# Patient Record
Sex: FEMALE | Race: WHITE | HISPANIC OR LATINO | Employment: OTHER | URBAN - METROPOLITAN AREA
[De-identification: names, ages, dates, MRNs, and addresses within clinical notes are randomized per-mention and may not be internally consistent; named-entity substitution may affect disease eponyms.]

---

## 2017-01-10 ENCOUNTER — HOSPITAL ENCOUNTER (OUTPATIENT)
Dept: LAB | Facility: MEDICAL CENTER | Age: 62
End: 2017-01-10
Attending: INTERNAL MEDICINE
Payer: MEDICAID

## 2017-01-10 ENCOUNTER — OFFICE VISIT (OUTPATIENT)
Dept: HEMATOLOGY ONCOLOGY | Facility: MEDICAL CENTER | Age: 62
End: 2017-01-10

## 2017-01-10 VITALS
HEIGHT: 62 IN | TEMPERATURE: 99.7 F | BODY MASS INDEX: 29.04 KG/M2 | OXYGEN SATURATION: 96 % | SYSTOLIC BLOOD PRESSURE: 124 MMHG | DIASTOLIC BLOOD PRESSURE: 72 MMHG | HEART RATE: 90 BPM | WEIGHT: 157.8 LBS | RESPIRATION RATE: 16 BRPM

## 2017-01-10 DIAGNOSIS — C18.7 CANCER OF SIGMOID COLON (HCC): ICD-10-CM

## 2017-01-10 LAB
ALBUMIN SERPL BCP-MCNC: 4.3 G/DL (ref 3.2–4.9)
ALBUMIN/GLOB SERPL: 1.4 G/DL
ALP SERPL-CCNC: 101 U/L (ref 30–99)
ALT SERPL-CCNC: 17 U/L (ref 2–50)
ANION GAP SERPL CALC-SCNC: 7 MMOL/L (ref 0–11.9)
AST SERPL-CCNC: 17 U/L (ref 12–45)
BASOPHILS # BLD AUTO: 0.1 K/UL (ref 0–0.12)
BASOPHILS NFR BLD AUTO: 1.4 % (ref 0–1.8)
BILIRUB SERPL-MCNC: 0.3 MG/DL (ref 0.1–1.5)
BUN SERPL-MCNC: 19 MG/DL (ref 8–22)
CALCIUM SERPL-MCNC: 9.7 MG/DL (ref 8.5–10.5)
CEA SERPL-MCNC: 1.2 NG/ML (ref 0–3)
CHLORIDE SERPL-SCNC: 103 MMOL/L (ref 96–112)
CO2 SERPL-SCNC: 27 MMOL/L (ref 20–33)
CREAT SERPL-MCNC: 0.86 MG/DL (ref 0.5–1.4)
EOSINOPHIL # BLD: 0.3 K/UL (ref 0–0.51)
EOSINOPHIL NFR BLD AUTO: 4.3 % (ref 0–6.9)
ERYTHROCYTE [DISTWIDTH] IN BLOOD BY AUTOMATED COUNT: 41.8 FL (ref 35.9–50)
GLOBULIN SER CALC-MCNC: 3.1 G/DL (ref 1.9–3.5)
GLUCOSE SERPL-MCNC: 88 MG/DL (ref 65–99)
HCT VFR BLD AUTO: 45.2 % (ref 37–47)
HGB BLD-MCNC: 14.9 G/DL (ref 12–16)
IMM GRANULOCYTES # BLD AUTO: 0.02 K/UL (ref 0–0.11)
IMM GRANULOCYTES NFR BLD AUTO: 0.3 % (ref 0–0.9)
LYMPHOCYTES # BLD: 1.54 K/UL (ref 1–4.8)
LYMPHOCYTES NFR BLD AUTO: 22.1 % (ref 22–41)
MCH RBC QN AUTO: 28.8 PG (ref 27–33)
MCHC RBC AUTO-ENTMCNC: 33 G/DL (ref 33.6–35)
MCV RBC AUTO: 87.4 FL (ref 81.4–97.8)
MONOCYTES # BLD: 0.91 K/UL (ref 0–0.85)
MONOCYTES NFR BLD AUTO: 13 % (ref 0–13.4)
NEUTROPHILS # BLD: 4.11 K/UL (ref 2–7.15)
NEUTROPHILS NFR BLD AUTO: 58.9 % (ref 44–72)
NRBC # BLD AUTO: 0 K/UL
NRBC BLD-RTO: 0 /100 WBC
PLATELET # BLD AUTO: 355 K/UL (ref 164–446)
PMV BLD AUTO: 9.4 FL (ref 9–12.9)
POTASSIUM SERPL-SCNC: 3.9 MMOL/L (ref 3.6–5.5)
PROT SERPL-MCNC: 7.4 G/DL (ref 6–8.2)
RBC # BLD AUTO: 5.17 M/UL (ref 4.2–5.4)
SODIUM SERPL-SCNC: 137 MMOL/L (ref 135–145)
WBC # BLD AUTO: 7 K/UL (ref 4.8–10.8)

## 2017-01-10 PROCEDURE — 80053 COMPREHEN METABOLIC PANEL: CPT

## 2017-01-10 PROCEDURE — 85025 COMPLETE CBC W/AUTO DIFF WBC: CPT

## 2017-01-10 PROCEDURE — 82378 CARCINOEMBRYONIC ANTIGEN: CPT

## 2017-01-10 PROCEDURE — 99245 OFF/OP CONSLTJ NEW/EST HI 55: CPT | Performed by: INTERNAL MEDICINE

## 2017-01-10 PROCEDURE — 36415 COLL VENOUS BLD VENIPUNCTURE: CPT

## 2017-01-10 NOTE — MR AVS SNAPSHOT
"Tim Sutton Crump   1/10/2017 4:00 PM   Office Visit   MRN: [id number]    Department:  [facility]   Dept Phone:  [phone]    Description:  Female : 1955   Provider:  Keturah Burgos M.D.           Reason for Visit     Other cancer of sigmoid colon      Allergies as of 1/10/2017     No Known Allergies      You were diagnosed with     Cancer of sigmoid colon (HCC)   [619921]         Vital Signs     Blood Pressure Pulse Temperature Respirations Height Weight    124/72 mmHg 90 37.6 °C (99.7 °F) 16 1.565 m (5' 1.61\") 71.578 kg (157 lb 12.8 oz)    Body Mass Index Oxygen Saturation Smoking Status             29.22 kg/m2 96% Never Smoker          Basic Information     Date Of Birth Sex Race Ethnicity Preferred Language    1955 Female  or   Origin (Sami,Kittitian,Lebanese,Citizen of [place], etc) Bur[place]ian      Your appointments     2017 10:00 AM   CT BODY WITH with [facility] CT 1   [facility]    [address]  [facility] [address]   [phone]           Taking medications as regularly scheduled is strongly encouraged.  *For Abdominal CT-Patient needs to  oral contrast and instruction from the department at least 2 hours prior to exam. Patient may  contrast at any imaging facility.              Problem List              ICD-10-CM Priority Class Noted - Resolved    Cancer of sigmoid colon (HCC) C18.7   2016 - Present    Fourth degree hemorrhoids K64.3   2016 - Present      Health Maintenance     Patient has no pending health maintenance at this time      Current Immunizations     Influenza Vaccine Quad Inj (Pf) 10/1/2016      Below and/or attached are the medications your provider expects you to take. Review all of your home medications and newly ordered medications with your provider and/or pharmacist. Follow medication instructions as directed by your provider and/or pharmacist. Please keep your medication list with " you and share with your provider. Update the information when medications are discontinued, doses are changed, or new medications (including over-the-counter products) are added; and carry medication information at all times in the event of emergency situations     Allergies:  No Known Allergies          Medications  Valid as of: January 10, 2017 -  5:14 PM    Generic Name Brand Name Tablet Size Instructions for use    Atorvastatin Calcium (Tab) LIPITOR 10 MG Take 5 mg by mouth every evening.        Cetirizine HCl (Tab) ZYRTEC 10 MG Take 10 mg by mouth every morning. Indications: Hayfever        Citalopram Hydrobromide (Tab) CELEXA 40 MG Take 40 mg by mouth every bedtime.        Hydrocodone-Acetaminophen (Tab) NORCO 5-325 MG Take 1-2 Tabs by mouth every four hours as needed.        MetFORMIN HCl (Tab) GLUCOPHAGE 500 MG Take 500 mg by mouth 2 times a day, with meals.        Montelukast Sodium (Tab) SINGULAIR 10 MG Take 10 mg by mouth every bedtime. Indications: Hayfever        Ondansetron (TABLET DISPERSIBLE) ZOFRAN ODT 8 MG Take 1 Tab by mouth every 8 hours as needed for Nausea/Vomiting.        ValACYclovir HCl (Tab) VALTREX 500 MG Take 500 mg by mouth 2 times a day. 3 day course        .                 Medicines prescribed today were sent to:     [facility] [address] [place] [address] [place] [address] [place] [address] [place] [address]    Phone: [phone] Fax: [phone]    Open 24 Hours?: No      Medication refill instructions:       If your prescription bottle indicates you have medication refills left, it is not necessary to call your provider’s office. Please contact your pharmacy and they will refill your medication.    If your prescription bottle indicates you do not have any refills left, you may request refills at any time through one of the following ways: The online Netrounds system (except Urgent Care), by calling your provider’s office, or by asking your pharmacy to  contact your provider’s office with a refill request. Medication refills are processed only during regular business hours and may not be available until the next business day. Your provider may request additional information or to have a follow-up visit with you prior to refilling your medication.   *Please Note: Medication refills are assigned a new Rx number when refilled electronically. Your pharmacy may indicate that no refills were authorized even though a new prescription for the same medication is available at the pharmacy. Please request the medicine by name with the pharmacy before contacting your provider for a refill.        Your To Do List     Future Labs/Procedures Complete By Expires    CBC WITH DIFFERENTIAL  As directed 1/10/2018    CEA  As directed 1/10/2018    COMP METABOLIC PANEL  As directed 1/10/2018    CT-CHEST,ABDOMEN,PELVIS WITH  As directed 1/10/2018         iTwin Access Code: [email or web address]  Expires: 1/31/2017  8:16 AM    iTwin  A secure, online tool to manage your health information     [employer]’s iTwin® is a secure, online tool that connects you to your personalized health information from the privacy of your home -- day or night - making it very easy for you to manage your healthcare. Once the activation process is completed, you can even access your medical information using the iTwin erin, which is available for free in the Apple Erin store or Google Play store.     iTwin provides the following levels of access (as shown below):   My Chart Features   [facility] Doctor [facility]  Specialists [facility]  Urgent  Care [facility]  Primary Care  Doctor   Email your healthcare team securely and privately 24/7 X X X    Manage appointments: schedule your next appointment; view details of past/upcoming appointments X      Request prescription refills. X      View recent personal medical records, including lab and immunizations X X X X   View health record, including health  history, allergies, medications X X X X   Read reports about your outpatient visits, procedures, consult and ER notes X X X X   See your discharge summary, which is a recap of your hospital and/or ER visit that includes your diagnosis, lab results, and care plan. X X       How to register for Cloudyn:  1. Go to  [email or web address].  2. Click on the Sign Up Now box, which takes you to the New Member Sign Up page. You will need to provide the following information:  a. Enter your Cloudyn Access Code exactly as it appears at the top of this page. (You will not need to use this code after you’ve completed the sign-up process. If you do not sign up before the expiration date, you must request a new code.)   b. Enter your date of birth.   c. Enter your home email address.   d. Click Submit, and follow the next screen’s instructions.  3. Create a Cloudyn ID. This will be your Cloudyn login ID and cannot be changed, so think of one that is secure and easy to remember.  4. Create a Tell[employer]et password. You can change your password at any time.  5. Enter your Password Reset Question and Answer. This can be used at a later time if you forget your password.   6. Enter your e-mail address. This allows you to receive e-mail notifications when new information is available in Cloudyn.  7. Click Sign Up. You can now view your health information.    For assistance activating your Cloudyn account, call [phone]

## 2017-01-10 NOTE — Clinical Note
[facility]    [address]  [place] [address]          To: Dr. Enoch Shaw M.D.  [address]  [place] [address]     Date:1/10/2017                     Reference to Tim Crump  Consult:  Oncology      Date of Consultation:  1/10/2017  Time:  4 pm           Referring Physician: Dr. Enoch Shaw  Primary Care:  William Naranjo D.O.  Surgeon:  Dr. Shaw  Gastroenterology: Dr. Fuller      Diagnosis: New diagnosis of colon cancer      Chief Complaint: She is here to establish care      History of Presenting Illness:  Tim Crump is a 61 y.o. female with recent diagnosis of colon cancer.  She has been clinically stable and has been getting screening FOBT in the past which has been negative.  She started to develop intermittent diarrhea for the past yeast.  She underwent repeat FOBT in 2016 which was positive.  5/16/16 colonoscopy showed polyps located in the sigmoid colon.  She underwent partial snare removal which was positive for moderately differentiated adenocarcinoma with indeterminate margins.  This was followed flexible sigmoidoscopy which showed the tattooed polyp stalk. CEA was 1. CT abdomen and pelvis showed no evidence of metastatic disease, she has left renal cortical scarring and L5 and S1 spondylolisthesis.  She was seen by Dr. Shaw.  She was originally scheduled for surgery in 8/2016 however this needed to be rescheduled as there were some issues with authorizations and insurance.  She eventually underwent laparoscopic sigmoid resection with low anterior pelvic anastomosis on 12/7/16.   Pathology showed previous tattooed polypectomy site with no residual malignancy, 1/13 nodes, clear margins and previous biopsy was moderately differentiated.  She was staged as pT0 pN1a and MLH1, MSH2, MSH6 and PMS2 are intact. She has been recovering well from the surgery.  She was kindly referred for further assessment and discussion.             She is here to establish care and is accompanied by her son who was present in the room. She was doing well till about 3 days ago when she developed a sore throat.  She also has a dry cough which has been minimal and denies any shortness of breath. She has chronic lower back pain which has been stable.  She has intermittent nausea and no vomiting.  She denies any abdominal pain. She denies headaches, dizziness, diarrhea, constipation, fever, chills or night sweats.                Past Medical History:  1.  Colon cancer    2. Pre DM  3. H/o HTN    4. Hyperlipidemia    5. Seasonal allergies    6. Arthritis, Knee and hands  7. Anxiety and depression                Past Surgical History   Past Surgical History    Procedure  Laterality  Date    •  Gyn surgery            hysterectomy    •  Gyn surgery             x 2    •  Other            varicose vein stripping rubina    •  Other orthopedic surgery  Left          left wrist    •  Low anterior resection laparoscopic    2016        Procedure: LOW ANTERIOR RESECTION LAPAROSCOPIC;  Surgeon: Enoch Shaw M.D.;  Location: SURGERY [facility];  Service:              Social History:    Smoking: life long non smoker  ETOH: Denies  Drugs: Denies  She lives in [place]  She lives with her son  No working  She is a   No exposures  She has two kids      Family History:  1. Paternal Grandmother: lung cancer  2. Paternal aunt: breast cancer  3. Paternal aunt and uncle: brain cancer           Allergies as of 01/10/2017    •  (No Known Allergies)          Current outpatient prescriptions:    •  valacyclovir (VALTREX) 500 MG Tab, Take 500 mg by mouth 2 times a day. 3 day course, Disp: , Rfl:    •  atorvastatin (LIPITOR) 10 MG Tab, Take 5 mg by mouth every evening., Disp: , Rfl:    •  montelukast (SINGULAIR) 10 MG Tab, Take 10 mg by mouth every bedtime. Indications: Hayfever, Disp: , Rfl:    •  cetirizine (ZYRTEC) 10 MG Tab, Take 10 mg by mouth  "every morning. Indications: Hayfever, Disp: , Rfl:    •  metformin (GLUCOPHAGE) 500 MG TABS, Take 500 mg by mouth 2 times a day, with meals., Disp: , Rfl:    •  citalopram (CELEXA) 40 MG TABS, Take 40 mg by mouth every bedtime., Disp: , Rfl:    •  hydrocodone-acetaminophen (NORCO) 5-325 MG Tab per tablet, Take 1-2 Tabs by mouth every four hours as needed., Disp: 40 Tab, Rfl: 0  •  ondansetron (ZOFRAN ODT) 8 MG TABLET DISPERSIBLE, Take 1 Tab by mouth every 8 hours as needed for Nausea/Vomiting., Disp: 20 Tab, Rfl: 0      Review of Systems:  All other review of systems are negative except what was mentioned above in the HPI.  Constitutional: Negative for fever, chills, weight loss and malaise/fatigue.    HENT: Negative for ear pain and nosebleeds.     Eyes: Negative for blurred vision.    Respiratory: Positive for cough. Negative for sputum production and shortness of breath.     Cardiovascular: Negative for chest pain and leg swelling.    Gastrointestinal: Intermittent nausea. Negative for vomiting and abdominal pain.    Genitourinary: Negative for dysuria.    Musculoskeletal: Negative for myalgias. Positive for lower back pain and joint pain.    Skin: Negative for rash and itching.    Neurological: Negative for dizziness, tingling, tremors, sensory change, focal weakness and headaches.    Endo/Heme/Allergies: No bruise/bleed easily.    Psychiatric/Behavioral: Negative for depression and memory loss.        Physical Exam:   Vitals   Filed Vitals:      01/10/17 1602    BP:  124/72    Pulse:  90    Temp:  37.6 °C (99.7 °F)    Resp:  16    Height:  1.575 m (5' 2.01\")    Weight:  71.578 kg (157 lb 12.8 oz)    SpO2:  96%             General: Not in acute distress, alert and oriented x 3  HEENT: normalcephalic, atraumatic, pupils equally reactive to light bilaterally, extra ocular muscles intact, moist oral mucus membranes, and oral cavity without any lesions.  Neck: Supple neck and full range of motion  Lymph nodes: No " palpable bilateral cervical, supraclavicular, or inguinal lymphadenopathy.     CVS: regular rate and rhythm  RESP: Clear to auscultate bilaterally.    ABD: Soft, non tender, non distended, positive bowel sounds, no palpable hepatomegaly    EXT: No edema or cyanosis  CNS: Alert and oriented x3, cranial nerves grossly intact, muscle strength grossly intact, and normal gait.       Labs:  No visits with results within 1 Day(s) from this visit.  Latest known visit with results is:      Admission on 12/07/2016, Discharged on 12/11/2016    Component  Date  Value  Ref Range  Status    •  Glucose - Accu-Ck  12/07/2016  96   65 - 99 mg/dL  Final    •  Sodium  12/08/2016  141   135 - 145 mmol/L  Final    •  Potassium  12/08/2016  4.6   3.6 - 5.5 mmol/L  Final    •  Chloride  12/08/2016  111   96 - 112 mmol/L  Final    •  Co2  12/08/2016  25   20 - 33 mmol/L  Final    •  Anion Gap  12/08/2016  5.0   0.0 - 11.9  Final    •  Glucose  12/08/2016  207*  65 - 99 mg/dL  Final    •  Bun  12/08/2016  13   8 - 22 mg/dL  Final    •  Creatinine  12/08/2016  0.94   0.50 - 1.40 mg/dL  Final    •  Calcium  12/08/2016  8.9   8.5 - 10.5 mg/dL  Final    •  AST(SGOT)  12/08/2016  14   12 - 45 U/L  Final    •  ALT(SGPT)  12/08/2016  12   2 - 50 U/L  Final    •  Alkaline Phosphatase  12/08/2016  65   30 - 99 U/L  Final    •  Total Bilirubin  12/08/2016  0.4   0.1 - 1.5 mg/dL  Final    •  Albumin  12/08/2016  3.3   3.2 - 4.9 g/dL  Final    •  Total Protein  12/08/2016  6.2   6.0 - 8.2 g/dL  Final    •  Globulin  12/08/2016  2.9   1.9 - 3.5 g/dL  Final    •  A-G Ratio  12/08/2016  1.1     Final    •  WBC  12/08/2016  13.7*  4.8 - 10.8 K/uL  Final    •  RBC  12/08/2016  4.87   4.20 - 5.40 M/uL  Final    •  Hemoglobin  12/08/2016  14.2   12.0 - 16.0 g/dL  Final    •  Hematocrit  12/08/2016  42.7   37.0 - 47.0 %  Final    •  MCV  12/08/2016  87.7   81.4 - 97.8 fL  Final    •  MCH  12/08/2016  29.2   27.0 - 33.0 pg  Final    •  MCHC  12/08/2016  33.3*   33.6 - 35.0 g/dL  Final    •  RDW  12/08/2016  42.8   35.9 - 50.0 fL  Final    •  Platelet Count  12/08/2016  340   164 - 446 K/uL  Final    •  MPV  12/08/2016  8.8*  9.0 - 12.9 fL  Final    •  GFR If   12/08/2016  >60   >60 mL/min/1.73 m 2  Final    •  GFR If Non   12/08/2016  >60   >60 mL/min/1.73 m 2  Final    •  WBC  12/09/2016  9.7   4.8 - 10.8 K/uL  Final    •  RBC  12/09/2016  4.33   4.20 - 5.40 M/uL  Final    •  Hemoglobin  12/09/2016  12.6   12.0 - 16.0 g/dL  Final    •  Hematocrit  12/09/2016  38.0   37.0 - 47.0 %  Final    •  MCV  12/09/2016  87.8   81.4 - 97.8 fL  Final    •  MCH  12/09/2016  29.1   27.0 - 33.0 pg  Final    •  MCHC  12/09/2016  33.2*  33.6 - 35.0 g/dL  Final    •  RDW  12/09/2016  42.9   35.9 - 50.0 fL  Final    •  Platelet Count  12/09/2016  282   164 - 446 K/uL  Final    •  MPV  12/09/2016  9.1   9.0 - 12.9 fL  Final    •  WBC  12/10/2016  8.4   4.8 - 10.8 K/uL  Final    •  RBC  12/10/2016  4.37   4.20 - 5.40 M/uL  Final    •  Hemoglobin  12/10/2016  12.4   12.0 - 16.0 g/dL  Final    •  Hematocrit  12/10/2016  37.8   37.0 - 47.0 %  Final    •  MCV  12/10/2016  86.5   81.4 - 97.8 fL  Final    •  MCH  12/10/2016  28.4   27.0 - 33.0 pg  Final    •  MCHC  12/10/2016  32.8*  33.6 - 35.0 g/dL  Final    •  RDW  12/10/2016  40.8   35.9 - 50.0 fL  Final    •  Platelet Count  12/10/2016  271   164 - 446 K/uL  Final    •  MPV  12/10/2016  9.0   9.0 - 12.9 fL  Final    •  WBC  12/11/2016  8.8   4.8 - 10.8 K/uL  Final    •  RBC  12/11/2016  4.36   4.20 - 5.40 M/uL  Final    •  Hemoglobin  12/11/2016  12.6   12.0 - 16.0 g/dL  Final    •  Hematocrit  12/11/2016  37.7   37.0 - 47.0 %  Final    •  MCV  12/11/2016  86.5   81.4 - 97.8 fL  Final    •  MCH  12/11/2016  28.9   27.0 - 33.0 pg  Final    •  MCHC  12/11/2016  33.4*  33.6 - 35.0 g/dL  Final    •  RDW  12/11/2016  40.6   35.9 - 50.0 fL  Final    •  Platelet Count  12/11/2016  299   164 - 446 K/uL  Final    •  MPV   2016  9.1   9.0 - 12.9 fL  Final              Imagin/3/16 CT abdomen and pelvis: No evidence of metastatic disease is identified. Moderate amount of colonic stool. Mild left renal cortical scarring. L5 spondylolysis with spondylolisthesis of L5 on S1.            Pathology:  16: FINAL DIAGNOSIS:    A. Sigmoid colon:         Sigmoid colon demonstrates the previous tattooed polypectomy          site.  There is a large circumscribed focus of tattoo ink          within the submucosa with associated macrophages.         There is overlying benign colonic mucosa.         No residual polyp or malignancy is seen.         The previous tattooed polypectomy site is well free of the          proximal and distal resection margins.  Benign mesenteric          resection margin negative for malignancy.         One of thirteen lymph nodes (1/13) positive for metastatic          adenocarcinoma.         Benign anastomotic ring negative for malignancy.         See synoptic report and comment below.    Synoptic Report for Primary Carcinomas of Colon/Rectum:         -Specimen: Sigmoid colon.       -Procedure: Laparoscopic sigmoid resection with low anterior        pelvic anastomosis.       -Specimen Length: 13 cm.       -Tumor Site: Sigmoid colon.       -Tumor Location: Previous polypectomy site is located above        peritoneal reflection.       -Tumor Size: No residual tumor present.  Previous sigmoid polyp        demonstrated a 1.1 cm invasive moderately differentiated        adenocarcinoma.       -Macroscopic Tumor Perforation: Not identified.       -Macroscopic Intactness of Mesorectum: Not applicable.       -Histologic Type: No residual tumor present.  Previous sigmoid        polyp demonstrated an invasive moderately differentiated        adenocarcinoma arising in an adenoma.       -Histologic Grade: Moderately differentiated (previous sigmoid        polyp).       -Microscopic Tumor Extension: No evidence of  primary tumor.       -Margins:        Proximal Margin: Uninvolved by invasive adenocarcinoma.        Distal Margin: Uninvolved by invasive adenocarcinoma.        Circumferential (Radial) Margin: Not applicable.        Mesenteric Margin: Uninvolved by invasive adenocarcinoma.        Other Margins: Not applicable.        Distance from closest margin: The previous tattooed polypectomy         site is 5.5 cm from the open resection margin, 8.5 cm from the         stapled resection margin and 8.2 cm from the closest mesenteric         stapled margin.       -Treatment Effect: No prior treatment.       -Lymph-Vascular Invasion: Not identified.       -Perineural Invasion: Not identified.       -Tumor Deposits: Not identified.       -Type of Polyp in Which Invasive Carcinoma Arose: Adenoma (per        previous sigmoid polyp pathology report).       -Pathologic Staging:        Primary Tumor: pT0 (No evidence of primary tumor).        Regional Lymph Nodes: pN1a          Number of lymph nodes examined: 13.          Number of lymph nodes involved: 1.        Distant Metastasis: Not applicable.       -Additional Pathologic Findings: None identified.       -Ancillary Studies: Immunohistochemistry for mismatch repair        proteins was performed on the previous sigmoid colon polyp        ([id number]).  MLH1, MSH2, MSH6 and PMS2 are intact (normal).        See separate [employer] report.          Assessment & Plan:  Moderately differentiated adenocarcinoma, 1/13 nodes, T1 pN1a, Stage IIIA: She was diagnosed due to abnormal FOBT and colonoscopy showed sigmoid colon polyp which was positive for moderately differentiated adenocarcinoma of colon.  Staging work up at the time was negative for metastatic disease.  She was due to get resection, however this was delayed due to insurance issues. She eventually underwent laparoscopic sigmoid resection with low anterior pelvic anastomosis. Pathology showed previous polypectomy site  with no residual malignancy, 1/13 nodes with intact MSI.  She was staged N1a disease.       I had a long discussion with the patient in regards to her current status. I went over imaging and pathology report.  We had a long discussion about colon cancer, staging, goals of treatment and various treatment options.  She is found to have a polyp which was positive and on resection was found to have 1/13 nodes, hence she is staged as T1 pN1a, stage IIIA disease. I recommend repeat CT scan to rule out metastatic disease.  As she has stage III disease, she is a candidate for chemotherapy.  I went over adjuvant chemotherapy with Xelox vs. FOLFOX vs. single agent 5fu/xeloda. She is fairly healthy, hence I recommend Xelox x 6 months.  I will also check her labs and continue to follow CEA levels.  I went over some of the side effects as well as the process involved.     I will order labs including CEA levels.  A CT CAP with contrast has been ordered.  She will be scheduled for chemotherapy education.  We went over the option of port vs. peripheral IV. She would like to try peripheral IV. Plan is to start her on chemotherapy in 2 weeks.  She is to follow up prior to starting chemotherapy or sooner as needed.        she agreed and verbalized her agreement and understanding with the current plan. I answered all questions and concerns she has at this time and advised her to call at any time in the interim with questions or concerns in regards to her care. Thank you for allowing me to participate in her care, I will continue to follow.      Please note that this dictation was created using voice recognition software. I have made every reasonable attempt to correct obvious errors, but I expect that there are errors of grammar and possibly content that I did not discover before finalizing the note.          Sincerely,  Keturah Burgos  [address] [place] [address], [address]   [phone]

## 2017-01-11 ENCOUNTER — TELEPHONE (OUTPATIENT)
Dept: OTHER | Facility: MEDICAL CENTER | Age: 62
End: 2017-01-11

## 2017-01-11 NOTE — PROGRESS NOTES
Consult:  Oncology    Date of Consultation:  1/10/2017  Time:  4 pm       Referring Physician: Dr. Enoch Shaw  Primary Care:  William Naranjo D.O.  Surgeon:  Dr. Shaw  Gastroenterology: Dr. Fuller    Diagnosis: New diagnosis of colon cancer    Chief Complaint: She is here to establish care    History of Presenting Illness:  Tim Crump is a 61 y.o. female with recent diagnosis of colon cancer.  She has been clinically stable and has been getting screening FOBT in the past which has been negative.  She started to develop intermittent diarrhea for the past yeast.  She underwent repeat FOBT in 2016 which was positive.  5/16/16 colonoscopy showed polyps located in the sigmoid colon.  She underwent partial snare removal which was positive for moderately differentiated adenocarcinoma with indeterminate margins.  This was followed flexible sigmoidoscopy which showed the tattooed polyp stalk. CEA was 1.  CT abdomen and pelvis showed no evidence of metastatic disease, she has left renal cortical scarring and L5 and S1 spondylolisthesis.  She was seen by Dr. Shaw.  She was originally scheduled for surgery in 8/2016 however this needed to be rescheduled as there were some issues with authorizations and insurance.  She eventually underwent laparoscopic sigmoid resection with low anterior pelvic anastomosis on 12/7/16.   Pathology showed previous tattooed polypectomy site with no residual malignancy, 1/13 nodes, clear margins and previous biopsy was moderately differentiated.  She was staged as pT0 pN1a and MLH1, MSH2, MSH6 and PMS2 are intact. She has been recovering well from the surgery.  She was kindly referred for further assessment and discussion.      She is here to establish care and is accompanied by her son who was present in the room. She was doing well till about 3 days ago when she developed a sore throat.  She also has a dry cough which has been minimal and denies any shortness of breath. She has  chronic lower back pain which has been stable.  She has intermittent nausea and no vomiting.  She denies any abdominal pain. She denies headaches, dizziness, diarrhea, constipation, fever, chills or night sweats.            Past Medical History:  1.  Colon cancer   2. Pre DM  3. H/o HTN   4. Hyperlipidemia   5. Seasonal allergies   6. Arthritis, Knee and hands  7. Anxiety and depression         Past Surgical History   Procedure Laterality Date   • Gyn surgery       hysterectomy   • Gyn surgery        x 2   • Other       varicose vein stripping rubina   • Other orthopedic surgery Left      left wrist   • Low anterior resection laparoscopic  2016     Procedure: LOW ANTERIOR RESECTION LAPAROSCOPIC;  Surgeon: Enoch Shaw M.D.;  Location: SURGERY [facility];  Service:        Social History:   Smoking: life long non smoker  ETOH: Denies  Drugs: Denies  She lives in [place]  She lives with her son  No working  She is a   No exposures  She has two kids    Family History:  1. Paternal Grandmother: lung cancer  2. Paternal aunt: breast cancer  3. Paternal aunt and uncle: brain cancer       Allergies as of 01/10/2017   • (No Known Allergies)         Current outpatient prescriptions:   •  valacyclovir (VALTREX) 500 MG Tab, Take 500 mg by mouth 2 times a day. 3 day course, Disp: , Rfl:   •  atorvastatin (LIPITOR) 10 MG Tab, Take 5 mg by mouth every evening., Disp: , Rfl:   •  montelukast (SINGULAIR) 10 MG Tab, Take 10 mg by mouth every bedtime. Indications: Hayfever, Disp: , Rfl:   •  cetirizine (ZYRTEC) 10 MG Tab, Take 10 mg by mouth every morning. Indications: Hayfever, Disp: , Rfl:   •  metformin (GLUCOPHAGE) 500 MG TABS, Take 500 mg by mouth 2 times a day, with meals., Disp: , Rfl:   •  citalopram (CELEXA) 40 MG TABS, Take 40 mg by mouth every bedtime., Disp: , Rfl:   •  hydrocodone-acetaminophen (NORCO) 5-325 MG Tab per tablet, Take 1-2 Tabs by mouth every four hours as needed.,  "Disp: 40 Tab, Rfl: 0  •  ondansetron (ZOFRAN ODT) 8 MG TABLET DISPERSIBLE, Take 1 Tab by mouth every 8 hours as needed for Nausea/Vomiting., Disp: 20 Tab, Rfl: 0    Review of Systems:  All other review of systems are negative except what was mentioned above in the HPI.  Constitutional: Negative for fever, chills, weight loss and malaise/fatigue.    HENT: Negative for ear pain and nosebleeds.     Eyes: Negative for blurred vision.    Respiratory: Positive for cough. Negative for sputum production and shortness of breath.    Cardiovascular: Negative for chest pain and leg swelling.    Gastrointestinal: Intermittent nausea. Negative for vomiting and abdominal pain.    Genitourinary: Negative for dysuria.    Musculoskeletal: Negative for myalgias. Positive for lower back pain and joint pain.    Skin: Negative for rash and itching.    Neurological: Negative for dizziness, tingling, tremors, sensory change, focal weakness and headaches.    Endo/Heme/Allergies: No bruise/bleed easily.    Psychiatric/Behavioral: Negative for depression and memory loss.      Physical Exam:  Filed Vitals:    01/10/17 1602   BP: 124/72   Pulse: 90   Temp: 37.6 °C (99.7 °F)   Resp: 16   Height: 1.575 m (5' 2.01\")   Weight: 71.578 kg (157 lb 12.8 oz)   SpO2: 96%       General: Not in acute distress, alert and oriented x 3  HEENT: normalcephalic, atraumatic, pupils equally reactive to light bilaterally, extra ocular muscles intact, moist oral mucus membranes, and oral cavity without any lesions.  Neck: Supple neck and full range of motion  Lymph nodes: No palpable bilateral cervical, supraclavicular, or inguinal lymphadenopathy.    CVS: regular rate and rhythm  RESP: Clear to auscultate bilaterally.   ABD: Soft, non tender, non distended, positive bowel sounds, no palpable hepatomegaly   EXT: No edema or cyanosis  CNS: Alert and oriented x3, cranial nerves grossly intact, muscle strength grossly intact, and normal gait.     Labs:  No visits with " results within 1 Day(s) from this visit.  Latest known visit with results is:    Admission on 12/07/2016, Discharged on 12/11/2016   Component Date Value Ref Range Status   • Glucose - Accu-Ck 12/07/2016 96  65 - 99 mg/dL Final   • Sodium 12/08/2016 141  135 - 145 mmol/L Final   • Potassium 12/08/2016 4.6  3.6 - 5.5 mmol/L Final   • Chloride 12/08/2016 111  96 - 112 mmol/L Final   • Co2 12/08/2016 25  20 - 33 mmol/L Final   • Anion Gap 12/08/2016 5.0  0.0 - 11.9 Final   • Glucose 12/08/2016 207* 65 - 99 mg/dL Final   • Bun 12/08/2016 13  8 - 22 mg/dL Final   • Creatinine 12/08/2016 0.94  0.50 - 1.40 mg/dL Final   • Calcium 12/08/2016 8.9  8.5 - 10.5 mg/dL Final   • AST(SGOT) 12/08/2016 14  12 - 45 U/L Final   • ALT(SGPT) 12/08/2016 12  2 - 50 U/L Final   • Alkaline Phosphatase 12/08/2016 65  30 - 99 U/L Final   • Total Bilirubin 12/08/2016 0.4  0.1 - 1.5 mg/dL Final   • Albumin 12/08/2016 3.3  3.2 - 4.9 g/dL Final   • Total Protein 12/08/2016 6.2  6.0 - 8.2 g/dL Final   • Globulin 12/08/2016 2.9  1.9 - 3.5 g/dL Final   • A-G Ratio 12/08/2016 1.1   Final   • WBC 12/08/2016 13.7* 4.8 - 10.8 K/uL Final   • RBC 12/08/2016 4.87  4.20 - 5.40 M/uL Final   • Hemoglobin 12/08/2016 14.2  12.0 - 16.0 g/dL Final   • Hematocrit 12/08/2016 42.7  37.0 - 47.0 % Final   • MCV 12/08/2016 87.7  81.4 - 97.8 fL Final   • MCH 12/08/2016 29.2  27.0 - 33.0 pg Final   • MCHC 12/08/2016 33.3* 33.6 - 35.0 g/dL Final   • RDW 12/08/2016 42.8  35.9 - 50.0 fL Final   • Platelet Count 12/08/2016 340  164 - 446 K/uL Final   • MPV 12/08/2016 8.8* 9.0 - 12.9 fL Final   • GFR If  12/08/2016 >60  >60 mL/min/1.73 m 2 Final   • GFR If Non  12/08/2016 >60  >60 mL/min/1.73 m 2 Final   • WBC 12/09/2016 9.7  4.8 - 10.8 K/uL Final   • RBC 12/09/2016 4.33  4.20 - 5.40 M/uL Final   • Hemoglobin 12/09/2016 12.6  12.0 - 16.0 g/dL Final   • Hematocrit 12/09/2016 38.0  37.0 - 47.0 % Final   • MCV 12/09/2016 87.8  81.4 - 97.8 fL Final    • MCH 2016 29.1  27.0 - 33.0 pg Final   • MCHC 2016 33.2* 33.6 - 35.0 g/dL Final   • RDW 2016 42.9  35.9 - 50.0 fL Final   • Platelet Count 2016 282  164 - 446 K/uL Final   • MPV 2016 9.1  9.0 - 12.9 fL Final   • WBC 12/10/2016 8.4  4.8 - 10.8 K/uL Final   • RBC 12/10/2016 4.37  4.20 - 5.40 M/uL Final   • Hemoglobin 12/10/2016 12.4  12.0 - 16.0 g/dL Final   • Hematocrit 12/10/2016 37.8  37.0 - 47.0 % Final   • MCV 12/10/2016 86.5  81.4 - 97.8 fL Final   • MCH 12/10/2016 28.4  27.0 - 33.0 pg Final   • MCHC 12/10/2016 32.8* 33.6 - 35.0 g/dL Final   • RDW 12/10/2016 40.8  35.9 - 50.0 fL Final   • Platelet Count 12/10/2016 271  164 - 446 K/uL Final   • MPV 12/10/2016 9.0  9.0 - 12.9 fL Final   • WBC 2016 8.8  4.8 - 10.8 K/uL Final   • RBC 2016 4.36  4.20 - 5.40 M/uL Final   • Hemoglobin 2016 12.6  12.0 - 16.0 g/dL Final   • Hematocrit 2016 37.7  37.0 - 47.0 % Final   • MCV 2016 86.5  81.4 - 97.8 fL Final   • MCH 2016 28.9  27.0 - 33.0 pg Final   • MCHC 2016 33.4* 33.6 - 35.0 g/dL Final   • RDW 2016 40.6  35.9 - 50.0 fL Final   • Platelet Count 2016 299  164 - 446 K/uL Final   • MPV 2016 9.1  9.0 - 12.9 fL Final       Imagin. 6/3/16 CT abdomen and pelvis: No evidence of metastatic disease is identified. Moderate amount of colonic stool. Mild left renal cortical scarring. L5 spondylolysis with spondylolisthesis of L5 on S1.        Pathology:  16: FINAL DIAGNOSIS:    A. Sigmoid colon:         Sigmoid colon demonstrates the previous tattooed polypectomy          site.  There is a large circumscribed focus of tattoo ink          within the submucosa with associated macrophages.         There is overlying benign colonic mucosa.         No residual polyp or malignancy is seen.         The previous tattooed polypectomy site is well free of the          proximal and distal resection margins.  Benign mesenteric           resection margin negative for malignancy.         One of thirteen lymph nodes (1/13) positive for metastatic          adenocarcinoma.         Benign anastomotic ring negative for malignancy.         See synoptic report and comment below.    Synoptic Report for Primary Carcinomas of Colon/Rectum:         -Specimen: Sigmoid colon.       -Procedure: Laparoscopic sigmoid resection with low anterior        pelvic anastomosis.       -Specimen Length: 13 cm.       -Tumor Site: Sigmoid colon.       -Tumor Location: Previous polypectomy site is located above        peritoneal reflection.       -Tumor Size: No residual tumor present.  Previous sigmoid polyp        demonstrated a 1.1 cm invasive moderately differentiated        adenocarcinoma.       -Macroscopic Tumor Perforation: Not identified.       -Macroscopic Intactness of Mesorectum: Not applicable.       -Histologic Type: No residual tumor present.  Previous sigmoid        polyp demonstrated an invasive moderately differentiated        adenocarcinoma arising in an adenoma.       -Histologic Grade: Moderately differentiated (previous sigmoid        polyp).       -Microscopic Tumor Extension: No evidence of primary tumor.       -Margins:        Proximal Margin: Uninvolved by invasive adenocarcinoma.        Distal Margin: Uninvolved by invasive adenocarcinoma.        Circumferential (Radial) Margin: Not applicable.        Mesenteric Margin: Uninvolved by invasive adenocarcinoma.        Other Margins: Not applicable.        Distance from closest margin: The previous tattooed polypectomy         site is 5.5 cm from the open resection margin, 8.5 cm from the         stapled resection margin and 8.2 cm from the closest mesenteric         stapled margin.       -Treatment Effect: No prior treatment.       -Lymph-Vascular Invasion: Not identified.       -Perineural Invasion: Not identified.       -Tumor Deposits: Not identified.       -Type of Polyp in Which Invasive Carcinoma  Arose: Adenoma (per        previous sigmoid polyp pathology report).       -Pathologic Staging:        Primary Tumor: pT0 (No evidence of primary tumor).        Regional Lymph Nodes: pN1a          Number of lymph nodes examined: 13.          Number of lymph nodes involved: 1.        Distant Metastasis: Not applicable.       -Additional Pathologic Findings: None identified.       -Ancillary Studies: Immunohistochemistry for mismatch repair        proteins was performed on the previous sigmoid colon polyp        ([id number]).  MLH1, MSH2, MSH6 and PMS2 are intact (normal).        See separate [employer] report.      Assessment & Plan:  1. Moderately differentiated adenocarcinoma, 1/13 nodes, T1 pN1a, Stage IIIA: She was diagnosed due to abnormal FOBT and colonoscopy showed sigmoid colon polyp which was positive for moderately differentiated adenocarcinoma of colon.  Staging work up at the time was negative for metastatic disease.  She was due to get resection, however this was delayed due to insurance issues. She eventually underwent laparoscopic sigmoid resection with low anterior pelvic anastomosis. Pathology showed previous polypectomy site with no residual malignancy, 1/13 nodes with intact MSI.  She was staged N1a disease.     I had a long discussion with the patient in regards to her current status. I went over imaging and pathology report.  We had a long discussion about colon cancer, staging, goals of treatment and various treatment options.  She is found to have a polyp which was positive and on resection was found to have 1/13 nodes, hence she is staged as T1 pN1a, stage IIIA disease. I recommend repeat CT scan to rule out metastatic disease.  As she has stage III disease, she is a candidate for chemotherapy.  I went over adjuvant chemotherapy with Xelox vs. FOLFOX vs. single agent 5fu/xeloda. She is fairly healthy, hence I recommend Xelox x 6 months.  I will also check her labs and continue to  follow CEA levels.  I went over some of the side effects as well as the process involved.    2. I will order labs including CEA levels.  A CT CAP with contrast has been ordered.  She will be scheduled for chemotherapy education.  We went over the option of port vs. peripheral IV. She would like to try peripheral IV. Plan is to start her on chemotherapy in 2 weeks.  She is to follow up prior to starting chemotherapy or sooner as needed.      she agreed and verbalized her agreement and understanding with the current plan. I answered all questions and concerns she has at this time and advised her to call at any time in the interim with questions or concerns in regards to her care. Thank you for allowing me to participate in her care, I will continue to follow.    Please note that this dictation was created using voice recognition software. I have made every reasonable attempt to correct obvious errors, but I expect that there are errors of grammar and possibly content that I did not discover before finalizing the note.

## 2017-01-11 NOTE — PROGRESS NOTES
Called patient and asked if patient and her son could meet with this SETH and KEYANA Parker tomorrow in the afternoon. Patient states her son works every day until 1430 but they could meet at 1500. Scheduled appointment to meet. After speaking with patient this ONN spoke with KEYANA Parker who had a conflict in her schedule and suggested meeting next Wednesday January 18th at 1500. Called patient again and confirmed that this would be a good time, patient agreeable. Canceled appointment with patient for tomorrow 1/12/2017 at 1500 and made appointment to see patient and her son 1/18/2017 at 1500.

## 2017-01-12 ENCOUNTER — HOSPITAL ENCOUNTER (OUTPATIENT)
Dept: RADIOLOGY | Facility: MEDICAL CENTER | Age: 62
End: 2017-01-12
Attending: INTERNAL MEDICINE
Payer: COMMERCIAL

## 2017-01-12 DIAGNOSIS — C18.7 CANCER OF SIGMOID COLON (HCC): ICD-10-CM

## 2017-01-12 PROCEDURE — 71260 CT THORAX DX C+: CPT

## 2017-01-12 PROCEDURE — 700117 HCHG RX CONTRAST REV CODE 255: Performed by: INTERNAL MEDICINE

## 2017-01-12 RX ADMIN — IOHEXOL 100 ML: 350 INJECTION, SOLUTION INTRAVENOUS at 11:45

## 2017-01-16 ENCOUNTER — TELEPHONE (OUTPATIENT)
Dept: HEMATOLOGY ONCOLOGY | Facility: MEDICAL CENTER | Age: 62
End: 2017-01-16

## 2017-01-16 NOTE — TELEPHONE ENCOUNTER
Nutrition Services:    RD referral received via [employer]. Spoke to pt on phone for potential in personal consultation regarding her nutrition status, and how to optimize it throughout her treatment. Pt would like to meet when she is getting her new chemo education on medical oncology on the 24th at 11.     RD to follow-up and meet with patient then.

## 2017-01-23 ENCOUNTER — TELEPHONE (OUTPATIENT)
Dept: HEMATOLOGY ONCOLOGY | Facility: MEDICAL CENTER | Age: 62
End: 2017-01-23

## 2017-01-23 NOTE — TELEPHONE ENCOUNTER
Xeloda prescription currently in process.  Patient has been approved through insurance, however she has a high co-pay of about $3,000/month for this medication.  Unfortunately, due to patient's undocumented status, she does not qualify for several assistance programs.  There are a few resources being looked into for this patient by our financial counselor, Matteo.

## 2017-01-24 ENCOUNTER — TELEPHONE (OUTPATIENT)
Dept: HEMATOLOGY ONCOLOGY | Facility: MEDICAL CENTER | Age: 62
End: 2017-01-24

## 2017-01-24 ENCOUNTER — NON-PROVIDER VISIT (OUTPATIENT)
Dept: HEMATOLOGY ONCOLOGY | Facility: MEDICAL CENTER | Age: 62
End: 2017-01-24
Payer: MEDICAID

## 2017-01-24 VITALS
BODY MASS INDEX: 27.78 KG/M2 | DIASTOLIC BLOOD PRESSURE: 70 MMHG | SYSTOLIC BLOOD PRESSURE: 114 MMHG | HEART RATE: 68 BPM | RESPIRATION RATE: 16 BRPM | OXYGEN SATURATION: 95 % | TEMPERATURE: 98.9 F | WEIGHT: 156.8 LBS | HEIGHT: 63 IN

## 2017-01-24 DIAGNOSIS — C18.7 CANCER OF SIGMOID COLON (HCC): ICD-10-CM

## 2017-01-24 RX ORDER — ONDANSETRON 4 MG/1
4 TABLET, FILM COATED ORAL EVERY 4 HOURS PRN
Qty: 30 TAB | Refills: 3 | Status: SHIPPED | OUTPATIENT
Start: 2017-01-24 | End: 2019-09-03

## 2017-01-24 RX ORDER — LIDOCAINE AND PRILOCAINE 25; 25 MG/G; MG/G
CREAM TOPICAL
Qty: 1 TUBE | Refills: 3 | Status: SHIPPED | OUTPATIENT
Start: 2017-01-24 | End: 2019-09-03

## 2017-01-24 RX ORDER — PROCHLORPERAZINE MALEATE 10 MG
10 TABLET ORAL EVERY 6 HOURS PRN
Qty: 30 TAB | Refills: 3 | Status: SHIPPED | OUTPATIENT
Start: 2017-01-24 | End: 2019-09-03

## 2017-01-24 RX ORDER — LOPERAMIDE HYDROCHLORIDE 2 MG/1
CAPSULE ORAL
Qty: 30 CAP | Refills: 3 | Status: SHIPPED | OUTPATIENT
Start: 2017-01-24 | End: 2018-08-18

## 2017-01-24 NOTE — MR AVS SNAPSHOT
"        Tim Crump   2017 1:30 PM   Non-Provider Visit   MRN: [id number]    Department:  [facility]   Dept Phone:  [phone]    Description:  Female : 1955   Provider:  ONC RN 1           Reason for Visit     Other Chemo education (xeloda and oxaliplatin)      Allergies as of 2017     No Known Allergies      Vital Signs     Blood Pressure Pulse Temperature Respirations Height Weight    114/70 mmHg 68 37.2 °C (98.9 °F) 16 1.59 m (5' 2.61\") 71.124 kg (156 lb 12.8 oz)    Body Mass Index Oxygen Saturation Breastfeeding? Smoking Status          28.13 kg/m2 95% No Never Smoker         Basic Information     Date Of Birth Sex Race Ethnicity Preferred Language    1955 Female  or   Origin (Romansh,Belarusian,Japanese,Kuwaiti, etc) English      Your appointments     2017 10:00 AM   Non Provider 1 with ONC RN 1   [facility] (--)    [address] [facility] [address]  [facility] [id number] [phone]           You will be receiving a confirmation call a few days before your appointment from our automated call confirmation system.            2017 10:30 AM   ONCOLOGY EST PATIENT 30 MIN with LUIS CARLOS Forman   [facility] (--)    [address], [facility] [address]  [facility] [address] [phone]            2017 11:30 AM   New Chemo 3 with RN 1   [facility] ([facility])    [address] [facility] [address]  [facility] [address] [phone]            2017  9:00 AM   ONCOLOGY EST PATIENT 30 MIN with Keturah Burgos M.D.   [facility] (--)    [address], [facility] [address]  [facility] [address] [phone]            2017  8:00 AM   Non Provider 1 with ONC RN 1   [facility] (--)    [address], [facility] [address]  [facility] [address] [phone]           You will be receiving a confirmation call a few days before your appointment from our automated call confirmation system.            2017  " 9:00 AM   ONCOLOGY EST PATIENT 30 MIN with Keturah Burgos M.D.   [facility] (--)    [address] [facility]  [place] [address] [phone]            Feb 16, 2017  9:30 AM   Est Chemo 3 with RN 6   [facility] ([facility])    [address] [facility] [address]  [place] [address]   [phone]              Problem List              ICD-10-CM Priority Class Noted - Resolved    Cancer of sigmoid colon (CMS-HCC) C18.7   12/7/2016 - Present    Fourth degree hemorrhoids K64.3   12/7/2016 - Present      Health Maintenance        Date Due Completion Dates    IMM DTaP/Tdap/Td Vaccine (1 - Tdap) 1/23/1976 ---    PAP SMEAR 1/23/1978 ---    COLONOSCOPY 1/23/2007 ---    MAMMOGRAM 2/7/2014 2/7/2013, 1/23/2012, 2/9/2011, 7/9/2010, 7/9/2010, 1/5/2010, 12/23/2009, 12/23/2009, 11/26/2008, 11/26/2008, 12/4/2007, 12/4/2007, 11/10/2006, 10/27/2005    IMM ZOSTER VACCINE 1/23/2017 ---            Current Immunizations     Influenza Vaccine Quad Inj (Pf) 10/1/2016      Below and/or attached are the medications your provider expects you to take. Review all of your home medications and newly ordered medications with your provider and/or pharmacist. Follow medication instructions as directed by your provider and/or pharmacist. Please keep your medication list with you and share with your provider. Update the information when medications are discontinued, doses are changed, or new medications (including over-the-counter products) are added; and carry medication information at all times in the event of emergency situations     Allergies:  No Known Allergies          Medications  Valid as of: January 24, 2017 -  3:47 PM    Generic Name Brand Name Tablet Size Instructions for use    Atorvastatin Calcium (Tab) LIPITOR 10 MG Take 5 mg by mouth every evening.        Cetirizine HCl (Tab) ZYRTEC 10 MG Take 10 mg by mouth every morning. Indications: Hayfever        Citalopram Hydrobromide (Tab) CELEXA 40 MG Take 40 mg by mouth every  bedtime.        Hydrocodone-Acetaminophen (Tab) NORCO 5-325 MG Take 1-2 Tabs by mouth every four hours as needed.        Lidocaine-Prilocaine (Cream) EMLA 2.5-2.5 % Apply to port 1 hour prior to access of port and cover with plastic wrap.        Loperamide HCl (Cap) IMODIUM 2 MG Give loperamide 4 mg orally first dose, then 2 mg orally with every loose bowel movement.  Contact physician if maximum of 16 mg/24 hours is exceeded.        Melatonin (Cap) Melatonin 1 MG Take  by mouth.        MetFORMIN HCl (Tab) GLUCOPHAGE 500 MG Take 500 mg by mouth 2 times a day, with meals.        Montelukast Sodium (Tab) SINGULAIR 10 MG Take 10 mg by mouth every bedtime. Indications: Hayfever        Ondansetron (TABLET DISPERSIBLE) ZOFRAN ODT 8 MG Take 1 Tab by mouth every 8 hours as needed for Nausea/Vomiting.        Ondansetron HCl (Tab) ZOFRAN 4 MG Take 1 Tab by mouth every four hours as needed for Nausea/Vomiting.        Prochlorperazine Maleate (Tab) COMPAZINE 10 MG Take 1 Tab by mouth every 6 hours as needed.        ValACYclovir HCl (Tab) VALTREX 500 MG Take 500 mg by mouth 2 times a day. 3 day course        .                 Medicines prescribed today were sent to:     [facility] [address] [place] [address] [place] - [facility] [place] [facility] [address] [place] [address] [place] [address]    Phone: [phone] Fax: [phone]    Open 24 Hours?: No      Medication refill instructions:       If your prescription bottle indicates you have medication refills left, it is not necessary to call your provider’s office. Please contact your pharmacy and they will refill your medication.    If your prescription bottle indicates you do not have any refills left, you may request refills at any time through one of the following ways: The online [employer] system (except Urgent Care), by calling your provider’s office, or by asking your pharmacy to contact your provider’s office with a refill request. Medication refills are processed only  during regular business hours and may not be available until the next business day. Your provider may request additional information or to have a follow-up visit with you prior to refilling your medication.   *Please Note: Medication refills are assigned a new Rx number when refilled electronically. Your pharmacy may indicate that no refills were authorized even though a new prescription for the same medication is available at the pharmacy. Please request the medicine by name with the pharmacy before contacting your provider for a refill.           "[employer]" Access Code: [email or web address]  Expires: 1/31/2017  8:16 AM    "[employer]"  A secure, online tool to manage your health information     [employer]’s "[employer]"® is a secure, online tool that connects you to your personalized health information from the privacy of your home -- day or night - making it very easy for you to manage your healthcare. Once the activation process is completed, you can even access your medical information using the "[employer]" erin, which is available for free in the Apple Erin store or Google Play store.     "[employer]" provides the following levels of access (as shown below):   My Chart Features   [facility] Doctor [facility]  Urgent  Care [facility]  Primary Care  Doctor   Email your healthcare team securely and privately 24/7 X X X    Manage appointments: schedule your next appointment; view details of past/upcoming appointments X      Request prescription refills. X      View recent personal medical records, including lab and immunizations X X X X   View health record, including health history, allergies, medications X X X X   Read reports about your outpatient visits, procedures, consult and ER notes X X X X   See your discharge summary, which is a recap of your hospital and/or ER visit that includes your diagnosis, lab results, and care plan. X X       How to register for "[employer]":  1. Go to   [email or web address].  2. Click on the Sign Up Now box, which takes you to the New Member Sign Up page. You will need to provide the following information:  a. Enter your [employer] Access Code exactly as it appears at the top of this page. (You will not need to use this code after you’ve completed the sign-up process. If you do not sign up before the expiration date, you must request a new code.)   b. Enter your date of birth.   c. Enter your home email address.   d. Click Submit, and follow the next screen’s instructions.  3. Create a [employer] ID. This will be your [employer] login ID and cannot be changed, so think of one that is secure and easy to remember.  4. Create a [employer] password. You can change your password at any time.  5. Enter your Password Reset Question and Answer. This can be used at a later time if you forget your password.   6. Enter your e-mail address. This allows you to receive e-mail notifications when new information is available in [employer].  7. Click Sign Up. You can now view your health information.    For assistance activating your [employer] account, call [phone]

## 2017-01-24 NOTE — TELEPHONE ENCOUNTER
Patient unable to get Xeloda prescription d/t co-pays. Will have to give patient FOLFOX instead. Patient will need a port placed. I discussed this with her surgeon and he has agreed to place a port. I spoke to patient and her son and they are in agreement with the plan. This discussion and plan was at the request of Dr. Burgos.

## 2017-01-24 NOTE — NON-PROVIDER
Patient is established with Dr. Burgos for colon cancer.  She will be starting on FOLFOX chemotherapy, and is here today for a pre-chemotherapy teaching appointment. Patient is accompanied by her son for today's visit.    Educated patient on chemotherapy including but not limited to: Infusion Policies and procedures, cycling of chemotherapy, length of treatment, alopecia, myelosuppression, infection prevention, fatigue, GI distress, use of specific nausea medications, avoidance of alcoholic beverages and supplement medications, use of sunscreen, chemotherapy precautions at home, and invasive procedures. Patient was provided with drug specific handouts, [facility] chemotherapy and you book, [facility] eating hints book, [facility] side effects sheet. Patient was given tour of Infusion Services and shown where to park and check-in.     The following prescriptions were given to the patient and she is aware to pick them up prior to starting chemo next Thursday, 2/2.      A total of 60 minutes was spent with this patient for chemotherapy education.

## 2017-01-25 ENCOUNTER — APPOINTMENT (OUTPATIENT)
Dept: HEMATOLOGY ONCOLOGY | Facility: MEDICAL CENTER | Age: 62
End: 2017-01-25
Payer: MEDICAID

## 2017-01-25 NOTE — TELEPHONE ENCOUNTER
Nutrition Services:  Per 2002 Nutritional Risk Screening guidelines, patient with score = 1.  Categorized as mild level of impaired nutritional status, as evidenced by hypermetabolic disease state.     Pt Dx: Colon cancer, per MD documentation.    Hx of pre-diabetes, hypertension, hyperlipidemia.     RD met with patient & pt's son (Krishna) today to establish rapport and review estimated nutrient needs for weight maintenance throughout cancer treatment.  Patient denies any difficulty tolerating solid foods and liquid beverages at this time.  RD explained the importance of weight maintenance throughout chemotherapy and reviewed common side effects pertinent to nutrition status.  Patient denies constipation, diarrhea, nausea or emesis at this time.  RD also reviewed remedies for diarrhea, emesis, changes in taste and smell, should they occur during chemotherapy - provided in-depth handout, as well.  Pt's son inquired about omega-3 supplements for anti-inflammation - RD encouraged omega-3 from eating foods vs supplementation during chemo, only to prevent any adverse interactions.  RD also encouraged patient to discuss all supplements with MD prior to taking.  Patient and patient's son verbalize very good understanding.     Plan/Recommend:  Additional goals as stated above.     FEMALE  cm/kg     Nutrition Needs Assessment:       Height (inches) 63 160.02     Weight (lbs) 157.0 71.36     Age (years) 62      BMI 27.89      Total Daily Calorie Needs per MSJ x1.3  1629 23 kcal/kg   Total Daily Protein Needs (1.2-1.4 g/kg Act BW) 86 100     Daily Fluids (30 mL per Act BW) 2141

## 2017-01-26 ENCOUNTER — HOSPITAL ENCOUNTER (OUTPATIENT)
Facility: MEDICAL CENTER | Age: 62
End: 2017-01-26
Attending: INTERNAL MEDICINE | Admitting: INTERNAL MEDICINE
Payer: COMMERCIAL

## 2017-01-26 ENCOUNTER — APPOINTMENT (OUTPATIENT)
Dept: RADIOLOGY | Facility: MEDICAL CENTER | Age: 62
End: 2017-01-26
Attending: INTERNAL MEDICINE
Payer: COMMERCIAL

## 2017-01-26 ENCOUNTER — APPOINTMENT (OUTPATIENT)
Dept: ONCOLOGY | Facility: MEDICAL CENTER | Age: 62
End: 2017-01-26
Attending: INTERNAL MEDICINE

## 2017-01-26 VITALS
WEIGHT: 156.4 LBS | OXYGEN SATURATION: 95 % | RESPIRATION RATE: 12 BRPM | HEIGHT: 61 IN | TEMPERATURE: 97.5 F | SYSTOLIC BLOOD PRESSURE: 94 MMHG | HEART RATE: 66 BPM | DIASTOLIC BLOOD PRESSURE: 50 MMHG | BODY MASS INDEX: 29.53 KG/M2

## 2017-01-26 DIAGNOSIS — C18.7 CANCER OF SIGMOID COLON (HCC): ICD-10-CM

## 2017-01-26 LAB — INR BLD: 1.1

## 2017-01-26 PROCEDURE — 85610 PROTHROMBIN TIME: CPT

## 2017-01-26 PROCEDURE — 700111 HCHG RX REV CODE 636 W/ 250 OVERRIDE (IP)

## 2017-01-26 PROCEDURE — 700101 HCHG RX REV CODE 250

## 2017-01-26 PROCEDURE — 77001 FLUOROGUIDE FOR VEIN DEVICE: CPT

## 2017-01-26 PROCEDURE — 36561 INSERT TUNNELED CV CATH: CPT

## 2017-01-26 PROCEDURE — 76937 US GUIDE VASCULAR ACCESS: CPT

## 2017-01-26 PROCEDURE — 99153 MOD SED SAME PHYS/QHP EA: CPT

## 2017-01-26 RX ORDER — BUPIVACAINE HYDROCHLORIDE AND EPINEPHRINE 2.5; 5 MG/ML; UG/ML
INJECTION, SOLUTION EPIDURAL; INFILTRATION; INTRACAUDAL; PERINEURAL
Status: COMPLETED
Start: 2017-01-26 | End: 2017-01-26

## 2017-01-26 RX ORDER — CEFAZOLIN SODIUM 1 G/3ML
INJECTION, POWDER, FOR SOLUTION INTRAMUSCULAR; INTRAVENOUS
Status: COMPLETED
Start: 2017-01-26 | End: 2017-01-26

## 2017-01-26 RX ORDER — ONDANSETRON 2 MG/ML
4 INJECTION INTRAMUSCULAR; INTRAVENOUS EVERY 8 HOURS PRN
Status: DISCONTINUED | OUTPATIENT
Start: 2017-01-26 | End: 2017-01-26 | Stop reason: HOSPADM

## 2017-01-26 RX ORDER — ONDANSETRON 2 MG/ML
4 INJECTION INTRAMUSCULAR; INTRAVENOUS PRN
Status: DISCONTINUED | OUTPATIENT
Start: 2017-01-26 | End: 2017-01-26 | Stop reason: HOSPADM

## 2017-01-26 RX ORDER — MIDAZOLAM HYDROCHLORIDE 1 MG/ML
.5-2 INJECTION INTRAMUSCULAR; INTRAVENOUS PRN
Status: DISCONTINUED | OUTPATIENT
Start: 2017-01-26 | End: 2017-01-26 | Stop reason: HOSPADM

## 2017-01-26 RX ORDER — SODIUM CHLORIDE 9 MG/ML
INJECTION, SOLUTION INTRAVENOUS
Status: DISCONTINUED | OUTPATIENT
Start: 2017-01-26 | End: 2017-01-26 | Stop reason: HOSPADM

## 2017-01-26 RX ORDER — MIDAZOLAM HYDROCHLORIDE 1 MG/ML
INJECTION INTRAMUSCULAR; INTRAVENOUS
Status: COMPLETED
Start: 2017-01-26 | End: 2017-01-26

## 2017-01-26 RX ORDER — OXYCODONE HYDROCHLORIDE 5 MG/1
2.5 TABLET ORAL
Status: DISCONTINUED | OUTPATIENT
Start: 2017-01-26 | End: 2017-01-26 | Stop reason: HOSPADM

## 2017-01-26 RX ORDER — SODIUM CHLORIDE 9 MG/ML
500 INJECTION, SOLUTION INTRAVENOUS PRN
Status: DISCONTINUED | OUTPATIENT
Start: 2017-01-26 | End: 2017-01-26 | Stop reason: HOSPADM

## 2017-01-26 RX ADMIN — FENTANYL CITRATE 25 MCG: 50 INJECTION, SOLUTION INTRAMUSCULAR; INTRAVENOUS at 15:32

## 2017-01-26 RX ADMIN — BUPIVACAINE HYDROCHLORIDE AND EPINEPHRINE BITARTRATE: 2.5; .0091 INJECTION, SOLUTION EPIDURAL; INFILTRATION; INTRACAUDAL; PERINEURAL at 15:35

## 2017-01-26 RX ADMIN — MIDAZOLAM HYDROCHLORIDE 1 MG: 1 INJECTION INTRAMUSCULAR; INTRAVENOUS at 15:34

## 2017-01-26 RX ADMIN — HEPARIN: 100 SYRINGE at 15:35

## 2017-01-26 RX ADMIN — CEFAZOLIN 1 G: 1 INJECTION, POWDER, FOR SOLUTION INTRAVENOUS at 15:32

## 2017-01-26 RX ADMIN — MIDAZOLAM HYDROCHLORIDE 1 MG: 1 INJECTION INTRAMUSCULAR; INTRAVENOUS at 15:32

## 2017-01-26 RX ADMIN — MIDAZOLAM 1 MG: 1 INJECTION INTRAMUSCULAR; INTRAVENOUS at 15:32

## 2017-01-26 RX ADMIN — SODIUM CHLORIDE: 9 INJECTION, SOLUTION INTRAVENOUS at 14:45

## 2017-01-26 ASSESSMENT — PAIN SCALES - GENERAL
PAINLEVEL_OUTOF10: 0

## 2017-01-26 NOTE — PROGRESS NOTES
IR RN note:    Tunneled port placement by MD Giles assisted by RT Camar[place],Right upper chest and IJ access site;  Port placed   BARD PowerPort Implantable  Port with 8Fr polyurethane open-ended single lumen Venous REF# [id number] LOT# [id number]  Patient tolerated procedure, hemodynamically stable; report given to [facility] RN Timothy;   patient transported back to [facility] via RN

## 2017-01-26 NOTE — IP AVS SNAPSHOT
" After Visit Summary                                                                                                                Name:Tim Crump  Medical Record Number:[id number]  CSN: [id number]    YOB: 1955   Age: 62 y.o.  Sex: female  HT:1.549 m (5' 1\") WT: 70.943 kg (156 lb 6.4 oz)          Admit Date: 1/26/2017     Discharge Date:   Today's Date: 1/26/2017  Attending Doctor:  Adriano Mcarthur M.D.                  Allergies:  Review of patient's allergies indicates no known allergies.                Discharge Instructions         ACTIVITY: Rest and take it easy for the first 24 hours.  A responsible adult is recommended to remain with you during that time.  It is normal to feel sleepy.  We encourage you to not do anything that requires balance, judgment or coordination.    MILD FLU-LIKE SYMPTOMS ARE NORMAL. YOU MAY EXPERIENCE GENERALIZED MUSCLE ACHES, THROAT IRRITATION, HEADACHE AND/OR SOME NAUSEA.    FOR 24 HOURS DO NOT:  Drive, operate machinery or run household appliances.  Drink beer or alcoholic beverages.   Make important decisions or sign legal documents.      DIET: To avoid nausea, slowly advance diet as tolerated, avoiding spicy or greasy foods for the first day.  Add more substantial food to your diet according to your physician's instructions.  Babies can be fed formula or breast milk as soon as they are hungry.  INCREASE FLUIDS AND FIBER TO AVOID CONSTIPATION.    SURGICAL DRESSING/BATHING:     Keep the dressing clean and dry.  May change the dressing as needed.   Leave steri strips on.   Do not submerge for 1 week.  No heavy lifting.      FOLLOW-UP APPOINTMENT:  A follow-up appointment should be arranged with your doctor; call to schedule.    You should CALL YOUR PHYSICIAN if you develop:  Fever greater than 101 degrees F.  Pain not relieved by medication, or persistent nausea or vomiting.  Excessive bleeding (blood soaking through dressing) or unexpected drainage from the " wound.  Extreme redness or swelling around the incision site, drainage of pus or foul smelling drainage.  Inability to urinate or empty your bladder within 8 hours.  Problems with breathing or chest pain.    You should call 911 if you develop problems with breathing or chest pain.  If you are unable to contact your doctor or surgical center, you should go to the nearest emergency room or urgent care center.      Physician's telephone #: [phone]    If any questions arise, call your doctor.  If your doctor is not available, please feel free to call the Surgical Center at [phone].  The Center is open Monday through Friday from 7AM to 7PM.  You can also call the HEALTH HOTLINE open 24 hours/day, 7 days/week and speak to a nurse at [phone], or toll free at [phone].    A registered nurse may call you a few days after your surgery to see how you are doing after your procedure.    MEDICATIONS: Resume taking daily medication.  Take prescribed pain medication with food.  If no medication is prescribed, you may take non-aspirin pain medication if needed.  PAIN MEDICATION CAN BE VERY CONSTIPATING.  Take a stool softener or laxative such as senokot, pericolace, or milk of magnesia if needed.      If your physician has prescribed pain medication that includes Acetaminophen (Tylenol), do not take additional Acetaminophen (Tylenol) while taking the prescribed medication.    Depression / Suicide Risk    As you are discharged from this [facility] facility, it is important to learn how to keep safe from harming yourself.    Recognize the warning signs:  · Abrupt changes in personality, positive or negative- including increase in energy   · Giving away possessions  · Change in eating patterns- significant weight changes-  positive or negative  · Change in sleeping patterns- unable to sleep or sleeping all the time   · Unwillingness or inability to communicate  · Depression  · Unusual sadness, discouragement and  loneliness  · Talk of wanting to die  · Neglect of personal appearance   · Rebelliousness- reckless behavior  · Withdrawal from people/activities they love  · Confusion- inability to concentrate     If you or a loved one observes any of these behaviors or has concerns about self-harm, here's what you can do:  · Talk about it- your feelings and reasons for harming yourself  · Remove any means that you might use to hurt yourself (examples: pills, rope, extension cords, firearm)  · Get professional help from the community (Mental Health, Substance Abuse, psychological counseling)  · Do not be alone:Call your Safe Contact- someone whom you trust who will be there for you.  · Call your local CRISIS HOTLINE 569-2280 or 359-437-2224  · Call your local Children's Mobile Crisis Response Team [place] (089) 729-6079 or www.[place]  · Call the toll free National Suicide Prevention Hotlines   · National Suicide Prevention Lifeline 223-300-ANJG (7196)  · National Hope Line Network 800-SUICIDE (683-3029)    Implanted Port Insertion, Care After  Refer to this sheet in the next few weeks. These instructions provide you with information on caring for yourself after your procedure. Your health care provider may also give you more specific instructions. Your treatment has been planned according to current medical practices, but problems sometimes occur. Call your health care provider if you have any problems or questions after your procedure.  WHAT TO EXPECT AFTER THE PROCEDURE  After your procedure, it is typical to have the following:   · Discomfort at the port insertion site. Ice packs to the area will help.  · Bruising on the skin over the port. This will subside in 3-4 days.  HOME CARE INSTRUCTIONS  · After your port is placed, you will get a 's information card. The card has information about your port. Keep this card with you at all times.    · Know what kind of port you have. There are many types of  ports available.    · Wear a medical alert bracelet in case of an emergency. This can help alert health care workers that you have a port.    · The port can stay in for as long as your health care provider believes it is necessary.    · A home health care nurse may give medicines and take care of the port.    · You or a family member can get special training and directions for giving medicine and taking care of the port at home.    SEEK MEDICAL CARE IF:   · Your port does not flush or you are unable to get a blood return.    · You have a fever or chills.  SEEK IMMEDIATE MEDICAL CARE IF:  · You have new fluid or pus coming from your incision.    · You notice a bad smell coming from your incision site.    · You have swelling, pain, or more redness at the incision or port site.    · You have chest pain or shortness of breath.     This information is not intended to replace advice given to you by your health care provider. Make sure you discuss any questions you have with your health care provider.     Document Released: 10/08/2014 Document Revised: 12/23/2014 Document Reviewed: 10/08/2014  [employer] Interactive Patient Education ©2016 [employer]         Medication List      CONTINUE taking these medications        Instructions    atorvastatin 10 MG Tabs   Commonly known as:  LIPITOR    Take 5 mg by mouth every evening.   Dose:  5 mg       cetirizine 10 MG Tabs   Commonly known as:  ZYRTEC    Take 10 mg by mouth every morning. Indications: Hayfever   Dose:  10 mg       citalopram 40 MG Tabs   Commonly known as:  CELEXA    Take 40 mg by mouth every bedtime.   Dose:  40 mg       hydrocodone-acetaminophen 5-325 MG Tabs per tablet   Commonly known as:  NORCO    Take 1-2 Tabs by mouth every four hours as needed.   Dose:  1-2 Tab       lidocaine-prilocaine 2.5-2.5 % Crea   Commonly known as:  EMLA    Apply to port 1 hour prior to access of port and cover with plastic wrap.       loperamide 2 MG Caps   Commonly known as:   IMODIUM    Give loperamide 4 mg orally first dose, then 2 mg orally with every loose bowel movement.  Contact physician if maximum of 16 mg/24 hours is exceeded.       Melatonin 1 MG Caps    Take  by mouth.       metformin 500 MG Tabs   Commonly known as:  GLUCOPHAGE    Take 500 mg by mouth 2 times a day, with meals.   Dose:  500 mg       montelukast 10 MG Tabs   Commonly known as:  SINGULAIR    Take 10 mg by mouth every bedtime. Indications: Hayfever   Dose:  10 mg       ondansetron 4 MG Tabs tablet   Commonly known as:  ZOFRAN    Take 1 Tab by mouth every four hours as needed for Nausea/Vomiting.   Dose:  4 mg       ondansetron 8 MG Tbdp   Commonly known as:  ZOFRAN ODT    Take 1 Tab by mouth every 8 hours as needed for Nausea/Vomiting.   Dose:  8 mg       prochlorperazine 10 MG Tabs   Commonly known as:  COMPAZINE    Take 1 Tab by mouth every 6 hours as needed.   Dose:  10 mg       valacyclovir 500 MG Tabs   Commonly known as:  VALTREX    Take 500 mg by mouth 2 times a day. 3 day course   Dose:  500 mg               Medication Information     Above and/or attached are the medications your physician expects you to take upon discharge. Review all of your home medications and newly ordered medications with your doctor and/or pharmacist. Follow medication instructions as directed by your doctor and/or pharmacist. Please keep your medication list with you and share with your physician. Update the information when medications are discontinued, doses are changed, or new medications (including over-the-counter products) are added; and carry medication information at all times in the event of emergency situations.        Resources     Quit Smoking / Tobacco Use:    I understand the use of any tobacco products increases my chance of suffering from future heart disease or stroke and could cause other illnesses which may shorten my life. Quitting the use of tobacco products is the single most important thing I can do to  improve my health. For further information on smoking / tobacco cessation call a Toll Free Quit Line at 1-879.915.5231 (*National [employer] [place]) or 1-441.981.5385 (American Lung Association) or you can access the web based program at www.lungusa.org.    [place] Tobacco Users Help Line:  (936) 165-6793       Toll Free: 1-500.226.9295  Quit Tobacco Program [facility] (381)861-6769    Crisis Hotline:    [place] Crisis Hotline:  2-804-CWXNPKJ or 1-158.552.7532    [place] Crisis Hotline:    1-718.102.3642 or 475-943-7430    Discharge Survey:   Thank you for choosing [facility]. We hope we did everything we could to make your hospital stay a pleasant one. You may be receiving a survey and we would appreciate your time and participation in answering the questions. Your input is very valuable to us in our efforts to improve our service to our patients and their families.            Signatures     My signature on this form indicates that:    1. I acknowledge receipt and understanding of these Home Care Instruction.  2. My questions regarding this information have been answered to my satisfaction.  3. I have formulated a plan with my discharge nurse to obtain my prescribed medications for home.    __________________________________      __________________________________                   Patient Signature                                 Guardian/Responsible Adult Signature      __________________________________                 __________       ________                       Nurse Signature                                               Date                 Time

## 2017-01-26 NOTE — IP AVS SNAPSHOT
1/26/2017          Tim Crump  [address] [place] [address]    [facility]    [facility] wants to ensure your discharge home is safe and you or your loved ones have had all your questions answered regarding your care after you leave the hospital.    You may receive a telephone call within two days of your discharge.  This call is to make certain you understand your discharge instructions as well as ensure we provided you with the best care possible during your stay with us.     The call will only last approximately 3-5 minutes and will be done by a nurse.    Once again, we want to ensure your discharge home is safe and that you have a clear understanding of any next steps in your care.  If you have any questions or concerns, please do not hesitate to contact us, we are here for you.  Thank you for choosing [facility] for your healthcare needs.    Sincerely,    Tim Felder    [facility]

## 2017-01-27 NOTE — OR SURGEON
Immediate Post- Operative Note    PostOp Diagnosis: VENOUS ACCESS REQUIRED FOR CHEMOTHERAPY      Procedure(s): RIGHT INTERNAL JUGULAR POWER PORT VENOUS ACCESS PLACEMENT WITH U/S AND FLUOROSCOPIC GUIDANCE    TRIM LENGTH 21 CM      Estimated Blood Loss: <5CC        Complications: NONE          1/26/2017     4:01 PM     Calvin Giles

## 2017-01-27 NOTE — DISCHARGE INSTRUCTIONS
ACTIVITY: Rest and take it easy for the first 24 hours.  A responsible adult is recommended to remain with you during that time.  It is normal to feel sleepy.  We encourage you to not do anything that requires balance, judgment or coordination.    MILD FLU-LIKE SYMPTOMS ARE NORMAL. YOU MAY EXPERIENCE GENERALIZED MUSCLE ACHES, THROAT IRRITATION, HEADACHE AND/OR SOME NAUSEA.    FOR 24 HOURS DO NOT:  Drive, operate machinery or run household appliances.  Drink beer or alcoholic beverages.   Make important decisions or sign legal documents.      DIET: To avoid nausea, slowly advance diet as tolerated, avoiding spicy or greasy foods for the first day.  Add more substantial food to your diet according to your physician's instructions.  Babies can be fed formula or breast milk as soon as they are hungry.  INCREASE FLUIDS AND FIBER TO AVOID CONSTIPATION.    SURGICAL DRESSING/BATHING:     Keep the dressing clean and dry.  May change the dressing as needed.   Leave steri strips on.   Do not submerge for 1 week.  No heavy lifting.      FOLLOW-UP APPOINTMENT:  A follow-up appointment should be arranged with your doctor; call to schedule.    You should CALL YOUR PHYSICIAN if you develop:  Fever greater than 101 degrees F.  Pain not relieved by medication, or persistent nausea or vomiting.  Excessive bleeding (blood soaking through dressing) or unexpected drainage from the wound.  Extreme redness or swelling around the incision site, drainage of pus or foul smelling drainage.  Inability to urinate or empty your bladder within 8 hours.  Problems with breathing or chest pain.    You should call 911 if you develop problems with breathing or chest pain.  If you are unable to contact your doctor or surgical center, you should go to the nearest emergency room or urgent care center.      Physician's telephone #: [phone]    If any questions arise, call your doctor.  If your doctor is not available, please feel free to call the  Surgical Center at [phone].  The Center is open Monday through Friday from 7AM to 7PM.  You can also call the HEALTH HOTLINE open 24 hours/day, 7 days/week and speak to a nurse at [phone], or toll free at [phone].    A registered nurse may call you a few days after your surgery to see how you are doing after your procedure.    MEDICATIONS: Resume taking daily medication.  Take prescribed pain medication with food.  If no medication is prescribed, you may take non-aspirin pain medication if needed.  PAIN MEDICATION CAN BE VERY CONSTIPATING.  Take a stool softener or laxative such as senokot, pericolace, or milk of magnesia if needed.      If your physician has prescribed pain medication that includes Acetaminophen (Tylenol), do not take additional Acetaminophen (Tylenol) while taking the prescribed medication.    Depression / Suicide Risk    As you are discharged from this [facility] facility, it is important to learn how to keep safe from harming yourself.    Recognize the warning signs:  · Abrupt changes in personality, positive or negative- including increase in energy   · Giving away possessions  · Change in eating patterns- significant weight changes-  positive or negative  · Change in sleeping patterns- unable to sleep or sleeping all the time   · Unwillingness or inability to communicate  · Depression  · Unusual sadness, discouragement and loneliness  · Talk of wanting to die  · Neglect of personal appearance   · Rebelliousness- reckless behavior  · Withdrawal from people/activities they love  · Confusion- inability to concentrate     If you or a loved one observes any of these behaviors or has concerns about self-harm, here's what you can do:  · Talk about it- your feelings and reasons for harming yourself  · Remove any means that you might use to hurt yourself (examples: pills, rope, extension cords, firearm)  · Get professional help from the community (Mental Health, Substance Abuse,  psychological counseling)  · Do not be alone:Call your Safe Contact- someone whom you trust who will be there for you.  · Call your local CRISIS HOTLINE 081-5657 or 864-663-6685  · Call your local Children's Mobile Crisis Response Team [place] (272) 167-4708 or www.[employer].com  · Call the toll free National Suicide Prevention Hotlines   · National Suicide Prevention Lifeline 204-624-FNEI (9891)  · National Hope Line Network 800-SUICIDE (921-6165)    Implanted Port Insertion, Care After  Refer to this sheet in the next few weeks. These instructions provide you with information on caring for yourself after your procedure. Your health care provider may also give you more specific instructions. Your treatment has been planned according to current medical practices, but problems sometimes occur. Call your health care provider if you have any problems or questions after your procedure.  WHAT TO EXPECT AFTER THE PROCEDURE  After your procedure, it is typical to have the following:   · Discomfort at the port insertion site. Ice packs to the area will help.  · Bruising on the skin over the port. This will subside in 3-4 days.  HOME CARE INSTRUCTIONS  · After your port is placed, you will get a 's information card. The card has information about your port. Keep this card with you at all times.    · Know what kind of port you have. There are many types of ports available.    · Wear a medical alert bracelet in case of an emergency. This can help alert health care workers that you have a port.    · The port can stay in for as long as your health care provider believes it is necessary.    · A home health care nurse may give medicines and take care of the port.    · You or a family member can get special training and directions for giving medicine and taking care of the port at home.    SEEK MEDICAL CARE IF:   · Your port does not flush or you are unable to get a blood return.    · You have a fever or  chills.  SEEK IMMEDIATE MEDICAL CARE IF:  · You have new fluid or pus coming from your incision.    · You notice a bad smell coming from your incision site.    · You have swelling, pain, or more redness at the incision or port site.    · You have chest pain or shortness of breath.     This information is not intended to replace advice given to you by your health care provider. Make sure you discuss any questions you have with your health care provider.     Document Released: 10/08/2014 Document Revised: 12/23/2014 Document Reviewed: 10/08/2014  [employer] Interactive Patient Education ©2016 [employer]

## 2017-01-27 NOTE — OR NURSING
1613 Patient from cath lab to PPU via gurney. Patient is awake. VSS. R SCL site soft and dressing clean, dry and intact. Vascular access care management per MD order (see assessment). No c/o pain or nausea at this time. Will monitor closely.  1625: VSS. Family at bedside.  1635: Patient tolerating PO well. Denies pain or nausea at this time.   1730: DC instructions given. Questions answered. Family at bedside. R SCL soft,CDI. No c/o pain or nausea. Patient wide awake. VSS. Patient met criteria for discharge.

## 2017-02-02 ENCOUNTER — OFFICE VISIT (OUTPATIENT)
Dept: HEMATOLOGY ONCOLOGY | Facility: MEDICAL CENTER | Age: 62
End: 2017-02-02

## 2017-02-02 ENCOUNTER — OUTPATIENT INFUSION SERVICES (OUTPATIENT)
Dept: ONCOLOGY | Facility: MEDICAL CENTER | Age: 62
End: 2017-02-02
Attending: INTERNAL MEDICINE
Payer: COMMERCIAL

## 2017-02-02 ENCOUNTER — HOSPITAL ENCOUNTER (OUTPATIENT)
Facility: MEDICAL CENTER | Age: 62
End: 2017-02-02
Attending: NURSE PRACTITIONER
Payer: COMMERCIAL

## 2017-02-02 ENCOUNTER — NON-PROVIDER VISIT (OUTPATIENT)
Dept: HEMATOLOGY ONCOLOGY | Facility: MEDICAL CENTER | Age: 62
End: 2017-02-02

## 2017-02-02 VITALS
TEMPERATURE: 98.1 F | SYSTOLIC BLOOD PRESSURE: 134 MMHG | BODY MASS INDEX: 29.74 KG/M2 | OXYGEN SATURATION: 97 % | WEIGHT: 161.6 LBS | DIASTOLIC BLOOD PRESSURE: 74 MMHG | HEART RATE: 68 BPM | HEIGHT: 62 IN | RESPIRATION RATE: 18 BRPM

## 2017-02-02 VITALS
WEIGHT: 161.6 LBS | BODY MASS INDEX: 30.51 KG/M2 | HEART RATE: 77 BPM | SYSTOLIC BLOOD PRESSURE: 118 MMHG | TEMPERATURE: 99.1 F | DIASTOLIC BLOOD PRESSURE: 78 MMHG | OXYGEN SATURATION: 96 % | RESPIRATION RATE: 16 BRPM | HEIGHT: 61 IN

## 2017-02-02 DIAGNOSIS — C18.7 CANCER OF SIGMOID COLON (HCC): ICD-10-CM

## 2017-02-02 LAB
ALBUMIN SERPL BCP-MCNC: 3.8 G/DL (ref 3.2–4.9)
ALBUMIN/GLOB SERPL: 1.5 G/DL
ALP SERPL-CCNC: 104 U/L (ref 30–99)
ALT SERPL-CCNC: 16 U/L (ref 2–50)
ANION GAP SERPL CALC-SCNC: 9 MMOL/L (ref 0–11.9)
AST SERPL-CCNC: 19 U/L (ref 12–45)
BASOPHILS # BLD AUTO: 0.05 K/UL (ref 0–0.12)
BASOPHILS NFR BLD AUTO: 0.6 % (ref 0–1.8)
BILIRUB SERPL-MCNC: 0.3 MG/DL (ref 0.1–1.5)
BUN SERPL-MCNC: 21 MG/DL (ref 8–22)
CALCIUM SERPL-MCNC: 9.3 MG/DL (ref 8.5–10.5)
CHLORIDE SERPL-SCNC: 106 MMOL/L (ref 96–112)
CO2 SERPL-SCNC: 22 MMOL/L (ref 20–33)
CREAT SERPL-MCNC: 0.8 MG/DL (ref 0.5–1.4)
EOSINOPHIL # BLD: 0.54 K/UL (ref 0–0.51)
EOSINOPHIL NFR BLD AUTO: 7 % (ref 0–6.9)
ERYTHROCYTE [DISTWIDTH] IN BLOOD BY AUTOMATED COUNT: 42.9 FL (ref 35.9–50)
GLOBULIN SER CALC-MCNC: 2.6 G/DL (ref 1.9–3.5)
GLUCOSE SERPL-MCNC: 175 MG/DL (ref 65–99)
HCT VFR BLD AUTO: 39.9 % (ref 37–47)
HGB BLD-MCNC: 13.5 G/DL (ref 12–16)
LYMPHOCYTES # BLD: 1.76 K/UL (ref 1–4.8)
LYMPHOCYTES NFR BLD AUTO: 22.7 % (ref 22–41)
MCH RBC QN AUTO: 28.8 PG (ref 27–33)
MCHC RBC AUTO-ENTMCNC: 33.8 G/DL (ref 33.6–35)
MCV RBC AUTO: 85.3 FL (ref 81.4–97.8)
MONOCYTES # BLD: 0.5 K/UL (ref 0–0.85)
MONOCYTES NFR BLD AUTO: 6.5 % (ref 0–13.4)
NEUTROPHILS # BLD: 4.89 K/UL (ref 2–7.15)
NEUTROPHILS NFR BLD AUTO: 63.2 % (ref 44–72)
PLATELET # BLD AUTO: 309 K/UL (ref 164–446)
PMV BLD AUTO: 9 FL (ref 9–12.9)
POTASSIUM SERPL-SCNC: 3.8 MMOL/L (ref 3.6–5.5)
PROT SERPL-MCNC: 6.4 G/DL (ref 6–8.2)
RBC # BLD AUTO: 4.68 M/UL (ref 4.2–5.4)
SODIUM SERPL-SCNC: 137 MMOL/L (ref 135–145)
WBC # BLD AUTO: 7.7 K/UL (ref 4.8–10.8)

## 2017-02-02 PROCEDURE — G0498 CHEMO EXTEND IV INFUS W/PUMP: HCPCS

## 2017-02-02 PROCEDURE — 700111 HCHG RX REV CODE 636 W/ 250 OVERRIDE (IP): Performed by: NURSE PRACTITIONER

## 2017-02-02 PROCEDURE — 36591 DRAW BLOOD OFF VENOUS DEVICE: CPT | Performed by: NURSE PRACTITIONER

## 2017-02-02 PROCEDURE — 306637 HCHG MISC ORTHO ITEM RC 0274

## 2017-02-02 PROCEDURE — 700111 HCHG RX REV CODE 636 W/ 250 OVERRIDE (IP)

## 2017-02-02 PROCEDURE — 99213 OFFICE O/P EST LOW 20 MIN: CPT | Performed by: NURSE PRACTITIONER

## 2017-02-02 PROCEDURE — 96415 CHEMO IV INFUSION ADDL HR: CPT

## 2017-02-02 PROCEDURE — 96413 CHEMO IV INFUSION 1 HR: CPT

## 2017-02-02 PROCEDURE — 96411 CHEMO IV PUSH ADDL DRUG: CPT

## 2017-02-02 PROCEDURE — 96409 CHEMO IV PUSH SNGL DRUG: CPT

## 2017-02-02 PROCEDURE — 96375 TX/PRO/DX INJ NEW DRUG ADDON: CPT

## 2017-02-02 PROCEDURE — 96368 THER/DIAG CONCURRENT INF: CPT

## 2017-02-02 RX ORDER — ONDANSETRON 4 MG/1
4 TABLET, FILM COATED ORAL EVERY 4 HOURS PRN
Qty: 30 TAB | Refills: 6 | Status: SHIPPED | OUTPATIENT
Start: 2017-02-02 | End: 2017-02-02

## 2017-02-02 RX ORDER — PALONOSETRON 0.05 MG/ML
0.25 INJECTION, SOLUTION INTRAVENOUS ONCE
Status: COMPLETED | OUTPATIENT
Start: 2017-02-02 | End: 2017-02-02

## 2017-02-02 RX ORDER — DEXTROSE MONOHYDRATE 50 MG/ML
INJECTION, SOLUTION INTRAVENOUS CONTINUOUS
Status: DISCONTINUED | OUTPATIENT
Start: 2017-02-02 | End: 2017-02-02 | Stop reason: HOSPADM

## 2017-02-02 RX ORDER — LIDOCAINE AND PRILOCAINE 25; 25 MG/G; MG/G
CREAM TOPICAL
Qty: 1 TUBE | Refills: 3 | Status: SHIPPED | OUTPATIENT
Start: 2017-02-02 | End: 2017-04-01

## 2017-02-02 RX ORDER — LOPERAMIDE HYDROCHLORIDE 2 MG/1
2 TABLET ORAL SEE ADMIN INSTRUCTIONS
Qty: 30 TAB | Refills: 6 | Status: SHIPPED | OUTPATIENT
Start: 2017-02-02 | End: 2017-06-24

## 2017-02-02 RX ORDER — PROCHLORPERAZINE MALEATE 10 MG
10 TABLET ORAL EVERY 6 HOURS PRN
Qty: 30 TAB | Refills: 6 | Status: SHIPPED | OUTPATIENT
Start: 2017-02-02 | End: 2017-02-02

## 2017-02-02 RX ADMIN — LEUCOVORIN CALCIUM 712 MG: 350 INJECTION, POWDER, LYOPHILIZED, FOR SOLUTION INTRAMUSCULAR; INTRAVENOUS at 12:33

## 2017-02-02 RX ADMIN — DEXAMETHASONE SODIUM PHOSPHATE 12 MG: 4 INJECTION, SOLUTION INTRAMUSCULAR; INTRAVENOUS at 12:02

## 2017-02-02 RX ADMIN — DEXTROSE MONOHYDRATE 151.3 MG: 50 INJECTION, SOLUTION INTRAVENOUS at 12:37

## 2017-02-02 RX ADMIN — FLUOROURACIL 710 MG: 50 INJECTION, SOLUTION INTRAVENOUS at 15:11

## 2017-02-02 RX ADMIN — Medication 500 UNITS: at 10:30

## 2017-02-02 RX ADMIN — PALONOSETRON HYDROCHLORIDE 0.25 MG: 0.25 INJECTION INTRAVENOUS at 12:21

## 2017-02-02 RX ADMIN — FLUOROURACIL 4270 MG: 50 INJECTION, SOLUTION INTRAVENOUS at 15:16

## 2017-02-02 RX ADMIN — DEXTROSE MONOHYDRATE: 50 INJECTION, SOLUTION INTRAVENOUS at 11:26

## 2017-02-02 ASSESSMENT — ENCOUNTER SYMPTOMS
DIARRHEA: 0
PALPITATIONS: 0
WEIGHT LOSS: 0
VOMITING: 0
FEVER: 0
SHORTNESS OF BREATH: 0
MYALGIAS: 0
TINGLING: 0
SORE THROAT: 1
NAUSEA: 0
CONSTIPATION: 1
COUGH: 1
DIZZINESS: 0
WHEEZING: 0
HEADACHES: 0
CHILLS: 0

## 2017-02-02 ASSESSMENT — PAIN SCALES - GENERAL: PAINLEVEL: NO PAIN

## 2017-02-02 NOTE — PROGRESS NOTES
Chemotherapy Verification - SECONDARY RN       Height = 61.5in  Weight = 73.3kg  BSA = 1.78m2       Medication: Oxaliplatin  Dose: 85mg/m2 Calculated Dose: 151.3mg                             (In mg/m2, AUC, mg/kg)     Medication: Leucovorin  Dose: 400mg/m2  Calculated Dose: 712mg                             (In mg/m2, AUC, mg/kg)    Medication: Fluorouracil  Dose: 400mg/m2  Calculated Dose: 712mg    Medication: Flurorouracil  Dose: 2400mg/m2  Calculated Dose: 4272mg                             (In mg/m2, AUC, mg/kg)               I confirm that this process was performed independently.

## 2017-02-02 NOTE — MR AVS SNAPSHOT
"Tim Crump   2017 10:30 AM   Office Visit   MRN: [id number]    Department:  [facility]   Dept Phone:  [phone]    Description:  Female : 1955   Provider:  FREDERICK FormanPKATARZYNA           Reason for Visit     Follow-Up prechemo      Allergies as of 2017     No Known Allergies      Vital Signs     Blood Pressure Pulse Temperature Respirations Height Weight    118/78 mmHg 77 37.3 °C (99.1 °F) 16 1.549 m (5' 0.98\") 73.3 kg (161 lb 9.6 oz)    Body Mass Index Oxygen Saturation Smoking Status             30.55 kg/m2 96% Never Smoker          Basic Information     Date Of Birth Sex Race Ethnicity Preferred Language    1955 Female  or   Origin (Bulgarian,Tunisian,Guatemalan,Uruguayan, etc) English      Your appointments     2017 11:30 AM   New Chemo 3 with RN 7   [facility] ([facility] [place])    [address]  [facility] [address]   [phone]            2017  4:30 PM   EST Port Flush / Central Line Care with RN 7   [facility] ([facility])    [address]  [facility] [address]   [phone]            2017  9:00 AM   ONCOLOGY EST PATIENT 30 MIN with Keturah Burgos M.D.   [facility] (--)    [address] [place] [address] [address]  [facility] [id number] [phone]            2017  8:00 AM   Non Provider 1 with ONC RN 1   [facility] (--)    [address], [facility] [address]  [facility] [address] [phone]           You will be receiving a confirmation call a few days before your appointment from our automated call confirmation system.            2017  9:00 AM   ONCOLOGY EST PATIENT 30 MIN with Keturah Burgos M.D.   [facility] (--)    [address] [facility] [address]  [facility] [address]   [phone]            2017  9:30 AM   Est Chemo 3 with RN 6   [facility] ([facility] [place])    [address]  [place] [address]   [phone]            2017  2:30 " PM   EST Port Flush / Central Line Care with RN 2   In[facility] ([facility])    [address] [facility] [address]  [place] [address] [phone]              Problem List              ICD-10-CM Priority Class Noted - Resolved    Cancer of sigmoid colon (CMS-HCC) C18.7   12/7/2016 - Present    Fourth degree hemorrhoids K64.3   12/7/2016 - Present      Health Maintenance        Date Due Completion Dates    IMM DTaP/Tdap/Td Vaccine (1 - Tdap) 1/23/1976 ---    PAP SMEAR 1/23/1978 ---    COLONOSCOPY 1/23/2007 ---    MAMMOGRAM 2/7/2014 2/7/2013, 1/23/2012, 2/9/2011, 7/9/2010, 7/9/2010, 1/5/2010, 12/23/2009, 12/23/2009, 11/26/2008, 11/26/2008, 12/4/2007, 12/4/2007, 11/10/2006, 10/27/2005    IMM ZOSTER VACCINE 1/23/2017 ---            Current Immunizations     Influenza Vaccine Quad Inj (Pf) 10/1/2016      Below and/or attached are the medications your provider expects you to take. Review all of your home medications and newly ordered medications with your provider and/or pharmacist. Follow medication instructions as directed by your provider and/or pharmacist. Please keep your medication list with you and share with your provider. Update the information when medications are discontinued, doses are changed, or new medications (including over-the-counter products) are added; and carry medication information at all times in the event of emergency situations     Allergies:  No Known Allergies          Medications  Valid as of: February 02, 2017 - 11:07 AM    Generic Name Brand Name Tablet Size Instructions for use    Atorvastatin Calcium (Tab) LIPITOR 10 MG Take 5 mg by mouth every evening.        Cetirizine HCl (Tab) ZYRTEC 10 MG Take 10 mg by mouth every morning. Indications: Hayfever        Citalopram Hydrobromide (Tab) CELEXA 40 MG Take 40 mg by mouth every bedtime.        Hydrocodone-Acetaminophen (Tab) NORCO 5-325 MG Take 1-2 Tabs by mouth every four hours as needed.        Lidocaine-Prilocaine (Cream) EMLA 2.5-2.5 % Apply  to port 1 hour prior to access of port and cover with plastic wrap.        Loperamide HCl (Cap) IMODIUM 2 MG Give loperamide 4 mg orally first dose, then 2 mg orally with every loose bowel movement.  Contact physician if maximum of 16 mg/24 hours is exceeded.        Melatonin (Cap) Melatonin 1 MG Take  by mouth.        MetFORMIN HCl (Tab) GLUCOPHAGE 500 MG Take 500 mg by mouth 2 times a day, with meals.        Montelukast Sodium (Tab) SINGULAIR 10 MG Take 10 mg by mouth every bedtime. Indications: Hayfever        Ondansetron (TABLET DISPERSIBLE) ZOFRAN ODT 8 MG Take 1 Tab by mouth every 8 hours as needed for Nausea/Vomiting.        Ondansetron HCl (Tab) ZOFRAN 4 MG Take 1 Tab by mouth every four hours as needed for Nausea/Vomiting.        Prochlorperazine Maleate (Tab) COMPAZINE 10 MG Take 1 Tab by mouth every 6 hours as needed.        ValACYclovir HCl (Tab) VALTREX 500 MG Take 500 mg by mouth 2 times a day. 3 day course        .                 Medicines prescribed today were sent to:     [facility] [address] [place] [address] [place], [place] - [address] [facility] [address]    [address] [place] [address]    Phone: [phone] Fax: [phone]    Open 24 Hours?: No      Medication refill instructions:       If your prescription bottle indicates you have medication refills left, it is not necessary to call your provider’s office. Please contact your pharmacy and they will refill your medication.    If your prescription bottle indicates you do not have any refills left, you may request refills at any time through one of the following ways: The online [employer] system (except Urgent Care), by calling your provider’s office, or by asking your pharmacy to contact your provider’s office with a refill request. Medication refills are processed only during regular business hours and may not be available until the next business day. Your provider may request additional information or to have a follow-up visit with you  prior to refilling your medication.   *Please Note: Medication refills are assigned a new Rx number when refilled electronically. Your pharmacy may indicate that no refills were authorized even though a new prescription for the same medication is available at the pharmacy. Please request the medicine by name with the pharmacy before contacting your provider for a refill.           PricePanda Access Code: [email or web address]  Expires: 3/1/2017 10:48 AM    PricePanda  A secure, online tool to manage your health information     [employer]’s PricePanda® is a secure, online tool that connects you to your personalized health information from the privacy of your home -- day or night - making it very easy for you to manage your healthcare. Once the activation process is completed, you can even access your medical information using the PricePanda erin, which is available for free in the Apple Erin store or Google Play store.     PricePanda provides the following levels of access (as shown below):   My Chart Features   [facility] Primary Care Doctor [facility]  Specialists [facility]  [facility]gent  [facility]  Primary Care  Doctor   Email your healthcare team securely and privately 24/7 X X X    Manage appointments: schedule your next appointment; view details of past/upcoming appointments X      Request prescription refills. X      View recent personal medical records, including lab and immunizations X X X X   View health record, including health history, allergies, medications X X X X   Read reports about your outpatient visits, procedures, consult and ER notes X X X X   See your discharge summary, which is a recap of your hospital and/or ER visit that includes your diagnosis, lab results, and care plan. X X       How to register for PricePanda:  1. Go to  [email or web address].  2. Click on the Sign Up Now box, which takes you to the New Member Sign Up page. You will need to provide the following information:  a. Enter your PricePanda Access Code  exactly as it appears at the top of this page. (You will not need to use this code after you’ve completed the sign-up process. If you do not sign up before the expiration date, you must request a new code.)   b. Enter your date of birth.   c. Enter your home email address.   d. Click Submit, and follow the next screen’s instructions.  3. Create a [employer] ID. This will be your [employer] login ID and cannot be changed, so think of one that is secure and easy to remember.  4. Create a [employer] password. You can change your password at any time.  5. Enter your Password Reset Question and Answer. This can be used at a later time if you forget your password.   6. Enter your e-mail address. This allows you to receive e-mail notifications when new information is available in [employer].  7. Click Sign Up. You can now view your health information.    For assistance activating your [employer] account, call [phone]

## 2017-02-02 NOTE — MR AVS SNAPSHOT
Tim Crump   2017 10:00 AM   Appointment   MRN: [id number]    Department:  [facility]   Dept Phone:  [phone]    Description:  Female : 1955   Provider:  ONC RN 1           Allergies as of 2017     No Known Allergies      Vital Signs     Smoking Status                   Never Smoker            Basic Information     Date Of Birth Sex Race Ethnicity Preferred Language    1955 Female  or   Origin ([place] citizen,Mauritian,Zambian,Paul, etc) English      Your appointments     2017 11:30 AM   New Chemo 3 with RN 7   [facility] ([email or web address])    [address] [facility] [address]  [facility] [address]   [phone]            2017  4:30 PM   EST Port Flush / Central Line Care with RN 7   [facility] ([email or web address])    [address]  [place] [address]   [phone]            2017  9:00 AM   ONCOLOGY EST PATIENT 30 MIN with Keturah Burgos M.D.   [facility] (--)    [address], [facility] [address]  [facility] [address]   [phone]            2017  8:00 AM   Non Provider 1 with ONC RN 1   [facility] (--)    [address], [facility] [address]  [facility] [address] [phone]           You will be receiving a confirmation call a few days before your appointment from our automated call confirmation system.            2017  9:00 AM   ONCOLOGY EST PATIENT 30 MIN with Keturah Burgos M.D.   [facility] (--)    [address], [facility] [address]  [facility] [address]   [phone]            2017  9:30 AM   Est Chemo 3 with RN 6   [facility] ([email or web address] [place])    [address] [facility] [address]  [place] [address]   [phone]            2017  2:30 PM   EST Port Flush / Central Line Care with RN 2   [facility] ([facility])    [address] [facility] [address]  [facility] [address]   [phone]              Problem List              ICD-10-CM Priority Class Noted - Resolved    Cancer of sigmoid colon  (CMS-[facility], located within [facility]) C18.7   12/7/2016 - Present    Fourth degree hemorrhoids K64.3   12/7/2016 - Present      Health Maintenance        Date Due Completion Dates    IMM DTaP/Tdap/Td Vaccine (1 - Tdap) 1/23/1976 ---    PAP SMEAR 1/23/1978 ---    COLONOSCOPY 1/23/2007 ---    MAMMOGRAM 2/7/2014 2/7/2013, 1/23/2012, 2/9/2011, 7/9/2010, 7/9/2010, 1/5/2010, 12/23/2009, 12/23/2009, 11/26/2008, 11/26/2008, 12/4/2007, 12/4/2007, 11/10/2006, 10/27/2005    IMM ZOSTER VACCINE 1/23/2017 ---            Current Immunizations     Influenza Vaccine Quad Inj (Pf) 10/1/2016      Below and/or attached are the medications your provider expects you to take. Review all of your home medications and newly ordered medications with your provider and/or pharmacist. Follow medication instructions as directed by your provider and/or pharmacist. Please keep your medication list with you and share with your provider. Update the information when medications are discontinued, doses are changed, or new medications (including over-the-counter products) are added; and carry medication information at all times in the event of emergency situations     Allergies:  No Known Allergies          Medications  Valid as of: February 02, 2017 - 10:31 AM    Generic Name Brand Name Tablet Size Instructions for use    Atorvastatin Calcium (Tab) LIPITOR 10 MG Take 5 mg by mouth every evening.        Cetirizine HCl (Tab) ZYRTEC 10 MG Take 10 mg by mouth every morning. Indications: Hayfever        Citalopram Hydrobromide (Tab) CELEXA 40 MG Take 40 mg by mouth every bedtime.        Hydrocodone-Acetaminophen (Tab) NORCO 5-325 MG Take 1-2 Tabs by mouth every four hours as needed.        Lidocaine-Prilocaine (Cream) EMLA 2.5-2.5 % Apply to port 1 hour prior to access of port and cover with plastic wrap.        Loperamide HCl (Cap) IMODIUM 2 MG Give loperamide 4 mg orally first dose, then 2 mg orally with every loose bowel movement.  Contact physician if maximum of 16 mg/24 hours is  exceeded.        Melatonin (Cap) Melatonin 1 MG Take  by mouth.        MetFORMIN HCl (Tab) GLUCOPHAGE 500 MG Take 500 mg by mouth 2 times a day, with meals.        Montelukast Sodium (Tab) SINGULAIR 10 MG Take 10 mg by mouth every bedtime. Indications: Hayfever        Ondansetron (TABLET DISPERSIBLE) ZOFRAN ODT 8 MG Take 1 Tab by mouth every 8 hours as needed for Nausea/Vomiting.        Ondansetron HCl (Tab) ZOFRAN 4 MG Take 1 Tab by mouth every four hours as needed for Nausea/Vomiting.        Prochlorperazine Maleate (Tab) COMPAZINE 10 MG Take 1 Tab by mouth every 6 hours as needed.        ValACYclovir HCl (Tab) VALTREX 500 MG Take 500 mg by mouth 2 times a day. 3 day course        .                 Medicines prescribed today were sent to:     [facility] [address] [place] [address], [place] - [address] [facility] [address]    [facility] [address] [place] [address]    Phone: [phone] Fax: [phone]    Open 24 Hours?: No      Medication refill instructions:       If your prescription bottle indicates you have medication refills left, it is not necessary to call your provider’s office. Please contact your pharmacy and they will refill your medication.    If your prescription bottle indicates you do not have any refills left, you may request refills at any time through one of the following ways: The online [employer] system (except Urgent Care), by calling your provider’s office, or by asking your pharmacy to contact your provider’s office with a refill request. Medication refills are processed only during regular business hours and may not be available until the next business day. Your provider may request additional information or to have a follow-up visit with you prior to refilling your medication.   *Please Note: Medication refills are assigned a new Rx number when refilled electronically. Your pharmacy may indicate that no refills were authorized even though a new prescription for the same medication is  available at the pharmacy. Please request the medicine by name with the pharmacy before contacting your provider for a refill.           Minted Access Code: [email or web address]  Expires: 3/1/2017 10:48 AM    Minted  A secure, online tool to manage your health information     [employer] Minted® is a secure, online tool that connects you to your personalized health information from the privacy of your home -- day or night - making it very easy for you to manage your healthcare. Once the activation process is completed, you can even access your medical information using the Minted erin, which is available for free in the Apple Erin store or Google Play store.     Minted provides the following levels of access (as shown below):   My Chart Features   [facility] Primary Care Doctor [facility]  Specialists [facility]  Urgent  Care Non-[facility]  Primary Care  Doctor   Email your healthcare team securely and privately 24/7 X X X    Manage appointments: schedule your next appointment; view details of past/upcoming appointments X      Request prescription refills. X      View recent personal medical records, including lab and immunizations X X X X   View health record, including health history, allergies, medications X X X X   Read reports about your outpatient visits, procedures, consult and ER notes X X X X   See your discharge summary, which is a recap of your hospital and/or ER visit that includes your diagnosis, lab results, and care plan. X X       How to register for Minted:  1. Go to  [email or web address].  2. Click on the Sign Up Now box, which takes you to the New Member Sign Up page. You will need to provide the following information:  a. Enter your Minted Access Code exactly as it appears at the top of this page. (You will not need to use this code after you’ve completed the sign-up process. If you do not sign up before the expiration date, you must request a new code.)   b. Enter your date of birth.   c. Enter  your home email address.   d. Click Submit, and follow the next screen’s instructions.  3. Create a [employer] ID. This will be your [employer] login ID and cannot be changed, so think of one that is secure and easy to remember.  4. Create a Smartvuet password. You can change your password at any time.  5. Enter your Password Reset Question and Answer. This can be used at a later time if you forget your password.   6. Enter your e-mail address. This allows you to receive e-mail notifications when new information is available in [employer].  7. Click Sign Up. You can now view your health information.    For assistance activating your [employer] account, call [phone]

## 2017-02-02 NOTE — PROGRESS NOTES
"Patient Name: Tim Crump   Dx: Colon Cancer        Potocol: Protocol: [id number]  Oxaliplatin (Eloxatin) 85 mg/m2 iv d1  Leucovorin 400 mg/m2 iv over 2 hrs before 5-FU d1  5- mg/m2 iv bolus d1 followed by 2400 mg/m2 iv over 46 hrs  Q2w x 12 cycles or until progression or unacceptable toxicity  NCCN Guidelines for Colon Cancer V.1.2017  Xu T, et al - N Engl J Med. 2004 [place] 3;350(23):3252-61.  shankar Hartmann- J Clin Oncol. 2008 Apr 20;26(12):2006-12. doi: 10.1200/JCO.[id number].  Hong SL, et al - Br J Cancer. 2002 Aug 12;87(4):393-9.    Allergies:  Review of patient's allergies indicates no known allergies.     /74 mmHg  Pulse 68  Temp(Src) 36.7 °C (98.1 °F)  Resp 18  Ht 1.562 m (5' 1.5\")  Wt 73.3 kg (161 lb 9.6 oz)  BMI 30.04 kg/m2  SpO2 97% Body surface area is 1.78 meters squared.    Labs 2/2/17:  ANC~ 4890    Plt = 309k    Hgb = 13.5   SCr = 0.8mg/dL CrCl ~ 87 mL/min   AST/ALT/AP/TBili = 19/16/104/0.3        Drug Order   (Drug name, dose, route, IV Fluid & volume, frequency, number of doses) Cycle: 1      Previous treatment: N/A     Medication = OXALIplatin (Eloxitan)  Base Dose = 85 mg/m2  Calc Dose: Base Dose x 1.78 m2 = 151.3 mg  Final Dose = 151.3mg  Route = IV  Fluid & Volume = D5W 250 mL  Admin Duration = Over 2 hours          <5% difference, ok to treat with final dose     Medication = Leucovorin (Wellcovorin)  Base Dose = 400 mg/m2  Calc Dose: Base Dose x 1.78 m2 = 712mg  Final Dose = 712 mg  Route = IV  Fluid & Volume = D5W 250 mL  Admin Duration = Over 2 hours          <5% difference, ok to treat with final dose   Medication = Fluorouracil (5-FU) bolus  Base Dose = 400 mg/m2  Calc Dose:Base Dose x 1.78 m2 = 712 mg  Final Dose = 710 mg  Route = IVP  Fluid & Volume = in syringe 14.2 mL  Conc = 50 mg/mL  Admin Duration = Over 5 minutes          <5% difference, ok to treat with final dose   Medication = Fluorouracil (5-FU) CIVI  Base Dose = 2400 mg/m2  Calc Dose:Base " Dose x 1.78 m2 = 4272 mg  Final Dose = 4270 mg  Route = IVP  Fluid & Volume = in CADD pump 85.4 mL  Conc = 50 mg/mL  Admin Duration = Over 46 hours    Rate = 1.9 ml/hr           <5% difference, ok to treat with final dose     By my signature below, I confirm this process was performed independently with the BSA and all final chemotherapy dosing calculations congruent. I have reviewed the above chemotherapy order and that my calculation of the final dose and BSA (when applicable) corroborate those calculations of the  pharmacist. Discrepancies of 5% or greater in the written dose have been addressed and documented within the EPIC Progress notes.    Signature: Kathe Daniel Pharm.D.

## 2017-02-02 NOTE — PROGRESS NOTES
Subjective:      Tim Crump is a 62 y.o. female who presents for Follow-Up for colon cancer.        HPI    Patient seen today in follow up for colon cancer.  She is here for cycle 1, day 1 of FOLFOX.  Patient is accompanied by her son for today's visit.  He is assisting with translation as needed, but patient is able to understand English well.     Fever - None  Chills - None  Appetite - Appetite is good. She has a god appetite at this time.   Fatigue - None  N/V - None  Constipation/Diarrhea - Patient complains of constipation. She had a BM yesterday but required straining. Patient is starting on 5FU chemo and risk for diarrhea.   Pain - None  Neuropathy - None  Other - Patient c/o slight sore throat today relieve with Cepacol lozenges.     No Known Allergies  Current Outpatient Prescriptions on File Prior to Visit   Medication Sig Dispense Refill   • lidocaine-prilocaine (EMLA) 2.5-2.5 % Cream Apply to port 1 hour prior to access of port and cover with plastic wrap. 1 Tube 3   • loperamide (IMODIUM A-D) 2 MG tablet Take 1 Tab by mouth See Admin Instructions. 30 Tab 6   • lidocaine-prilocaine (EMLA) 2.5-2.5 % Cream Apply to port one hour prior to access and cover with plastic wrap. 1 Tube 3   • menthol (CEPACOL) 3 MG lozenge Take 1 Lozenge by mouth as needed.     • Melatonin 1 MG Cap Take  by mouth.     • ondansetron (ZOFRAN) 4 MG Tab tablet Take 1 Tab by mouth every four hours as needed for Nausea/Vomiting. 30 Tab 3   • prochlorperazine (COMPAZINE) 10 MG Tab Take 1 Tab by mouth every 6 hours as needed. 30 Tab 3   • loperamide (IMODIUM) 2 MG Cap Give loperamide 4 mg orally first dose, then 2 mg orally with every loose bowel movement.  Contact physician if maximum of 16 mg/24 hours is exceeded. 30 Cap 3   • valacyclovir (VALTREX) 500 MG Tab Take 500 mg by mouth 2 times a day. 3 day course     • atorvastatin (LIPITOR) 10 MG Tab Take 5 mg by mouth every evening.     • montelukast (SINGULAIR) 10 MG Tab Take  "10 mg by mouth every bedtime. Indications: Hayfever     • cetirizine (ZYRTEC) 10 MG Tab Take 10 mg by mouth every morning. Indications: Hayfever     • metformin (GLUCOPHAGE) 500 MG TABS Take 500 mg by mouth 2 times a day, with meals.     • citalopram (CELEXA) 40 MG TABS Take 40 mg by mouth every bedtime.       Current Facility-Administered Medications on File Prior to Visit   Medication Dose Route Frequency Provider Last Rate Last Dose   • D5W infusion   Intravenous Continuous Iliana LALIT Alsop, A.P.N.       • dexamethasone (DECADRON) premix ivpb 12 mg  12 mg Intravenous Once Iliana M Alsop, A.P.N.   12 mg at 02/02/17 1202   • oxaliplatin (ELOXATIN) 151.3 mg in D5W 250 mL Chemo Infusion  85 mg/m2 Intravenous Once Iliana M Alsop, A.P.N.       • leucovorin 712 mg in D5W 250 mL  400 mg/m2 Intravenous Once Iliana M Alsop, A.P.N.       • fluorouracil (ADRUCIL) 710 mg in syringe 14.2 mL Chemo bolus injection  400 mg/m2 Intravenous Once Iliana M Alsop, A.P.N.       • fluorouracil (ADRUCIL) 4,270 mg in CADD Cassette/Bag Chemo infusion  2,400 mg/m2 Intravenous Once Iliana M Alsop, A.P.N.             Review of Systems   Constitutional: Negative for fever, chills, weight loss and malaise/fatigue.   HENT: Positive for sore throat (mild sore throat).    Respiratory: Positive for cough (mild cough). Negative for shortness of breath and wheezing.    Cardiovascular: Negative for chest pain, palpitations and leg swelling.   Gastrointestinal: Positive for constipation. Negative for nausea, vomiting and diarrhea.   Genitourinary: Negative for dysuria.   Musculoskeletal: Negative for myalgias.   Skin: Negative for itching and rash.   Neurological: Negative for dizziness, tingling and headaches.          Objective:     /78 mmHg  Pulse 77  Temp(Src) 37.3 °C (99.1 °F)  Resp 16  Ht 1.549 m (5' 0.98\")  Wt 73.3 kg (161 lb 9.6 oz)  BMI 30.55 kg/m2  SpO2 96%     Physical Exam   Constitutional: She is oriented to " person, place, and time. She appears well-developed and well-nourished. No distress.   HENT:   Head: Normocephalic and atraumatic.   Mouth/Throat: Oropharynx is clear and moist. No oropharyngeal exudate.   Eyes: Conjunctivae and EOM are normal. Pupils are equal, round, and reactive to light. Right eye exhibits no discharge. Left eye exhibits no discharge.   Pulmonary/Chest: Breath sounds normal. No respiratory distress. She has no wheezes.   Abdominal: Soft. Bowel sounds are normal. She exhibits no distension. There is no tenderness.   Musculoskeletal: Normal range of motion. She exhibits no edema or tenderness.   Neurological: She is alert and oriented to person, place, and time.   Skin: Skin is warm and dry. No rash noted. She is not diaphoretic. No erythema. No pallor.   Psychiatric: She has a normal mood and affect. Her behavior is normal.   Vitals reviewed.      Hospital Outpatient Visit on 02/02/2017   Component Date Value Ref Range Status   • WBC 02/02/2017 7.7  4.8 - 10.8 K/uL Final   • RBC 02/02/2017 4.68  4.20 - 5.40 M/uL Final   • Hemoglobin 02/02/2017 13.5  12.0 - 16.0 g/dL Final   • Hematocrit 02/02/2017 39.9  37.0 - 47.0 % Final   • MCV 02/02/2017 85.3  81.4 - 97.8 fL Final   • MCH 02/02/2017 28.8  27.0 - 33.0 pg Final   • MCHC 02/02/2017 33.8  33.6 - 35.0 g/dL Final   • RDW 02/02/2017 42.9  35.9 - 50.0 fL Final   • Platelet Count 02/02/2017 309  164 - 446 K/uL Final   • MPV 02/02/2017 9.0  9.0 - 12.9 fL Final   • Neutrophils-Polys 02/02/2017 63.20  44.00 - 72.00 % Final   • Lymphocytes 02/02/2017 22.70  22.00 - 41.00 % Final   • Monocytes 02/02/2017 6.50  0.00 - 13.40 % Final   • Eosinophils 02/02/2017 7.00* 0.00 - 6.90 % Final   • Basophils 02/02/2017 0.60  0.00 - 1.80 % Final   • Neutrophils (Absolute) 02/02/2017 4.89  2.00 - 7.15 K/uL Final    Includes immature neutrophils, if present.   • Lymphs (Absolute) 02/02/2017 1.76  1.00 - 4.80 K/uL Final   • Monos (Absolute) 02/02/2017 0.50  0.00 - 0.85  K/uL Final   • Eos (Absolute) 02/02/2017 0.54* 0.00 - 0.51 K/uL Final   • Baso (Absolute) 02/02/2017 0.05  0.00 - 0.12 K/uL Final   • Sodium 02/02/2017 137  135 - 145 mmol/L Final   • Potassium 02/02/2017 3.8  3.6 - 5.5 mmol/L Final   • Chloride 02/02/2017 106  96 - 112 mmol/L Final   • Co2 02/02/2017 22  20 - 33 mmol/L Final   • Anion Gap 02/02/2017 9.0  0.0 - 11.9 Final   • Glucose 02/02/2017 175* 65 - 99 mg/dL Final   • Bun 02/02/2017 21  8 - 22 mg/dL Final   • Creatinine 02/02/2017 0.80  0.50 - 1.40 mg/dL Final   • Calcium 02/02/2017 9.3  8.5 - 10.5 mg/dL Final   • AST(SGOT) 02/02/2017 19  12 - 45 U/L Final   • ALT(SGPT) 02/02/2017 16  2 - 50 U/L Final   • Alkaline Phosphatase 02/02/2017 104* 30 - 99 U/L Final   • Total Bilirubin 02/02/2017 0.3  0.1 - 1.5 mg/dL Final   • Albumin 02/02/2017 3.8  3.2 - 4.9 g/dL Final   • Total Protein 02/02/2017 6.4  6.0 - 8.2 g/dL Final   • Globulin 02/02/2017 2.6  1.9 - 3.5 g/dL Final   • A-G Ratio 02/02/2017 1.5   Final   • GFR If  02/02/2017 >60  >60 mL/min/1.73 m 2 Final   • GFR If Non  02/02/2017 >60  >60 mL/min/1.73 m 2 Final            Assessment/Plan:     1. Cancer of sigmoid colon (CMS-HCC)       Plan  1. Patient is doing well and ready to start her chemotherapy. She tolerated her port placement. I reviewed her labs, CBC and CMP, and okay to proceed with cycle one, day one of FOLFOX.    2. I did spend some time with patient and son reviewing the chemotherapies and discussing side effects especially with oxaliplatin and the cold sensitivity. I also discussed patient's current constipation at this time, however she is due to receive 5-FU which will put her at risk for diarrhea. At this time I will not start patient on any medications for constipation and I have educated the patient and son in management of her bowels. She is to call if she has any issues or concerns with diarrhea or constipation.    3. Patient is to follow up in one week or  sooner for a toxicity check.

## 2017-02-02 NOTE — PROGRESS NOTES
Chemotherapy Verification - PRIMARY RN      Height = 156.2cm  Weight = 73.3kg  BSA = 1.78m2       Medication: Oxaliplatin  Dose: 85mg/m2  Calculated Dose: 151.3mg                             (In mg/m2, AUC, mg/kg)     Medication: Leucovorin  Dose: 400mg/m2  Calculated Dose: 712mg                             (In mg/m2, AUC, mg/kg)    Medication: Fluorouracil  Dose: 400mg/m2  Calculated Dose: 712mg                             (In mg/m2, AUC, mg/kg)    Medication: Fluorouracil CADD PUMP  Dose: 2400mg/m2  Calculated Dose: 4272mg Infused over 46 hours                            (In mg/m2, AUC, mg/kg)      I confirm this process was performed independently with the BSA and all final chemotherapy dosing calculations congruent.  Any discrepancies of 5% or greater have been addressed with the chemotherapy pharmacist. The resolution of the discrepancy has been documented in the EPIC progress notes.

## 2017-02-02 NOTE — PROGRESS NOTES
Pt presents ambulatory accompanied by her son for today's visit.  She is here for labs and pre chemo visit with KRUPA Casarez chest port site is covered with steri strips and tegaderm.  Tegaderm was removed and steri strips left intact.  She did not apply EMLA cream today, but is agreeable to proceeding with access.  Port accessed in sterile fashion; received brisk blood return.  Labs drawn per orders in [place].  Port flushed with NS and heparin per protocol.  It remains accessed for c1d1 of FOLFOX.  Pt tolerated well.

## 2017-02-02 NOTE — PROGRESS NOTES
"Pharmacy Chemotherapy Calculations Note:    Patient Name: Tim Crump      Dx: Colon Cancer     Cycle 1 Previous treatment: n/a      Protocol: [id number]   Oxaliplatin (Eloxatin) 85 mg/m2 IV Day 1  Leucovorin 400 mg/m2 IV over 2 hrs on Day 1. Run concurrently with oxaliplatin  Fluorouracil 400 mg/m2 IV bolus Day 1 followed by 2400 mg/m2 CIVI over 46 hrs   Q14 days until disease progression or unacceptable toxicity    NCCN Guidelines for Colon Cancer. V.2.2017  Xu T, et al. N Engl J Med. 2004;350:7218-0343  Renetta RAY et al. J Clin Oncol. 2008;26(12):2006-12  Cheeseman SL, et al. Br J Cancer. 2002;87(4):393-9         /74 mmHg  Pulse 68  Temp(Src) 36.7 °C (98.1 °F)  Resp 18  Ht 1.562 m (5' 1.5\")  Wt 73.3 kg (161 lb 9.6 oz)  BMI 30.04 kg/m2  SpO2 97% Body surface area is 1.78 meters squared.     02/02/17 ANC~ 4900  Plt = 309 k    Hgb = 13.5  SCr = 0.8 mg/dL CrCl = 87 mL/min LFT = WNL except alk phos 104 TBili = 0.3     OXALIplatin (Eloxatin) 85 mg/m² x 1.78 m² = 151.3 mg   <5% difference, ok to treat with final dose = 151.3 mg IV    Leucovorin 400 mg/m² x 1.78 m² = 712 mg   <5% difference, ok to treat with final written dose = 712 mg IV    Fluorouracil (5-FU) 400 mg/m² x 1.78 m² = 712 mg   <5% difference, ok to treat with final written dose = 710 mg IVP    Fluorouracil (5-FU) 2400 mg/m² x 1.78 m²  = 4272 mg    <5% difference, ok to treat with final dose = 4270 mg CIVI over 46 hrs via CADD pump       Glenda Mendoza, PharmD      "

## 2017-02-03 NOTE — PROGRESS NOTES
Patient arrived to Infusion for Day 1 Cycle 1 FOLFOX for rectal cancer. Son accompanied.  Son and patient went to Dr. Burgos's office this morning for Port access/labs/pre-chemo assessment.  Reviewed plan of care, pt and son had no questions regarding medication/treatment at this time.  Port flushed, brisk blood return noted. D5W infusion started, confirmed lab results with pharmacy, ok to treat.  Pre-meds infused, pt tolerated well.  Oxaliplatin/Leucovorin infused, pt tolerated well.  During infusion, pt and son reviewed CADD pump video and signed consent form. Answered any questions; pt and son felt comfortable with education given, no further questions.  Fluoruracil push given, set up CADD pump, infusion started.  Pt given booklet of information supplied for pump, along with chemo spill kit and directions.  Extra batteries given to son; pt carried pump over shoulder.  Confirmed de-access appt 2/4/2017; given print out of next appts.  Pt and son both left in good spirits; patient had no apparent distress.

## 2017-02-04 ENCOUNTER — OUTPATIENT INFUSION SERVICES (OUTPATIENT)
Dept: ONCOLOGY | Facility: MEDICAL CENTER | Age: 62
End: 2017-02-04
Attending: INTERNAL MEDICINE
Payer: COMMERCIAL

## 2017-02-04 VITALS
SYSTOLIC BLOOD PRESSURE: 118 MMHG | TEMPERATURE: 98.8 F | HEIGHT: 61 IN | WEIGHT: 163.14 LBS | BODY MASS INDEX: 30.8 KG/M2 | RESPIRATION RATE: 18 BRPM | OXYGEN SATURATION: 95 % | DIASTOLIC BLOOD PRESSURE: 59 MMHG | HEART RATE: 72 BPM

## 2017-02-04 DIAGNOSIS — C18.7 CANCER OF SIGMOID COLON (HCC): ICD-10-CM

## 2017-02-04 PROCEDURE — 96523 IRRIG DRUG DELIVERY DEVICE: CPT

## 2017-02-04 PROCEDURE — 700111 HCHG RX REV CODE 636 W/ 250 OVERRIDE (IP)

## 2017-02-04 RX ADMIN — HEPARIN 500 UNITS: 100 SYRINGE at 16:32

## 2017-02-04 ASSESSMENT — PAIN SCALES - GENERAL
PAINLEVEL: NO PAIN
PAINLEVEL: NO PAIN

## 2017-02-05 NOTE — PROGRESS NOTES
Pt ambulated into department to have pump de-accessed from continuous 5-FU infusion. Pt reported that infusion was uneventful and tolerated well, stated that her pump had started to beep prior to 16:30. Pump reviewed by RN, display showed that there was 0 ml left in bag. RN disconnected Pt from home infusion, flushed port per [facility] policy, de-accessed, needle intact, insertion site covered with sterile gauze and paper tape. Future appointments confirmed with Pt prior to departure, left department with son appearing in good spirits and NAD.    Pt's pump and personal carrier stored in [facility] pharmacy for next infusion.

## 2017-02-06 ENCOUNTER — TELEPHONE (OUTPATIENT)
Dept: ONCOLOGY | Facility: MEDICAL CENTER | Age: 62
End: 2017-02-06

## 2017-02-09 ENCOUNTER — OFFICE VISIT (OUTPATIENT)
Dept: HEMATOLOGY ONCOLOGY | Facility: MEDICAL CENTER | Age: 62
End: 2017-02-09

## 2017-02-09 VITALS
OXYGEN SATURATION: 96 % | DIASTOLIC BLOOD PRESSURE: 80 MMHG | SYSTOLIC BLOOD PRESSURE: 122 MMHG | BODY MASS INDEX: 29.63 KG/M2 | HEART RATE: 78 BPM | RESPIRATION RATE: 16 BRPM | TEMPERATURE: 97.2 F | WEIGHT: 159.39 LBS

## 2017-02-09 DIAGNOSIS — C18.7 CANCER OF SIGMOID COLON (HCC): ICD-10-CM

## 2017-02-09 PROCEDURE — 99214 OFFICE O/P EST MOD 30 MIN: CPT | Performed by: INTERNAL MEDICINE

## 2017-02-09 RX ORDER — FAMOTIDINE 20 MG/1
20 TABLET, FILM COATED ORAL 2 TIMES DAILY
Qty: 60 TAB | Refills: 2 | Status: SHIPPED | OUTPATIENT
Start: 2017-02-09 | End: 2018-07-21

## 2017-02-09 ASSESSMENT — PAIN SCALES - GENERAL: PAINLEVEL: NO PAIN

## 2017-02-09 NOTE — PROGRESS NOTES
Follow Up Visit:  Oncology    Date: 2/9/17  Time: 8 am        Referring Physician: Dr. Enoch Shaw  Primary Care:  William Naranjo D.O.  Surgeon:  Dr. Shaw  Gastroenterology: Dr. 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    History of Presenting Illness:  Tim Crump is a 61 y.o. female diagnosed in 5/2016 with colon cancer. She was diagnosed secondary to positive fecal occult blood test as well as intermittent diarrhea. Colonoscopy showed a polyp located in the sigmoid colon. She underwent partial snare removal.  Pathology was positive for poorly differentiated adenocarcinoma with indeterminate margins. This was then followed by a sigmoidoscopy which showed the tattooed polyp stalk further workup with a CT showed no evidence of metastatic disease and CEA was 1.  She was seen by Dr. Shaw and underwent a laparoscopic sigmoid resection with low anterior pelvic anastomosis on 12/7/16. Final pathology showed previous tattooed polypectomy site without any wrist or malignancy but she was found to have 1/13 nodes. She was staged as pT1 pN1a and MLH1, MSH2, MSH6 and PMS2 are intact. Staging workup was negative for metastatic disease. After further discussion she was started on adjuvant chemotherapy with modified FOLFOX in 2/2/17.    Interval history  She is here for follow-up visit and is accompanied by her son who was present in the room. She has been feeling fairly stable since her last visit. She did have some constipation over the weekend and was started on stool softeners with good bowel movement. She is currently taking Jolynn-Colace. She had mild throat discomfort which has resolved. He has noticed persistent prominent submandibular glands which have been chronic and stable. She appears to have tolerated cycle one fairly well. She has noticed intermittent nausea but no vomiting. Her cough has  resolved and she has noticed improvement in her shortness of breath since her last visit. He continues to have chronic neck pain which has been fairly stable. She denies any fevers, chills or night sweats.      Past Medical History:  1.  Colon cancer   2. Pre DM  3. H/o HTN   4. Hyperlipidemia   5. Seasonal allergies   6. Arthritis, Knee and hands  7. Anxiety and depression         Past Surgical History   Procedure Laterality Date   • Gyn surgery       hysterectomy   • Gyn surgery        x 2   • Other       varicose vein stripping rubina   • Other orthopedic surgery Left      left wrist   • Low anterior resection laparoscopic  2016     Procedure: LOW ANTERIOR RESECTION LAPAROSCOPIC;  Surgeon: Enoch Shaw M.D.;  Location: SURGERY [facility];  Service:        Allergies as of 2017   • (No Known Allergies)         Current outpatient prescriptions:   •  loperamide (IMODIUM A-D) 2 MG tablet, Take 1 Tab by mouth See Admin Instructions., Disp: 30 Tab, Rfl: 6  •  lidocaine-prilocaine (EMLA) 2.5-2.5 % Cream, Apply to port one hour prior to access and cover with plastic wrap., Disp: 1 Tube, Rfl: 3  •  menthol (CEPACOL) 3 MG lozenge, Take 1 Lozenge by mouth as needed., Disp: , Rfl:   •  Melatonin 1 MG Cap, Take  by mouth., Disp: , Rfl:   •  ondansetron (ZOFRAN) 4 MG Tab tablet, Take 1 Tab by mouth every four hours as needed for Nausea/Vomiting., Disp: 30 Tab, Rfl: 3  •  prochlorperazine (COMPAZINE) 10 MG Tab, Take 1 Tab by mouth every 6 hours as needed., Disp: 30 Tab, Rfl: 3  •  lidocaine-prilocaine (EMLA) 2.5-2.5 % Cream, Apply to port 1 hour prior to access of port and cover with plastic wrap., Disp: 1 Tube, Rfl: 3  •  loperamide (IMODIUM) 2 MG Cap, Give loperamide 4 mg orally first dose, then 2 mg orally with every loose bowel movement.  Contact physician if maximum of 16 mg/24 hours is exceeded., Disp: 30 Cap, Rfl: 3  •  valacyclovir (VALTREX) 500 MG Tab, Take 500 mg by mouth 2 times a  day. 3 day course, Disp: , Rfl:   •  atorvastatin (LIPITOR) 10 MG Tab, Take 5 mg by mouth every evening., Disp: , Rfl:   •  montelukast (SINGULAIR) 10 MG Tab, Take 10 mg by mouth every bedtime. Indications: Hayfever, Disp: , Rfl:   •  cetirizine (ZYRTEC) 10 MG Tab, Take 10 mg by mouth every morning. Indications: Hayfever, Disp: , Rfl:   •  metformin (GLUCOPHAGE) 500 MG TABS, Take 500 mg by mouth 2 times a day, with meals., Disp: , Rfl:   •  citalopram (CELEXA) 40 MG TABS, Take 40 mg by mouth every bedtime., Disp: , Rfl:     Review of Systems:  All other review of systems are negative except what was mentioned above in the HPI.  Constitutional: Negative for fever, chills, weight loss and malaise/fatigue.    HENT: Negative for ear pain and nosebleeds.     Eyes: Negative for blurred vision.    Respiratory: Negative for cough, sputum production and shortness of breath.    Cardiovascular: Negative for chest pain and leg swelling.    Gastrointestinal: Intermittent nausea. Negative for vomiting and abdominal pain.    Genitourinary: Negative for dysuria.    Musculoskeletal: Negative for myalgias. Positive for lower back pain and joint pain stable.    Skin: Negative for rash and itching.    Neurological: Negative for dizziness, sensory change, focal weakness and headaches.    Endo/Heme/Allergies: No bruise/bleed easily.    Psychiatric/Behavioral: Negative for depression and memory loss.      Physical Exam:  Filed Vitals:    02/09/17 0847   BP: 122/80   Pulse: 78   Temp: 36.2 °C (97.2 °F)   Resp: 16   Weight: 72.3 kg (159 lb 6.3 oz)   SpO2: 96%       General: Not in acute distress, alert and oriented x 3  HEENT: normalcephalic, atraumatic, xtra ocular muscles intact, moist oral mucus membranes, and oral cavity without any lesions.  Neck: Supple neck and full range of motion. Prominent submandibular glands bilaterally   Lymph nodes: No palpable bilateral cervical and supraclavicular lymphadenopathy.    CVS: regular rate and  rhythm  RESP: Clear to auscultate bilaterally.   ABD: Soft, non tender, non distended, positive bowel sounds, no palpable hepatomegaly   EXT: No edema or cyanosis  CNS: Alert and oriented x3, cranial nerves grossly intact, muscle strength grossly intact, and normal gait.     Labs:  No visits with results within 1 Day(s) from this visit.  Latest known visit with results is:    Hospital Outpatient Visit on 02/02/2017   Component Date Value Ref Range Status   • WBC 02/02/2017 7.7  4.8 - 10.8 K/uL Final   • RBC 02/02/2017 4.68  4.20 - 5.40 M/uL Final   • Hemoglobin 02/02/2017 13.5  12.0 - 16.0 g/dL Final   • Hematocrit 02/02/2017 39.9  37.0 - 47.0 % Final   • MCV 02/02/2017 85.3  81.4 - 97.8 fL Final   • MCH 02/02/2017 28.8  27.0 - 33.0 pg Final   • MCHC 02/02/2017 33.8  33.6 - 35.0 g/dL Final   • RDW 02/02/2017 42.9  35.9 - 50.0 fL Final   • Platelet Count 02/02/2017 309  164 - 446 K/uL Final   • MPV 02/02/2017 9.0  9.0 - 12.9 fL Final   • Neutrophils-Polys 02/02/2017 63.20  44.00 - 72.00 % Final   • Lymphocytes 02/02/2017 22.70  22.00 - 41.00 % Final   • Monocytes 02/02/2017 6.50  0.00 - 13.40 % Final   • Eosinophils 02/02/2017 7.00* 0.00 - 6.90 % Final   • Basophils 02/02/2017 0.60  0.00 - 1.80 % Final   • Neutrophils (Absolute) 02/02/2017 4.89  2.00 - 7.15 K/uL Final    Includes immature neutrophils, if present.   • Lymphs (Absolute) 02/02/2017 1.76  1.00 - 4.80 K/uL Final   • Monos (Absolute) 02/02/2017 0.50  0.00 - 0.85 K/uL Final   • Eos (Absolute) 02/02/2017 0.54* 0.00 - 0.51 K/uL Final   • Baso (Absolute) 02/02/2017 0.05  0.00 - 0.12 K/uL Final   • Sodium 02/02/2017 137  135 - 145 mmol/L Final   • Potassium 02/02/2017 3.8  3.6 - 5.5 mmol/L Final   • Chloride 02/02/2017 106  96 - 112 mmol/L Final   • Co2 02/02/2017 22  20 - 33 mmol/L Final   • Anion Gap 02/02/2017 9.0  0.0 - 11.9 Final   • Glucose 02/02/2017 175* 65 - 99 mg/dL Final   • Bun 02/02/2017 21  8 - 22 mg/dL Final   • Creatinine 02/02/2017 0.80  0.50 -  1.40 mg/dL Final   • Calcium 02/02/2017 9.3  8.5 - 10.5 mg/dL Final   • AST(SGOT) 02/02/2017 19  12 - 45 U/L Final   • ALT(SGPT) 02/02/2017 16  2 - 50 U/L Final   • Alkaline Phosphatase 02/02/2017 104* 30 - 99 U/L Final   • Total Bilirubin 02/02/2017 0.3  0.1 - 1.5 mg/dL Final   • Albumin 02/02/2017 3.8  3.2 - 4.9 g/dL Final   • Total Protein 02/02/2017 6.4  6.0 - 8.2 g/dL Final   • Globulin 02/02/2017 2.6  1.9 - 3.5 g/dL Final   • A-G Ratio 02/02/2017 1.5   Final   • GFR If  02/02/2017 >60  >60 mL/min/1.73 m 2 Final   • GFR If Non  02/02/2017 >60  >60 mL/min/1.73 m 2 Final   ]    Assessment & Plan:    1. 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 On resection she did not have any residual disease was found to have 1/13 nodes positive. She was recommended adjuvant chemotherapy and she was in good health she has been started on modified FOLFOX. She tolerated cycle one fairly well without any significant issues. She is continuing on her current regimen.  2. GERD: She has noticed some increased reflux since her last visit. She will be started on Pepcid.  3. Constipation: She is advised to continue stool softeners and to maintain a bowel movement of 1-3 per day. She is also advised to monitor this closely as she can developed diarrhea on her current regimen.  4. Nutrition: He has a stable appetite without any significant weight loss. Continue to monitor.  5. She is follow-up again prior to her next cycle or sooner as needed.    she agreed and verbalized her agreement and understanding with the current plan. I answered all questions and concerns she has at this time and advised her to call at any time in the interim with questions or concerns in regards to her care. Thank you for allowing me to participate in her care, I will continue to follow.    Please note that this dictation was created  using voice recognition software. I have made every reasonable attempt to correct obvious errors, but I expect that there are errors of grammar and possibly content that I did not discover before finalizing the note.

## 2017-02-09 NOTE — MR AVS SNAPSHOT
Tim Sutton Crump   2017 9:00 AM   Office Visit   MRN: [id number]    Department:  [facility]   Dept Phone:  [phone]    Description:  Female : 1955   Provider:  Keturah Burgos M.D.           Reason for Visit     Follow-Up tox check      Allergies as of 2017     No Known Allergies      You were diagnosed with     Cancer of sigmoid colon (CMS-HCC)   [492921]         Vital Signs     Blood Pressure Pulse Temperature Respirations Weight Oxygen Saturation    122/80 mmHg 78 36.2 °C (97.2 °F) 16 72.3 kg (159 lb 6.3 oz) 96%    Smoking Status                   Never Smoker            Basic Information     Date Of Birth Sex Race Ethnicity Preferred Language    1955 Female  or   Origin (Lithuanian,Surinamese,North Korean,Paul, etc) English      Your appointments     2017  8:00 AM   Non Provider 1 with ONC RN 1   [facility] (--)    [address] [place] [address]  [facility] [address] [phone]           You will be receiving a confirmation call a few days before your appointment from our automated call confirmation system.            2017  9:00 AM   ONCOLOGY EST PATIENT 30 MIN with Keturah Burgos M.D.   [facility] (--)    [address], [facility] [address]  [facility] [id number] [phone]            2017 11:30 AM   Est Chemo 3 with RN 3   [facility]    [id number]  [facility] [address]   [phone]            2017  1:30 PM   EST Port Flush / Central Line Care with INFUSION QUICK INJECT   [facility]    [address]  [facility] [address]   [phone]              Problem List              ICD-10-CM Priority Class Noted - Resolved    Cancer of sigmoid colon (CMS-HCC) C18.7   2016 - Present    Fourth degree hemorrhoids K64.3   2016 - Present      Health Maintenance        Date Due Completion Dates    IMM DTaP/Tdap/Td Vaccine (1 - Tdap) 1976 ---    PAP SMEAR 1/23/1978 ---    COLONOSCOPY 1/23/2007 ---    MAMMOGRAM 2/7/2014 2/7/2013, 1/23/2012, 2/9/2011, 7/9/2010, 7/9/2010, 1/5/2010, 12/23/2009, 12/23/2009, 11/26/2008, 11/26/2008, 12/4/2007, 12/4/2007, 11/10/2006, 10/27/2005    IMM ZOSTER VACCINE 1/23/2017 ---            Current Immunizations     Influenza Vaccine Quad Inj (Pf) 10/1/2016      Below and/or attached are the medications your provider expects you to take. Review all of your home medications and newly ordered medications with your provider and/or pharmacist. Follow medication instructions as directed by your provider and/or pharmacist. Please keep your medication list with you and share with your provider. Update the information when medications are discontinued, doses are changed, or new medications (including over-the-counter products) are added; and carry medication information at all times in the event of emergency situations     Allergies:  No Known Allergies          Medications  Valid as of: February 09, 2017 -  9:19 AM    Generic Name Brand Name Tablet Size Instructions for use    Atorvastatin Calcium (Tab) LIPITOR 10 MG Take 5 mg by mouth every evening.        Cetirizine HCl (Tab) ZYRTEC 10 MG Take 10 mg by mouth every morning. Indications: Hayfever        Citalopram Hydrobromide (Tab) CELEXA 40 MG Take 40 mg by mouth every bedtime.        Famotidine (Tab) PEPCID 20 MG Take 1 Tab by mouth 2 times a day.        Lidocaine-Prilocaine (Cream) EMLA 2.5-2.5 % Apply to port 1 hour prior to access of port and cover with plastic wrap.        Lidocaine-Prilocaine (Cream) EMLA 2.5-2.5 % Apply to port one hour prior to access and cover with plastic wrap.        Loperamide HCl (Cap) IMODIUM 2 MG Give loperamide 4 mg orally first dose, then 2 mg orally with every loose bowel movement.  Contact physician if maximum of 16 mg/24 hours is exceeded.        Loperamide HCl (Tab) IMODIUM A-D 2 MG Take 1 Tab by mouth See Admin Instructions.        Melatonin (Cap)  Melatonin 1 MG Take  by mouth.        Menthol (Lozenge) CEPACOL 3 MG Take 1 Lozenge by mouth as needed.        MetFORMIN HCl (Tab) GLUCOPHAGE 500 MG Take 500 mg by mouth 2 times a day, with meals.        Montelukast Sodium (Tab) SINGULAIR 10 MG Take 10 mg by mouth every bedtime. Indications: Hayfever        Ondansetron HCl (Tab) ZOFRAN 4 MG Take 1 Tab by mouth every four hours as needed for Nausea/Vomiting.        Prochlorperazine Maleate (Tab) COMPAZINE 10 MG Take 1 Tab by mouth every 6 hours as needed.        ValACYclovir HCl (Tab) VALTREX 500 MG Take 500 mg by mouth 2 times a day. 3 day course        .                 Medicines prescribed today were sent to:     [facility] [address] [place] [address] [place] - [address] [facility]    [address] [place] [address]    Phone: [phone] Fax: [phone]    Open 24 Hours?: No      Medication refill instructions:       If your prescription bottle indicates you have medication refills left, it is not necessary to call your provider’s office. Please contact your pharmacy and they will refill your medication.    If your prescription bottle indicates you do not have any refills left, you may request refills at any time through one of the following ways: The online LawyerPaid system (except Urgent Care), by calling your provider’s office, or by asking your pharmacy to contact your provider’s office with a refill request. Medication refills are processed only during regular business hours and may not be available until the next business day. Your provider may request additional information or to have a follow-up visit with you prior to refilling your medication.   *Please Note: Medication refills are assigned a new Rx number when refilled electronically. Your pharmacy may indicate that no refills were authorized even though a new prescription for the same medication is available at the pharmacy. Please request the medicine by name with the pharmacy  before contacting your provider for a refill.           LYSOGENE Access Code: [email or web address]  Expires: 3/1/2017 10:48 AM    LYSOGENE  A secure, online tool to manage your health information     [employer]’s LYSOGENE® is a secure, online tool that connects you to your personalized health information from the privacy of your home -- day or night - making it very easy for you to manage your healthcare. Once the activation process is completed, you can even access your medical information using the LYSOGENE erin, which is available for free in the Apple Erin store or Google Play store.     LYSOGENE provides the following levels of access (as shown below):   My Chart Features   [facility] Primary Care Doctor [facility]  Specialists [facility]  Urgent  Care Non-[facility]  Primary Care  Doctor   Email your healthcare team securely and privately 24/7 X X X    Manage appointments: schedule your next appointment; view details of past/upcoming appointments X      Request prescription refills. X      View recent personal medical records, including lab and immunizations X X X X   View health record, including health history, allergies, medications X X X X   Read reports about your outpatient visits, procedures, consult and ER notes X X X X   See your discharge summary, which is a recap of your hospital and/or ER visit that includes your diagnosis, lab results, and care plan. X X       How to register for LYSOGENE:  1. Go to  [email or web address].  2. Click on the Sign Up Now box, which takes you to the New Member Sign Up page. You will need to provide the following information:  a. Enter your LYSOGENE Access Code exactly as it appears at the top of this page. (You will not need to use this code after you’ve completed the sign-up process. If you do not sign up before the expiration date, you must request a new code.)   b. Enter your date of birth.   c. Enter your home email address.   d. Click Submit, and follow the next screen’s  instructions.  3. Create a Shopventoryt ID. This will be your Shopventoryt login ID and cannot be changed, so think of one that is secure and easy to remember.  4. Create a Shopventoryt password. You can change your password at any time.  5. Enter your Password Reset Question and Answer. This can be used at a later time if you forget your password.   6. Enter your e-mail address. This allows you to receive e-mail notifications when new information is available in [email or web address].  7. Click Sign Up. You can now view your health information.    For assistance activating your [email or web address] account, call [phone]

## 2017-02-16 ENCOUNTER — HOSPITAL ENCOUNTER (OUTPATIENT)
Facility: MEDICAL CENTER | Age: 62
End: 2017-02-16
Attending: INTERNAL MEDICINE
Payer: COMMERCIAL

## 2017-02-16 ENCOUNTER — OFFICE VISIT (OUTPATIENT)
Dept: HEMATOLOGY ONCOLOGY | Facility: MEDICAL CENTER | Age: 62
End: 2017-02-16

## 2017-02-16 ENCOUNTER — NON-PROVIDER VISIT (OUTPATIENT)
Dept: HEMATOLOGY ONCOLOGY | Facility: MEDICAL CENTER | Age: 62
End: 2017-02-16

## 2017-02-16 VITALS
RESPIRATION RATE: 16 BRPM | WEIGHT: 162.3 LBS | SYSTOLIC BLOOD PRESSURE: 122 MMHG | OXYGEN SATURATION: 96 % | HEIGHT: 61 IN | BODY MASS INDEX: 30.64 KG/M2 | DIASTOLIC BLOOD PRESSURE: 70 MMHG | HEART RATE: 68 BPM | TEMPERATURE: 98.8 F

## 2017-02-16 VITALS
DIASTOLIC BLOOD PRESSURE: 70 MMHG | SYSTOLIC BLOOD PRESSURE: 122 MMHG | HEART RATE: 68 BPM | RESPIRATION RATE: 16 BRPM | OXYGEN SATURATION: 96 % | HEIGHT: 61 IN | WEIGHT: 162.3 LBS | TEMPERATURE: 98.8 F | BODY MASS INDEX: 30.64 KG/M2

## 2017-02-16 DIAGNOSIS — C18.7 CANCER OF SIGMOID COLON (HCC): ICD-10-CM

## 2017-02-16 LAB
ALBUMIN SERPL BCP-MCNC: 3.5 G/DL (ref 3.2–4.9)
ALBUMIN/GLOB SERPL: 1.4 G/DL
ALP SERPL-CCNC: 96 U/L (ref 30–99)
ALT SERPL-CCNC: 12 U/L (ref 2–50)
ANION GAP SERPL CALC-SCNC: 9 MMOL/L (ref 0–11.9)
AST SERPL-CCNC: 15 U/L (ref 12–45)
BASOPHILS # BLD AUTO: 0.07 K/UL (ref 0–0.12)
BASOPHILS NFR BLD AUTO: 1.3 % (ref 0–1.8)
BILIRUB SERPL-MCNC: 0.4 MG/DL (ref 0.1–1.5)
BUN SERPL-MCNC: 21 MG/DL (ref 8–22)
CALCIUM SERPL-MCNC: 8.9 MG/DL (ref 8.5–10.5)
CEA SERPL-MCNC: 1.2 NG/ML (ref 0–3)
CHLORIDE SERPL-SCNC: 106 MMOL/L (ref 96–112)
CO2 SERPL-SCNC: 23 MMOL/L (ref 20–33)
CREAT SERPL-MCNC: 0.82 MG/DL (ref 0.5–1.4)
EOSINOPHIL # BLD: 0.34 K/UL (ref 0–0.51)
EOSINOPHIL NFR BLD AUTO: 6.4 % (ref 0–6.9)
ERYTHROCYTE [DISTWIDTH] IN BLOOD BY AUTOMATED COUNT: 41.3 FL (ref 35.9–50)
GLOBULIN SER CALC-MCNC: 2.5 G/DL (ref 1.9–3.5)
GLUCOSE SERPL-MCNC: 110 MG/DL (ref 65–99)
HCT VFR BLD AUTO: 39.4 % (ref 37–47)
HGB BLD-MCNC: 13 G/DL (ref 12–16)
IMM GRANULOCYTES # BLD AUTO: 0.02 K/UL (ref 0–0.11)
IMM GRANULOCYTES NFR BLD AUTO: 0.4 % (ref 0–0.9)
LYMPHOCYTES # BLD: 2.27 K/UL (ref 1–4.8)
LYMPHOCYTES NFR BLD AUTO: 42.5 % (ref 22–41)
MCH RBC QN AUTO: 27.8 PG (ref 27–33)
MCHC RBC AUTO-ENTMCNC: 33 G/DL (ref 33.6–35)
MCV RBC AUTO: 84.4 FL (ref 81.4–97.8)
MONOCYTES # BLD: 0.6 K/UL (ref 0–0.85)
MONOCYTES NFR BLD AUTO: 11.2 % (ref 0–13.4)
NEUTROPHILS # BLD: 2.04 K/UL (ref 2–7.15)
NEUTROPHILS NFR BLD AUTO: 38.2 % (ref 44–72)
NRBC # BLD AUTO: 0 K/UL
NRBC BLD-RTO: 0 /100 WBC
PLATELET # BLD AUTO: 297 K/UL (ref 164–446)
PMV BLD AUTO: 8.7 FL (ref 9–12.9)
POTASSIUM SERPL-SCNC: 3.8 MMOL/L (ref 3.6–5.5)
PROT SERPL-MCNC: 6 G/DL (ref 6–8.2)
RBC # BLD AUTO: 4.67 M/UL (ref 4.2–5.4)
SODIUM SERPL-SCNC: 138 MMOL/L (ref 135–145)
WBC # BLD AUTO: 5.3 K/UL (ref 4.8–10.8)

## 2017-02-16 PROCEDURE — 85025 COMPLETE CBC W/AUTO DIFF WBC: CPT

## 2017-02-16 PROCEDURE — 99214 OFFICE O/P EST MOD 30 MIN: CPT | Performed by: INTERNAL MEDICINE

## 2017-02-16 PROCEDURE — 36591 DRAW BLOOD OFF VENOUS DEVICE: CPT | Performed by: INTERNAL MEDICINE

## 2017-02-16 PROCEDURE — 80053 COMPREHEN METABOLIC PANEL: CPT

## 2017-02-16 PROCEDURE — 82378 CARCINOEMBRYONIC ANTIGEN: CPT

## 2017-02-16 RX ADMIN — Medication 500 UNITS: at 08:30

## 2017-02-16 ASSESSMENT — PAIN SCALES - GENERAL
PAINLEVEL: NO PAIN
PAINLEVEL: NO PAIN

## 2017-02-16 NOTE — PROGRESS NOTES
Follow Up Visit:  Oncology    Date: 2/16/17  Time: 9 am        Referring Physician: Dr. Enoch Shaw  Primary Care:  William Naranjo D.O.  Surgeon:  Dr. Shaw  Gastroenterology: Dr. 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    History of Presenting Illness:  Tim Crump is a 4-year-old female with colon cancer diagnosed in 5/2016.  He was diagnosed secondary to positive call occult testing and alteration in her bowel movements. Colonoscopy showed a polyp in the sigmoid colon which was removed. Pathology was positive for poorly differentiated adenocarcinoma with indeterminate margins. In underwent a sigmoidoscopy showed tattooed polyp stalk without any evidence of disease. On further workup there was no evidence of metastatic disease. Her CEA was one at diagnosis. She then underwent laparoscopic sigmoid resection with low anterior pelvic anastomosis on 12/2016. Pathology showed evidence of local malignancy but she was found to have 1/13 nodes positive and was staged pT1 pN1a and MLH1, MSH2, MSH6 and PMS2 are intact. Staging workup was negative for metastatic disease. She was started on adjuvant chemotherapy with modified FOLFOX in 2/2/17.     Interval history  She is here for follow-up visit.  He has been fairly stable since her last visit. She appears a tolerating chemotherapy fairly well. She had improvement in her mouth sores. She has noticed increased anxiety but this has been fairly stable. She has been taking stool softeners as she had issues with constipation. She is currently having regular bowel movements. She has good energy with stable appetite and weight. She denies any nausea or vomiting. She denies any further neck pain. She denies any fevers, chills or night sweats.    Past Medical History:  1.  Colon cancer   2. Pre DM  3. H/o HTN   4. Hyperlipidemia   5. Seasonal allergies   6.  Arthritis, Knee and hands  7. Anxiety and depression         Past Surgical History   Procedure Laterality Date   • Gyn surgery       hysterectomy   • Gyn surgery        x 2   • Other       varicose vein stripping rubina   • Other orthopedic surgery Left      left wrist   • Low anterior resection laparoscopic  2016     Procedure: LOW ANTERIOR RESECTION LAPAROSCOPIC;  Surgeon: Enoch Shaw M.D.;  [facility] SURGERY [facility];  Service:        Allergies as of 2017   • (No Known Allergies)         Current outpatient prescriptions:   •  famotidine (PEPCID) 20 MG Tab, Take 1 Tab by mouth 2 times a day., Disp: 60 Tab, Rfl: 2  •  loperamide (IMODIUM A-D) 2 MG tablet, Take 1 Tab by mouth See Admin Instructions., Disp: 30 Tab, Rfl: 6  •  lidocaine-prilocaine (EMLA) 2.5-2.5 % Cream, Apply to port one hour prior to access and cover with plastic wrap., Disp: 1 Tube, Rfl: 3  •  menthol (CEPACOL) 3 MG lozenge, Take 1 Lozenge by mouth as needed., Disp: , Rfl:   •  Melatonin 1 MG Cap, Take  by mouth., Disp: , Rfl:   •  ondansetron (ZOFRAN) 4 MG Tab tablet, Take 1 Tab by mouth every four hours as needed for Nausea/Vomiting., Disp: 30 Tab, Rfl: 3  •  prochlorperazine (COMPAZINE) 10 MG Tab, Take 1 Tab by mouth every 6 hours as needed., Disp: 30 Tab, Rfl: 3  •  lidocaine-prilocaine (EMLA) 2.5-2.5 % Cream, Apply to port 1 hour prior to access of port and cover with plastic wrap., Disp: 1 Tube, Rfl: 3  •  loperamide (IMODIUM) 2 MG Cap, Give loperamide 4 mg orally first dose, then 2 mg orally with every loose bowel movement.  Contact physician if maximum of 16 mg/24 hours is exceeded., Disp: 30 Cap, Rfl: 3  •  valacyclovir (VALTREX) 500 MG Tab, Take 500 mg by mouth 2 times a day. 3 day course, Disp: , Rfl:   •  atorvastatin (LIPITOR) 10 MG Tab, Take 5 mg by mouth every evening., Disp: , Rfl:   •  montelukast (SINGULAIR) 10 MG Tab, Take 10 mg by mouth every bedtime. Indications: Hayfever, Disp: , Rfl:  "  •  cetirizine (ZYRTEC) 10 MG Tab, Take 10 mg by mouth every morning. Indications: Hayfever, Disp: , Rfl:   •  metformin (GLUCOPHAGE) 500 MG TABS, Take 500 mg by mouth 2 times a day, with meals., Disp: , Rfl:   •  citalopram (CELEXA) 40 MG TABS, Take 40 mg by mouth every bedtime., Disp: , Rfl:     Review of Systems:  All other review of systems are negative except what was mentioned above in the HPI.  Constitutional: Negative for fever, chills, and weight loss. Positive for mild malaise/fatigue.    HENT: Negative for ear pain and nosebleeds.     Eyes: Negative for blurred vision.    Respiratory: Negative for cough, sputum production and shortness of breath.    Cardiovascular: Negative for chest pain and leg swelling.    Gastrointestinal: Negative for nausea, vomiting and abdominal pain.    Genitourinary: Negative for dysuria.    Musculoskeletal: Negative for myalgias. Positive for lower back pain and joint pain stable.    Skin: Negative for rash and itching.    Neurological: Negative for dizziness, sensory change, focal weakness and headaches.    Endo/Heme/Allergies: No bruise/bleed easily.    Psychiatric/Behavioral: Negative for depression and memory loss.      Physical Exam:  Filed Vitals:    02/16/17 0828   BP: 122/70   Pulse: 68   Temp: 37.1 °C (98.8 °F)   Resp: 16   Height: 1.549 m (5' 0.98\")   Weight: 73.619 kg (162 lb 4.8 oz)   SpO2: 96%       General: Not in acute distress, alert and oriented x 3  HEENT: normalcephalic, atraumatic, xtra ocular muscles intact, moist oral mucus membranes, and oral cavity without any lesions.  Neck: Supple neck and full range of motion. Prominent submandibular glands bilaterally stable  Lymph nodes: No palpable bilateral cervical and supraclavicular lymphadenopathy.    CVS: Regular rate and rhythm  RESP: Clear to auscultate bilaterally.   ABD: Soft, non tender, non distended, positive bowel sounds, no palpable hepatomegaly   EXT: No edema or cyanosis  CNS: Alert and oriented " x3, cranial nerves grossly intact, muscle strength grossly intact, and normal gait.     Labs:   Hospital Outpatient Visit on 02/16/2017   Component Date Value Ref Range Status   • Carcinoembryonic Antigen 02/16/2017 1.2  0.0 - 3.0 ng/mL Final    Comment: Effective September 17, 2013 the quantitative determination  of Carcinoembryonic Antigen (CEA) will now be performed at  [facility].  The Access CEA paramagnetic  particle chemiluminescent immunoassay is used.  Results  obtained with different test methods or kits cannot be used interchangeably.  Measurement of CEA has been shown to be  clinically relevant in the management of patients with  colorectal, breast, lung, prostatic, pancreatic, and ovarian  carcinomas.  Smokers may have slightly elevated levels of CEA.     • WBC 02/16/2017 5.3  4.8 - 10.8 K/uL Final   • RBC 02/16/2017 4.67  4.20 - 5.40 M/uL Final   • Hemoglobin 02/16/2017 13.0  12.0 - 16.0 g/dL Final   • Hematocrit 02/16/2017 39.4  37.0 - 47.0 % Final   • MCV 02/16/2017 84.4  81.4 - 97.8 fL Final   • MCH 02/16/2017 27.8  27.0 - 33.0 pg Final   • MCHC 02/16/2017 33.0* 33.6 - 35.0 g/dL Final   • RDW 02/16/2017 41.3  35.9 - 50.0 fL Final   • Platelet Count 02/16/2017 297  164 - 446 K/uL Final   • MPV 02/16/2017 8.7* 9.0 - 12.9 fL Final   • Neutrophils-Polys 02/16/2017 38.20* 44.00 - 72.00 % Final   • Lymphocytes 02/16/2017 42.50* 22.00 - 41.00 % Final   • Monocytes 02/16/2017 11.20  0.00 - 13.40 % Final   • Eosinophils 02/16/2017 6.40  0.00 - 6.90 % Final   • Basophils 02/16/2017 1.30  0.00 - 1.80 % Final   • Immature Granulocytes 02/16/2017 0.40  0.00 - 0.90 % Final   • Nucleated RBC 02/16/2017 0.00   Final   • Neutrophils (Absolute) 02/16/2017 2.04  2.00 - 7.15 K/uL Final    Includes immature neutrophils, if present.   • Lymphs (Absolute) 02/16/2017 2.27  1.00 - 4.80 K/uL Final   • Monos (Absolute) 02/16/2017 0.60  0.00 - 0.85 K/uL Final   • Eos (Absolute) 02/16/2017 0.34  0.00 - 0.51 K/uL  Final   • Baso (Absolute) 02/16/2017 0.07  0.00 - 0.12 K/uL Final   • Immature Granulocytes (abs) 02/16/2017 0.02  0.00 - 0.11 K/uL Final   • NRBC (Absolute) 02/16/2017 0.00   Final   • Sodium 02/16/2017 138  135 - 145 mmol/L Final   • Potassium 02/16/2017 3.8  3.6 - 5.5 mmol/L Final   • Chloride 02/16/2017 106  96 - 112 mmol/L Final   • Co2 02/16/2017 23  20 - 33 mmol/L Final   • Anion Gap 02/16/2017 9.0  0.0 - 11.9 Final   • Glucose 02/16/2017 110* 65 - 99 mg/dL Final   • Bun 02/16/2017 21  8 - 22 mg/dL Final   • Creatinine 02/16/2017 0.82  0.50 - 1.40 mg/dL Final   • Calcium 02/16/2017 8.9  8.5 - 10.5 mg/dL Final   • AST(SGOT) 02/16/2017 15  12 - 45 U/L Final   • ALT(SGPT) 02/16/2017 12  2 - 50 U/L Final   • Alkaline Phosphatase 02/16/2017 96  30 - 99 U/L Final   • Total Bilirubin 02/16/2017 0.4  0.1 - 1.5 mg/dL Final   • Albumin 02/16/2017 3.5  3.2 - 4.9 g/dL Final   • Total Protein 02/16/2017 6.0  6.0 - 8.2 g/dL Final   • Globulin 02/16/2017 2.5  1.9 - 3.5 g/dL Final   • A-G Ratio 02/16/2017 1.4   Final   • GFR If  02/16/2017 >60  >60 mL/min/1.73 m 2 Final   • GFR If Non  02/16/2017 >60  >60 mL/min/1.73 m 2 Final   ]    Assessment & Plan:  1. 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 She has started adjuvant chemotherapy with FOLFOX. She tolerated cycle one fairly well. She is continue on her current regimen.   2. GERD: He is continued on Pepcid.  3. Constipation: She is on softeners with regular bowel movements.  4. Nutrition: Stable appetite and weight.   5. She is follow-up again prior to her next cycle or sooner as needed.    she agreed and verbalized her agreement and understanding with the current plan. I answered all questions and concerns she has at this time and advised her to call at any time in the interim with questions or concerns in regards to her care. Thank you  for allowing me to participate in her care, I will continue to follow.    Please note that this dictation was created using voice recognition software. I have made every reasonable attempt to correct obvious errors, but I expect that there are errors of grammar and possibly content that I did not discover before finalizing the note.

## 2017-02-16 NOTE — MR AVS SNAPSHOT
"Tim Crump   2017 9:00 AM   Office Visit   MRN: [id number]    Department:  [facility]   Dept Phone:  [phone]    Description:  Female : 1955   Provider:  Keturah Burgos M.D.           Reason for Visit     Follow-Up prechemo      Allergies as of 2017     No Known Allergies      Vital Signs     Blood Pressure Pulse Temperature Respirations Height Weight    122/70 mmHg 68 37.1 °C (98.8 °F) 16 1.549 m (5' 0.98\") 73.619 kg (162 lb 4.8 oz)    Body Mass Index Oxygen Saturation Smoking Status             30.68 kg/m2 96% Never Smoker          Basic Information     Date Of Birth Sex Race Ethnicity Preferred Language    1955 Female  or   Origin (Burundian,New Zealander,Albanian,Citizen of [place], etc) English      Your appointments     2017 11:30 AM   Est Chemo 3 with RN 3   Infusion Services ([facility])    [address]  [facility] [id number] [phone]            2017  1:30 PM   EST Port Flush / Central Line Care with INFUSION QUICK INJECT   Infusion Services ([facility])    [address]  [facility] [address] [phone]            Mar 03, 2017  9:30 AM   Non Provider 1 with ONC RN 2   [facility] (--)    [address] [facility] [address]  [facility] [address] [phone]           You will be receiving a confirmation call a few days before your appointment from our automated call confirmation system.            Mar 03, 2017 10:00 AM   ONCOLOGY EST PATIENT 30 MIN with Keturah Burgos M.D.   [facility] (--)    [address], [address]  [facility] [id number] [phone]            Mar 04, 2017 11:30 AM   Est Chemo 3 with RN 3   Infusion Services ([facility])    [address]  [facility] [address]   [phone]            Mar 06, 2017  1:30 PM   EST Port Flush / Central Line Care with RN 5   Infusion Services ([facility])    [address]  [facility] [address]   [phone]            Mar 17, 2017 " 10:00 AM   Non Provider 1 with ONC RN 1   [facility] (--)    [address] [facility], [address]  [place] [address] [phone]           You will be receiving a confirmation call a few days before your appointment from our automated call confirmation system.            Mar 17, 2017 11:00 AM   ONCOLOGY EST PATIENT 30 MIN with LUIS CARLOS Forman   [facility] (--)    [address] [facility], [address]  [place] [id number] [phone]            Mar 18, 2017 11:30 AM   Est Chemo 3 with RN 3   Infusion Services ([facility])    [address] [facility] [address]  [facility] [address]   [phone]            Mar 20, 2017  2:00 PM   EST Port Flush / Central Line Care with INFUSION QUICK INJECT   Infusion Services ([facility])    [address] [facility] [address]  [facility] [address]   [phone]              Problem List              ICD-10-CM Priority Class Noted - Resolved    Cancer of sigmoid colon (CMS-HCC) C18.7   12/7/2016 - Present    Fourth degree hemorrhoids K64.3   12/7/2016 - Present      Health Maintenance        Date Due Completion Dates    IMM DTaP/Tdap/Td Vaccine (1 - Tdap) 1/23/1976 ---    PAP SMEAR 1/23/1978 ---    COLONOSCOPY 1/23/2007 ---    MAMMOGRAM 2/7/2014 2/7/2013, 1/23/2012, 2/9/2011, 7/9/2010, 7/9/2010, 1/5/2010, 12/23/2009, 12/23/2009, 11/26/2008, 11/26/2008, 12/4/2007, 12/4/2007, 11/10/2006, 10/27/2005    IMM ZOSTER VACCINE 1/23/2017 ---            Current Immunizations     Influenza Vaccine Quad Inj (Pf) 10/1/2016      Below and/or attached are the medications your provider expects you to take. Review all of your home medications and newly ordered medications with your provider and/or pharmacist. Follow medication instructions as directed by your provider and/or pharmacist. Please keep your medication list with you and share with your provider. Update the information when medications are discontinued, doses are changed, or new medications (including over-the-counter products) are added; and carry  medication information at all times in the event of emergency situations     Allergies:  No Known Allergies          Medications  Valid as of: February 16, 2017 -  9:19 AM    Generic Name Brand Name Tablet Size Instructions for use    Atorvastatin Calcium (Tab) LIPITOR 10 MG Take 5 mg by mouth every evening.        Cetirizine HCl (Tab) ZYRTEC 10 MG Take 10 mg by mouth every morning. Indications: Hayfever        Citalopram Hydrobromide (Tab) CELEXA 40 MG Take 40 mg by mouth every bedtime.        Famotidine (Tab) PEPCID 20 MG Take 1 Tab by mouth 2 times a day.        Lidocaine-Prilocaine (Cream) EMLA 2.5-2.5 % Apply to port 1 hour prior to access of port and cover with plastic wrap.        Lidocaine-Prilocaine (Cream) EMLA 2.5-2.5 % Apply to port one hour prior to access and cover with plastic wrap.        Loperamide HCl (Cap) IMODIUM 2 MG Give loperamide 4 mg orally first dose, then 2 mg orally with every loose bowel movement.  Contact physician if maximum of 16 mg/24 hours is exceeded.        Loperamide HCl (Tab) IMODIUM A-D 2 MG Take 1 Tab by mouth See Admin Instructions.        Melatonin (Cap) Melatonin 1 MG Take  by mouth.        Menthol (Lozenge) CEPACOL 3 MG Take 1 Lozenge by mouth as needed.        MetFORMIN HCl (Tab) GLUCOPHAGE 500 MG Take 500 mg by mouth 2 times a day, with meals.        Montelukast Sodium (Tab) SINGULAIR 10 MG Take 10 mg by mouth every bedtime. Indications: Hayfever        Ondansetron HCl (Tab) ZOFRAN 4 MG Take 1 Tab by mouth every four hours as needed for Nausea/Vomiting.        Prochlorperazine Maleate (Tab) COMPAZINE 10 MG Take 1 Tab by mouth every 6 hours as needed.        ValACYclovir HCl (Tab) VALTREX 500 MG Take 500 mg by mouth 2 times a day. 3 day course        .                 Medicines prescribed today were sent to:     [facility] - [place] - [address] AT [facility] OF [address]    [address] [address]    Phone: [phone] Fax:  [phone]    Open 24 Hours?: No      Medication refill instructions:       If your prescription bottle indicates you have medication refills left, it is not necessary to call your provider’s office. Please contact your pharmacy and they will refill your medication.    If your prescription bottle indicates you do not have any refills left, you may request refills at any time through one of the following ways: The online Contrib system (except Urgent Care), by calling your provider’s office, or by asking your pharmacy to contact your provider’s office with a refill request. Medication refills are processed only during regular business hours and may not be available until the next business day. Your provider may request additional information or to have a follow-up visit with you prior to refilling your medication.   *Please Note: Medication refills are assigned a new Rx number when refilled electronically. Your pharmacy may indicate that no refills were authorized even though a new prescription for the same medication is available at the pharmacy. Please request the medicine by name with the pharmacy before contacting your provider for a refill.        Referral     A referral request has been sent to our patient care coordination department. Please allow 3-5 business days for us to process this request and contact you either by phone or mail. If you do not hear from us by the 5th business day, please call us at [phone].           Contrib Access Code: [email or web address]  Expires: 3/1/2017 10:48 AM    Contrib  A secure, online tool to manage your health information     [employer]’s Contrib® is a secure, online tool that connects you to your personalized health information from the privacy of your home -- day or night - making it very easy for you to manage your healthcare. Once the activation process is completed, you can even access your medical information using the Contrib orlin, which is available for free  in the Apple Erin store or Google Play store.     [employer] provides the following levels of access (as shown below):   My Chart Features   [facility] Primary [facility] Doctor [facility]  Specialists [facility]  Urgent  Care Non-[facility]  Primary Care  Doctor   Email your healthcare team securely and privately 24/7 X X X    Manage appointments: schedule your next appointment; view details of past/upcoming appointments X      Request prescription refills. X      View recent personal medical records, including lab and immunizations X X X X   View health record, including health history, allergies, medications X X X X   Read reports about your outpatient visits, procedures, consult and ER notes X X X X   See your discharge summary, which is a recap of your hospital and/or ER visit that includes your diagnosis, lab results, and care plan. X X       How to register for [employer]):  1. Go to  [email or web address].  2. Click on the Sign Up Now box, which takes you to the New Member Sign Up page. You will need to provide the following information:  a. Enter your [employer]) Access Code exactly as it appears at the top of this page. (You will not need to use this code after you’ve completed the sign-up process. If you do not sign up before the expiration date, you must request a new code.)   b. Enter your date of birth.   c. Enter your home email address.   d. Click Submit, and follow the next screen’s instructions.  3. Create a [employer]) ID. This will be your [employer] login ID and cannot be changed, so think of one that is secure and easy to remember.  4. Create a [employer]) password. You can change your password at any time.  5. Enter your Password Reset Question and Answer. This can be used at a later time if you forget your password.   6. Enter your e-mail address. This allows you to receive e-mail notifications when new information is available in [employer]).  7. Click Sign Up. You can now view your health information.    For assistance activating  your MyChart account, call [phone]

## 2017-02-16 NOTE — MR AVS SNAPSHOT
"Tim Sutton Crump   2017 8:00 AM   Non-Provider Visit   MRN: [id number]    Department:  [facility]   Dept Phone:  [phone]    Description:  Female : 1955   Provider:  ONC RN 1           Reason for Visit     Labs Only labs      Allergies as of 2017     No Known Allergies      Vital Signs     Blood Pressure Pulse Temperature Respirations Height Weight    122/70 mmHg 68 37.1 °C (98.8 °F) 16 1.549 m (5' 1\") 73.619 kg (162 lb 4.8 oz)    Body Mass Index Oxygen Saturation Smoking Status             30.68 kg/m2 96% Never Smoker          Basic Information     Date Of Birth Sex Race Ethnicity Preferred Language    1955 Female  or   Origin (Portuguese,North Korean,Jamaican,Angolan, etc) English      Your appointments     2017  9:00 AM   ONCOLOGY EST PATIENT 30 MIN with Keturah Burgos M.D.   [facility] (--)    [address], [address]  [facility] [address]   [phone]            2017 11:30 AM   Est Chemo 3 with RN 3   Infusion Services ([facility])    [id number]  [facility] [address]   [phone]            2017  1:30 PM   EST Port Flush / Central Line Care with INFUSION QUICK INJECT   Infusion Services ([facility])    [address] [facility] [address]  [facility] [address]   [phone]              Problem List              ICD-10-CM Priority Class Noted - Resolved    Cancer of sigmoid colon (CMS-HCC) C18.7   2016 - Present    Fourth degree hemorrhoids K64.3   2016 - Present      Health Maintenance        Date Due Completion Dates    IMM DTaP/Tdap/Td Vaccine (1 - Tdap) 1976 ---    PAP SMEAR 1978 ---    COLONOSCOPY 2007 ---    MAMMOGRAM 2014, 2012, 2011, 2010, 2010, 2010, 2009, 2009, 2008, 2008, 2007, 2007, 11/10/2006, 10/27/2005    IMM ZOSTER VACCINE 2017 ---            Current Immunizations     Influenza Vaccine Quad Inj (Pf) " 10/1/2016      Below and/or attached are the medications your provider expects you to take. Review all of your home medications and newly ordered medications with your provider and/or pharmacist. Follow medication instructions as directed by your provider and/or pharmacist. Please keep your medication list with you and share with your provider. Update the information when medications are discontinued, doses are changed, or new medications (including over-the-counter products) are added; and carry medication information at all times in the event of emergency situations     Allergies:  No Known Allergies          Medications  Valid as of: February 16, 2017 -  8:42 AM    Generic Name Brand Name Tablet Size Instructions for use    Atorvastatin Calcium (Tab) LIPITOR 10 MG Take 5 mg by mouth every evening.        Cetirizine HCl (Tab) ZYRTEC 10 MG Take 10 mg by mouth every morning. Indications: Hayfever        Citalopram Hydrobromide (Tab) CELEXA 40 MG Take 40 mg by mouth every bedtime.        Famotidine (Tab) PEPCID 20 MG Take 1 Tab by mouth 2 times a day.        Lidocaine-Prilocaine (Cream) EMLA 2.5-2.5 % Apply to port 1 hour prior to access of port and cover with plastic wrap.        Lidocaine-Prilocaine (Cream) EMLA 2.5-2.5 % Apply to port one hour prior to access and cover with plastic wrap.        Loperamide HCl (Cap) IMODIUM 2 MG Give loperamide 4 mg orally first dose, then 2 mg orally with every loose bowel movement.  Contact physician if maximum of 16 mg/24 hours is exceeded.        Loperamide HCl (Tab) IMODIUM A-D 2 MG Take 1 Tab by mouth See Admin Instructions.        Melatonin (Cap) Melatonin 1 MG Take  by mouth.        Menthol (Lozenge) CEPACOL 3 MG Take 1 Lozenge by mouth as needed.        MetFORMIN HCl (Tab) GLUCOPHAGE 500 MG Take 500 mg by mouth 2 times a day, with meals.        Montelukast Sodium (Tab) SINGULAIR 10 MG Take 10 mg by mouth every bedtime. Indications: Hayfever        Ondansetron HCl (Tab)  ZOFRAN 4 MG Take 1 Tab by mouth every four hours as needed for Nausea/Vomiting.        Prochlorperazine Maleate (Tab) COMPAZINE 10 MG Take 1 Tab by mouth every 6 hours as needed.        ValACYclovir HCl (Tab) VALTREX 500 MG Take 500 mg by mouth 2 times a day. 3 day course        .                 Medicines prescribed today were sent to:     [facility] [address] [place] [address], [place] - [address] AT [facility] [address]    [facility] [place] [place] [address]    Phone: [phone] Fax: [phone]    Open 24 Hours?: No      Medication refill instructions:       If your prescription bottle indicates you have medication refills left, it is not necessary to call your provider’s office. Please contact your pharmacy and they will refill your medication.    If your prescription bottle indicates you do not have any refills left, you may request refills at any time through one of the following ways: The online Fiz system (except Urgent Care), by calling your provider’s office, or by asking your pharmacy to contact your provider’s office with a refill request. Medication refills are processed only during regular business hours and may not be available until the next business day. Your provider may request additional information or to have a follow-up visit with you prior to refilling your medication.   *Please Note: Medication refills are assigned a new Rx number when refilled electronically. Your pharmacy may indicate that no refills were authorized even though a new prescription for the same medication is available at the pharmacy. Please request the medicine by name with the pharmacy before contacting your provider for a refill.           Fiz Access Code: [email or web address]  Expires: 3/1/2017 10:48 AM    Fiz  A secure, online tool to manage your health information     [employer]’s Fiz® is a secure, online tool that connects you to your personalized health information from the privacy of your home  -- day or night - making it very easy for you to manage your healthcare. Once the activation process is completed, you can even access your medical information using the Cura TV erin, which is available for free in the Apple Erin store or Google Play store.     [employer] provides the following levels of access (as shown below):   My Chart Features   [facility] Primary Care Doctor [facility]  Specialists [facility]  Urgent  Care Non-[facility]  Primary Care  Doctor   Email your healthcare team securely and privately 24/7 X X X    Manage appointments: schedule your next appointment; view details of past/upcoming appointments X      Request prescription refills. X      View recent personal medical records, including lab and immunizations X X X X   View health record, including health history, allergies, medications X X X X   Read reports about your outpatient visits, procedures, consult and ER notes X X X X   See your discharge summary, which is a recap of your hospital and/or ER visit that includes your diagnosis, lab results, and care plan. X X       How to register for Cura TV:  1. Go to  [email or web address].  2. Click on the Sign Up Now box, which takes you to the New Member Sign Up page. You will need to provide the following information:  a. Enter your Cura TV Access Code exactly as it appears at the top of this page. (You will not need to use this code after you’ve completed the sign-up process. If you do not sign up before the expiration date, you must request a new code.)   b. Enter your date of birth.   c. Enter your home email address.   d. Click Submit, and follow the next screen’s instructions.  3. Create a Cura TV ID. This will be your Cura TV login ID and cannot be changed, so think of one that is secure and easy to remember.  4. Create a Cura TV password. You can change your password at any time.  5. Enter your Password Reset Question and Answer. This can be used at a later time if you forget your password.    6. Enter your e-mail address. This allows you to receive e-mail notifications when new information is available in [employer].  7. Click Sign Up. You can now view your health information.    For assistance activating your [employer] account, call [phone]

## 2017-02-16 NOTE — PROGRESS NOTES
Pt presents ambulatory to clinic for port access with labs pre chemo appointment with Dr. Burgos.  She is accompanied by herself for today's visit.  Plan of care discussed, patient is in agreement.  Pt states she has been feeling well with the exception of slight nausea after her last cycle.  Pt with EMLA cream to R chest port site.  Port accessed in sterile fashion; brisk blood return noted.  Labs drawn per orders in [place].  Port flushed per protocol with NS and heparin.  Port then de-accessed, as patient does not have chemo until this Saturday.  Port site covered with sterile gauze and tape. Pt tolerated well.

## 2017-02-18 ENCOUNTER — OUTPATIENT INFUSION SERVICES (OUTPATIENT)
Dept: ONCOLOGY | Facility: MEDICAL CENTER | Age: 62
End: 2017-02-18
Attending: INTERNAL MEDICINE
Payer: COMMERCIAL

## 2017-02-18 VITALS
RESPIRATION RATE: 18 BRPM | TEMPERATURE: 98 F | DIASTOLIC BLOOD PRESSURE: 72 MMHG | HEIGHT: 61 IN | SYSTOLIC BLOOD PRESSURE: 129 MMHG | OXYGEN SATURATION: 98 % | BODY MASS INDEX: 30.88 KG/M2 | WEIGHT: 163.58 LBS | HEART RATE: 70 BPM

## 2017-02-18 DIAGNOSIS — C18.7 CANCER OF SIGMOID COLON (HCC): ICD-10-CM

## 2017-02-18 PROCEDURE — 306637 HCHG MISC ORTHO ITEM RC 0274

## 2017-02-18 PROCEDURE — 96415 CHEMO IV INFUSION ADDL HR: CPT

## 2017-02-18 PROCEDURE — 700111 HCHG RX REV CODE 636 W/ 250 OVERRIDE (IP): Performed by: INTERNAL MEDICINE

## 2017-02-18 PROCEDURE — 96411 CHEMO IV PUSH ADDL DRUG: CPT

## 2017-02-18 PROCEDURE — G0498 CHEMO EXTEND IV INFUS W/PUMP: HCPCS

## 2017-02-18 PROCEDURE — 96375 TX/PRO/DX INJ NEW DRUG ADDON: CPT

## 2017-02-18 PROCEDURE — A4212 NON CORING NEEDLE OR STYLET: HCPCS

## 2017-02-18 PROCEDURE — 700111 HCHG RX REV CODE 636 W/ 250 OVERRIDE (IP)

## 2017-02-18 PROCEDURE — 96368 THER/DIAG CONCURRENT INF: CPT

## 2017-02-18 PROCEDURE — 96413 CHEMO IV INFUSION 1 HR: CPT

## 2017-02-18 RX ORDER — PALONOSETRON 0.05 MG/ML
0.25 INJECTION, SOLUTION INTRAVENOUS ONCE
Status: COMPLETED | OUTPATIENT
Start: 2017-02-18 | End: 2017-02-18

## 2017-02-18 RX ORDER — DEXTROSE MONOHYDRATE 50 MG/ML
INJECTION, SOLUTION INTRAVENOUS CONTINUOUS
Status: DISCONTINUED | OUTPATIENT
Start: 2017-02-18 | End: 2017-02-18 | Stop reason: HOSPADM

## 2017-02-18 RX ADMIN — PALONOSETRON HYDROCHLORIDE 0.25 MG: 0.25 INJECTION INTRAVENOUS at 12:00

## 2017-02-18 RX ADMIN — FLUOROURACIL 4295 MG: 50 INJECTION, SOLUTION INTRAVENOUS at 15:44

## 2017-02-18 RX ADMIN — DEXTROSE MONOHYDRATE 152.15 MG: 50 INJECTION, SOLUTION INTRAVENOUS at 12:48

## 2017-02-18 RX ADMIN — LEUCOVORIN CALCIUM 716 MG: 350 INJECTION, POWDER, LYOPHILIZED, FOR SOLUTION INTRAMUSCULAR; INTRAVENOUS at 12:48

## 2017-02-18 RX ADMIN — FLUOROURACIL 715 MG: 50 INJECTION, SOLUTION INTRAVENOUS at 15:22

## 2017-02-18 RX ADMIN — DEXTROSE MONOHYDRATE: 50 INJECTION, SOLUTION INTRAVENOUS at 11:55

## 2017-02-18 RX ADMIN — DEXAMETHASONE SODIUM PHOSPHATE 12 MG: 4 INJECTION, SOLUTION INTRAMUSCULAR; INTRAVENOUS at 12:04

## 2017-02-18 ASSESSMENT — PAIN SCALES - GENERAL: PAINLEVEL: NO PAIN

## 2017-02-18 NOTE — PROGRESS NOTES
Chemotherapy Verification -SECONDARY RN      Height = 156.2  Weight = 74.2 kg  BSA = 1.79 m2       Medication: Oxaliplatin  Dose: 85 mg/m2  Calculated Dose: 152.15 mg                             (In mg/m2, AUC, mg/kg)     Medication: Adrucil  Dose: 400 mg/m2  Calculated Dose: 716 mg                             (In mg/m2, AUC, mg/kg)    Medication: Adrucil  Dose: 2,400 mg/m2 to go over 46 hours  Calculated Dose: 4,296 mg to go over 46 hours                             (In mg/m2, AUC, mg/kg    I confirm this process was performed independently with the BSA and all final chemotherapy dosing calculations congruent.  Any discrepancies of 5% or greater have been addressed with the chemotherapy pharmacist. The resolution of the discrepancy has been documented in the EPIC progress notes.

## 2017-02-18 NOTE — PROGRESS NOTES
"Pharmacy Chemotherapy Calculations Note:    Patient Name: Tim Crump      Dx: Colon Cancer     Cycle 2 Previous treatment: C1 = 2/2/17      Protocol: [id number]   Oxaliplatin (Eloxatin) 85 mg/m2 IV Day 1  Leucovorin 400 mg/m2 IV over 2 hrs on Day 1. Run concurrently with oxaliplatin  Fluorouracil 400 mg/m2 IV bolus Day 1 followed by 2400 mg/m2 CIVI over 46 hrs   Q14 days until disease progression or unacceptable toxicity    NCCN Guidelines for Colon Cancer. V.2.2017  Xu T, et al. N Engl J Med. 2004;350:9954-5509  Renetta RAY et al. J Clin Oncol. 2008;26(12):2006-12  Hong SL, et al. Br J Cancer. 2002;87(4):393-9         /72 mmHg  Pulse 70  Temp(Src) 36.7 °C (98 °F)  Resp 18  Ht 1.562 m (5' 1.5\")  Wt 74.2 kg (163 lb 9.3 oz)  BMI 30.41 kg/m2  SpO2 98% Body surface area is 1.79 meters squared.     Labs 02/16/17   ANC ~2000  Plt = 397k    Hgb = 13.0  SCr = 0.82 mg/dL CrCl ~39 mL/min LFT = WNL  TBili = 0.4     OXALIplatin (Eloxatin) 85 mg/m² x 1.79 m² = 152.2 mg   <5% difference, ok to treat with final dose = 152.15 mg IV    Leucovorin 400 mg/m² x 1.79 m² = 716 mg   <5% difference, ok to treat with final dose = 716 mg IV    Fluorouracil (5-FU) 400 mg/m² x 1.78 m² = 716 mg   <5% difference, ok to treat with final dose = 715 mg IVP    Fluorouracil (5-FU) 2400 mg/m² x 1.79 m²  = 4296 mg    <5% difference, ok to treat with final dose = 4295 mg CIVI over 46 hrs via CADD pump       Meredith NairD        "

## 2017-02-18 NOTE — PROGRESS NOTES
"Patient Name: Tim Crump   Dx: Colon Cancer        Protocol: [id number]   Oxaliplatin (Eloxatin) 85 mg/m2 IV Day 1  Leucovorin 400 mg/m2 IV over 2 hrs on Day 1. Run concurrently with oxaliplatin  Fluorouracil 400 mg/m2 IV bolus Day 1 followed by 2400 mg/m2 CIVI over 46 hrs    Q14 days until disease progression or unacceptable toxicity    NCCN Guidelines for Colon Cancer. V.2.2017  Xu T, et al. N Engl J Med. 2004;350:8647-7015  Renetta RAY et al. J Clin Oncol. 2008;26(12):2006-12  Hong VERDUGO, et al. Br J Cancer. 2002;87(4):393-9    Allergies:  Review of patient's allergies indicates no known allergies.     /72 mmHg  Pulse 70  Temp(Src) 36.7 °C (98 °F)  Resp 18  Ht 1.562 m (5' 1.5\")  Wt 74.2 kg (163 lb 9.3 oz)  BMI 30.41 kg/m2  SpO2 98% Body surface area is 1.79 meters squared.    Labs 2/16/17:  ANC~ 2000    Plt = 297k      Hgb = 13   SCr = 0.82mg/dL CrCl ~ 86 mL/min   AST/ALT/AP/TBili = 15/12/96/0.4        Drug Order   (Drug name, dose, route, IV Fluid & volume, frequency, number of doses) Cycle: 2      Previous treatment: C1 = 02/02/17     Medication = OXALIplatin (Eloxitan)  Base Dose = 85 mg/m2  Calc Dose: Base Dose x 1.79 m2 = 152.15 mg  Final Dose = 152.15mg  Route = IV  Fluid & Volume = D5W 250 mL  Admin Duration = Over 2 hours          <5% difference, ok to treat with final dose     Medication = Leucovorin (Wellcovorin)  Base Dose = 400 mg/m2  Calc Dose: Base Dose x 1.79 m2 = 716mg  Final Dose = 716 mg  Route = IV  Fluid & Volume = D5W 250 mL  Admin Duration = Over 2 hours          <5% difference, ok to treat with final dose   Medication = Fluorouracil (5-FU) bolus  Base Dose = 400 mg/m2  Calc Dose:Base Dose x 1.79 m2 = 716 mg  Final Dose = 715 mg  Route = IVP  Fluid & Volume = in syringe 14.3 mL  Conc = 50 mg/mL  Admin Duration = Over 5 minutes          <5% difference, ok to treat with final dose   Medication = Fluorouracil (5-FU)  Base Dose = 2400 mg/m2  Calc Dose:Base Dose x 1.79 " m2 = 4296 mg  Final Dose = 4295 mg  Route = CIVI  Conc = 50 mg/mL   Fluid & Volume = 85.9 mL in CADD pump   Admin Duration = Over 46 hours @ 1.9 mL/hr            <5% difference, ok to treat with final dose     By my signature below, I confirm this process was performed independently with the BSA and all final chemotherapy dosing calculations congruent. I have reviewed the above chemotherapy order and that my calculation of the final dose and BSA (when applicable) corroborate those calculations of the  pharmacist. Discrepancies of 5% or greater in the written dose have been addressed and documented within the EPIC Progress notes.    Glenda Mendoza, PharmD

## 2017-02-18 NOTE — PROGRESS NOTES
Chemotherapy Verification - PRIMARY RN      Height = 156.2cm  Weight = 74.2kg  BSA = 1.79m2      Medication: Oxaliplatin  Dose: 85mg/m2  Calculated Dose: 152.15mg                            (In mg/m2, AUC, mg/kg)     Medication: Leucovorin Dose: 400mg/m2  Calculated Dose: 716mg                             (In mg/m2, AUC, mg/kg)    Medication: Fluorouracil Bolus  Dose: 400mg/m2  Calculated Dose: 716mg                            (In mg/m2, AUC, mg/kg)    Medication: Fluorouracil CADD pump Dose: 2400mg/m2  Calculated Dose: 4296mg                             (In mg/m2, AUC, mg/kg)        I confirm this process was performed independently with the BSA and all final chemotherapy dosing calculations congruent.  Any discrepancies of 5% or greater have been addressed with the chemotherapy pharmacist. The resolution of the discrepancy has been documented in the EPIC progress notes.

## 2017-02-19 NOTE — PROGRESS NOTES
"Pt arrives ambulatory for D1C2 Folfox therapy, son \"Alejandro\" at side. Reports tolerating her first cycle w/ ease and that she doesn't feel like she has cancer. Assessment complete. POC reviewed. Lab results 2/16/17 reviewed. EMLA cream removed from R port site. Port accessed in sterile fashion using low profile needle as pt wanted to try low profile; brisk blood return observed. Premedication administered as ordered. Oxaliplatin and leucovorin administered as ordered concurrently w/o adverse s/sx reported or observed. Port flushed per policy & blood return observed. 5FU bolus administered as ordered & w/o adverse s/sx reported or observed. Port flushed & blood return observed prior to connecting pt to 5FU CADD pump w/ Roxanne RN, pump confirmed to be infusing at 1.9mL/hr prior to pt d/c. Pt confirms spill kit at home. Pump de-access appt confirmed. Treatment Pt d/c'd in great spirits no distress observed.   "

## 2017-02-20 ENCOUNTER — OUTPATIENT INFUSION SERVICES (OUTPATIENT)
Dept: ONCOLOGY | Facility: MEDICAL CENTER | Age: 62
End: 2017-02-20
Attending: INTERNAL MEDICINE
Payer: COMMERCIAL

## 2017-02-20 VITALS
WEIGHT: 161.6 LBS | HEART RATE: 68 BPM | TEMPERATURE: 98.7 F | DIASTOLIC BLOOD PRESSURE: 84 MMHG | RESPIRATION RATE: 18 BRPM | HEIGHT: 61 IN | SYSTOLIC BLOOD PRESSURE: 139 MMHG | OXYGEN SATURATION: 98 % | BODY MASS INDEX: 30.51 KG/M2

## 2017-02-20 PROCEDURE — 96523 IRRIG DRUG DELIVERY DEVICE: CPT

## 2017-02-20 PROCEDURE — 700111 HCHG RX REV CODE 636 W/ 250 OVERRIDE (IP)

## 2017-02-20 RX ADMIN — HEPARIN 500 UNITS: 100 SYRINGE at 15:06

## 2017-02-20 ASSESSMENT — PAIN SCALES - GENERAL: PAINLEVEL: NO PAIN

## 2017-02-21 NOTE — PROGRESS NOTES
Returns for Home Infusion disconnect and port flush.  Tx complete.  Port saline and hep lock flush before de access.  Does report some nausea.  Discussed antiemetic use.  Also monitoring for constipation as already starting, taking stool softeners.  DC to self care.

## 2017-02-23 ENCOUNTER — TELEPHONE (OUTPATIENT)
Dept: HEMATOLOGY ONCOLOGY | Facility: MEDICAL CENTER | Age: 62
End: 2017-02-23

## 2017-02-23 NOTE — TELEPHONE ENCOUNTER
Nutrition Services: RD follow-up note    Called pt and spoke to her on the phone to discuss her current nutrition status, weight, and PO adequacy. Pt reports she is consistently eating well of >3 meals/day w/ good appetite and no c/o GI distress at this time. Pt does have her largest meal in the evenings. Otherwise, pt's weight stable with no significant changes at this time. Continue current POC.     RD to sign off, please re-consult us if any thing changes. RD available PRN.

## 2017-03-03 ENCOUNTER — OFFICE VISIT (OUTPATIENT)
Dept: HEMATOLOGY ONCOLOGY | Facility: MEDICAL CENTER | Age: 62
End: 2017-03-03
Payer: MEDICAID

## 2017-03-03 ENCOUNTER — HOSPITAL ENCOUNTER (OUTPATIENT)
Facility: MEDICAL CENTER | Age: 62
End: 2017-03-03
Attending: INTERNAL MEDICINE
Payer: COMMERCIAL

## 2017-03-03 ENCOUNTER — NON-PROVIDER VISIT (OUTPATIENT)
Dept: HEMATOLOGY ONCOLOGY | Facility: MEDICAL CENTER | Age: 62
End: 2017-03-03
Payer: MEDICAID

## 2017-03-03 VITALS
RESPIRATION RATE: 12 BRPM | HEIGHT: 61 IN | OXYGEN SATURATION: 96 % | HEART RATE: 77 BPM | TEMPERATURE: 98.4 F | BODY MASS INDEX: 30.55 KG/M2 | WEIGHT: 161.8 LBS | DIASTOLIC BLOOD PRESSURE: 64 MMHG | SYSTOLIC BLOOD PRESSURE: 100 MMHG

## 2017-03-03 VITALS
DIASTOLIC BLOOD PRESSURE: 64 MMHG | HEIGHT: 60 IN | BODY MASS INDEX: 31.77 KG/M2 | WEIGHT: 161.8 LBS | HEART RATE: 77 BPM | SYSTOLIC BLOOD PRESSURE: 100 MMHG | TEMPERATURE: 98.4 F | OXYGEN SATURATION: 96 % | RESPIRATION RATE: 12 BRPM

## 2017-03-03 DIAGNOSIS — C18.7 CANCER OF SIGMOID COLON (HCC): ICD-10-CM

## 2017-03-03 LAB
ALBUMIN SERPL BCP-MCNC: 3.7 G/DL (ref 3.2–4.9)
ALBUMIN/GLOB SERPL: 1.2 G/DL
ALP SERPL-CCNC: 98 U/L (ref 30–99)
ALT SERPL-CCNC: 18 U/L (ref 2–50)
ANION GAP SERPL CALC-SCNC: 8 MMOL/L (ref 0–11.9)
AST SERPL-CCNC: 18 U/L (ref 12–45)
BASOPHILS # BLD AUTO: 0.03 K/UL (ref 0–0.12)
BASOPHILS NFR BLD AUTO: 0.7 % (ref 0–1.8)
BILIRUB SERPL-MCNC: 0.5 MG/DL (ref 0.1–1.5)
BUN SERPL-MCNC: 22 MG/DL (ref 8–22)
CALCIUM SERPL-MCNC: 9.6 MG/DL (ref 8.5–10.5)
CEA SERPL-MCNC: 1.6 NG/ML (ref 0–3)
CHLORIDE SERPL-SCNC: 106 MMOL/L (ref 96–112)
CO2 SERPL-SCNC: 24 MMOL/L (ref 20–33)
CREAT SERPL-MCNC: 0.84 MG/DL (ref 0.5–1.4)
EOSINOPHIL # BLD: 0.21 K/UL (ref 0–0.51)
EOSINOPHIL NFR BLD AUTO: 5 % (ref 0–6.9)
ERYTHROCYTE [DISTWIDTH] IN BLOOD BY AUTOMATED COUNT: 43.3 FL (ref 35.9–50)
GLOBULIN SER CALC-MCNC: 3 G/DL (ref 1.9–3.5)
GLUCOSE SERPL-MCNC: 130 MG/DL (ref 65–99)
HCT VFR BLD AUTO: 41.7 % (ref 37–47)
HGB BLD-MCNC: 13.9 G/DL (ref 12–16)
IMM GRANULOCYTES # BLD AUTO: 0.01 K/UL (ref 0–0.11)
IMM GRANULOCYTES NFR BLD AUTO: 0.2 % (ref 0–0.9)
LYMPHOCYTES # BLD: 1.6 K/UL (ref 1–4.8)
LYMPHOCYTES NFR BLD AUTO: 38.3 % (ref 22–41)
MCH RBC QN AUTO: 28.4 PG (ref 27–33)
MCHC RBC AUTO-ENTMCNC: 33.3 G/DL (ref 33.6–35)
MCV RBC AUTO: 85.1 FL (ref 81.4–97.8)
MONOCYTES # BLD: 0.56 K/UL (ref 0–0.85)
MONOCYTES NFR BLD AUTO: 13.4 % (ref 0–13.4)
NEUTROPHILS # BLD: 1.77 K/UL (ref 2–7.15)
NEUTROPHILS NFR BLD AUTO: 42.4 % (ref 44–72)
NRBC # BLD AUTO: 0 K/UL
NRBC BLD-RTO: 0 /100 WBC
PLATELET # BLD AUTO: 213 K/UL (ref 164–446)
PMV BLD AUTO: 8.4 FL (ref 9–12.9)
POTASSIUM SERPL-SCNC: 3.6 MMOL/L (ref 3.6–5.5)
PROT SERPL-MCNC: 6.7 G/DL (ref 6–8.2)
RBC # BLD AUTO: 4.9 M/UL (ref 4.2–5.4)
SODIUM SERPL-SCNC: 138 MMOL/L (ref 135–145)
WBC # BLD AUTO: 4.2 K/UL (ref 4.8–10.8)

## 2017-03-03 PROCEDURE — 36592 COLLECT BLOOD FROM PICC: CPT | Performed by: INTERNAL MEDICINE

## 2017-03-03 PROCEDURE — 99213 OFFICE O/P EST LOW 20 MIN: CPT | Performed by: NURSE PRACTITIONER

## 2017-03-03 PROCEDURE — 82378 CARCINOEMBRYONIC ANTIGEN: CPT

## 2017-03-03 PROCEDURE — 80053 COMPREHEN METABOLIC PANEL: CPT

## 2017-03-03 PROCEDURE — 85025 COMPLETE CBC W/AUTO DIFF WBC: CPT

## 2017-03-03 ASSESSMENT — ENCOUNTER SYMPTOMS
CHILLS: 0
WEIGHT LOSS: 0
COUGH: 1
TINGLING: 1
DIARRHEA: 0
NAUSEA: 1
VOMITING: 0
SHORTNESS OF BREATH: 0
NERVOUS/ANXIOUS: 1
MYALGIAS: 0
PALPITATIONS: 0
CONSTIPATION: 1
DIZZINESS: 0
FEVER: 0

## 2017-03-03 ASSESSMENT — PAIN SCALES - GENERAL
PAINLEVEL: NO PAIN
PAINLEVEL: NO PAIN

## 2017-03-03 NOTE — PROGRESS NOTES
Appointment date: 3/4/17   Patient assistance medication: Aloxi  Patient assistant medication received and in bin: yes  Patient to be charged: no

## 2017-03-03 NOTE — MR AVS SNAPSHOT
"Carlitossolomonkyler Lesley Crump   3/3/2017 9:30 AM   Non-Provider Visit   MRN: [id number]    Department:  [facility]   Dept Phone:  [phone]    Description:  Female : 1955   Provider:  ONC RN 2           Reason for Visit     Labs Only labs      Allergies as of 3/3/2017     No Known Allergies      Vital Signs     Blood Pressure Pulse Temperature Respirations Height Oxygen Saturation    100/64 mmHg 77 36.9 °C (98.4 °F) 12 1.549 m (5' 0.98\") 96%    Smoking Status                   Never Smoker            Basic Information     Date Of Birth Sex Race Ethnicity Preferred Language    1955 Female  or   Origin (K[place]i,Comoran,Kosovan,Omani, etc) English      Your appointments     Mar 04, 2017 11:30 AM   Est Chemo 3 with RN 3   [facility] ([facility])    [address] [facility] [address]  [facility] [address]   [phone]            Mar 06, 2017  1:30 PM   EST Port Flush / Central Line Care with RN 5   [facility] ([facility])    [id number]  [facility] [address]   [phone]            Mar 17, 2017 10:00 AM   Non Provider 1 with ONC RN 1   [facility] (--)    [address]  [facility] [id number] [phone]           You will be receiving a confirmation call a few days before your appointment from our automated call confirmation system.            Mar 17, 2017 11:00 AM   ONCOLOGY EST PATIENT 30 MIN with FREDERICK FormanPJuanisNJuanis   [facility] (--)    [address]  [facility] [address] [phone]            Mar 18, 2017 11:30 AM   Est Chemo 3 with RN 3   [facility] ([facility])    [address]  [facility] [address]   [phone]            Mar 20, 2017  2:00 PM   EST Port Flush / Central Line Care with INFUSION QUICK INJECT   [facility] ([facility])    [address]  [facility] [address]   [phone]              Problem List              ICD-10-CM Priority Class Noted - Resolved    Cancer " of sigmoid colon (CMS-[facility]) C18.7   12/7/2016 - Present    Fourth degree hemorrhoids K64.3   12/7/2016 - Present      Health Maintenance        Date Due Completion Dates    IMM DTaP/Tdap/Td Vaccine (1 - Tdap) 1/23/1976 ---    PAP SMEAR 1/23/1978 ---    COLONOSCOPY 1/23/2007 ---    MAMMOGRAM 2/7/2014 2/7/2013, 1/23/2012, 2/9/2011, 7/9/2010, 7/9/2010, 1/5/2010, 12/23/2009, 12/23/2009, 11/26/2008, 11/26/2008, 12/4/2007, 12/4/2007, 11/10/2006, 10/27/2005    IMM ZOSTER VACCINE 1/23/2017 ---            Current Immunizations     Influenza Vaccine Quad Inj (Pf) 10/1/2016      Below and/or attached are the medications your provider expects you to take. Review all of your home medications and newly ordered medications with your provider and/or pharmacist. Follow medication instructions as directed by your provider and/or pharmacist. Please keep your medication list with you and share with your provider. Update the information when medications are discontinued, doses are changed, or new medications (including over-the-counter products) are added; and carry medication information at all times in the event of emergency situations     Allergies:  No Known Allergies          Medications  Valid as of: March 03, 2017 - 10:07 AM    Generic Name Brand Name Tablet Size Instructions for use    Atorvastatin Calcium (Tab) LIPITOR 10 MG Take 5 mg by mouth every evening.        Cetirizine HCl (Tab) ZYRTEC 10 MG Take 10 mg by mouth every morning. Indications: Hayfever        Citalopram Hydrobromide (Tab) CELEXA 40 MG Take 40 mg by mouth every bedtime.        Docusate Sodium (Cap) COLACE 50 MG Take 50 mg by mouth 2 times a day.        Famotidine (Tab) PEPCID 20 MG Take 1 Tab by mouth 2 times a day.        Lidocaine-Prilocaine (Cream) EMLA 2.5-2.5 % Apply to port 1 hour prior to access of port and cover with plastic wrap.        Lidocaine-Prilocaine (Cream) EMLA 2.5-2.5 % Apply to port one hour prior to access and cover with plastic wrap.           Loperamide HCl (Cap) IMODIUM 2 MG Give loperamide 4 mg orally first dose, then 2 mg orally with every loose bowel movement.  Contact physician if maximum of 16 mg/24 hours is exceeded.        Loperamide HCl (Tab) IMODIUM A-D 2 MG Take 1 Tab by mouth See Admin Instructions.        Melatonin (Cap) Melatonin 1 MG Take  by mouth.        Menthol (Lozenge) CEPACOL 3 MG Take 1 Lozenge by mouth as needed.        MetFORMIN HCl (Tab) GLUCOPHAGE 500 MG Take 500 mg by mouth 2 times a day, with meals.        Montelukast Sodium (Tab) SINGULAIR 10 MG Take 10 mg by mouth every bedtime. Indications: Hayfever        Ondansetron HCl (Tab) ZOFRAN 4 MG Take 1 Tab by mouth every four hours as needed for Nausea/Vomiting.        Prochlorperazine Maleate (Tab) COMPAZINE 10 MG Take 1 Tab by mouth every 6 hours as needed.        ValACYclovir HCl (Tab) VALTREX 500 MG Take 500 mg by mouth 2 times a day. 3 day course        .                 Medicines prescribed today were sent to:     [facility] [address] [place], [place] [address], [place] - [address] [facility] [address]    [facility] [place] [address]    Phone: [phone] Fax: [phone]    Open 24 Hours?: No      Medication refill instructions:       If your prescription bottle indicates you have medication refills left, it is not necessary to call your provider’s office. Please contact your pharmacy and they will refill your medication.    If your prescription bottle indicates you do not have any refills left, you may request refills at any time through one of the following ways: The online [employer] system (except Urgent Care), by calling your provider’s office, or by asking your pharmacy to contact your provider’s office with a refill request. Medication refills are processed only during regular business hours and may not be available until the next business day. Your provider may request additional information or to have a follow-up visit with you prior to refilling your  medication.   *Please Note: Medication refills are assigned a new Rx number when refilled electronically. Your pharmacy may indicate that no refills were authorized even though a new prescription for the same medication is available at the pharmacy. Please request the medicine by name with the pharmacy before contacting your provider for a refill.           Vivo Access Code: [email or web address]  Expires: 3/30/2017 12:13 PM    Vivo  A secure, online tool to manage your health information     [employer]’s Vivo® is a secure, online tool that connects you to your personalized health information from the privacy of your home -- day or night - making it very easy for you to manage your healthcare. Once the activation process is completed, you can even access your medical information using the Vivo erin, which is available for free in the Apple Erin store or Google Play store.     Vivo provides the following levels of access (as shown below):   My Chart Features   [facility] Primary [facility] Doctor [facility]  Specialists [facility]  Urgent  Care Non-[facility]  Primary Care  Doctor   Email your healthcare team securely and privately 24/7 X X X    Manage appointments: schedule your next appointment; view details of past/upcoming appointments X      Request prescription refills. X      View recent personal medical records, including lab and immunizations X X X X   View health record, including health history, allergies, medications X X X X   Read reports about your outpatient visits, procedures, consult and ER notes X X X X   See your discharge summary, which is a recap of your hospital and/or ER visit that includes your diagnosis, lab results, and care plan. X X       How to register for Vivo:  1. Go to  [email or web address].  2. Click on the Sign Up Now box, which takes you to the New Member Sign Up page. You will need to provide the following information:  a. Enter your Vivo Access Code exactly as it appears at the  top of this page. (You will not need to use this code after you’ve completed the sign-up process. If you do not sign up before the expiration date, you must request a new code.)   b. Enter your date of birth.   c. Enter your home email address.   d. Click Submit, and follow the next screen’s instructions.  3. Create a [facility] ID. This will be your [facility] login ID and cannot be changed, so think of one that is secure and easy to remember.  4. Create a [facility] password. You can change your password at any time.  5. Enter your Password Reset Question and Answer. This can be used at a later time if you forget your password.   6. Enter your e-mail address. This allows you to receive e-mail notifications when new information is available in [facility].  7. Click Sign Up. You can now view your health information.    For assistance activating your [facility] account, call [phone]

## 2017-03-03 NOTE — PROGRESS NOTES
Subjective:      Tim Crump is a 62 y.o. female who presents for Follow-Up for colon cancer.         HPI    Patient seen today in follow up for colon cancer.  She is here for cycle 3, day 1 of FOLFOX.  Patient is accompanied by herself for today's visit.     Fever - None  Chills - None  Appetite - Her appetite has been good. Her weight is stable.   Fatigue - Mild at times but tolerable and able to maintain normal daily activities.   N/V - She does have nausea. She does take anti-emetic. First week she does have nausea.   Constipation/Diarrhea - She does have constipation. She does take a stool softener, Colace. Last BM was today, but it was hard. She has a BM daily but it is hard. She will take MOM if Colace is not working.   Pain - None  Neuropathy - First week she does have cold sensitivity. It does last about 10 days. This is present on her hand only.   Other - Patient feeling a little anxious and was started on Celexa and states she thinks it is helping. She stated she notices a twitch every once in a while. She does have feel depressed but more anxious at times.     No Known Allergies  Current Outpatient Prescriptions on File Prior to Visit   Medication Sig Dispense Refill   • docusate sodium (COLACE) 50 MG Cap Take 50 mg by mouth 2 times a day.     • famotidine (PEPCID) 20 MG Tab Take 1 Tab by mouth 2 times a day. 60 Tab 2   • loperamide (IMODIUM A-D) 2 MG tablet Take 1 Tab by mouth See Admin Instructions. 30 Tab 6   • lidocaine-prilocaine (EMLA) 2.5-2.5 % Cream Apply to port one hour prior to access and cover with plastic wrap. 1 Tube 3   • menthol (CEPACOL) 3 MG lozenge Take 1 Lozenge by mouth as needed.     • Melatonin 1 MG Cap Take  by mouth.     • ondansetron (ZOFRAN) 4 MG Tab tablet Take 1 Tab by mouth every four hours as needed for Nausea/Vomiting. 30 Tab 3   • prochlorperazine (COMPAZINE) 10 MG Tab Take 1 Tab by mouth every 6 hours as needed. 30 Tab 3   • lidocaine-prilocaine (EMLA) 2.5-2.5  % Cream Apply to port 1 hour prior to access of port and cover with plastic wrap. 1 Tube 3   • loperamide (IMODIUM) 2 MG Cap Give loperamide 4 mg orally first dose, then 2 mg orally with every loose bowel movement.  Contact physician if maximum of 16 mg/24 hours is exceeded. 30 Cap 3   • valacyclovir (VALTREX) 500 MG Tab Take 500 mg by mouth 2 times a day. 3 day course     • atorvastatin (LIPITOR) 10 MG Tab Take 5 mg by mouth every evening.     • montelukast (SINGULAIR) 10 MG Tab Take 10 mg by mouth every bedtime. Indications: Hayfever     • cetirizine (ZYRTEC) 10 MG Tab Take 10 mg by mouth every morning. Indications: Hayfever     • metformin (GLUCOPHAGE) 500 MG TABS Take 500 mg by mouth 2 times a day, with meals.     • citalopram (CELEXA) 40 MG TABS Take 40 mg by mouth every bedtime.       No current facility-administered medications on file prior to visit.       Review of Systems   Constitutional: Positive for malaise/fatigue. Negative for fever, chills and weight loss.   HENT: Positive for congestion (nasal drainage, clear in color).    Respiratory: Positive for cough (sometimes but very mild). Negative for shortness of breath.    Cardiovascular: Negative for chest pain, palpitations and leg swelling.   Gastrointestinal: Positive for nausea (first week of chemo) and constipation. Negative for vomiting and diarrhea.   Genitourinary: Negative for dysuria.   Musculoskeletal: Negative for myalgias.   Neurological: Positive for tingling. Negative for dizziness.   Psychiatric/Behavioral: The patient is nervous/anxious.           Objective:     /64 mmHg  Pulse 77  Temp(Src) 36.9 °C (98.4 °F)  Resp 12  Ht 1.524 m (5')  Wt 73.392 kg (161 lb 12.8 oz)  BMI 31.60 kg/m2  SpO2 96%     Physical Exam   Constitutional: She is oriented to person, place, and time. She appears well-developed and well-nourished. No distress.   HENT:   Head: Normocephalic and atraumatic.   Mouth/Throat: Oropharynx is clear and moist. No  oropharyngeal exudate.   Eyes: Conjunctivae and EOM are normal. Pupils are equal, round, and reactive to light. Right eye exhibits no discharge. Left eye exhibits no discharge.   Cardiovascular: Normal rate, regular rhythm, normal heart sounds and intact distal pulses.  Exam reveals no gallop and no friction rub.    No murmur heard.  Pulmonary/Chest: Effort normal and breath sounds normal. No respiratory distress. She has no wheezes.   Abdominal: Soft. Bowel sounds are normal. She exhibits no distension. There is no tenderness.   Musculoskeletal: Normal range of motion. She exhibits no edema or tenderness.   Neurological: She is alert and oriented to person, place, and time.   Skin: Skin is warm and dry. No rash noted. She is not diaphoretic. No erythema. No pallor.   Psychiatric: She has a normal mood and affect. Her behavior is normal.   Vitals reviewed.      Hospital Outpatient Visit on 03/03/2017   Component Date Value Ref Range Status   • WBC 03/03/2017 4.2* 4.8 - 10.8 K/uL Final   • RBC 03/03/2017 4.90  4.20 - 5.40 M/uL Final   • Hemoglobin 03/03/2017 13.9  12.0 - 16.0 g/dL Final   • Hematocrit 03/03/2017 41.7  37.0 - 47.0 % Final   • MCV 03/03/2017 85.1  81.4 - 97.8 fL Final   • MCH 03/03/2017 28.4  27.0 - 33.0 pg Final   • MCHC 03/03/2017 33.3* 33.6 - 35.0 g/dL Final   • RDW 03/03/2017 43.3  35.9 - 50.0 fL Final   • Platelet Count 03/03/2017 213  164 - 446 K/uL Final   • MPV 03/03/2017 8.4* 9.0 - 12.9 fL Final   • Neutrophils-Polys 03/03/2017 42.40* 44.00 - 72.00 % Final   • Lymphocytes 03/03/2017 38.30  22.00 - 41.00 % Final   • Monocytes 03/03/2017 13.40  0.00 - 13.40 % Final   • Eosinophils 03/03/2017 5.00  0.00 - 6.90 % Final   • Basophils 03/03/2017 0.70  0.00 - 1.80 % Final   • Immature Granulocytes 03/03/2017 0.20  0.00 - 0.90 % Final   • Nucleated RBC 03/03/2017 0.00   Final   • Neutrophils (Absolute) 03/03/2017 1.77* 2.00 - 7.15 K/uL Final    Includes immature neutrophils, if present.   • Lymphs  (Absolute) 03/03/2017 1.60  1.00 - 4.80 K/uL Final   • Monos (Absolute) 03/03/2017 0.56  0.00 - 0.85 K/uL Final   • Eos (Absolute) 03/03/2017 0.21  0.00 - 0.51 K/uL Final   • Baso (Absolute) 03/03/2017 0.03  0.00 - 0.12 K/uL Final   • Immature Granulocytes (abs) 03/03/2017 0.01  0.00 - 0.11 K/uL Final   • NRBC (Absolute) 03/03/2017 0.00   Final   • Sodium 03/03/2017 138  135 - 145 mmol/L Final   • Potassium 03/03/2017 3.6  3.6 - 5.5 mmol/L Final   • Chloride 03/03/2017 106  96 - 112 mmol/L Final   • Co2 03/03/2017 24  20 - 33 mmol/L Final   • Anion Gap 03/03/2017 8.0  0.0 - 11.9 Final   • Glucose 03/03/2017 130* 65 - 99 mg/dL Final   • Bun 03/03/2017 22  8 - 22 mg/dL Final   • Creatinine 03/03/2017 0.84  0.50 - 1.40 mg/dL Final   • Calcium 03/03/2017 9.6  8.5 - 10.5 mg/dL Final   • AST(SGOT) 03/03/2017 18  12 - 45 U/L Final   • ALT(SGPT) 03/03/2017 18  2 - 50 U/L Final   • Alkaline Phosphatase 03/03/2017 98  30 - 99 U/L Final   • Total Bilirubin 03/03/2017 0.5  0.1 - 1.5 mg/dL Final   • Albumin 03/03/2017 3.7  3.2 - 4.9 g/dL Final   • Total Protein 03/03/2017 6.7  6.0 - 8.2 g/dL Final   • Globulin 03/03/2017 3.0  1.9 - 3.5 g/dL Final   • A-G Ratio 03/03/2017 1.2   Final   • Carcinoembryonic Antigen 03/03/2017 1.6  0.0 - 3.0 ng/mL Final    Comment: Effective September 17, 2013 the quantitative determination  of Carcinoembryonic Antigen (CEA) will now be performed at  [facility].  The Access CEA paramagnetic  particle chemiluminescent immunoassay is used.  Results  obtained with different test methods or kits cannot be used interchangeably.  Measurement of CEA has been shown to be  clinically relevant in the management of patients with  colorectal, breast, lung, prostatic, pancreatic, and ovarian  carcinomas.  Smokers may have slightly elevated levels of CEA.     • GFR If  03/03/2017 >60  >60 mL/min/1.73 m 2 Final   • GFR If Non  03/03/2017 >60  >60 mL/min/1.73 m 2 Final           Assessment/Plan:     1. Cancer of sigmoid colon (CMS-HCC)       Plan  1. Patient is doing well and tolerating the treatment. She does have cold sensitivity that lasts about 10 days due to the chemotherapy. I did review patient's CBC and CMP and okay to proceed with cycle 3, day one of FOLFOX.    2. Constipation - patient stated she does have daily bowel movements but describes stool is hard. She is taking a stool softener daily but I have also asked patient to add OTC Miralax daily.     3. Patient is to follow-up in 2 weeks or sooner if needed.

## 2017-03-03 NOTE — NON-PROVIDER
9:40am Patient here today for port access and lab draw prior to seeing provider for pre-chemo appointment. Patient in agreement with plan of care. Port accessed in sterile fashion, flushed easily with brisk blood return. Labs obtained from port per orders by Dr. Burgos. Flushed with NS and heparin per protocol. Port deaccessed and site covered with sterile gauze and tape.  Patient tolerated well. Patient to return tomorrow for chemotherapy in infusion center.

## 2017-03-03 NOTE — MR AVS SNAPSHOT
Tim Sutton Crump   3/3/2017 10:00 AM   Office Visit   MRN: [id number]    Department:  [facility]   Dept Phone:  [phone]    Description:  Female : 1955   Provider:  LATRICIA Forman.P.N.           Reason for Visit     Follow-Up prechemo      Allergies as of 3/3/2017     No Known Allergies      You were diagnosed with     Cancer of sigmoid colon (CMS-HCC)   [137009]         Vital Signs     Blood Pressure Pulse Temperature Respirations Height Weight    100/64 mmHg 77 36.9 °C (98.4 °F) 12 1.524 m (5') 73.392 kg (161 lb 12.8 oz)    Body Mass Index Oxygen Saturation Smoking Status             31.60 kg/m2 96% Never Smoker          Basic Information     Date Of Birth Sex Race Ethnicity Preferred Language    1955 Female  or   Origin (Lithuanian,Trinidadian,Moroccan,Paul, etc) English      Your appointments     Mar 04, 2017 11:30 AM   Est Chemo 3 with RN 3   [facility])    [address]  [facility] [address]   [phone]            Mar 06, 2017  1:30 PM   EST Port Flush / Central Line Care with RN 5   [facility] ([facility])    [address] [facility] [address]  [facility] [address]   [phone]            Mar 17, 2017 10:00 AM   Non Provider 1 with ONC RN 1   [facility] (--)    [address], [facility] [address]  [facility] [id number] [phone]           You will be receiving a confirmation call a few days before your appointment from our automated call confirmation system.            Mar 17, 2017 11:00 AM   ONCOLOGY EST PATIENT 30 MIN with Iliana Casarez, A.P.N.   [facility] (--)    [address], [address]  [facility] [id number] [phone]            Mar 18, 2017 11:30 AM   Est Chemo 3 with RN 3   [facility])    [address]  [facility] [address]   [phone]            Mar 20, 2017  2:00 PM   EST Port Flush / Central Line Care with INFUSION QUICK INJECT   [facility] ([facility])    [address] [facility] [address]  [facility] [address]   [phone]            Mar 31, 2017 10:00 AM   Non Provider 1 with ONC RN 1   [facility] (--)    [address], [facility] [address]  [facility] [address]   [phone]           You will be receiving a confirmation call a few days before your appointment from our automated call confirmation system.            Mar 31, 2017 11:00 AM   ONCOLOGY EST PATIENT 30 MIN with Keturah Burgos M.D.   [facility] (--)    [address], [address]  [facility] [address]   [phone]            Apr 01, 2017 11:30 AM   Est Chemo 2 with RN 4   [facility] ([facility])    [address] [place] [address]   [phone]            Apr 03, 2017  2:00 PM   EST Port Flush / Central Line Care with INFUSION QUICK INJECT   [facility] ([facility])    [address] [place] [address]   [phone]              Problem List              ICD-10-CM Priority Class Noted - Resolved    Cancer of sigmoid colon (CMS-HCC) C18.7   12/7/2016 - Present    Fourth degree hemorrhoids K64.3   12/7/2016 - Present      Health Maintenance        Date Due Completion Dates    IMM DTaP/Tdap/Td Vaccine (1 - Tdap) 1/23/1976 ---    PAP SMEAR 1/23/1978 ---    COLONOSCOPY 1/23/2007 ---    MAMMOGRAM 2/7/2014 2/7/2013, 1/23/2012, 2/9/2011, 7/9/2010, 7/9/2010, 1/5/2010, 12/23/2009, 12/23/2009, 11/26/2008, 11/26/2008, 12/4/2007, 12/4/2007, 11/10/2006, 10/27/2005    IMM ZOSTER VACCINE 1/23/2017 ---            Current Immunizations     Influenza Vaccine Quad Inj (Pf) 10/1/2016      Below and/or attached are the medications your provider expects you to take. Review all of your home medications and newly ordered medications with your provider and/or pharmacist. Follow medication instructions as directed by your provider and/or pharmacist. Please keep your medication list with you and share with your provider. Update the information when medications are discontinued, doses are changed, or  new medications (including over-the-counter products) are added; and carry medication information at all times in the event of emergency situations     Allergies:  No Known Allergies          Medications  Valid as of: March 03, 2017 - 10:34 AM    Generic Name Brand Name Tablet Size Instructions for use    Atorvastatin Calcium (Tab) LIPITOR 10 MG Take 5 mg by mouth every evening.        Cetirizine HCl (Tab) ZYRTEC 10 MG Take 10 mg by mouth every morning. Indications: Hayfever        Citalopram Hydrobromide (Tab) CELEXA 40 MG Take 40 mg by mouth every bedtime.        Docusate Sodium (Cap) COLACE 50 MG Take 50 mg by mouth 2 times a day.        Famotidine (Tab) PEPCID 20 MG Take 1 Tab by mouth 2 times a day.        Lidocaine-Prilocaine (Cream) EMLA 2.5-2.5 % Apply to port 1 hour prior to access of port and cover with plastic wrap.        Lidocaine-Prilocaine (Cream) EMLA 2.5-2.5 % Apply to port one hour prior to access and cover with plastic wrap.        Loperamide HCl (Cap) IMODIUM 2 MG Give loperamide 4 mg orally first dose, then 2 mg orally with every loose bowel movement.  Contact physician if maximum of 16 mg/24 hours is exceeded.        Loperamide HCl (Tab) IMODIUM A-D 2 MG Take 1 Tab by mouth See Admin Instructions.        Melatonin (Cap) Melatonin 1 MG Take  by mouth.        Menthol (Lozenge) CEPACOL 3 MG Take 1 Lozenge by mouth as needed.        MetFORMIN HCl (Tab) GLUCOPHAGE 500 MG Take 500 mg by mouth 2 times a day, with meals.        Montelukast Sodium (Tab) SINGULAIR 10 MG Take 10 mg by mouth every bedtime. Indications: Hayfever        Ondansetron HCl (Tab) ZOFRAN 4 MG Take 1 Tab by mouth every four hours as needed for Nausea/Vomiting.        Prochlorperazine Maleate (Tab) COMPAZINE 10 MG Take 1 Tab by mouth every 6 hours as needed.        ValACYclovir HCl (Tab) VALTREX 500 MG Take 500 mg by mouth 2 times a day. 3 day course        .                 Medicines prescribed today were sent to:     [id number] - [place], [place] - [address] AT [facility] OF [facility] [address] [address] [place] [address]    Phone: [phone] Fax: [phone]    Open 24 Hours?: No      Medication refill instructions:       If your prescription bottle indicates you have medication refills left, it is not necessary to call your provider’s office. Please contact your pharmacy and they will refill your medication.    If your prescription bottle indicates you do not have any refills left, you may request refills at any time through one of the following ways: The online WinAd system (except Urgent Care), by calling your provider’s office, or by asking your pharmacy to contact your provider’s office with a refill request. Medication refills are processed only during regular business hours and may not be available until the next business day. Your provider may request additional information or to have a follow-up visit with you prior to refilling your medication.   *Please Note: Medication refills are assigned a new Rx number when refilled electronically. Your pharmacy may indicate that no refills were authorized even though a new prescription for the same medication is available at the pharmacy. Please request the medicine by name with the pharmacy before contacting your provider for a refill.           WinAd Access Code: [email or web address]  Expires: 3/30/2017 12:13 PM    WinAd  A secure, online tool to manage your health information     [employer] WinAd® is a secure, online tool that connects you to your personalized health information from the privacy of your home -- day or night - making it very easy for you to manage your healthcare. Once the activation process is completed, you can even access your medical information using the WinAd erin, which is available for free in the Apple Erin store or Google Play store.     WinAd provides the following levels of access (as shown below):   My Chart Features    [facility] Doctor [facility]  Specialists [facility]  Urgent  Care Non-[facility]  Primary Care  Doctor   Email your healthcare team securely and privately 24/7 X X X    Manage appointments: schedule your next appointment; view details of past/upcoming appointments X      Request prescription refills. X      View recent personal medical records, including lab and immunizations X X X X   View health record, including health history, allergies, medications X X X X   Read reports about your outpatient visits, procedures, consult and ER notes X X X X   See your discharge summary, which is a recap of your hospital and/or ER visit that includes your diagnosis, lab results, and care plan. X X       How to register for [employer]:  1. Go to  [email or web address].  2. Click on the Sign Up Now box, which takes you to the New Member Sign Up page. You will need to provide the following information:  a. Enter your Venuelabs Access Code exactly as it appears at the top of this page. (You will not need to use this code after you’ve completed the sign-up process. If you do not sign up before the expiration date, you must request a new code.)   b. Enter your date of birth.   c. Enter your home email address.   d. Click Submit, and follow the next screen’s instructions.  3. Create a Venuelabs ID. This will be your Venuelabs login ID and cannot be changed, so think of one that is secure and easy to remember.  4. Create a Venuelabs password. You can change your password at any time.  5. Enter your Password Reset Question and Answer. This can be used at a later time if you forget your password.   6. Enter your e-mail address. This allows you to receive e-mail notifications when new information is available in Venuelabs.  7. Click Sign Up. You can now view your health information.    For assistance activating your [employer] account, call [phone]

## 2017-03-04 ENCOUNTER — OUTPATIENT INFUSION SERVICES (OUTPATIENT)
Dept: ONCOLOGY | Facility: MEDICAL CENTER | Age: 62
End: 2017-03-04
Attending: INTERNAL MEDICINE
Payer: COMMERCIAL

## 2017-03-04 VITALS
HEART RATE: 86 BPM | WEIGHT: 161.82 LBS | DIASTOLIC BLOOD PRESSURE: 60 MMHG | BODY MASS INDEX: 29.78 KG/M2 | SYSTOLIC BLOOD PRESSURE: 116 MMHG | HEIGHT: 62 IN | RESPIRATION RATE: 18 BRPM | TEMPERATURE: 97.8 F | OXYGEN SATURATION: 98 %

## 2017-03-04 DIAGNOSIS — C18.7 CANCER OF SIGMOID COLON (HCC): ICD-10-CM

## 2017-03-04 PROCEDURE — 96411 CHEMO IV PUSH ADDL DRUG: CPT

## 2017-03-04 PROCEDURE — 96368 THER/DIAG CONCURRENT INF: CPT

## 2017-03-04 PROCEDURE — 96415 CHEMO IV INFUSION ADDL HR: CPT

## 2017-03-04 PROCEDURE — 96413 CHEMO IV INFUSION 1 HR: CPT

## 2017-03-04 PROCEDURE — 96375 TX/PRO/DX INJ NEW DRUG ADDON: CPT

## 2017-03-04 PROCEDURE — G0498 CHEMO EXTEND IV INFUS W/PUMP: HCPCS

## 2017-03-04 PROCEDURE — A4212 NON CORING NEEDLE OR STYLET: HCPCS

## 2017-03-04 PROCEDURE — 700111 HCHG RX REV CODE 636 W/ 250 OVERRIDE (IP)

## 2017-03-04 PROCEDURE — 700111 HCHG RX REV CODE 636 W/ 250 OVERRIDE (IP): Performed by: NURSE PRACTITIONER

## 2017-03-04 RX ORDER — PALONOSETRON 0.05 MG/ML
0.25 INJECTION, SOLUTION INTRAVENOUS ONCE
Status: COMPLETED | OUTPATIENT
Start: 2017-03-04 | End: 2017-03-04

## 2017-03-04 RX ORDER — DEXTROSE MONOHYDRATE 50 MG/ML
INJECTION, SOLUTION INTRAVENOUS CONTINUOUS
Status: DISCONTINUED | OUTPATIENT
Start: 2017-03-04 | End: 2017-03-04 | Stop reason: HOSPADM

## 2017-03-04 RX ADMIN — FLUOROURACIL 715 MG: 50 INJECTION, SOLUTION INTRAVENOUS at 16:34

## 2017-03-04 RX ADMIN — DEXAMETHASONE SODIUM PHOSPHATE 12 MG: 4 INJECTION, SOLUTION INTRAMUSCULAR; INTRAVENOUS at 11:54

## 2017-03-04 RX ADMIN — PALONOSETRON HYDROCHLORIDE 0.25 MG: 0.25 INJECTION INTRAVENOUS at 11:53

## 2017-03-04 RX ADMIN — DEXTROSE MONOHYDRATE 152.15 MG: 50 INJECTION, SOLUTION INTRAVENOUS at 14:25

## 2017-03-04 RX ADMIN — FLUOROURACIL 4295 MG: 50 INJECTION, SOLUTION INTRAVENOUS at 16:45

## 2017-03-04 RX ADMIN — DEXTROSE MONOHYDRATE: 50 INJECTION, SOLUTION INTRAVENOUS at 11:25

## 2017-03-04 RX ADMIN — LEUCOVORIN CALCIUM 716 MG: 350 INJECTION, POWDER, LYOPHILIZED, FOR SOLUTION INTRAMUSCULAR; INTRAVENOUS at 12:26

## 2017-03-04 ASSESSMENT — PAIN SCALES - GENERAL: PAINLEVEL: NO PAIN

## 2017-03-04 NOTE — PROGRESS NOTES
"Pharmacy Chemotherapy Calculations Note:    Patient Name: Tim Crump      Dx: Colon Cancer     Cycle 3 Previous treatment: C2 = 2/18/17      Protocol: [id number]   Oxaliplatin (Eloxatin) 85 mg/m2 IV Day 1  Leucovorin 400 mg/m2 IV over 2 hrs on Day 1. Run concurrently with oxaliplatin  Fluorouracil 400 mg/m2 IV bolus Day 1 followed by 2400 mg/m2 CIVI over 46 hrs   Q14 days until disease progression or unacceptable toxicity    NCCN Guidelines for Colon Cancer. V.2.2017  Xu T, et al. N Engl J Med. 2004;350:3832-3374  Renetta RAY et al. J Clin Oncol. 2008;26(12):2006-12  Hong SL, et al. Br J Cancer. 2002;87(4):393-9         /60 mmHg  Pulse 86  Temp(Src) 36.6 °C (97.8 °F)  Resp 18  Ht 1.58 m (5' 2.21\")  Wt 73.4 kg (161 lb 13.1 oz)  BMI 29.40 kg/m2  SpO2 98% Body surface area is 1.79 meters squared.     03/03/17 ANC ~1800  Plt = 213k    Hgb = 13.9     SCr = 0.84 mg/dL CrCl ~83 mL/min LFT = WNL  TBili = 0.5     OXALIplatin (Eloxatin) 85 mg/m² x 1.79 m² = 152.15 mg   <5% difference, ok to treat with final dose = 152.15 mg IV    Leucovorin 400 mg/m² x 1.79 m² = 716 mg   <5% difference, ok to treat with final dose = 716 mg IV    Fluorouracil (5-FU) 400 mg/m² x 1.79 m² = 716 mg   <5% difference, ok to treat with final dose = 715 mg IVP    Fluorouracil (5-FU) 2400 mg/m² x 1.79 m²  = 4296 mg    <5% difference, ok to treat with final dose = 4295 mg CIVI over 46 hrs via CADD pump       Glenda Mendoza, PharmD          "

## 2017-03-04 NOTE — PROGRESS NOTES
Chemotherapy Verification - SECONDARY RN       Height = 158cm  Weight = 73.4kg  BSA = 1.79m2       Medication: Oxaliplatin  Dose: 85mg/m2  Calculated Dose: 152.15mg                             (In mg/m2, AUC, mg/kg)     Medication: 5-Fu (push)  Dose: 400mg/m2  Calculated Dose: 716mg (push)                            (In mg/m2, AUC, mg/kg)    Medication: 5-Fu (continuous over 46h)  Dose: 2400mg/m2 (over 46h)  Calculated Dose: 4296mg (over 46h)                             (In mg/m2, AUC, mg/kg)      I confirm that this process was performed independently.

## 2017-03-04 NOTE — PROGRESS NOTES
"Patient Name: Tim Crump   Dx: Colon Cancer        Protocol: [id number]   Oxaliplatin (Eloxatin) 85 mg/m2 IV Day 1  Leucovorin 400 mg/m2 IV over 2 hrs on Day 1. Run concurrently with oxaliplatin  Fluorouracil 400 mg/m2 IV bolus Day 1 followed by 2400 mg/m2 CIVI over 46 hrs    Q14 days until disease progression or unacceptable toxicity    NCCN Guidelines for Colon Cancer. V.2.2017  Xu T, et al. N Engl J Med. 2004;350:0568-2632  Renetta RAY et al. J Clin Oncol. 2008;26(12):2006-12  Hong VERDUGO, et al. Br J Cancer. 2002;87(4):393-9    Allergies:  Review of patient's allergies indicates no known allergies.     /60 mmHg  Pulse 86  Temp(Src) 36.6 °C (97.8 °F)  Resp 18  Ht 1.58 m (5' 2.21\")  Wt 73.4 kg (161 lb 13.1 oz)  BMI 29.40 kg/m2  SpO2 98% Body surface area is 1.79 meters squared.    Labs 3/3/17:  ANC ~1770    Plt = 213k      Hgb = 13.9   SCr = 0.84 mg/dL CrCl ~83 mL/min   AST/ALT/AP = WNL T bili = 0.5        Drug Order   (Drug name, dose, route, IV Fluid & volume, frequency, number of doses) Cycle: 3      Previous treatment: C2 = 02/18/17     Medication = OXALIplatin (Eloxitan)  Base Dose = 85 mg/m2  Calc Dose: Base Dose x 1.79 m2 = 152.2 mg  Final Dose = 152.15 mg  Route = IV  Fluid & Volume = D5W 250 mL  Admin Duration = Over 2 hours          <5% difference, ok to treat with final dose     Medication = Leucovorin (Wellcovorin)  Base Dose = 400 mg/m2  Calc Dose: Base Dose x 1.79 m2 = 716 mg  Final Dose = 716 mg  Route = IV  Fluid & Volume = D5W 250 mL  Admin Duration = Over 2 hours          <5% difference, ok to treat with final dose   Medication = Fluorouracil (5-FU) bolus  Base Dose = 400 mg/m2  Calc Dose:Base Dose x 1.79 m2 = 716 mg  Final Dose = 715 mg  Route = IVP  Fluid & Volume = in syringe 14.3 mL  Conc = 50 mg/mL  Admin Duration = Over 5 minutes          <5% difference, ok to treat with final dose   Medication = Fluorouracil (5-FU)  Base Dose = 2400 mg/m2  Calc Dose:Base Dose x " 1.79 m2 = 4296 mg  Final Dose = 4295 mg  Route = CIVI  Conc = 50 mg/mL   Fluid & Volume = 85.9 mL in CADD pump   Admin Duration = Over 46 hours @ 1.9 mL/hr            <5% difference, ok to treat with final dose     By my signature below, I confirm this process was performed independently with the BSA and all final chemotherapy dosing calculations congruent. I have reviewed the above chemotherapy order and that my calculation of the final dose and BSA (when applicable) corroborate those calculations of the  pharmacist. Discrepancies of 5% or greater in the written dose have been addressed and documented within the [facility] Progress notes.    Charles Muniz, MeredithD

## 2017-03-04 NOTE — PROGRESS NOTES
Chemotherapy Verification - PRIMARY RN      Height = 62.21 in  Weight = 73.4 kg  BSA = 1.79 m2       Medication: Oxaliplatin  Dose: 85 mg/m2  Calculated Dose: 152 mg                             (In mg/m2, AUC, mg/kg)     Medication: Leucovorin  Dose: 400 mg/m2  Calculated Dose: 716 mg                             (In mg/m2, AUC, mg/kg)    Medication: 5 FU  Dose: 400 mg/m2  Calculated Dose: 716 mg                             (In mg/m2, AUC, mg/kg)    Medication: 5 FU Pump  Dose: 2400 mg/m2  Calculated Dose: 4296 mg to infuse over 46 hours                             (In mg/m2, AUC, mg/kg)        I confirm this process was performed independently with the BSA and all final chemotherapy dosing calculations congruent.  Any discrepancies of 5% or greater have been addressed with the chemotherapy pharmacist. The resolution of the discrepancy has been documented in the EPIC progress notes.

## 2017-03-05 NOTE — PROGRESS NOTES
Patient presents ambulatory with son for C3D1 of chemotherapy.  Patient reports feeling well.  Port accessed using sterile technique and blood return noted.  Labs drawn on 3/3/17, reviewed, and are within parameters to proceed with treatment.  Pre medications given with no adverse effects.  Oxaliplatin and leucovorin hung, after two hours RN noted that oxaliplatin was clamped.  Consulted with pharmacy and oxaliplatin restarted and 5FU push given after the oxaliplatin was finished.  5FU pump checked by two RNs, rate 1.9mL/hr confirmed.  Port flushed and blood return noted, pump connected.  Patient left ambulatory with son in no distress.

## 2017-03-06 ENCOUNTER — OUTPATIENT INFUSION SERVICES (OUTPATIENT)
Dept: ONCOLOGY | Facility: MEDICAL CENTER | Age: 62
End: 2017-03-06
Attending: INTERNAL MEDICINE
Payer: COMMERCIAL

## 2017-03-06 VITALS
BODY MASS INDEX: 30.06 KG/M2 | HEART RATE: 73 BPM | RESPIRATION RATE: 18 BRPM | OXYGEN SATURATION: 97 % | SYSTOLIC BLOOD PRESSURE: 121 MMHG | DIASTOLIC BLOOD PRESSURE: 78 MMHG | WEIGHT: 163.36 LBS | TEMPERATURE: 97 F | HEIGHT: 62 IN

## 2017-03-06 DIAGNOSIS — C18.7 CANCER OF SIGMOID COLON (HCC): ICD-10-CM

## 2017-03-06 PROCEDURE — 700111 HCHG RX REV CODE 636 W/ 250 OVERRIDE (IP)

## 2017-03-06 PROCEDURE — 96523 IRRIG DRUG DELIVERY DEVICE: CPT

## 2017-03-06 RX ADMIN — HEPARIN 500 UNITS: 100 SYRINGE at 15:46

## 2017-03-06 ASSESSMENT — PAIN SCALES - GENERAL: PAINLEVEL: NO PAIN

## 2017-03-16 DIAGNOSIS — C18.7 CANCER OF SIGMOID COLON (HCC): ICD-10-CM

## 2017-03-17 ENCOUNTER — HOSPITAL ENCOUNTER (OUTPATIENT)
Facility: MEDICAL CENTER | Age: 62
End: 2017-03-17
Attending: NURSE PRACTITIONER
Payer: COMMERCIAL

## 2017-03-17 ENCOUNTER — OFFICE VISIT (OUTPATIENT)
Dept: HEMATOLOGY ONCOLOGY | Facility: MEDICAL CENTER | Age: 62
End: 2017-03-17

## 2017-03-17 ENCOUNTER — NON-PROVIDER VISIT (OUTPATIENT)
Dept: HEMATOLOGY ONCOLOGY | Facility: MEDICAL CENTER | Age: 62
End: 2017-03-17
Payer: MEDICAID

## 2017-03-17 VITALS
OXYGEN SATURATION: 94 % | RESPIRATION RATE: 16 BRPM | SYSTOLIC BLOOD PRESSURE: 120 MMHG | HEIGHT: 62 IN | HEART RATE: 69 BPM | BODY MASS INDEX: 30.22 KG/M2 | DIASTOLIC BLOOD PRESSURE: 78 MMHG | WEIGHT: 164.24 LBS | TEMPERATURE: 99.1 F

## 2017-03-17 VITALS
RESPIRATION RATE: 16 BRPM | WEIGHT: 164 LBS | SYSTOLIC BLOOD PRESSURE: 120 MMHG | HEIGHT: 62 IN | OXYGEN SATURATION: 94 % | TEMPERATURE: 99.1 F | HEART RATE: 69 BPM | BODY MASS INDEX: 30.18 KG/M2 | DIASTOLIC BLOOD PRESSURE: 78 MMHG

## 2017-03-17 DIAGNOSIS — G62.0 PERIPHERAL NEUROPATHY DUE TO CHEMOTHERAPY (HCC): ICD-10-CM

## 2017-03-17 DIAGNOSIS — C18.7 CANCER OF SIGMOID COLON (HCC): ICD-10-CM

## 2017-03-17 DIAGNOSIS — T45.1X5A PERIPHERAL NEUROPATHY DUE TO CHEMOTHERAPY (HCC): ICD-10-CM

## 2017-03-17 LAB
ALBUMIN SERPL BCP-MCNC: 3.6 G/DL (ref 3.2–4.9)
ALBUMIN/GLOB SERPL: 1.3 G/DL
ALP SERPL-CCNC: 103 U/L (ref 30–99)
ALT SERPL-CCNC: 27 U/L (ref 2–50)
ANION GAP SERPL CALC-SCNC: 7 MMOL/L (ref 0–11.9)
AST SERPL-CCNC: 27 U/L (ref 12–45)
BASOPHILS # BLD AUTO: 0.04 K/UL (ref 0–0.12)
BASOPHILS NFR BLD AUTO: 0.9 % (ref 0–1.8)
BILIRUB SERPL-MCNC: 0.4 MG/DL (ref 0.1–1.5)
BUN SERPL-MCNC: 18 MG/DL (ref 8–22)
CALCIUM SERPL-MCNC: 9 MG/DL (ref 8.5–10.5)
CHLORIDE SERPL-SCNC: 105 MMOL/L (ref 96–112)
CO2 SERPL-SCNC: 25 MMOL/L (ref 20–33)
CREAT SERPL-MCNC: 0.79 MG/DL (ref 0.5–1.4)
EOSINOPHIL # BLD: 0.27 K/UL (ref 0–0.51)
EOSINOPHIL NFR BLD AUTO: 6.4 % (ref 0–6.9)
ERYTHROCYTE [DISTWIDTH] IN BLOOD BY AUTOMATED COUNT: 45.4 FL (ref 35.9–50)
GLOBULIN SER CALC-MCNC: 2.7 G/DL (ref 1.9–3.5)
GLUCOSE SERPL-MCNC: 124 MG/DL (ref 65–99)
HCT VFR BLD AUTO: 39.3 % (ref 37–47)
HGB BLD-MCNC: 13.1 G/DL (ref 12–16)
IMM GRANULOCYTES # BLD AUTO: 0.01 K/UL (ref 0–0.11)
IMM GRANULOCYTES NFR BLD AUTO: 0.2 % (ref 0–0.9)
LYMPHOCYTES # BLD: 1.64 K/UL (ref 1–4.8)
LYMPHOCYTES NFR BLD AUTO: 38.9 % (ref 22–41)
MCH RBC QN AUTO: 28.5 PG (ref 27–33)
MCHC RBC AUTO-ENTMCNC: 33.3 G/DL (ref 33.6–35)
MCV RBC AUTO: 85.6 FL (ref 81.4–97.8)
MONOCYTES # BLD: 0.7 K/UL (ref 0–0.85)
MONOCYTES NFR BLD AUTO: 16.6 % (ref 0–13.4)
NEUTROPHILS # BLD: 1.56 K/UL (ref 2–7.15)
NEUTROPHILS NFR BLD AUTO: 37 % (ref 44–72)
NRBC # BLD AUTO: 0 K/UL
NRBC BLD-RTO: 0 /100 WBC
PLATELET # BLD AUTO: 194 K/UL (ref 164–446)
PMV BLD AUTO: 8.4 FL (ref 9–12.9)
POTASSIUM SERPL-SCNC: 3.9 MMOL/L (ref 3.6–5.5)
PROT SERPL-MCNC: 6.3 G/DL (ref 6–8.2)
RBC # BLD AUTO: 4.59 M/UL (ref 4.2–5.4)
SODIUM SERPL-SCNC: 137 MMOL/L (ref 135–145)
WBC # BLD AUTO: 4.2 K/UL (ref 4.8–10.8)

## 2017-03-17 PROCEDURE — 85025 COMPLETE CBC W/AUTO DIFF WBC: CPT

## 2017-03-17 PROCEDURE — 99213 OFFICE O/P EST LOW 20 MIN: CPT | Performed by: NURSE PRACTITIONER

## 2017-03-17 PROCEDURE — 36591 DRAW BLOOD OFF VENOUS DEVICE: CPT | Performed by: INTERNAL MEDICINE

## 2017-03-17 PROCEDURE — 80053 COMPREHEN METABOLIC PANEL: CPT

## 2017-03-17 RX ADMIN — Medication 500 UNITS: at 10:15

## 2017-03-17 ASSESSMENT — ENCOUNTER SYMPTOMS
CONSTIPATION: 0
DIARRHEA: 0
HEADACHES: 0
CHILLS: 0
PALPITATIONS: 0
DIARRHEA: 0
WEIGHT LOSS: 0
INSOMNIA: 0
MYALGIAS: 0
CHILLS: 0
TINGLING: 1
PALPITATIONS: 0
VOMITING: 0
VOMITING: 0
SHORTNESS OF BREATH: 0
MYALGIAS: 0
FEVER: 0
COUGH: 1
WEIGHT LOSS: 0
COUGH: 1
CONSTIPATION: 0
WHEEZING: 0
NAUSEA: 0
DIZZINESS: 0
HEADACHES: 0
WHEEZING: 0
SHORTNESS OF BREATH: 0
TINGLING: 1
DIZZINESS: 0
NAUSEA: 1
FEVER: 0

## 2017-03-17 ASSESSMENT — PAIN SCALES - GENERAL
PAINLEVEL: NO PAIN
PAINLEVEL: NO PAIN

## 2017-03-17 NOTE — MR AVS SNAPSHOT
"Tim Crump   3/17/2017 11:00 AM   Office Visit   MRN: [id number]    Department:  [facility]   Dept Phone:  [phone]    Description:  Female : 1955   Provider:  Iliana Casarez A.PJuanisN.           Reason for Visit     Follow-Up prechemo      Allergies as of 3/17/2017     No Known Allergies      Vital Signs     Blood Pressure Pulse Temperature Respirations Height Weight    120/78 mmHg 69 37.3 °C (99.1 °F) 16 1.575 m (5' 2.01\") 74.39 kg (164 lb)    Body Mass Index Oxygen Saturation Smoking Status             29.99 kg/m2 94% Never Smoker          Basic Information     Date Of Birth Sex Race Ethnicity Preferred Language    1955 Female  or   Origin (Sri Lankan,Wallisian,Mauritian,Paul, etc) English      Your appointments     Mar 18, 2017 11:30 AM   Est Chemo 3 with RN 3   [facility] ([facility])    [address]  [facility] [address] [phone]            Mar 20, 2017  2:00 PM   EST Port Flush / Central Line Care with INFUSION QUICK INJECT   [facility] ([facility])    [address]  [facility] [id number] [phone]            Mar 31, 2017 10:00 AM   Non Provider 1 with ONC RN 1   [facility] (--)    [address] [facility] [address]  [facility] [id number] [phone]           You will be receiving a confirmation call a few days before your appointment from our automated call confirmation system.            Mar 31, 2017 11:00 AM   ONCOLOGY EST PATIENT 30 MIN with Keturah Burgos M.D.   [facility] (--)    [address], [facility] [address]  [facility] [address] [phone]            2017 11:30 AM   Est Chemo 2 with RN 4   [facility] ([facility])    [address]  [facility] [address] [phone]            2017  2:00 PM   EST Port Flush / Central Line Care with INFUSION QUICK INJECT   [facility] ([facility])    [address]  [facility] [id number] [phone]              "   Problem List              ICD-10-CM Priority Class Noted - Resolved    Cancer of sigmoid colon (CMS-HCC) C18.7   12/7/2016 - Present    Fourth degree hemorrhoids K64.3   12/7/2016 - Present      Health Maintenance        Date Due Completion Dates    IMM DTaP/Tdap/Td Vaccine (1 - Tdap) 1/23/1976 ---    PAP SMEAR 1/23/1978 ---    COLONOSCOPY 1/23/2007 ---    MAMMOGRAM 2/7/2014 2/7/2013, 1/23/2012, 2/9/2011, 7/9/2010, 7/9/2010, 1/5/2010, 12/23/2009, 12/23/2009, 11/26/2008, 11/26/2008, 12/4/2007, 12/4/2007, 11/10/2006, 10/27/2005    IMM ZOSTER VACCINE 1/23/2017 ---            Current Immunizations     Influenza Vaccine Quad Inj (Pf) 10/1/2016      Below and/or attached are the medications your provider expects you to take. Review all of your home medications and newly ordered medications with your provider and/or pharmacist. Follow medication instructions as directed by your provider and/or pharmacist. Please keep your medication list with you and share with your provider. Update the information when medications are discontinued, doses are changed, or new medications (including over-the-counter products) are added; and carry medication information at all times in the event of emergency situations     Allergies:  No Known Allergies          Medications  Valid as of: March 17, 2017 - 11:52 AM    Generic Name Brand Name Tablet Size Instructions for use    Atorvastatin Calcium (Tab) LIPITOR 10 MG Take 5 mg by mouth every evening.        Cetirizine HCl (Tab) ZYRTEC 10 MG Take 10 mg by mouth every morning. Indications: Hayfever        Citalopram Hydrobromide (Tab) CELEXA 40 MG Take 40 mg by mouth every bedtime.        Docusate Sodium (Cap) COLACE 50 MG Take 50 mg by mouth 2 times a day.        Famotidine (Tab) PEPCID 20 MG Take 1 Tab by mouth 2 times a day.        Lidocaine-Prilocaine (Cream) EMLA 2.5-2.5 % Apply to port 1 hour prior to access of port and cover with plastic wrap.        Lidocaine-Prilocaine (Cream) EMLA  2.5-2.5 % Apply to port one hour prior to access and cover with plastic wrap.        Loperamide HCl (Cap) IMODIUM 2 MG Give loperamide 4 mg orally first dose, then 2 mg orally with every loose bowel movement.  Contact physician if maximum of 16 mg/24 hours is exceeded.        Loperamide HCl (Tab) IMODIUM A-D 2 MG Take 1 Tab by mouth See Admin Instructions.        Melatonin (Cap) Melatonin 1 MG Take  by mouth.        Menthol (Lozenge) CEPACOL 3 MG Take 1 Lozenge by mouth as needed.        MetFORMIN HCl (Tab) GLUCOPHAGE 500 MG Take 500 mg by mouth 2 times a day, with meals.        Montelukast Sodium (Tab) SINGULAIR 10 MG Take 10 mg by mouth every bedtime. Indications: Hayfever        Ondansetron HCl (Tab) ZOFRAN 4 MG Take 1 Tab by mouth every four hours as needed for Nausea/Vomiting.        Prochlorperazine Maleate (Tab) COMPAZINE 10 MG Take 1 Tab by mouth every 6 hours as needed.        ValACYclovir HCl (Tab) VALTREX 500 MG Take 500 mg by mouth 2 times a day. 3 day course        .                 Medicines prescribed today were sent to:     [email or web address] [address] [place] [address], [place] - [address] AT [facility] OF [address] [address] [address] [place] [address]    Phone: [phone] Fax: [phone]    Open 24 Hours?: No      Medication refill instructions:       If your prescription bottle indicates you have medication refills left, it is not necessary to call your provider’s office. Please contact your pharmacy and they will refill your medication.    If your prescription bottle indicates you do not have any refills left, you may request refills at any time through one of the following ways: The online [facility] system (except Urgent Care), by calling your provider’s office, or by asking your pharmacy to contact your provider’s office with a refill request. Medication refills are processed only during regular business hours and may not be available until the next business day. Your provider may request additional  information or to have a follow-up visit with you prior to refilling your medication.   *Please Note: Medication refills are assigned a new Rx number when refilled electronically. Your pharmacy may indicate that no refills were authorized even though a new prescription for the same medication is available at the pharmacy. Please request the medicine by name with the pharmacy before contacting your provider for a refill.           Purpose Global Access Code: [email or web address]  Expires: 3/30/2017  1:13 PM    Purpose Global  A secure, online tool to manage your health information     [employer]’s Purpose Global® is a secure, online tool that connects you to your personalized health information from the privacy of your home -- day or night - making it very easy for you to manage your healthcare. Once the activation process is completed, you can even access your medical information using the Purpose Global erin, which is available for free in the Apple Erin store or Google Play store.     Purpose Global provides the following levels of access (as shown below):   My Chart Features   [facility] Doctor [facility]  Specialists [facility]  Urgent  Care Non-[facility]  Primary Care  Doctor   Email your healthcare team securely and privately 24/7 X X X    Manage appointments: schedule your next appointment; view details of past/upcoming appointments X      Request prescription refills. X      View recent personal medical records, including lab and immunizations X X X X   View health record, including health history, allergies, medications X X X X   Read reports about your outpatient visits, procedures, consult and ER notes X X X X   See your discharge summary, which is a recap of your hospital and/or ER visit that includes your diagnosis, lab results, and care plan. X X       How to register for Purpose Global:  1. Go to  [email or web address].  2. Click on the Sign Up Now box, which takes you to the New Member Sign Up page. You will need to provide the following  information:  a. Enter your [employer] Access Code exactly as it appears at the top of this page. (You will not need to use this code after you’ve completed the sign-up process. If you do not sign up before the expiration date, you must request a new code.)   b. Enter your date of birth.   c. Enter your home email address.   d. Click Submit, and follow the next screen’s instructions.  3. Create a [employer] ID. This will be your [employer] login ID and cannot be changed, so think of one that is secure and easy to remember.  4. Create a [employer] password. You can change your password at any time.  5. Enter your Password Reset Question and Answer. This can be used at a later time if you forget your password.   6. Enter your e-mail address. This allows you to receive e-mail notifications when new information is available in [employer].  7. Click Sign Up. You can now view your health information.    For assistance activating your [employer] account, call [phone]

## 2017-03-17 NOTE — PROGRESS NOTES
Subjective:      Tim Crump is a 62 y.o. female who presents for Follow-Up for colon cancer.         HPI    Patient seen today in follow-up for colon cancer. She is accompanied by her son for today's visit. Patient is scheduled to receive cycle 4, day one of FOLFOX tomorrow. Patient denies fevers or chills. Her appetite has been good and her weight is stable. She has mild fatigue but tolerable. She denies nausea and vomiting. Her bowels are working well now with medication. Last BM was this morning. Patient denies pain. Patient with peripheral neuropathy related to the oxaliplatin. She does have significant cold sensitivity and neuropathy the first week after treatment, however it does dissipate on the second week. Patient stated she no longer has neuropathy in her hands and feet today and is doing well. I discussed in detail with patient the concern with peripheral neuropathy related to chemotherapy and she is aware to continue to monitor. Patient also denies any mouth sores. No cracking or peeling of the hands or feet.    No Known Allergies  Current Outpatient Prescriptions on File Prior to Visit   Medication Sig Dispense Refill   • docusate sodium (COLACE) 50 MG Cap Take 50 mg by mouth 2 times a day.     • famotidine (PEPCID) 20 MG Tab Take 1 Tab by mouth 2 times a day. 60 Tab 2   • loperamide (IMODIUM A-D) 2 MG tablet Take 1 Tab by mouth See Admin Instructions. 30 Tab 6   • lidocaine-prilocaine (EMLA) 2.5-2.5 % Cream Apply to port one hour prior to access and cover with plastic wrap. 1 Tube 3   • menthol (CEPACOL) 3 MG lozenge Take 1 Lozenge by mouth as needed.     • Melatonin 1 MG Cap Take  by mouth.     • ondansetron (ZOFRAN) 4 MG Tab tablet Take 1 Tab by mouth every four hours as needed for Nausea/Vomiting. 30 Tab 3   • prochlorperazine (COMPAZINE) 10 MG Tab Take 1 Tab by mouth every 6 hours as needed. 30 Tab 3   • lidocaine-prilocaine (EMLA) 2.5-2.5 % Cream Apply to port 1 hour prior to access of  "port and cover with plastic wrap. 1 Tube 3   • loperamide (IMODIUM) 2 MG Cap Give loperamide 4 mg orally first dose, then 2 mg orally with every loose bowel movement.  Contact physician if maximum of 16 mg/24 hours is exceeded. 30 Cap 3   • valacyclovir (VALTREX) 500 MG Tab Take 500 mg by mouth 2 times a day. 3 day course     • atorvastatin (LIPITOR) 10 MG Tab Take 5 mg by mouth every evening.     • montelukast (SINGULAIR) 10 MG Tab Take 10 mg by mouth every bedtime. Indications: Hayfever     • cetirizine (ZYRTEC) 10 MG Tab Take 10 mg by mouth every morning. Indications: Hayfever     • metformin (GLUCOPHAGE) 500 MG TABS Take 500 mg by mouth 2 times a day, with meals.     • citalopram (CELEXA) 40 MG TABS Take 40 mg by mouth every bedtime.       No current facility-administered medications on file prior to visit.       Review of Systems   Constitutional: Positive for malaise/fatigue (mild). Negative for fever, chills and weight loss.   Respiratory: Positive for cough (very mild at times). Negative for shortness of breath and wheezing.    Cardiovascular: Negative for chest pain, palpitations and leg swelling.   Gastrointestinal: Negative for nausea, vomiting, diarrhea and constipation.   Genitourinary: Negative for dysuria.   Musculoskeletal: Negative for myalgias.   Skin: Negative for itching and rash.   Neurological: Positive for tingling (resolves by the start of each cycle. ). Negative for dizziness and headaches.   Psychiatric/Behavioral: The patient does not have insomnia.           Objective:     /78 mmHg  Pulse 69  Temp(Src) 37.3 °C (99.1 °F)  Resp 16  Ht 1.575 m (5' 2.01\")  Wt 74.39 kg (164 lb)  BMI 29.99 kg/m2  SpO2 94%     Physical Exam   Constitutional: She is oriented to person, place, and time. She appears well-developed and well-nourished. No distress.   HENT:   Head: Normocephalic and atraumatic.   Mouth/Throat: Oropharynx is clear and moist. No oropharyngeal exudate.   Eyes: Conjunctivae " and EOM are normal. Pupils are equal, round, and reactive to light. Right eye exhibits no discharge. Left eye exhibits no discharge. No scleral icterus.   Cardiovascular: Normal rate, regular rhythm, normal heart sounds and intact distal pulses.  Exam reveals no gallop and no friction rub.    No murmur heard.  Pulmonary/Chest: Effort normal and breath sounds normal. No respiratory distress. She has no wheezes.   Abdominal: Soft. Bowel sounds are normal. She exhibits no distension. There is no tenderness.   Musculoskeletal: Normal range of motion. She exhibits no edema or tenderness.   Neurological: She is alert and oriented to person, place, and time.   Skin: Skin is warm and dry. No rash noted. She is not diaphoretic. No erythema. No pallor.   Psychiatric: She has a normal mood and affect. Her behavior is normal.   Vitals reviewed.      Hospital Outpatient Visit on 03/17/2017   Component Date Value Ref Range Status   • WBC 03/17/2017 4.2* 4.8 - 10.8 K/uL Final   • RBC 03/17/2017 4.59  4.20 - 5.40 M/uL Final   • Hemoglobin 03/17/2017 13.1  12.0 - 16.0 g/dL Final   • Hematocrit 03/17/2017 39.3  37.0 - 47.0 % Final   • MCV 03/17/2017 85.6  81.4 - 97.8 fL Final   • MCH 03/17/2017 28.5  27.0 - 33.0 pg Final   • MCHC 03/17/2017 33.3* 33.6 - 35.0 g/dL Final   • RDW 03/17/2017 45.4  35.9 - 50.0 fL Final   • Platelet Count 03/17/2017 194  164 - 446 K/uL Final   • MPV 03/17/2017 8.4* 9.0 - 12.9 fL Final   • Neutrophils-Polys 03/17/2017 37.00* 44.00 - 72.00 % Final   • Lymphocytes 03/17/2017 38.90  22.00 - 41.00 % Final   • Monocytes 03/17/2017 16.60* 0.00 - 13.40 % Final   • Eosinophils 03/17/2017 6.40  0.00 - 6.90 % Final   • Basophils 03/17/2017 0.90  0.00 - 1.80 % Final   • Immature Granulocytes 03/17/2017 0.20  0.00 - 0.90 % Final   • Nucleated RBC 03/17/2017 0.00   Final   • Neutrophils (Absolute) 03/17/2017 1.56* 2.00 - 7.15 K/uL Final    Includes immature neutrophils, if present.   • Lymphs (Absolute) 03/17/2017 1.64   1.00 - 4.80 K/uL Final   • Monos (Absolute) 03/17/2017 0.70  0.00 - 0.85 K/uL Final   • Eos (Absolute) 03/17/2017 0.27  0.00 - 0.51 K/uL Final   • Baso (Absolute) 03/17/2017 0.04  0.00 - 0.12 K/uL Final   • Immature Granulocytes (abs) 03/17/2017 0.01  0.00 - 0.11 K/uL Final   • NRBC (Absolute) 03/17/2017 0.00   Final   • Sodium 03/17/2017 137  135 - 145 mmol/L Final   • Potassium 03/17/2017 3.9  3.6 - 5.5 mmol/L Final   • Chloride 03/17/2017 105  96 - 112 mmol/L Final   • Co2 03/17/2017 25  20 - 33 mmol/L Final   • Anion Gap 03/17/2017 7.0  0.0 - 11.9 Final   • Glucose 03/17/2017 124* 65 - 99 mg/dL Final   • Bun 03/17/2017 18  8 - 22 mg/dL Final   • Creatinine 03/17/2017 0.79  0.50 - 1.40 mg/dL Final   • Calcium 03/17/2017 9.0  8.5 - 10.5 mg/dL Final   • AST(SGOT) 03/17/2017 27  12 - 45 U/L Final   • ALT(SGPT) 03/17/2017 27  2 - 50 U/L Final   • Alkaline Phosphatase 03/17/2017 103* 30 - 99 U/L Final   • Total Bilirubin 03/17/2017 0.4  0.1 - 1.5 mg/dL Final   • Albumin 03/17/2017 3.6  3.2 - 4.9 g/dL Final   • Total Protein 03/17/2017 6.3  6.0 - 8.2 g/dL Final   • Globulin 03/17/2017 2.7  1.9 - 3.5 g/dL Final   • A-G Ratio 03/17/2017 1.3   Final   • GFR If  03/17/2017 >60  >60 mL/min/1.73 m 2 Final   • GFR If Non  03/17/2017 >60  >60 mL/min/1.73 m 2 Final        Assessment/Plan:     1. Cancer of sigmoid colon (CMS-HCC)     2. 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 Patient scheduled for cycle 4, day one of FOLFOX. She has tolerated treatment well. Her neuropathy has been stable and resolves by the time she starts her next cycle of chemotherapy. I did review her blood work, CBC and CMP to proceed with treatment as planned.    2. Peripheral neuropathy is present of the first week of chemotherapy due to cold sensitivity from the oxaliplatin. However, patient's neuropathy does resolve by the start of the next cycle. I have discussed in detail with the patient  and her son the importance of monitoring the neuropathy and letting us know immediately if it continues or worsens. Patient and son verbalized understanding. I also discussed with Dr. Burgos and she is aware    3. Patient is to follow-up in 2 weeks or sooner if needed.

## 2017-03-17 NOTE — NON-PROVIDER
Pt presents ambulatory to clinic for port access with labs pre chemo appointment with Dr. Burgos.  She is accompanied by her son for today's visit.  Plan of care discussed, patient is in agreement.  Pt states she has been feeling well with the exception of slight nausea after her last cycle.  Pt with EMLA cream to R chest port site.  Port accessed in sterile fashion; brisk blood return noted.  Labs drawn per orders in [place].  Port flushed per protocol with NS and heparin.  Port then de-accessed, as patient does not have chemo until tomorrow, 3/18.  Port site covered with sterile gauze and tape. Pt tolerated well.

## 2017-03-17 NOTE — PROGRESS NOTES
Subjective:      Tim Crump is a 62 y.o. female who presents for Follow-Up for colon cancer.           HPI        No Known Allergies  Current Outpatient Prescriptions on File Prior to Visit   Medication Sig Dispense Refill   • famotidine (PEPCID) 20 MG Tab Take 1 Tab by mouth 2 times a day. 60 Tab 2   • atorvastatin (LIPITOR) 10 MG Tab Take 5 mg by mouth every evening.     • montelukast (SINGULAIR) 10 MG Tab Take 10 mg by mouth every bedtime. Indications: Hayfever     • cetirizine (ZYRTEC) 10 MG Tab Take 10 mg by mouth every morning. Indications: Hayfever     • metformin (GLUCOPHAGE) 500 MG TABS Take 500 mg by mouth 2 times a day, with meals.     • citalopram (CELEXA) 40 MG TABS Take 40 mg by mouth every bedtime.     • docusate sodium (COLACE) 50 MG Cap Take 50 mg by mouth 2 times a day.     • loperamide (IMODIUM A-D) 2 MG tablet Take 1 Tab by mouth See Admin Instructions. 30 Tab 6   • lidocaine-prilocaine (EMLA) 2.5-2.5 % Cream Apply to port one hour prior to access and cover with plastic wrap. 1 Tube 3   • menthol (CEPACOL) 3 MG lozenge Take 1 Lozenge by mouth as needed.     • Melatonin 1 MG Cap Take  by mouth.     • ondansetron (ZOFRAN) 4 MG Tab tablet Take 1 Tab by mouth every four hours as needed for Nausea/Vomiting. 30 Tab 3   • prochlorperazine (COMPAZINE) 10 MG Tab Take 1 Tab by mouth every 6 hours as needed. 30 Tab 3   • lidocaine-prilocaine (EMLA) 2.5-2.5 % Cream Apply to port 1 hour prior to access of port and cover with plastic wrap. 1 Tube 3   • loperamide (IMODIUM) 2 MG Cap Give loperamide 4 mg orally first dose, then 2 mg orally with every loose bowel movement.  Contact physician if maximum of 16 mg/24 hours is exceeded. 30 Cap 3   • valacyclovir (VALTREX) 500 MG Tab Take 500 mg by mouth 2 times a day. 3 day course       No current facility-administered medications on file prior to visit.       Review of Systems   Constitutional: Negative for fever, chills, weight loss and malaise/fatigue  "(patient stated she is doing okay but son mike she is having fatigue).   Respiratory: Positive for cough. Negative for shortness of breath and wheezing.    Cardiovascular: Negative for chest pain, palpitations and leg swelling.   Gastrointestinal: Positive for nausea. Negative for vomiting, diarrhea and constipation.   Genitourinary: Negative for dysuria.   Musculoskeletal: Negative for myalgias.   Skin: Negative for itching and rash.   Neurological: Positive for tingling. Negative for dizziness and headaches.          Objective:     /78 mmHg  Pulse 69  Temp(Src) 37.3 °C (99.1 °F)  Resp 16  Ht 1.575 m (5' 2\")  Wt 74.5 kg (164 lb 3.9 oz)  BMI 30.03 kg/m2  SpO2 94%     Physical Exam   Constitutional: She is oriented to person, place, and time. She appears well-developed and well-nourished. No distress.   HENT:   Head: Normocephalic and atraumatic.   Mouth/Throat: Oropharynx is clear and moist. No oropharyngeal exudate.   Eyes: Conjunctivae and EOM are normal. Pupils are equal, round, and reactive to light. Right eye exhibits no discharge. Left eye exhibits no discharge. No scleral icterus.   Cardiovascular: Normal rate, regular rhythm and intact distal pulses.  Exam reveals no gallop and no friction rub.    No murmur heard.  Pulmonary/Chest: Effort normal and breath sounds normal. No respiratory distress. She has no wheezes.   Abdominal: Soft. Bowel sounds are normal. She exhibits no distension. There is no tenderness.   Musculoskeletal: Normal range of motion. She exhibits no edema or tenderness.   Neurological: She is alert and oriented to person, place, and time.   Skin: Skin is warm and dry. No rash noted. She is not diaphoretic. No erythema. No pallor.   Vitals reviewed.              Assessment/Plan:             "

## 2017-03-17 NOTE — MR AVS SNAPSHOT
Tim Crump   3/17/2017 10:00 AM   Non-Provider Visit   MRN: [id number]    Department:  [facility]   Dept Phone:  [phone]    Description:  Female : 1955   Provider:  ONC RN 1           Reason for Visit     Labs Only labs      Allergies as of 3/17/2017     No Known Allergies      Vital Signs     Smoking Status                   Never Smoker            Basic Information     Date Of Birth Sex Race Ethnicity Preferred Language    1955 Female  or   Origin (Wolof,English,[place], etc) English      Your appointments     Mar 17, 2017 11:00 AM   ONCOLOGY EST PATIENT 30 MIN with LUIS CARLOS Forman   [facility] (--)    [address], [facility] [address]  [facility] [id number] [phone]            Mar 18, 2017 11:30 AM   Est Chemo 3 with RN 3   [facility]    [address] [address]  [facility] [address]   [phone]            Mar 20, 2017  2:00 PM   EST Port Flush / Central Line Care with INFUSION QUICK INJECT   [facility]    [address] [address]  [facility] [address]   [phone]            Mar 31, 2017 10:00 AM   Non Provider 1 with ONC RN 1   [facility] (--)    [address] [place] [id number] [phone]           You will be receiving a confirmation call a few days before your appointment from our automated call confirmation system.            Mar 31, 2017 11:00 AM   ONCOLOGY EST PATIENT 30 MIN with Keturah Burgos M.D.   [facility] (--)    [address] [facility] [address]  [facility] [id number] [phone]            2017 11:30 AM   Est Chemo 2 with RN 4   [facility]    [address] [address]  [place] [address]   [phone]            2017  2:00 PM   EST Port Flush / Central Line Care with INFUSION QUICK INJECT   [facility]    [address] [address]  [facility] [address]   [phone]              Problem  List              ICD-10-CM Priority Class Noted - Resolved    Cancer of sigmoid colon (CMS-HCC) C18.7   12/7/2016 - Present    Fourth degree hemorrhoids K64.3   12/7/2016 - Present      Health Maintenance        Date Due Completion Dates    IMM DTaP/Tdap/Td Vaccine (1 - Tdap) 1/23/1976 ---    PAP SMEAR 1/23/1978 ---    COLONOSCOPY 1/23/2007 ---    MAMMOGRAM 2/7/2014 2/7/2013, 1/23/2012, 2/9/2011, 7/9/2010, 7/9/2010, 1/5/2010, 12/23/2009, 12/23/2009, 11/26/2008, 11/26/2008, 12/4/2007, 12/4/2007, 11/10/2006, 10/27/2005    IMM ZOSTER VACCINE 1/23/2017 ---            Current Immunizations     Influenza Vaccine Quad Inj (Pf) 10/1/2016      Below and/or attached are the medications your provider expects you to take. Review all of your home medications and newly ordered medications with your provider and/or pharmacist. Follow medication instructions as directed by your provider and/or pharmacist. Please keep your medication list with you and share with your provider. Update the information when medications are discontinued, doses are changed, or new medications (including over-the-counter products) are added; and carry medication information at all times in the event of emergency situations     Allergies:  No Known Allergies          Medications  Valid as of: March 17, 2017 - 10:59 AM    Generic Name Brand Name Tablet Size Instructions for use    Atorvastatin Calcium (Tab) LIPITOR 10 MG Take 5 mg by mouth every evening.        Cetirizine HCl (Tab) ZYRTEC 10 MG Take 10 mg by mouth every morning. Indications: Hayfever        Citalopram Hydrobromide (Tab) CELEXA 40 MG Take 40 mg by mouth every bedtime.        Docusate Sodium (Cap) COLACE 50 MG Take 50 mg by mouth 2 times a day.        Famotidine (Tab) PEPCID 20 MG Take 1 Tab by mouth 2 times a day.        Lidocaine-Prilocaine (Cream) EMLA 2.5-2.5 % Apply to port 1 hour prior to access of port and cover with plastic wrap.        Lidocaine-Prilocaine (Cream) EMLA 2.5-2.5 %  Apply to port one hour prior to access and cover with plastic wrap.        Loperamide HCl (Cap) IMODIUM 2 MG Give loperamide 4 mg orally first dose, then 2 mg orally with every loose bowel movement.  Contact physician if maximum of 16 mg/24 hours is exceeded.        Loperamide HCl (Tab) IMODIUM A-D 2 MG Take 1 Tab by mouth See Admin Instructions.        Melatonin (Cap) Melatonin 1 MG Take  by mouth.        Menthol (Lozenge) CEPACOL 3 MG Take 1 Lozenge by mouth as needed.        MetFORMIN HCl (Tab) GLUCOPHAGE 500 MG Take 500 mg by mouth 2 times a day, with meals.        Montelukast Sodium (Tab) SINGULAIR 10 MG Take 10 mg by mouth every bedtime. Indications: Hayfever        Ondansetron HCl (Tab) ZOFRAN 4 MG Take 1 Tab by mouth every four hours as needed for Nausea/Vomiting.        Prochlorperazine Maleate (Tab) COMPAZINE 10 MG Take 1 Tab by mouth every 6 hours as needed.        ValACYclovir HCl (Tab) VALTREX 500 MG Take 500 mg by mouth 2 times a day. 3 day course        .                 Medicines prescribed today were sent to:     [facility] [address] [place] [address] [place], [place] - [address] AT [facility] [address] & [facility]    [address] [place] [address]    Phone: [phone] Fax: [phone]    Open 24 Hours?: No      Medication refill instructions:       If your prescription bottle indicates you have medication refills left, it is not necessary to call your provider’s office. Please contact your pharmacy and they will refill your medication.    If your prescription bottle indicates you do not have any refills left, you may request refills at any time through one of the following ways: The online [employer] system (except Urgent Care), by calling your provider’s office, or by asking your pharmacy to contact your provider’s office with a refill request. Medication refills are processed only during regular business hours and may not be available until the next business day. Your provider may request additional  information or to have a follow-up visit with you prior to refilling your medication.   *Please Note: Medication refills are assigned a new Rx number when refilled electronically. Your pharmacy may indicate that no refills were authorized even though a new prescription for the same medication is available at the pharmacy. Please request the medicine by name with the pharmacy before contacting your provider for a refill.           [employer] Access Code: [email or web address]  Expires: 3/30/2017  1:13 PM    [employer]  A secure, online tool to manage your health information     [employer]’s [employer]® is a secure, online tool that connects you to your personalized health information from the privacy of your home -- day or night - making it very easy for you to manage your healthcare. Once the activation process is completed, you can even access your medical information using the [employer] erin, which is available for free in the Apple Erin store or Google Play store.     [employer] provides the following levels of access (as shown below):   My Chart Features   [facility]  Specialists [facility]  Urgent  Care Non-[facility]  Primary Care  Doctor   Email your healthcare team securely and privately 24/7 X X X    Manage appointments: schedule your next appointment; view details of past/upcoming appointments X      Request prescription refills. X      View recent personal medical records, including lab and immunizations X X X X   View health record, including health history, allergies, medications X X X X   Read reports about your outpatient visits, procedures, consult and ER notes X X X X   See your discharge summary, which is a recap of your hospital and/or ER visit that includes your diagnosis, lab results, and care plan. X X       How to register for [employer]:  1. Go to  [email or web address].  2. Click on the Sign Up Now box, which takes you to the New Member Sign Up page. You will need to provide the following  information:  a. Enter your [employer] Access Code exactly as it appears at the top of this page. (You will not need to use this code after you’ve completed the sign-up process. If you do not sign up before the expiration date, you must request a new code.)   b. Enter your date of birth.   c. Enter your home email address.   d. Click Submit, and follow the next screen’s instructions.  3. Create a [employer] ID. This will be your [employer] login ID and cannot be changed, so think of one that is secure and easy to remember.  4. Create a [employer] password. You can change your password at any time.  5. Enter your Password Reset Question and Answer. This can be used at a later time if you forget your password.   6. Enter your e-mail address. This allows you to receive e-mail notifications when new information is available in [employer].  7. Click Sign Up. You can now view your health information.    For assistance activating your [employer] account, call [phone]

## 2017-03-18 ENCOUNTER — OUTPATIENT INFUSION SERVICES (OUTPATIENT)
Dept: ONCOLOGY | Facility: MEDICAL CENTER | Age: 62
End: 2017-03-18
Attending: INTERNAL MEDICINE
Payer: COMMERCIAL

## 2017-03-18 VITALS
WEIGHT: 162.48 LBS | BODY MASS INDEX: 29.9 KG/M2 | DIASTOLIC BLOOD PRESSURE: 75 MMHG | SYSTOLIC BLOOD PRESSURE: 122 MMHG | HEART RATE: 85 BPM | OXYGEN SATURATION: 98 % | HEIGHT: 62 IN | RESPIRATION RATE: 18 BRPM | TEMPERATURE: 98.4 F

## 2017-03-18 DIAGNOSIS — C18.7 CANCER OF SIGMOID COLON (HCC): ICD-10-CM

## 2017-03-18 PROCEDURE — 96415 CHEMO IV INFUSION ADDL HR: CPT

## 2017-03-18 PROCEDURE — 96411 CHEMO IV PUSH ADDL DRUG: CPT

## 2017-03-18 PROCEDURE — 700111 HCHG RX REV CODE 636 W/ 250 OVERRIDE (IP)

## 2017-03-18 PROCEDURE — A4212 NON CORING NEEDLE OR STYLET: HCPCS

## 2017-03-18 PROCEDURE — 96368 THER/DIAG CONCURRENT INF: CPT

## 2017-03-18 PROCEDURE — 700111 HCHG RX REV CODE 636 W/ 250 OVERRIDE (IP): Performed by: NURSE PRACTITIONER

## 2017-03-18 PROCEDURE — 96413 CHEMO IV INFUSION 1 HR: CPT

## 2017-03-18 PROCEDURE — G0498 CHEMO EXTEND IV INFUS W/PUMP: HCPCS

## 2017-03-18 PROCEDURE — 96375 TX/PRO/DX INJ NEW DRUG ADDON: CPT

## 2017-03-18 RX ORDER — PALONOSETRON 0.05 MG/ML
0.25 INJECTION, SOLUTION INTRAVENOUS ONCE
Status: COMPLETED | OUTPATIENT
Start: 2017-03-18 | End: 2017-03-18

## 2017-03-18 RX ADMIN — DEXTROSE MONOHYDRATE 152.15 MG: 50 INJECTION, SOLUTION INTRAVENOUS at 12:36

## 2017-03-18 RX ADMIN — DEXAMETHASONE SODIUM PHOSPHATE 12 MG: 4 INJECTION, SOLUTION INTRAMUSCULAR; INTRAVENOUS at 12:01

## 2017-03-18 RX ADMIN — LEUCOVORIN CALCIUM 716 MG: 350 INJECTION, POWDER, LYOPHILIZED, FOR SOLUTION INTRAMUSCULAR; INTRAVENOUS at 12:45

## 2017-03-18 RX ADMIN — FLUOROURACIL 715 MG: 50 INJECTION, SOLUTION INTRAVENOUS at 14:48

## 2017-03-18 RX ADMIN — PALONOSETRON HYDROCHLORIDE 0.25 MG: 0.25 INJECTION INTRAVENOUS at 12:01

## 2017-03-18 RX ADMIN — FLUOROURACIL 4295 MG: 50 INJECTION, SOLUTION INTRAVENOUS at 14:52

## 2017-03-18 ASSESSMENT — PAIN SCALES - GENERAL: PAINLEVEL: NO PAIN

## 2017-03-18 NOTE — PROGRESS NOTES
"Patient Name: Tim Crump   Dx: Colon Cancer        Protocol: [id number]   Oxaliplatin (Eloxatin) 85 mg/m2 IV Day 1  Leucovorin 400 mg/m2 IV over 2 hrs on Day 1. Run concurrently with oxaliplatin  Fluorouracil 400 mg/m2 IV bolus Day 1 followed by 2400 mg/m2 CIVI over 46 hrs    Q14 days until disease progression or unacceptable toxicity    NCCN Guidelines for Colon Cancer. V.2.2017  uX T, et al. N Engl J Med. 2004;350:9397-1508  Renetta RAY et al. J Clin Oncol. 2008;26(12):2006-12  Hong VERDUGO, et al. Br J Cancer. 2002;87(4):393-9    Allergies:  Review of patient's allergies indicates no known allergies.     /75 mmHg  Pulse 85  Temp(Src) 36.9 °C (98.4 °F)  Resp 18  Ht 1.57 m (5' 1.81\")  Wt 73.7 kg (162 lb 7.7 oz)  BMI 29.90 kg/m2  SpO2 98% Body surface area is 1.79 meters squared.    Labs 3/17/17:  ANC ~1600    Plt = 194k      Hgb = 13.1   SCr = 0.79 mg/dL CrCl ~88 mL/min   AST/ALT/AP = WNL except alk phos 103 T bili = 0.4        Drug Order   (Drug name, dose, route, IV Fluid & volume, frequency, number of doses) Cycle: 4     Previous treatment: C3 = 03/04/17     Medication = OXALIplatin (Eloxitan)  Base Dose = 85 mg/m2  Calc Dose: Base Dose x 1.79 m2 = 152.2 mg  Final Dose = 152.15 mg  Route = IV  Fluid & Volume = D5W 250 mL  Admin Duration = Over 2 hours          <5% difference, ok to treat with final dose     Medication = Leucovorin (Wellcovorin)  Base Dose = 400 mg/m2  Calc Dose: Base Dose x 1.79 m2 = 716 mg  Final Dose = 716 mg  Route = IV  Fluid & Volume = D5W 250 mL  Admin Duration = Over 2 hours          <5% difference, ok to treat with final dose   Medication = Fluorouracil (5-FU) bolus  Base Dose = 400 mg/m2  Calc Dose:Base Dose x 1.79 m2 = 716 mg  Final Dose = 715 mg  Route = IVP  Fluid & Volume = in syringe 14.3 mL  Conc = 50 mg/mL  Admin Duration = Over 5 minutes          <5% difference, ok to treat with final dose   Medication = Fluorouracil (5-FU)  Base Dose = 2400 mg/m2  Calc " Dose:Base Dose x 1.79 m2 = 4296 mg  Final Dose = 4295 mg  Route = CIVI  Conc = 50 mg/mL = 85.9 mL   Fluid & Volume = 85.9 mL +3 mL overfill   Admin Duration = Over 46 hours @ 1.9 mL/hr     Via CADD pump for CIVI       <5% difference, ok to treat with final dose     By my signature below, I confirm this process was performed independently with the BSA and all final chemotherapy dosing calculations congruent. I have reviewed the above chemotherapy order and that my calculation of the final dose and BSA (when applicable) corroborate those calculations of the  pharmacist. Discrepancies of 5% or greater in the written dose have been addressed and documented within the EPIC Progress notes.    Glenda Mendoza, PharmD

## 2017-03-18 NOTE — PROGRESS NOTES
"Pharmacy Chemotherapy Calculations Note:    Patient Name: Tim Crump      Dx: Colon Cancer     Cycle 4 Previous treatment: C3 = 3/4/17      Protocol: [id number]   Oxaliplatin (Eloxatin) 85 mg/m2 IV Day 1  Leucovorin 400 mg/m2 IV over 2 hrs on Day 1. Run concurrently with oxaliplatin  Fluorouracil 400 mg/m2 IV bolus Day 1 followed by 2400 mg/m2 CIVI over 46 hrs   Q14 days until disease progression or unacceptable toxicity    NCCN Guidelines for Colon Cancer. V.2.2017  Xu T, et al. N Engl J Med. 2004;350:4745-7364  Renetta RAY et al. J Clin Oncol. 2008;26(12):2006-12  Hong SL, et al. Br J Cancer. 2002;87(4):393-9         /75 mmHg  Pulse 85  Temp(Src) 36.9 °C (98.4 °F)  Resp 18  Ht 1.57 m (5' 1.81\")  Wt 73.7 kg (162 lb 7.7 oz)  BMI 29.90 kg/m2  SpO2 98% Body surface area is 1.79 meters squared.     03/17/17 ANC ~1560 Plt = 194 k    Hgb = 13.1     SCr = 0.79 mg/dL CrCl ~88 mL/min  LFT = AST/ALT/AlkPhos/Tbili = 27/27/103/0.4      OXALIplatin (Eloxatin) 85 mg/m² x 1.79 m² = 152.15 mg   <5% difference, ok to treat with final dose = 152.15 mg IV    Leucovorin 400 mg/m² x 1.79 m² = 716 mg   <5% difference, ok to treat with final dose = 716 mg IV    Fluorouracil (5-FU) 400 mg/m² x 1.79 m² = 716 mg   <5% difference, ok to treat with final dose = 715 mg IVP    Fluorouracil (5-FU) 2400 mg/m² x 1.79 m²  = 4296 mg    <5% difference, ok to treat with final dose = 4295 mg CIVI over 46 hrs via CADD pump       Meredith KiserD            "

## 2017-03-18 NOTE — PROGRESS NOTES
Chemotherapy Verification - PRIMARY RN      Height = 61.81 in  Weight = 73.7 kg  BSA = 1.79 m2       Medication: Oxaliplatin  Dose: 85 mg/m2  Calculated Dose: 152.2 mg                             (In mg/m2, AUC, mg/kg)     Medication: Leucovorin  Dose: 400 mg/m2  Calculated Dose: 716 mg                             (In mg/m2, AUC, mg/kg)    Medication: 5 FU  Dose: 400 mg/m2  Calculated Dose: 716 mg                             (In mg/m2, AUC, mg/kg)    Medication: 5 FU pump  Dose: 2400 mg/m2  Calculated Dose: 4296 mg over 46 hours                             (In mg/m2, AUC, mg/kg)        I confirm this process was performed independently with the BSA and all final chemotherapy dosing calculations congruent.  Any discrepancies of 5% or greater have been addressed with the chemotherapy pharmacist. The resolution of the discrepancy has been documented in the EPIC progress notes.

## 2017-03-18 NOTE — PROGRESS NOTES
Patient presents ambulatory with son for C4D1 of chemotherapy.  Labs from 3/17/17 reviewed and are within parameters to proceed with treatment.  Port accessed using sterile technique and blood return noted.  Pre medications given with no complications.  Chemotherapy infused per MD order with no complications or adverse reactions.  5 FU pump connected, settings and dose verified by two RNs.  Patient to return 3/20/17 for pump disconnect, hand out for upcoming appts given to patient and son.  Patient left ambulatory with son in no distress.

## 2017-03-18 NOTE — PROGRESS NOTES
Chemotherapy Verification - SECONDARY RN       Height = 61.81in  Weight = 73.7kg  BSA = 1.79m2       Medication: Oxaliplatin  Dose: 85mg/m2  Calculated Dose: 152.15mg                             (In mg/m2, AUC, mg/kg)     Medication: Leucovorin  Dose: 400mg/m2  Calculated Dose: 716mg                             (In mg/m2, AUC, mg/kg)    Medication: Fluorouracil  Dose: 2400mg/m2  Calculated Dose: 4296mg over 46 hours continuous pump                            (In mg/m2, AUC, mg/kg)    Medication: Fluorouracil  Dose: 400mg/m2  Calculated Dose: 716mg                             (In mg/m2, AUC, mg/kg)          I confirm that this process was performed independently.

## 2017-03-20 ENCOUNTER — OUTPATIENT INFUSION SERVICES (OUTPATIENT)
Dept: ONCOLOGY | Facility: MEDICAL CENTER | Age: 62
End: 2017-03-20
Attending: INTERNAL MEDICINE
Payer: COMMERCIAL

## 2017-03-20 VITALS
HEIGHT: 62 IN | TEMPERATURE: 98.5 F | BODY MASS INDEX: 30.55 KG/M2 | WEIGHT: 166.01 LBS | SYSTOLIC BLOOD PRESSURE: 109 MMHG | OXYGEN SATURATION: 97 % | RESPIRATION RATE: 18 BRPM | HEART RATE: 70 BPM | DIASTOLIC BLOOD PRESSURE: 71 MMHG

## 2017-03-20 DIAGNOSIS — C18.7 CANCER OF SIGMOID COLON (HCC): ICD-10-CM

## 2017-03-20 PROCEDURE — 700111 HCHG RX REV CODE 636 W/ 250 OVERRIDE (IP): Performed by: NURSE PRACTITIONER

## 2017-03-20 PROCEDURE — 96523 IRRIG DRUG DELIVERY DEVICE: CPT

## 2017-03-20 RX ADMIN — HEPARIN 500 UNITS: 100 SYRINGE at 14:03

## 2017-03-20 ASSESSMENT — PAIN SCALES - GENERAL: PAINLEVEL: NO PAIN

## 2017-03-20 NOTE — PROGRESS NOTES
Patient presents for CADD home infusion de-access.  Pump stopped; pt stated it beeped when completed so she manually stopped it. Reviewed settings on pump, infusion completed.  Pump disconnected from patient; pt's bag and CADD pump cleansed and replaced in personal bag within pharmacy drawer.  Port flushed with brisk blood return; flushed port per protocol, de-accessed and gauze dressing in place.  Confirmed pt's next appt 4/1/17.  Pt left in great spirits under no apparent distress.

## 2017-03-30 ENCOUNTER — HOSPITAL ENCOUNTER (OUTPATIENT)
Facility: MEDICAL CENTER | Age: 62
End: 2017-03-30
Attending: NURSE PRACTITIONER
Payer: COMMERCIAL

## 2017-03-30 DIAGNOSIS — C18.7 CANCER OF SIGMOID COLON (HCC): ICD-10-CM

## 2017-03-31 ENCOUNTER — OFFICE VISIT (OUTPATIENT)
Dept: HEMATOLOGY ONCOLOGY | Facility: MEDICAL CENTER | Age: 62
End: 2017-03-31

## 2017-03-31 ENCOUNTER — NON-PROVIDER VISIT (OUTPATIENT)
Dept: HEMATOLOGY ONCOLOGY | Facility: MEDICAL CENTER | Age: 62
End: 2017-03-31

## 2017-03-31 VITALS
HEART RATE: 70 BPM | OXYGEN SATURATION: 95 % | WEIGHT: 164 LBS | RESPIRATION RATE: 16 BRPM | DIASTOLIC BLOOD PRESSURE: 90 MMHG | SYSTOLIC BLOOD PRESSURE: 126 MMHG | TEMPERATURE: 97.9 F | HEIGHT: 62 IN | BODY MASS INDEX: 30.18 KG/M2

## 2017-03-31 VITALS
WEIGHT: 164.68 LBS | RESPIRATION RATE: 16 BRPM | BODY MASS INDEX: 30.31 KG/M2 | HEART RATE: 70 BPM | DIASTOLIC BLOOD PRESSURE: 90 MMHG | HEIGHT: 62 IN | OXYGEN SATURATION: 95 % | SYSTOLIC BLOOD PRESSURE: 126 MMHG | TEMPERATURE: 97.9 F

## 2017-03-31 DIAGNOSIS — C18.7 CANCER OF SIGMOID COLON (HCC): ICD-10-CM

## 2017-03-31 LAB
ALBUMIN SERPL BCP-MCNC: 3.8 G/DL (ref 3.2–4.9)
ALBUMIN/GLOB SERPL: 1.3 G/DL
ALP SERPL-CCNC: 115 U/L (ref 30–99)
ALT SERPL-CCNC: 51 U/L (ref 2–50)
ANION GAP SERPL CALC-SCNC: 4 MMOL/L (ref 0–11.9)
AST SERPL-CCNC: 44 U/L (ref 12–45)
BASOPHILS # BLD AUTO: 0.04 K/UL (ref 0–0.12)
BASOPHILS NFR BLD AUTO: 0.9 % (ref 0–1.8)
BILIRUB SERPL-MCNC: 0.5 MG/DL (ref 0.1–1.5)
BUN SERPL-MCNC: 19 MG/DL (ref 8–22)
CALCIUM SERPL-MCNC: 9.1 MG/DL (ref 8.5–10.5)
CHLORIDE SERPL-SCNC: 105 MMOL/L (ref 96–112)
CO2 SERPL-SCNC: 27 MMOL/L (ref 20–33)
CREAT SERPL-MCNC: 1.17 MG/DL (ref 0.5–1.4)
EOSINOPHIL # BLD: 0.4 K/UL (ref 0–0.51)
EOSINOPHIL NFR BLD AUTO: 8.7 % (ref 0–6.9)
ERYTHROCYTE [DISTWIDTH] IN BLOOD BY AUTOMATED COUNT: 53.1 FL (ref 35.9–50)
GLOBULIN SER CALC-MCNC: 2.9 G/DL (ref 1.9–3.5)
GLUCOSE SERPL-MCNC: 119 MG/DL (ref 65–99)
HCT VFR BLD AUTO: 38.5 % (ref 37–47)
HGB BLD-MCNC: 13 G/DL (ref 12–16)
IMM GRANULOCYTES # BLD AUTO: 0.01 K/UL (ref 0–0.11)
IMM GRANULOCYTES NFR BLD AUTO: 0.2 % (ref 0–0.9)
LYMPHOCYTES # BLD: 1.43 K/UL (ref 1–4.8)
LYMPHOCYTES NFR BLD AUTO: 31 % (ref 22–41)
MCH RBC QN AUTO: 29.4 PG (ref 27–33)
MCHC RBC AUTO-ENTMCNC: 33.8 G/DL (ref 33.6–35)
MCV RBC AUTO: 87.1 FL (ref 81.4–97.8)
MONOCYTES # BLD: 0.77 K/UL (ref 0–0.85)
MONOCYTES NFR BLD AUTO: 16.7 % (ref 0–13.4)
NEUTROPHILS # BLD: 1.97 K/UL (ref 2–7.15)
NEUTROPHILS NFR BLD AUTO: 42.5 % (ref 44–72)
NRBC # BLD AUTO: 0 K/UL
NRBC BLD-RTO: 0 /100 WBC
PLATELET # BLD AUTO: 177 K/UL (ref 164–446)
PMV BLD AUTO: 8.8 FL (ref 9–12.9)
POTASSIUM SERPL-SCNC: 3.8 MMOL/L (ref 3.6–5.5)
PROT SERPL-MCNC: 6.7 G/DL (ref 6–8.2)
RBC # BLD AUTO: 4.42 M/UL (ref 4.2–5.4)
SODIUM SERPL-SCNC: 136 MMOL/L (ref 135–145)
WBC # BLD AUTO: 4.6 K/UL (ref 4.8–10.8)

## 2017-03-31 PROCEDURE — 85025 COMPLETE CBC W/AUTO DIFF WBC: CPT

## 2017-03-31 PROCEDURE — 80053 COMPREHEN METABOLIC PANEL: CPT

## 2017-03-31 PROCEDURE — 99214 OFFICE O/P EST MOD 30 MIN: CPT | Performed by: INTERNAL MEDICINE

## 2017-03-31 RX ADMIN — Medication 500 UNITS: at 10:15

## 2017-03-31 ASSESSMENT — PAIN SCALES - GENERAL
PAINLEVEL: NO PAIN
PAINLEVEL: NO PAIN

## 2017-03-31 NOTE — PROGRESS NOTES
Follow Up Visit:  Oncology    Date: 3/31/17  Time: 11 am        Referring Physician: Dr. Enoch Shaw  Primary Care:  William Naranjo D.O.  Surgeon:  Dr. Shaw  Gastroenterology: Dr. 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    History of Presenting Illness:  Tim Crump is a 62-year-old female diagnosed in 5/2016 with colon cancer.  She was diagnosed secondary to positive call occult testing and alteration in her bowel movements. Colonoscopy showed a polyp in the sigmoid colon which was removed. Pathology was positive for poorly differentiated adenocarcinoma with indeterminate margins. In underwent a sigmoidoscopy showed tattooed polyp stalk without any evidence of disease. Imaging showed no evidence of metastatic disease. Her CEA was 1 at diagnosis. She is s/p laparoscopic sigmoid resection with low anterior pelvic anastomosis on 12/2016. Pathology showed evidence of local malignancy but she was found to have 1/13 nodes positive and was staged pT1 pN1a and MLH1, MSH2, MSH6 and PMS2 are intact. Staging workup was negative for metastatic disease. She was started on adjuvant chemotherapy with modified FOLFOX in 2/2/17.  She has been tolerating the chemotherapy fairly well.      Interval history   She is here for follow-up visit. She has been tolerating therapy fairly well as far. Cycle 4 was on 3/17/17. With her last cycle she has noticed some increased nausea and one episode of vomiting since then. She is taking antiemetics. She has also started noticing numbness and tingling in her fingers and toes. This appears to be slightly was with this past cycle. There is some numbness present as well. Numbness has improved but she does have mild symptoms. She also has noticed cold sensitivity. She has been having constipation and has not been taking as much of a stool softener. She denies any fevers,  chills or night sweats.    Past Medical History:  1.  Colon cancer   2. Pre DM  3. H/o HTN   4. Hyperlipidemia   5. Seasonal allergies   6. Arthritis, Knee and hands  7. Anxiety and depression         Past Surgical History   Procedure Laterality Date   • Gyn surgery       hysterectomy   • Gyn surgery        x 2   • Other       varicose vein stripping rubina   • Other orthopedic surgery Left      left wrist   • Low anterior resection laparoscopic  2016     Procedure: LOW ANTERIOR RESECTION LAPAROSCOPIC;  Surgeon: Enoch Shaw M.D.;  Location: SURGERY [facility];  Service:        Allergies as of 2017   • (No Known Allergies)         Current outpatient prescriptions:   •  docusate sodium (COLACE) 50 MG Cap, Take 50 mg by mouth 2 times a day., Disp: , Rfl:   •  famotidine (PEPCID) 20 MG Tab, Take 1 Tab by mouth 2 times a day., Disp: 60 Tab, Rfl: 2  •  loperamide (IMODIUM A-D) 2 MG tablet, Take 1 Tab by mouth See Admin Instructions., Disp: 30 Tab, Rfl: 6  •  lidocaine-prilocaine (EMLA) 2.5-2.5 % Cream, Apply to port one hour prior to access and cover with plastic wrap., Disp: 1 Tube, Rfl: 3  •  menthol (CEPACOL) 3 MG lozenge, Take 1 Lozenge by mouth as needed., Disp: , Rfl:   •  Melatonin 1 MG Cap, Take  by mouth., Disp: , Rfl:   •  ondansetron (ZOFRAN) 4 MG Tab tablet, Take 1 Tab by mouth every four hours as needed for Nausea/Vomiting., Disp: 30 Tab, Rfl: 3  •  prochlorperazine (COMPAZINE) 10 MG Tab, Take 1 Tab by mouth every 6 hours as needed., Disp: 30 Tab, Rfl: 3  •  lidocaine-prilocaine (EMLA) 2.5-2.5 % Cream, Apply to port 1 hour prior to access of port and cover with plastic wrap., Disp: 1 Tube, Rfl: 3  •  loperamide (IMODIUM) 2 MG Cap, Give loperamide 4 mg orally first dose, then 2 mg orally with every loose bowel movement.  Contact physician if maximum of 16 mg/24 hours is exceeded., Disp: 30 Cap, Rfl: 3  •  valacyclovir (VALTREX) 500 MG Tab, Take 500 mg by mouth 2 times a  "day. 3 day course, Disp: , Rfl:   •  atorvastatin (LIPITOR) 10 MG Tab, Take 5 mg by mouth every evening., Disp: , Rfl:   •  montelukast (SINGULAIR) 10 MG Tab, Take 10 mg by mouth every bedtime. Indications: Hayfever, Disp: , Rfl:   •  cetirizine (ZYRTEC) 10 MG Tab, Take 10 mg by mouth every morning. Indications: Hayfever, Disp: , Rfl:   •  metformin (GLUCOPHAGE) 500 MG TABS, Take 500 mg by mouth 2 times a day, with meals., Disp: , Rfl:   •  citalopram (CELEXA) 40 MG TABS, Take 40 mg by mouth every bedtime., Disp: , Rfl:     Review of Systems:  All other review of systems are negative except what was mentioned above in the HPI.  Constitutional: Negative for fever, chills, and weight loss. Positive for mild malaise/fatigue.    HENT: Negative for ear pain and nosebleeds.     Eyes: Negative for blurred vision.    Respiratory: Negative for cough, sputum production and shortness of breath.    Cardiovascular: Negative for chest pain and leg swelling.    Gastrointestinal: Negative for nausea, vomiting and abdominal pain. Positive for constipaiton  Genitourinary: Negative for dysuria.    Musculoskeletal: Negative for myalgias. Positive for lower back pain and joint pain stable.    Skin: Negative for rash and itching.    Neurological: Negative for dizziness. Positive for sensory change. Negative for focal weakness and headaches.    Endo/Heme/Allergies: No bruise/bleed easily.    Psychiatric/Behavioral: Negative for depression and memory loss.      Physical Exam:  Filed Vitals:    03/31/17 1047   BP: 126/90   Pulse: 70   Temp: 36.6 °C (97.9 °F)   Resp: 16   Height: 1.565 m (5' 1.61\")   Weight: 74.7 kg (164 lb 10.9 oz)   SpO2: 95%       General: Not in acute distress, alert and oriented x 3  HEENT: normalcephalic, atraumatic, xtra ocular muscles intact, moist oral mucus membranes, and oral cavity without any lesions.  Neck: Supple neck and full range of motion. Prominent submandibular glands bilaterally stable  Lymph nodes: No " palpable bilateral cervical and supraclavicular lymphadenopathy.    CVS: Regular rate and rhythm  RESP: Clear to auscultate bilaterally.   ABD: Soft, non tender, non distended, positive bowel sounds, and no palpable hepatomegaly   EXT: No edema or cyanosis  CNS: Alert and oriented x3, cranial nerves grossly intact, muscle strength grossly intact, and normal gait.     Labs:   No visits with results within 1 Day(s) from this visit.  Latest known visit with results is:    Hospital Outpatient Visit on 03/30/2017   Component Date Value Ref Range Status   • WBC 03/31/2017 4.6* 4.8 - 10.8 K/uL Final   • RBC 03/31/2017 4.42  4.20 - 5.40 M/uL Final   • Hemoglobin 03/31/2017 13.0  12.0 - 16.0 g/dL Final   • Hematocrit 03/31/2017 38.5  37.0 - 47.0 % Final   • MCV 03/31/2017 87.1  81.4 - 97.8 fL Final   • MCH 03/31/2017 29.4  27.0 - 33.0 pg Final   • MCHC 03/31/2017 33.8  33.6 - 35.0 g/dL Final   • RDW 03/31/2017 53.1* 35.9 - 50.0 fL Final   • Platelet Count 03/31/2017 177  164 - 446 K/uL Final   • MPV 03/31/2017 8.8* 9.0 - 12.9 fL Final   • Neutrophils-Polys 03/31/2017 42.50* 44.00 - 72.00 % Final   • Lymphocytes 03/31/2017 31.00  22.00 - 41.00 % Final   • Monocytes 03/31/2017 16.70* 0.00 - 13.40 % Final   • Eosinophils 03/31/2017 8.70* 0.00 - 6.90 % Final   • Basophils 03/31/2017 0.90  0.00 - 1.80 % Final   • Immature Granulocytes 03/31/2017 0.20  0.00 - 0.90 % Final   • Nucleated RBC 03/31/2017 0.00   Final   • Neutrophils (Absolute) 03/31/2017 1.97* 2.00 - 7.15 K/uL Final    Includes immature neutrophils, if present.   • Lymphs (Absolute) 03/31/2017 1.43  1.00 - 4.80 K/uL Final   • Monos (Absolute) 03/31/2017 0.77  0.00 - 0.85 K/uL Final   • Eos (Absolute) 03/31/2017 0.40  0.00 - 0.51 K/uL Final   • Baso (Absolute) 03/31/2017 0.04  0.00 - 0.12 K/uL Final   • Immature Granulocytes (abs) 03/31/2017 0.01  0.00 - 0.11 K/uL Final   • NRBC (Absolute) 03/31/2017 0.00   Final   • Sodium 03/31/2017 136  135 - 145 mmol/L Final   •  Potassium 03/31/2017 3.8  3.6 - 5.5 mmol/L Final   • Chloride 03/31/2017 105  96 - 112 mmol/L Final   • Co2 03/31/2017 27  20 - 33 mmol/L Final   • Anion Gap 03/31/2017 4.0  0.0 - 11.9 Final   • Glucose 03/31/2017 119* 65 - 99 mg/dL Final   • Bun 03/31/2017 19  8 - 22 mg/dL Final   • Creatinine 03/31/2017 1.17  0.50 - 1.40 mg/dL Final   • Calcium 03/31/2017 9.1  8.5 - 10.5 mg/dL Final   • AST(SGOT) 03/31/2017 44  12 - 45 U/L Final   • ALT(SGPT) 03/31/2017 51* 2 - 50 U/L Final   • Alkaline Phosphatase 03/31/2017 115* 30 - 99 U/L Final   • Total Bilirubin 03/31/2017 0.5  0.1 - 1.5 mg/dL Final   • Albumin 03/31/2017 3.8  3.2 - 4.9 g/dL Final   • Total Protein 03/31/2017 6.7  6.0 - 8.2 g/dL Final   • Globulin 03/31/2017 2.9  1.9 - 3.5 g/dL Final   • A-G Ratio 03/31/2017 1.3   Final   • GFR If  03/31/2017 57* >60 mL/min/1.73 m 2 Final   • GFR If Non  03/31/2017 47* >60 mL/min/1.73 m 2 Final   ]    Assessment & Plan:  1. 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 She has started adjuvant chemotherapy with FOLFOX. She tolerated cycle one fairly well. Her last chemotherapy she has noticed worsening of her neuropathy. I will dose reduce oxaliplatin by 20% and continue to monitor her neuropathy closely.  2. GERD: He is continued on Pepcid.  3. Constipation: He is again advised to softeners and aggressive bowel regimen. She is recommended Jolynn-Colace twice a day as well as magnesium citrate or milk of magnesium as needed. She is advised to maintain a bowel movement every day.  4. Nutrition: Stable appetite and weight.   5. She is follow-up again prior to her next cycle or sooner as needed.      I informed the patient that will be leaving the [place] area shortly. I went over various options for transition of care. After detailed discussion, she will be scheduled to see Dr. Gomes.    she agreed and  verbalized her agreement and understanding with the current plan. I answered all questions and concerns she has at this time and advised her to call at any time in the interim with questions or concerns in regards to her care. Thank you for allowing me to participate in her care.    Please note that this dictation was created using voice recognition software. I have made every reasonable attempt to correct obvious errors, but I expect that there are errors of grammar and possibly content that I did not discover before finalizing the note.

## 2017-03-31 NOTE — MR AVS SNAPSHOT
Románkyler Lesley Crump   3/31/2017 10:00 AM   Appointment   MRN: [id number]    Department:  [facility]   Dept Phone:  [phone]    Description:  Female : 1955   Provider:  ONC RN 1           Allergies as of 3/31/2017     No Known Allergies      Vital Signs     Smoking Status                   Never Smoker            Basic Information     Date Of Birth Sex Race Ethnicity Preferred Language    1955 Female  or   Origin (French,Cayman Islander,Montenegrin,Paul, etc) English      Your appointments     Mar 31, 2017 11:00 AM   ONCOLOGY EST PATIENT 30 MIN with Keturah Burgos M.D.   [facility] (--)    [address], [facility] [address]  [facility] [id number] [phone]            2017 11:30 AM   Est Chemo 4 with RN 4   [facility] ([facility] [place])    [address] [facility] [address]  [facility] [address]   [phone]            2017  2:00 PM   EST Port Flush / Central Line Care with INFUSION QUICK INJECT   [facility] ([facility] [place])    [address]  [facility] [id number] [phone]            2017 10:00 AM   Non Provider 1 with ONC RN 1   [facility] (--)    [address] [facility] [address]  [facility] [id number] [phone]           You will be receiving a confirmation call a few days before your appointment from our automated call confirmation system.            2017 11:00 AM   ONCOLOGY EST PATIENT 30 MIN with Iliana Casarez A.PKATARZYNA   [facility] (--)    [address] [facility] [address]  [facility] [address] [phone]            Apr 15, 2017 10:30 AM   Est Chemo 2 with RN 3   [facility] ([facility] [place])    [address] [facility] [address]  [facility] [address] [phone]            2017  2:00 PM   EST Port Flush / Central Line Care with INFUSION QUICK INJECT   [facility] ([facility] [place])    [address] [facility] [address]  [facility] [address]   [phone]              Problem List              ICD-10-CM Priority Class Noted  - Resolved    Cancer of sigmoid colon (CMS-HCC) C18.7   12/7/2016 - Present    Fourth degree hemorrhoids K64.3   12/7/2016 - Present      Health Maintenance        Date Due Completion Dates    IMM DTaP/Tdap/Td Vaccine (1 - Tdap) 1/23/1976 ---    PAP SMEAR 1/23/1978 ---    COLONOSCOPY 1/23/2007 ---    MAMMOGRAM 2/7/2014 2/7/2013, 1/23/2012, 2/9/2011, 7/9/2010, 7/9/2010, 1/5/2010, 12/23/2009, 12/23/2009, 11/26/2008, 11/26/2008, 12/4/2007, 12/4/2007, 11/10/2006, 10/27/2005    IMM ZOSTER VACCINE 1/23/2017 ---            Current Immunizations     Influenza Vaccine Quad Inj (Pf) 10/1/2016      Below and/or attached are the medications your provider expects you to take. Review all of your home medications and newly ordered medications with your provider and/or pharmacist. Follow medication instructions as directed by your provider and/or pharmacist. Please keep your medication list with you and share with your provider. Update the information when medications are discontinued, doses are changed, or new medications (including over-the-counter products) are added; and carry medication information at all times in the event of emergency situations     Allergies:  No Known Allergies          Medications  Valid as of: March 31, 2017 - 10:34 AM    Generic Name Brand Name Tablet Size Instructions for use    Atorvastatin Calcium (Tab) LIPITOR 10 MG Take 5 mg by mouth every evening.        Cetirizine HCl (Tab) ZYRTEC 10 MG Take 10 mg by mouth every morning. Indications: Hayfever        Citalopram Hydrobromide (Tab) CELEXA 40 MG Take 40 mg by mouth every bedtime.        Docusate Sodium (Cap) COLACE 50 MG Take 50 mg by mouth 2 times a day.        Famotidine (Tab) PEPCID 20 MG Take 1 Tab by mouth 2 times a day.        Lidocaine-Prilocaine (Cream) EMLA 2.5-2.5 % Apply to port 1 hour prior to access of port and cover with plastic wrap.        Lidocaine-Prilocaine (Cream) EMLA 2.5-2.5 % Apply to port one hour prior to access and cover  with plastic wrap.        Loperamide HCl (Cap) IMODIUM 2 MG Give loperamide 4 mg orally first dose, then 2 mg orally with every loose bowel movement.  Contact physician if maximum of 16 mg/24 hours is exceeded.        Loperamide HCl (Tab) IMODIUM A-D 2 MG Take 1 Tab by mouth See Admin Instructions.        Melatonin (Cap) Melatonin 1 MG Take  by mouth.        Menthol (Lozenge) CEPACOL 3 MG Take 1 Lozenge by mouth as needed.        MetFORMIN HCl (Tab) GLUCOPHAGE 500 MG Take 500 mg by mouth 2 times a day, with meals.        Montelukast Sodium (Tab) SINGULAIR 10 MG Take 10 mg by mouth every bedtime. Indications: Hayfever        Ondansetron HCl (Tab) ZOFRAN 4 MG Take 1 Tab by mouth every four hours as needed for Nausea/Vomiting.        Prochlorperazine Maleate (Tab) COMPAZINE 10 MG Take 1 Tab by mouth every 6 hours as needed.        ValACYclovir HCl (Tab) VALTREX 500 MG Take 500 mg by mouth 2 times a day. 3 day course        .                 Medicines prescribed today were sent to:     [facility] [address] [place] [address], [place] - [address] [facility] [employer] [facility]    [address] [place] [address]    Phone: [phone] Fax: [phone]    Open 24 Hours?: No      Medication refill instructions:       If your prescription bottle indicates you have medication refills left, it is not necessary to call your provider’s office. Please contact your pharmacy and they will refill your medication.    If your prescription bottle indicates you do not have any refills left, you may request refills at any time through one of the following ways: The online SendMe system (except Urgent Care), by calling your provider’s office, or by asking your pharmacy to contact your provider’s office with a refill request. Medication refills are processed only during regular business hours and may not be available until the next business day. Your provider may request additional information or to have a follow-up visit with you prior to  refilling your medication.   *Please Note: Medication refills are assigned a new Rx number when refilled electronically. Your pharmacy may indicate that no refills were authorized even though a new prescription for the same medication is available at the pharmacy. Please request the medicine by name with the pharmacy before contacting your provider for a refill.           [email or web address] Access Code: Activation code not generated  Current Planearth NET Status: Active

## 2017-03-31 NOTE — MR AVS SNAPSHOT
"iTm Crump   3/31/2017 11:00 AM   Office Visit   MRN: [id number]    Department:  [facility]   Dept Phone:  [phone]    Description:  Female : 1955   Provider:  Keturah Burgos M.D.           Reason for Visit     Follow-Up Prechemo      Allergies as of 3/31/2017     No Known Allergies      Vital Signs     Blood Pressure Pulse Temperature Respirations Height Weight    126/90 mmHg 70 36.6 °C (97.9 °F) 16 1.565 m (5' 1.61\") 74.7 kg (164 lb 10.9 oz)    Body Mass Index Oxygen Saturation Breastfeeding? Smoking Status          30.50 kg/m2 95% No Never Smoker         Basic Information     Date Of Birth Sex Race Ethnicity Preferred Language    1955 Female  or   Origin (Luxembourgish,Kenyan,Latvian,North Korean, etc) English      Your appointments     2017 11:30 AM   Est Chemo 4 with RN 4   [facility] ([facility])    [address]  [facility] [address] [phone]            2017  2:00 PM   EST Port Flush / Central Line Care with INFUSION QUICK INJECT   [facility] ([facility])    [address]  [facility] [address] [phone]            2017 10:00 AM   Non Provider 1 with ONC RN 1   [facility] (--)    [address] [facility] [address]  [facility] [address] [phone]           You will be receiving a confirmation call a few days before your appointment from our automated call confirmation system.            2017 11:00 AM   ONCOLOGY EST PATIENT 30 MIN with LUIS CARLOS Forman   [facility] (--)    [address], [address]  [facility] [address] [phone]            Apr 15, 2017 10:30 AM   Est Chemo 2 with RN 3   [facility] ([facility])    [address]  [facility] [address] [phone]            2017  2:00 PM   EST Port Flush / Central Line Care with INFUSION QUICK INJECT   [facility] ([facility])    [address]  [facility] [address]   "   [phone]            Apr 20, 2017  3:40 PM   ONCOLOGY NEW PATIENT 60 MIN with Sonu Gomes M.D.   [facility] (--)    [address], [address]  [place] [address] [phone]              Problem List              ICD-10-CM Priority Class Noted - Resolved    Cancer of sigmoid colon (CMS-HCC) C18.7   12/7/2016 - Present    Fourth degree hemorrhoids K64.3   12/7/2016 - Present      Health Maintenance        Date Due Completion Dates    IMM DTaP/Tdap/Td Vaccine (1 - Tdap) 1/23/1976 ---    PAP SMEAR 1/23/1978 ---    COLONOSCOPY 1/23/2007 ---    MAMMOGRAM 2/7/2014 2/7/2013, 1/23/2012, 2/9/2011, 7/9/2010, 7/9/2010, 1/5/2010, 12/23/2009, 12/23/2009, 11/26/2008, 11/26/2008, 12/4/2007, 12/4/2007, 11/10/2006, 10/27/2005    IMM ZOSTER VACCINE 1/23/2017 ---            Current Immunizations     Influenza Vaccine Quad Inj (Pf) 10/1/2016      Below and/or attached are the medications your provider expects you to take. Review all of your home medications and newly ordered medications with your provider and/or pharmacist. Follow medication instructions as directed by your provider and/or pharmacist. Please keep your medication list with you and share with your provider. Update the information when medications are discontinued, doses are changed, or new medications (including over-the-counter products) are added; and carry medication information at all times in the event of emergency situations     Allergies:  No Known Allergies          Medications  Valid as of: March 31, 2017 - 11:23 AM    Generic Name Brand Name Tablet Size Instructions for use    Atorvastatin Calcium (Tab) LIPITOR 10 MG Take 5 mg by mouth every evening.        Cetirizine HCl (Tab) ZYRTEC 10 MG Take 10 mg by mouth every morning. Indications: Hayfever        Citalopram Hydrobromide (Tab) CELEXA 40 MG Take 40 mg by mouth every bedtime.        Docusate Sodium (Cap) COLACE 50 MG Take 50 mg by mouth 2 times a day.        Famotidine (Tab) PEPCID  20 MG Take 1 Tab by mouth 2 times a day.        Lidocaine-Prilocaine (Cream) EMLA 2.5-2.5 % Apply to port 1 hour prior to access of port and cover with plastic wrap.        Lidocaine-Prilocaine (Cream) EMLA 2.5-2.5 % Apply to port one hour prior to access and cover with plastic wrap.        Loperamide HCl (Cap) IMODIUM 2 MG Give loperamide 4 mg orally first dose, then 2 mg orally with every loose bowel movement.  Contact physician if maximum of 16 mg/24 hours is exceeded.        Loperamide HCl (Tab) IMODIUM A-D 2 MG Take 1 Tab by mouth See Admin Instructions.        Melatonin (Cap) Melatonin 1 MG Take  by mouth.        Menthol (Lozenge) CEPACOL 3 MG Take 1 Lozenge by mouth as needed.        MetFORMIN HCl (Tab) GLUCOPHAGE 500 MG Take 500 mg by mouth 2 times a day, with meals.        Montelukast Sodium (Tab) SINGULAIR 10 MG Take 10 mg by mouth every bedtime. Indications: Hayfever        Ondansetron HCl (Tab) ZOFRAN 4 MG Take 1 Tab by mouth every four hours as needed for Nausea/Vomiting.        Prochlorperazine Maleate (Tab) COMPAZINE 10 MG Take 1 Tab by mouth every 6 hours as needed.        ValACYclovir HCl (Tab) VALTREX 500 MG Take 500 mg by mouth 2 times a day. 3 day course        .                 Medicines prescribed today [facility]:     [facility] [address] [place] [address] [place], [place] - [address] AT [facility] [address] [address] [address] [place] [address]    Phone: [phone] Fax: [phone]    Open 24 Hours?: No      Medication refill instructions:       If your prescription bottle indicates you have medication refills left, it is not necessary to call your provider’s office. Please contact your pharmacy and they will refill your medication.    If your prescription bottle indicates you do not have any refills left, you may request refills at any time through one of the following ways: The online Apparent system (except Urgent Care), by calling your provider’s office, or by asking your pharmacy to  contact your provider’s office with a refill request. Medication refills are processed only during regular business hours and may not be available until the next business day. Your provider may request additional information or to have a follow-up visit with you prior to refilling your medication.   *Please Note: Medication refills are assigned a new Rx number when refilled electronically. Your pharmacy may indicate that no refills were authorized even though a new prescription for the same medication is available at the pharmacy. Please request the medicine by name with the pharmacy before contacting your provider for a refill.           GoNogging Access Code: Activation code not generated  Current GoNogging Status: Active

## 2017-03-31 NOTE — Clinical Note
[facility]    [address], [address]  [place] [id number]        Date:4/3/2017                     Reference to Tim Crump    Follow Up Visit:  Oncology    Date: 3/31/17  Time: 11 am        Referring Physician: Dr. Enoch Shaw  Primary Care:  William Naranjo D.O.  Surgeon:  Dr. Shaw  Gastroenterology: Dr. 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    History of Presenting Illness:  Tim Crump is a 62-year-old female diagnosed in 5/2016 with colon cancer.  She was diagnosed secondary to positive call occult testing and alteration in her bowel movements. Colonoscopy showed a polyp in the sigmoid colon which was removed. Pathology was positive for poorly differentiated adenocarcinoma with indeterminate margins. In underwent a sigmoidoscopy showed tattooed polyp stalk without any evidence of disease. Imaging showed no evidence of metastatic disease. Her CEA was 1 at diagnosis. She is s/p laparoscopic sigmoid resection with low anterior pelvic anastomosis on 12/2016. Pathology showed evidence of local malignancy but she was found to have 1/13 nodes positive and was staged pT1 pN1a and MLH1, MSH2, MSH6 and PMS2 are intact. Staging workup was negative for metastatic disease. She was started on adjuvant chemotherapy with modified FOLFOX in 2/2/17.  She has been tolerating the chemotherapy fairly well.      Interval history   She is here for follow-up visit. She has been tolerating therapy fairly well as far. Cycle 4 was on 3/17/17. With her last cycle she has noticed some increased nausea and one episode of vomiting since then. She is taking antiemetics. She has also started noticing numbness and tingling in her fingers and toes. This appears to be slightly was with this past cycle. There is some numbness present as well. Numbness has improved but she does  have mild symptoms. She also has noticed cold sensitivity. She has been having constipation and has not been taking as much of a stool softener. She denies any fevers, chills or night sweats.    Past Medical History:  1.  Colon cancer   2. Pre DM  3. H/o HTN   4. Hyperlipidemia   5. Seasonal allergies   6. Arthritis, Knee and hands  7. Anxiety and depression         Past Surgical History   Procedure Laterality Date   • Gyn surgery       hysterectomy   • Gyn surgery        x 2   • Other       varicose vein stripping rubina   • Other orthopedic surgery Left      left wrist   • Low anterior resection laparoscopic  2016     Procedure: LOW ANTERIOR RESECTION LAPAROSCOPIC;  Surgeon: Enoch Shaw M.D.;  Location: SURGERY [facility];  Service:        Allergies as of 2017   • (No Known Allergies)         Current outpatient prescriptions:   •  docusate sodium (COLACE) 50 MG Cap, Take 50 mg by mouth 2 times a day., Disp: , Rfl:   •  famotidine (PEPCID) 20 MG Tab, Take 1 Tab by mouth 2 times a day., Disp: 60 Tab, Rfl: 2  •  loperamide (IMODIUM A-D) 2 MG tablet, Take 1 Tab by mouth See Admin Instructions., Disp: 30 Tab, Rfl: 6  •  lidocaine-prilocaine (EMLA) 2.5-2.5 % Cream, Apply to port one hour prior to access and cover with plastic wrap., Disp: 1 Tube, Rfl: 3  •  menthol (CEPACOL) 3 MG lozenge, Take 1 Lozenge by mouth as needed., Disp: , Rfl:   •  Melatonin 1 MG Cap, Take  by mouth., Disp: , Rfl:   •  ondansetron (ZOFRAN) 4 MG Tab tablet, Take 1 Tab by mouth every four hours as needed for Nausea/Vomiting., Disp: 30 Tab, Rfl: 3  •  prochlorperazine (COMPAZINE) 10 MG Tab, Take 1 Tab by mouth every 6 hours as needed., Disp: 30 Tab, Rfl: 3  •  lidocaine-prilocaine (EMLA) 2.5-2.5 % Cream, Apply to port 1 hour prior to access of port and cover with plastic wrap., Disp: 1 Tube, Rfl: 3  •  loperamide (IMODIUM) 2 MG Cap, Give loperamide 4 mg orally first dose, then 2 mg orally with every loose  "bowel movement.  Contact physician if maximum of 16 mg/24 hours is exceeded., Disp: 30 Cap, Rfl: 3  •  valacyclovir (VALTREX) 500 MG Tab, Take 500 mg by mouth 2 times a day. 3 day course, Disp: , Rfl:   •  atorvastatin (LIPITOR) 10 MG Tab, Take 5 mg by mouth every evening., Disp: , Rfl:   •  montelukast (SINGULAIR) 10 MG Tab, Take 10 mg by mouth every bedtime. Indications: Hayfever, Disp: , Rfl:   •  cetirizine (ZYRTEC) 10 MG Tab, Take 10 mg by mouth every morning. Indications: Hayfever, Disp: , Rfl:   •  metformin (GLUCOPHAGE) 500 MG TABS, Take 500 mg by mouth 2 times a day, with meals., Disp: , Rfl:   •  citalopram (CELEXA) 40 MG TABS, Take 40 mg by mouth every bedtime., Disp: , Rfl:     Review of Systems:  All other review of systems are negative except what was mentioned above in the HPI.  Constitutional: Negative for fever, chills, and weight loss. Positive for mild malaise/fatigue.    HENT: Negative for ear pain and nosebleeds.     Eyes: Negative for blurred vision.    Respiratory: Negative for cough, sputum production and shortness of breath.    Cardiovascular: Negative for chest pain and leg swelling.    Gastrointestinal: Negative for nausea, vomiting and abdominal pain. Positive for constipaiton  Genitourinary: Negative for dysuria.    Musculoskeletal: Negative for myalgias. Positive for lower back pain and joint pain stable.    Skin: Negative for rash and itching.    Neurological: Negative for dizziness. Positive for sensory change. Negative for focal weakness and headaches.    Endo/Heme/Allergies: No bruise/bleed easily.    Psychiatric/Behavioral: Negative for depression and memory loss.      Physical Exam:  Filed Vitals:    03/31/17 1047   BP: 126/90   Pulse: 70   Temp: 36.6 °C (97.9 °F)   Resp: 16   Height: 1.565 m (5' 1.61\")   Weight: 74.7 kg (164 lb 10.9 oz)   SpO2: 95%       General: Not in acute distress, alert and oriented x 3  HEENT: normalcephalic, atraumatic, xtra ocular muscles intact, moist " oral mucus membranes, and oral cavity without any lesions.  Neck: Supple neck and full range of motion. Prominent submandibular glands bilaterally stable  Lymph nodes: No palpable bilateral cervical and supraclavicular lymphadenopathy.    CVS: Regular rate and rhythm  RESP: Clear to auscultate bilaterally.   ABD: Soft, non tender, non distended, positive bowel sounds, and no palpable hepatomegaly   EXT: No edema or cyanosis  CNS: Alert and oriented x3, cranial nerves grossly intact, muscle strength grossly intact, and normal gait.     Labs:   No visits with results within 1 Day(s) from this visit.  Latest known visit with results is:    Hospital Outpatient Visit on 03/30/2017   Component Date Value Ref Range Status   • WBC 03/31/2017 4.6* 4.8 - 10.8 K/uL Final   • RBC 03/31/2017 4.42  4.20 - 5.40 M/uL Final   • Hemoglobin 03/31/2017 13.0  12.0 - 16.0 g/dL Final   • Hematocrit 03/31/2017 38.5  37.0 - 47.0 % Final   • MCV 03/31/2017 87.1  81.4 - 97.8 fL Final   • MCH 03/31/2017 29.4  27.0 - 33.0 pg Final   • MCHC 03/31/2017 33.8  33.6 - 35.0 g/dL Final   • RDW 03/31/2017 53.1* 35.9 - 50.0 fL Final   • Platelet Count 03/31/2017 177  164 - 446 K/uL Final   • MPV 03/31/2017 8.8* 9.0 - 12.9 fL Final   • Neutrophils-Polys 03/31/2017 42.50* 44.00 - 72.00 % Final   • Lymphocytes 03/31/2017 31.00  22.00 - 41.00 % Final   • Monocytes 03/31/2017 16.70* 0.00 - 13.40 % Final   • Eosinophils 03/31/2017 8.70* 0.00 - 6.90 % Final   • Basophils 03/31/2017 0.90  0.00 - 1.80 % Final   • Immature Granulocytes 03/31/2017 0.20  0.00 - 0.90 % Final   • Nucleated RBC 03/31/2017 0.00   Final   • Neutrophils (Absolute) 03/31/2017 1.97* 2.00 - 7.15 K/uL Final    Includes immature neutrophils, if present.   • Lymphs (Absolute) 03/31/2017 1.43  1.00 - 4.80 K/uL Final   • Monos (Absolute) 03/31/2017 0.77  0.00 - 0.85 K/uL Final   • Eos (Absolute) 03/31/2017 0.40  0.00 - 0.51 K/uL Final   • Baso (Absolute) 03/31/2017 0.04  0.00 - 0.12 K/uL Final      • Immature Granulocytes (abs) 03/31/2017 0.01  0.00 - 0.11 K/uL Final   • NRBC (Absolute) 03/31/2017 0.00   Final   • Sodium 03/31/2017 136  135 - 145 mmol/L Final   • Potassium 03/31/2017 3.8  3.6 - 5.5 mmol/L Final   • Chloride 03/31/2017 105  96 - 112 mmol/L Final   • Co2 03/31/2017 27  20 - 33 mmol/L Final   • Anion Gap 03/31/2017 4.0  0.0 - 11.9 Final   • Glucose 03/31/2017 119* 65 - 99 mg/dL Final   • Bun 03/31/2017 19  8 - 22 mg/dL Final   • Creatinine 03/31/2017 1.17  0.50 - 1.40 mg/dL Final   • Calcium 03/31/2017 9.1  8.5 - 10.5 mg/dL Final   • AST(SGOT) 03/31/2017 44  12 - 45 U/L Final   • ALT(SGPT) 03/31/2017 51* 2 - 50 U/L Final   • Alkaline Phosphatase 03/31/2017 115* 30 - 99 U/L Final   • Total Bilirubin 03/31/2017 0.5  0.1 - 1.5 mg/dL Final   • Albumin 03/31/2017 3.8  3.2 - 4.9 g/dL Final   • Total Protein 03/31/2017 6.7  6.0 - 8.2 g/dL Final   • Globulin 03/31/2017 2.9  1.9 - 3.5 g/dL Final   • A-G Ratio 03/31/2017 1.3   Final   • GFR If  03/31/2017 57* >60 mL/min/1.73 m 2 Final   • GFR If Non  03/31/2017 47* >60 mL/min/1.73 m 2 Final   ]    Assessment & Plan:  1. 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 She has started adjuvant chemotherapy with FOLFOX. She tolerated cycle one fairly well. Her last chemotherapy she has noticed worsening of her neuropathy. I will dose reduce oxaliplatin by 20% and continue to monitor her neuropathy closely.  2. GERD: He is continued on Pepcid.  3. Constipation: He is again advised to softeners and aggressive bowel regimen. She is recommended Jolynn-Colace twice a day as well as magnesium citrate or milk of magnesium as needed. She is advised to maintain a bowel movement every day.  4. Nutrition: Stable appetite and weight.   5. She is follow-up again prior to her next cycle or sooner as needed.      I informed the patient that will be  leaving the [place] area shortly. I went over various options for transition of care. After detailed discussion, she will be scheduled to see Dr. Gomes.    she agreed and verbalized her agreement and understanding with the current plan. I answered all questions and concerns she has at this time and advised her to call at any time in the interim with questions or concerns in regards to her care. Thank you for allowing me to participate in her care.    Please note that this dictation was created using voice recognition software. I have made every reasonable attempt to correct obvious errors, but I expect that there are errors of grammar and possibly content that I did not discover before finalizing the note.      Sincerely,  Keturah Burgos  [address] [place] [address], [address]   [phone]

## 2017-03-31 NOTE — PROGRESS NOTES
Pt presents ambulatory to clinic for port access with labs pre chemo appointment with Dr. Burgos.  She is accompanied by her son for today's visit.  Plan of care discussed, patient is in agreement.  Pt states she has been feeling well with the exception of slight nausea after her last cycle.  Pt with EMLA cream to R chest port site.  Port accessed in sterile fashion; however was unable to receive blood return after several flushes and positional techniques.  Port was then de-accessed and covered with sterile gauze and tape.  Labs from [place] plan drawn peripherally from [facility] without complication.  Pt tolerated well.    [facility] will most likely require alteplase tomorrow prior to chemotherapy.  Orders placed in [place].  Pt aware that infusion tomorrow may take longer due to alteplase.  She verbalizes understanding.

## 2017-04-01 ENCOUNTER — OUTPATIENT INFUSION SERVICES (OUTPATIENT)
Dept: ONCOLOGY | Facility: MEDICAL CENTER | Age: 62
End: 2017-04-01
Attending: INTERNAL MEDICINE
Payer: COMMERCIAL

## 2017-04-01 VITALS
HEART RATE: 83 BPM | BODY MASS INDEX: 30.55 KG/M2 | OXYGEN SATURATION: 99 % | HEIGHT: 62 IN | SYSTOLIC BLOOD PRESSURE: 134 MMHG | DIASTOLIC BLOOD PRESSURE: 77 MMHG | RESPIRATION RATE: 18 BRPM | WEIGHT: 166.01 LBS | TEMPERATURE: 98.2 F

## 2017-04-01 DIAGNOSIS — C18.7 CANCER OF SIGMOID COLON (HCC): ICD-10-CM

## 2017-04-01 PROCEDURE — 96415 CHEMO IV INFUSION ADDL HR: CPT

## 2017-04-01 PROCEDURE — 96413 CHEMO IV INFUSION 1 HR: CPT

## 2017-04-01 PROCEDURE — G0498 CHEMO EXTEND IV INFUS W/PUMP: HCPCS

## 2017-04-01 PROCEDURE — 700111 HCHG RX REV CODE 636 W/ 250 OVERRIDE (IP)

## 2017-04-01 PROCEDURE — 96375 TX/PRO/DX INJ NEW DRUG ADDON: CPT

## 2017-04-01 PROCEDURE — A4212 NON CORING NEEDLE OR STYLET: HCPCS

## 2017-04-01 PROCEDURE — 700111 HCHG RX REV CODE 636 W/ 250 OVERRIDE (IP): Performed by: INTERNAL MEDICINE

## 2017-04-01 PROCEDURE — 36593 DECLOT VASCULAR DEVICE: CPT

## 2017-04-01 PROCEDURE — 96411 CHEMO IV PUSH ADDL DRUG: CPT

## 2017-04-01 PROCEDURE — 96368 THER/DIAG CONCURRENT INF: CPT

## 2017-04-01 RX ORDER — PALONOSETRON 0.05 MG/ML
0.25 INJECTION, SOLUTION INTRAVENOUS ONCE
Status: COMPLETED | OUTPATIENT
Start: 2017-04-01 | End: 2017-04-01

## 2017-04-01 RX ADMIN — ALTEPLASE 2 MG: 2.2 INJECTION, POWDER, LYOPHILIZED, FOR SOLUTION INTRAVENOUS at 11:48

## 2017-04-01 RX ADMIN — FLUOROURACIL 725 MG: 50 INJECTION, SOLUTION INTRAVENOUS at 16:05

## 2017-04-01 RX ADMIN — PALONOSETRON HYDROCHLORIDE 0.25 MG: 0.25 INJECTION INTRAVENOUS at 12:33

## 2017-04-01 RX ADMIN — LEUCOVORIN CALCIUM 724 MG: 350 INJECTION, POWDER, LYOPHILIZED, FOR SOLUTION INTRAMUSCULAR; INTRAVENOUS at 13:46

## 2017-04-01 RX ADMIN — DEXAMETHASONE SODIUM PHOSPHATE 12 MG: 4 INJECTION, SOLUTION INTRAMUSCULAR; INTRAVENOUS at 12:32

## 2017-04-01 RX ADMIN — DEXTROSE MONOHYDRATE 123.08 MG: 50 INJECTION, SOLUTION INTRAVENOUS at 13:49

## 2017-04-01 RX ADMIN — FLUOROURACIL 4345 MG: 50 INJECTION, SOLUTION INTRAVENOUS at 16:21

## 2017-04-01 ASSESSMENT — PAIN SCALES - GENERAL: PAINLEVEL: NO PAIN

## 2017-04-01 NOTE — PROGRESS NOTES
Chemotherapy Verification - SECONDARY RN       Height = 157cm  Weight = 75.3kg  BSA = 1.81m2      Medication: Oxaliplatin  Dose: 68mg/m2  Calculated Dose: 123.08mg                            (In mg/m2, AUC, mg/kg)     Medication: Leucovorin  Dose: 400mg/m2  Calculated Dose: 724mg                             (In mg/m2, AUC, mg/kg)    Medication: Fluorouracil bolus Dose: 400mg/m2  Calculated Dose: 724mg                             (In mg/m2, AUC, mg/kg)    Medication: Fluorouracil continuous home infusion Dose: 2400mg/m2  Calculated Dose: 4344mg over 46 hours                            (In mg/m2, AUC, mg/kg)      I confirm that this process was performed independently.

## 2017-04-01 NOTE — PROGRESS NOTES
Chemotherapy Verification - PRIMARY RN      Height = 157 cm  Weight = 75.3 kg  BSA = 1.81 m2       Medication: Oxaliplatin  Dose: 68 mg/m2  Calculated Dose: 123.1 mg                             (In mg/m2, AUC, mg/kg)     Medication: Leucovorin   Dose: 400 mg/m2  Calculated Dose: 724  mg                             (In mg/m2, AUC, mg/kg)      Medication: Fluorouracil Bolus  Dose: 400 mg/m2  Calculated Dose: 724 mg                            (In mg/m2, AUC, mg/kg)    Medication: Fluorouracil CADD pump Infusion  Dose: 2400 mg/m2  Calculated Dose: 4344 mg over 46 hours                             (In mg/m2, AUC, mg/kg)        I confirm this process was performed independently with the BSA and all final chemotherapy dosing calculations congruent.  Any discrepancies of 5% or greater have been addressed with the chemotherapy pharmacist. The resolution of the discrepancy has been documented in the EPIC progress notes.

## 2017-04-01 NOTE — PROGRESS NOTES
"Patient Name: Tim Crump   Dx: Colon Cancer        Protocol: [id number]   Oxaliplatin (Eloxatin) 85 mg/m2 IV Day 1  --4/1/17 dose reduced 20% by Aubrey MEDRANO for tolerance  Leucovorin 400 mg/m2 IV over 2 hrs on Day 1. Run concurrently with oxaliplatin  Fluorouracil 400 mg/m2 IV bolus Day 1 followed by 2400 mg/m2 CIVI over 46 hrs    Q14 days until disease progression or unacceptable toxicity    NCCN Guidelines for Colon Cancer. V.2.2017  Xu T, et al. N Engl J Med. 2004;350:8667-2730  rock Hartmann al. J Clin Oncol. 2008;26(12):2006-12  Hong VERDUGO, et al. Br J Cancer. 2002;87(4):393-9    Allergies:  Review of patient's allergies indicates no known allergies.     /77 mmHg  Pulse 83  Temp(Src) 36.8 °C (98.2 °F)  Resp 18  Ht 1.57 m (5' 1.81\")  Wt 75.3 kg (166 lb 0.1 oz)  BMI 30.55 kg/m2  SpO2 99% Body surface area is 1.81 meters squared.    Labs 3/31/17:  ANC ~1970    Plt = 177k      Hgb = 13.0   SCr = 1.17 mg/dL CrCl ~61 mL/min   AST/ALT/AP = 44/51/115 T bili = 0.5        Drug Order   (Drug name, dose, route, IV Fluid & volume, frequency, number of doses) Cycle: 5     Previous treatment: C4 = 03/18/17     Medication = OXALIplatin (Eloxitan)  Base Dose = 68 mg/m2  Calc Dose: Base Dose x 1.81 m2 = 123.1 mg  Final Dose = 123.08 mg  Route = IV  Fluid & Volume = D5W 250 mL  Admin Duration = Over 2 hours          <5% difference, ok to treat with final dose     Medication = Leucovorin (Wellcovorin)  Base Dose = 400 mg/m2  Calc Dose: Base Dose x 1.81 m2 = 724 mg  Final Dose = 724 mg  Route = IV  Fluid & Volume = D5W 250 mL  Admin Duration = Over 2 hours          <5% difference, ok to treat with final dose   Medication = Fluorouracil (5-FU) bolus  Base Dose = 400 mg/m2  Calc Dose:Base Dose x 1.81 m2 = 724 mg  Final Dose = 725 mg  Route = IVP  Fluid & Volume = in syringe 14.5 mL  Conc = 50 mg/mL  Admin Duration = Over 5 minutes          <5% difference, ok to treat with final dose   Medication = " Fluorouracil (5-FU)  Base Dose = 2400 mg/m2  Calc Dose:Base Dose x 1.81 m2 = 4344 mg  Final Dose = 4345 mg  Route = CIVI  Conc = 50 mg/mL = 86.9 mL   Fluid & Volume = 86.9 mL +3 mL overfill   Admin Duration = Over 46 hours @ 1.9 mL/hr     Via CADD pump for home CIVI       <5% difference, ok to treat with final dose     By my signature below, I confirm this process was performed independently with the BSA and all final chemotherapy dosing calculations congruent. I have reviewed the above chemotherapy order and that my calculation of the final dose and BSA (when applicable) corroborate those calculations of the  pharmacist. Discrepancies of 5% or greater in the written dose have been addressed and documented within the EPIC Progress notes.    Meredith NairD

## 2017-04-01 NOTE — PROGRESS NOTES
"Pharmacy Chemotherapy Calculations Note:    Patient Name: Tim Crump      Dx: Colon Cancer     Cycle 5 Previous treatment: C4 = 3/18/17      Protocol: [id number]   Oxaliplatin (Eloxatin) 85 mg/m2 IV Day 1   04/01/17: dose reduced to 68 mg/m2 (20% dose reduction) d/t neuropathy per Dr. Burgos    Leucovorin 400 mg/m2 IV over 2 hrs on Day 1. Run concurrently with oxaliplatin  Fluorouracil 400 mg/m2 IV bolus Day 1 followed by 2400 mg/m2 CIVI over 46 hrs   Q14 days until disease progression or unacceptable toxicity    NCCN Guidelines for Colon Cancer. V.2.2017  Xu T, et al. N Engl J Med. 2004;350:3068-4462  Renetta J, et al. J Clin Oncol. 2008;26(12):2006-12  Hong VERDUGO, et al. Br J Cancer. 2002;87(4):393-9         /77 mmHg  Pulse 83  Temp(Src) 36.8 °C (98.2 °F)  Resp 18  Ht 1.57 m (5' 1.81\")  Wt 75.3 kg (166 lb 0.1 oz)  BMI 30.55 kg/m2  SpO2 99% Body surface area is 1.81 meters squared.     03/31/17 ANC ~2000  Plt = 177k    Hgb = 13     SCr = 1.17 mg/dL  CrCl ~61 mL/min  LFT = AST/ALT/AlkPhos/Tbili = 44/51/115/0.5       OXALIplatin (Eloxatin) 68 mg/m² x 1.81 m² = 123.08 mg   <5% difference, ok to treat with final dose = 123.08 mg IV    Leucovorin 400 mg/m² x 1.81 m² = 724 mg   <5% difference, ok to treat with final dose = 724 mg IV    Fluorouracil (5-FU) 400 mg/m² x 1.81 m² = 724 mg   <5% difference, ok to treat with final dose = 725 mg IVP    Fluorouracil (5-FU) 2400 mg/m² x 1.81 m²  = 4344 mg    <5% difference, ok to treat with final dose = 4345 mg CIVI over 46 hrs via CADD pump       Glenda Mendoza, PharmD              "

## 2017-04-02 NOTE — PROGRESS NOTES
Pt presented to infusion center for D1C5 FOLFOX. Right chest port in place. Pt reports had labs drawn yesterday at MD office peripherally, could not get blood return from port. Per pt, they informed her that she may need catheter de-clotting and appt today would take longer as a result. Pt had EMLA cream in place, removed, declined lidocaine. Accessed port with sterile technique, flushed easily but no blood return with flushing-aspiration technique or changing positions. Alteplase instilled per orders, after 30 mins alteplase aspirated and brisk blood return observed. Pre-meds given as ordered. Oxaliplatin infused concurrently with leucovorin with D5W with no s/s of adverse effect. Line rinsed clear. Brisk blood return again observed, 5FU IV push given over 5 minutes, blood return observed every 2 mls, no s/s of adverse reaction. Brisk blood return again observed before home 5FU pump connected at 1621, all clamps undone and pump in RUN. Pt has spill kit at home and understands instructions for home pump. Has appt for disconnect on Monday at 1630. Left on foot in good spirits with son.

## 2017-04-03 ENCOUNTER — OUTPATIENT INFUSION SERVICES (OUTPATIENT)
Dept: ONCOLOGY | Facility: MEDICAL CENTER | Age: 62
End: 2017-04-03
Attending: INTERNAL MEDICINE
Payer: COMMERCIAL

## 2017-04-03 VITALS
OXYGEN SATURATION: 95 % | SYSTOLIC BLOOD PRESSURE: 113 MMHG | RESPIRATION RATE: 18 BRPM | DIASTOLIC BLOOD PRESSURE: 72 MMHG | TEMPERATURE: 98.7 F | HEIGHT: 62 IN | HEART RATE: 72 BPM | WEIGHT: 166.45 LBS | BODY MASS INDEX: 30.63 KG/M2

## 2017-04-03 PROCEDURE — 700111 HCHG RX REV CODE 636 W/ 250 OVERRIDE (IP)

## 2017-04-03 PROCEDURE — 96523 IRRIG DRUG DELIVERY DEVICE: CPT

## 2017-04-03 RX ADMIN — HEPARIN 500 UNITS: 100 SYRINGE at 15:58

## 2017-04-03 ASSESSMENT — PAIN SCALES - GENERAL: PAINLEVEL: NO PAIN

## 2017-04-04 NOTE — PROGRESS NOTES
Pt is here for her scheduled 5-Fu pump de-access. Pump stopped by the patient - chemotherapy bag observed empty. Pt voices no concerns. Treatment completed without an incident. Port flushed per protocol - pump/bag cleaned and stored appropriately. Discharged home to self care. Next appointment verified. .

## 2017-04-14 ENCOUNTER — OFFICE VISIT (OUTPATIENT)
Dept: HEMATOLOGY ONCOLOGY | Facility: MEDICAL CENTER | Age: 62
End: 2017-04-14
Payer: MEDICAID

## 2017-04-14 ENCOUNTER — HOSPITAL ENCOUNTER (OUTPATIENT)
Facility: MEDICAL CENTER | Age: 62
End: 2017-04-14
Attending: NURSE PRACTITIONER
Payer: COMMERCIAL

## 2017-04-14 ENCOUNTER — NON-PROVIDER VISIT (OUTPATIENT)
Dept: HEMATOLOGY ONCOLOGY | Facility: MEDICAL CENTER | Age: 62
End: 2017-04-14
Payer: MEDICAID

## 2017-04-14 VITALS
DIASTOLIC BLOOD PRESSURE: 68 MMHG | BODY MASS INDEX: 30.59 KG/M2 | HEIGHT: 62 IN | SYSTOLIC BLOOD PRESSURE: 116 MMHG | WEIGHT: 166.23 LBS | OXYGEN SATURATION: 97 % | HEART RATE: 77 BPM | TEMPERATURE: 98.9 F | RESPIRATION RATE: 16 BRPM

## 2017-04-14 VITALS
SYSTOLIC BLOOD PRESSURE: 116 MMHG | WEIGHT: 166 LBS | HEART RATE: 77 BPM | TEMPERATURE: 99 F | RESPIRATION RATE: 16 BRPM | BODY MASS INDEX: 30.55 KG/M2 | DIASTOLIC BLOOD PRESSURE: 68 MMHG | OXYGEN SATURATION: 97 % | HEIGHT: 62 IN

## 2017-04-14 DIAGNOSIS — C18.7 CANCER OF SIGMOID COLON (HCC): ICD-10-CM

## 2017-04-14 LAB
ALBUMIN SERPL BCP-MCNC: 3.7 G/DL (ref 3.2–4.9)
ALBUMIN/GLOB SERPL: 1.4 G/DL
ALP SERPL-CCNC: 111 U/L (ref 30–99)
ALT SERPL-CCNC: 28 U/L (ref 2–50)
ANION GAP SERPL CALC-SCNC: 6 MMOL/L (ref 0–11.9)
AST SERPL-CCNC: 29 U/L (ref 12–45)
BASOPHILS # BLD AUTO: 1.1 % (ref 0–1.8)
BASOPHILS # BLD: 0.05 K/UL (ref 0–0.12)
BILIRUB SERPL-MCNC: 0.4 MG/DL (ref 0.1–1.5)
BUN SERPL-MCNC: 20 MG/DL (ref 8–22)
CALCIUM SERPL-MCNC: 9.2 MG/DL (ref 8.5–10.5)
CEA SERPL-MCNC: 2.1 NG/ML (ref 0–3)
CHLORIDE SERPL-SCNC: 105 MMOL/L (ref 96–112)
CO2 SERPL-SCNC: 25 MMOL/L (ref 20–33)
CREAT SERPL-MCNC: 1.1 MG/DL (ref 0.5–1.4)
EOSINOPHIL # BLD AUTO: 0.46 K/UL (ref 0–0.51)
EOSINOPHIL NFR BLD: 9.7 % (ref 0–6.9)
ERYTHROCYTE [DISTWIDTH] IN BLOOD BY AUTOMATED COUNT: 56.3 FL (ref 35.9–50)
GFR SERPL CREATININE-BSD FRML MDRD: 51 ML/MIN/1.73 M 2
GLOBULIN SER CALC-MCNC: 2.6 G/DL (ref 1.9–3.5)
GLUCOSE SERPL-MCNC: 176 MG/DL (ref 65–99)
HCT VFR BLD AUTO: 36.7 % (ref 37–47)
HGB BLD-MCNC: 12.6 G/DL (ref 12–16)
IMM GRANULOCYTES # BLD AUTO: 0.02 K/UL (ref 0–0.11)
IMM GRANULOCYTES NFR BLD AUTO: 0.4 % (ref 0–0.9)
LYMPHOCYTES # BLD AUTO: 1.51 K/UL (ref 1–4.8)
LYMPHOCYTES NFR BLD: 32 % (ref 22–41)
MCH RBC QN AUTO: 30.2 PG (ref 27–33)
MCHC RBC AUTO-ENTMCNC: 34.3 G/DL (ref 33.6–35)
MCV RBC AUTO: 88 FL (ref 81.4–97.8)
MONOCYTES # BLD AUTO: 0.65 K/UL (ref 0–0.85)
MONOCYTES NFR BLD AUTO: 13.8 % (ref 0–13.4)
NEUTROPHILS # BLD AUTO: 2.03 K/UL (ref 2–7.15)
NEUTROPHILS NFR BLD: 43 % (ref 44–72)
NRBC # BLD AUTO: 0 K/UL
NRBC BLD AUTO-RTO: 0 /100 WBC
PLATELET # BLD AUTO: 164 K/UL (ref 164–446)
PMV BLD AUTO: 8.7 FL (ref 9–12.9)
POTASSIUM SERPL-SCNC: 4.3 MMOL/L (ref 3.6–5.5)
PROT SERPL-MCNC: 6.3 G/DL (ref 6–8.2)
RBC # BLD AUTO: 4.17 M/UL (ref 4.2–5.4)
SODIUM SERPL-SCNC: 136 MMOL/L (ref 135–145)
WBC # BLD AUTO: 4.7 K/UL (ref 4.8–10.8)

## 2017-04-14 PROCEDURE — 36591 DRAW BLOOD OFF VENOUS DEVICE: CPT | Performed by: INTERNAL MEDICINE

## 2017-04-14 PROCEDURE — 99213 OFFICE O/P EST LOW 20 MIN: CPT | Performed by: NURSE PRACTITIONER

## 2017-04-14 PROCEDURE — 82378 CARCINOEMBRYONIC ANTIGEN: CPT

## 2017-04-14 PROCEDURE — 80053 COMPREHEN METABOLIC PANEL: CPT

## 2017-04-14 PROCEDURE — 85025 COMPLETE CBC W/AUTO DIFF WBC: CPT

## 2017-04-14 RX ADMIN — Medication 500 UNITS: at 10:05

## 2017-04-14 ASSESSMENT — ENCOUNTER SYMPTOMS
FEVER: 0
DIZZINESS: 0
COUGH: 1
WHEEZING: 0
TINGLING: 1
VOMITING: 0
CONSTIPATION: 0
DIARRHEA: 0
WEIGHT LOSS: 0
NAUSEA: 1
HEADACHES: 0
SPUTUM PRODUCTION: 0
MYALGIAS: 0
ABDOMINAL PAIN: 0
SHORTNESS OF BREATH: 0
PALPITATIONS: 0
CHILLS: 0

## 2017-04-14 ASSESSMENT — PAIN SCALES - GENERAL
PAINLEVEL: NO PAIN
PAINLEVEL: NO PAIN

## 2017-04-14 NOTE — MR AVS SNAPSHOT
"Tim Crump   2017 11:00 AM   Office Visit   MRN: [id number]    Department:  [facility]   Dept Phone:  [phone]    Description:  Female : 1955   Provider:  LATRICIA Forman.P.N.           Reason for Visit     Follow-Up prechemo      Allergies as of 2017     No Known Allergies      You were diagnosed with     Cancer of sigmoid colon (CMS-HCC)   [483159]         Vital Signs     Blood Pressure Pulse Temperature Respirations Height Weight    116/68 mmHg 77 37.2 °C (98.9 °F) 16 1.575 m (5' 2\") 75.4 kg (166 lb 3.6 oz)    Body Mass Index Oxygen Saturation Smoking Status             30.40 kg/m2 97% Never Smoker          Basic Information     Date Of Birth Sex Race Ethnicity Preferred Language    1955 Female  or   Origin (Ethiopian,Burundian,Monegasque,East Timorese, etc) English      Your appointments     Apr 15, 2017 10:30 AM   Est Chemo 2 with RN 3   [facility] ([facility])    [address]  [facility] [address]   [phone]            2017  2:00 PM   EST Port Flush / Central Line Care with INFUSION QUICK INJECT   [facility] ([facility])    [address] [facility] [address]  [facility] [address]   [phone]            2017  3:40 PM   ONCOLOGY NEW PATIENT 60 MIN with Sonu Gomes M.D.   [facility] (--)    [address], [facility] [address]  [facility] [address]   [phone]            2017  9:30 AM   Non Provider 1 with ONC RN 1   [facility] (--)    [address], [address]  [facility] [address]   [phone]           You will be receiving a confirmation call a few days before your appointment from our automated call confirmation system.            2017 10:30 AM   ONCOLOGY EST PATIENT 30 MIN with Iliana Casarez, A.P.NJuanis   [facility] (--)    [address], [address]  [facility] [address]   [phone]            2017 11:00 AM   Est Chemo 2 with RN 2   [facility] ([facility] [place])    [address] [facility] [place] [address]   [phone]            May 01, 2017  4:00 PM   EST Port Flush / Central Line Care with RN 6   [facility] ([facility])    [address] [facility] [place] [address]   [phone]              Problem List              ICD-10-CM Priority Class Noted - Resolved    Cancer of sigmoid colon (CMS-HCC) C18.7   12/7/2016 - Present    Fourth degree hemorrhoids K64.3   12/7/2016 - Present      Health Maintenance        Date Due Completion Dates    IMM DTaP/Tdap/Td Vaccine (1 - Tdap) 1/23/1976 ---    PAP SMEAR 1/23/1978 ---    COLONOSCOPY 1/23/2007 ---    MAMMOGRAM 2/7/2014 2/7/2013, 1/23/2012, 2/9/2011, 7/9/2010, 7/9/2010, 1/5/2010, 12/23/2009, 12/23/2009, 11/26/2008, 11/26/2008, 12/4/2007, 12/4/2007, 11/10/2006, 10/27/2005    IMM ZOSTER VACCINE 1/23/2017 ---            Current Immunizations     Influenza Vaccine Quad Inj (Pf) 10/1/2016      Below and/or attached are the medications your provider expects you to take. Review all of your home medications and newly ordered medications with your provider and/or pharmacist. Follow medication instructions as directed by your provider and/or pharmacist. Please keep your medication list with you and share with your provider. Update the information when medications are discontinued, doses are changed, or new medications (including over-the-counter products) are added; and carry medication information at all times in the event of emergency situations     Allergies:  No Known Allergies          Medications  Valid as of: April 14, 2017 - 11:09 AM    Generic Name Brand Name Tablet Size Instructions for use    Atorvastatin Calcium (Tab) LIPITOR 10 MG Take 5 mg by mouth every evening.        Cetirizine HCl (Tab) ZYRTEC 10 MG Take 10 mg by mouth every morning. Indications: Hayfever        Citalopram Hydrobromide (Tab) CELEXA 40 MG Take 40 mg by mouth every bedtime.        Docusate Sodium (Cap) COLACE 50 MG Take 50 mg by mouth 2  times a day.        Famotidine (Tab) PEPCID 20 MG Take 1 Tab by mouth 2 times a day.        Lidocaine-Prilocaine (Cream) EMLA 2.5-2.5 % Apply to port 1 hour prior to access of port and cover with plastic wrap.        Loperamide HCl (Cap) IMODIUM 2 MG Give loperamide 4 mg orally first dose, then 2 mg orally with every loose bowel movement.  Contact physician if maximum of 16 mg/24 hours is exceeded.        Loperamide HCl (Tab) IMODIUM A-D 2 MG Take 1 Tab by mouth See Admin Instructions.        Melatonin (Cap) Melatonin 1 MG Take  by mouth.        Menthol (Lozenge) CEPACOL 3 MG Take 1 Lozenge by mouth as needed.        MetFORMIN HCl (Tab) GLUCOPHAGE 500 MG Take 500 mg by mouth 2 times a day, with meals.        Montelukast Sodium (Tab) SINGULAIR 10 MG Take 10 mg by mouth every bedtime. Indications: Hayfever        Ondansetron HCl (Tab) ZOFRAN 4 MG Take 1 Tab by mouth every four hours as needed for Nausea/Vomiting.        Prochlorperazine Maleate (Tab) COMPAZINE 10 MG Take 1 Tab by mouth every 6 hours as needed.        ValACYclovir HCl (Tab) VALTREX 500 MG Take 500 mg by mouth 2 times a day. 3 day course        .                 Medicines prescribed today were sent to:     [facility] [address] [place] [address] [place], [place] - [address] [facility] [address] [address] [place] [address]    Phone: [phone] Fax: [phone]    Open 24 Hours?: No      Medication refill instructions:       If your prescription bottle indicates you have medication refills left, it is not necessary to call your provider’s office. Please contact your pharmacy and they will refill your medication.    If your prescription bottle indicates you do not have any refills left, you may request refills at any time through one of the following ways: The online DoubleVerify system (except Urgent Care), by calling your provider’s office, or by asking your pharmacy to contact your provider’s office with a refill request. Medication refills are  processed only during regular business hours and may not be available until the next business day. Your provider may request additional information or to have a follow-up visit with you prior to refilling your medication.   *Please Note: Medication refills are assigned a new Rx number when refilled electronically. Your pharmacy may indicate that no refills were authorized even though a new prescription for the same medication is available at the pharmacy. Please request the medicine by name with the pharmacy before contacting your provider for a refill.           Z[employer] Access Code: Activation code not generated  Current Zimory Status: Active

## 2017-04-14 NOTE — PROGRESS NOTES
Pt presents ambulatory to clinic for port access with labs and  pre chemo appointment with JULIET Casarez.  She is accompanied by herself for today's visit.  Plan of care discussed, patient is in agreement.  Pt states she has been feeling well with the exception of mild allergies.  Pt with EMLA cream to R chest port site.  Port accessed in sterile fashion; brisk blood return noted.  Labs drawn per orders in [place].  Port flushed per protocol with NS and heparin.  Port then de-accessed, as patient does not have chemo until tomorrow, 4/15.  Port site covered with sterile gauze and tape. Pt tolerated well

## 2017-04-14 NOTE — MR AVS SNAPSHOT
"Carlitoscandisnaz Mackilva   2017 10:00 AM   Non-Provider Visit   MRN: [id number]    Department:  [facility]   Dept Phone:  [phone]    Description:  Female : 1955   Provider:  ONC RN 1           Reason for Visit     Labs Only port      Allergies as of 2017     No Known Allergies      You were diagnosed with     Cancer of sigmoid colon (CMS-HCC)   [622179]         Vital Signs     Blood Pressure Pulse Temperature Respirations Height Weight    116/68 mmHg 77 37.2 °C (99 °F) 16 1.575 m (5' 2\") 75.297 kg (166 lb)    Body Mass Index Oxygen Saturation Smoking Status             30.35 kg/m2 97% Never Smoker          Basic Information     Date Of Birth Sex Race Ethnicity Preferred Language    1955 Female  or   Origin (Turkish,St Lucian,Malian,Kenyan, etc) English      Your appointments     2017 11:00 AM   ONCOLOGY EST PATIENT 30 MIN with LUIS CARLOS Forman   [facility] (--)    [address], [facility] [address]  [facility] [address]   [phone]            Apr 15, 2017 10:30 AM   Est Chemo 2 with RN 3   [facility] ([facility])    [address]  [facility] [address]   [phone]            2017  2:00 PM   EST Port Flush / Central Line Care with INFUSION QUICK INJECT   [facility] ([facility])    [address]  [facility] [address]   [phone]            2017  3:40 PM   ONCOLOGY NEW PATIENT 60 MIN with Sonu Gomes M.D.   [facility] (--)    [address], [facility] [address]  [facility] [address]   [phone]              Problem List              ICD-10-CM Priority Class Noted - Resolved    Cancer of sigmoid colon (CMS-HCC) C18.7   2016 - Present    Fourth degree hemorrhoids K64.3   2016 - Present      Health Maintenance        Date Due Completion Dates    IMM DTaP/Tdap/Td Vaccine (1 - Tdap) 1976 ---    PAP SMEAR 1978 ---    COLONOSCOPY 2007 ---    MAMMOGRAM 2014 " 2/7/2013, 1/23/2012, 2/9/2011, 7/9/2010, 7/9/2010, 1/5/2010, 12/23/2009, 12/23/2009, 11/26/2008, 11/26/2008, 12/4/2007, 12/4/2007, 11/10/2006, 10/27/2005    IMM ZOSTER VACCINE 1/23/2017 ---            Current Immunizations     Influenza Vaccine Quad Inj (Pf) 10/1/2016      Below and/or attached are the medications your provider expects you to take. Review all of your home medications and newly ordered medications with your provider and/or pharmacist. Follow medication instructions as directed by your provider and/or pharmacist. Please keep your medication list with you and share with your provider. Update the information when medications are discontinued, doses are changed, or new medications (including over-the-counter products) are added; and carry medication information at all times in the event of emergency situations     Allergies:  No Known Allergies          Medications  Valid as of: April 14, 2017 - 10:45 AM    Generic Name Brand Name Tablet Size Instructions for use    Atorvastatin Calcium (Tab) LIPITOR 10 MG Take 5 mg by mouth every evening.        Cetirizine HCl (Tab) ZYRTEC 10 MG Take 10 mg by mouth every morning. Indications: Hayfever        Citalopram Hydrobromide (Tab) CELEXA 40 MG Take 40 mg by mouth every bedtime.        Docusate Sodium (Cap) COLACE 50 MG Take 50 mg by mouth 2 times a day.        Famotidine (Tab) PEPCID 20 MG Take 1 Tab by mouth 2 times a day.        Lidocaine-Prilocaine (Cream) EMLA 2.5-2.5 % Apply to port 1 hour prior to access of port and cover with plastic wrap.        Loperamide HCl (Cap) IMODIUM 2 MG Give loperamide 4 mg orally first dose, then 2 mg orally with every loose bowel movement.  Contact physician if maximum of 16 mg/24 hours is exceeded.        Loperamide HCl (Tab) IMODIUM A-D 2 MG Take 1 Tab by mouth See Admin Instructions.        Melatonin (Cap) Melatonin 1 MG Take  by mouth.        Menthol (Lozenge) CEPACOL 3 MG Take 1 Lozenge by mouth as needed.        MetFORMIN  HCl (Tab) GLUCOPHAGE 500 MG Take 500 mg by mouth 2 times a day, with meals.        Montelukast Sodium (Tab) SINGULAIR 10 MG Take 10 mg by mouth every bedtime. Indications: Hayfever        Ondansetron HCl (Tab) ZOFRAN 4 MG Take 1 Tab by mouth every four hours as needed for Nausea/Vomiting.        Prochlorperazine Maleate (Tab) COMPAZINE 10 MG Take 1 Tab by mouth every 6 hours as needed.        ValACYclovir HCl (Tab) VALTREX 500 MG Take 500 mg by mouth 2 times a day. 3 day course        .                 Medicines prescribed today were sent to:     [facility] [id number]  [place], [place] - [address] [facility]    [address] [place] [address]    Phone: [phone] Fax: [phone]    Open 24 Hours?: No      Medication refill instructions:       If your prescription bottle indicates you have medication refills left, it is not necessary to call your provider’s office. Please contact your pharmacy and they will refill your medication.    If your prescription bottle indicates you do not have any refills left, you may request refills at any time through one of the following ways: The online [employer] system (except Urgent Care), by calling your provider’s office, or by asking your pharmacy to contact your provider’s office with a refill request. Medication refills are processed only during regular business hours and may not be available until the next business day. Your provider may request additional information or to have a follow-up visit with you prior to refilling your medication.   *Please Note: Medication refills are assigned a new Rx number when refilled electronically. Your pharmacy may indicate that no refills were authorized even though a new prescription for the same medication is available at the pharmacy. Please request the medicine by name with the pharmacy before contacting your provider for a refill.           Sagence Access Code: Activation code not generated  Current Sagence  Status: Active

## 2017-04-14 NOTE — PROGRESS NOTES
Subjective:      Tim Crump is a 62 y.o. female who presents for Follow-Up for colon cancer.         HPI    Patient seen today in follow-up for colon cancer. She is accompanied by herself for today's appointment. Patient is scheduled to receive cycle 6 of FOLFOX today.    Patient has been tolerating treatment fairly well. She denies any fevers or chills and states her appetite is good. Her weight is stable. Patient denies headaches, chest pain, heart palpitations or swelling in her legs. She does have a mild cough which is related to nasal congestion and allergies. Her cough is dry and she denies any shortness of breath or wheezing. Patient mentioned that she does have nausea and takes antiemetics with positive results. She has not vomited. She does have nausea more so the first week after chemotherapy but is having it every day. Patient does have issues with constipation but it is controlled with medication. Her last bowel movement was this morning. Patient did complain of mouth sores during the second week after treatment but that resolved with the use of salt water and baking soda. Patient states she noticed right after her initial chemotherapy when she stood up she felt like her legs were heavy and felt off balance. They lasted for a few minutes and she did not have that feeling again. This was the second cycle where she felt this. Patient denies any cracking and peeling of her hands or feet.    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 She did receive a 20% dose reduction of the oxaliplatin on cycle 5 but stated she did not notice a difference with her neuropathy. She still has significant cold sensitivity at this time and is unable to touch anything from the refrigerator due to that. She is noticing some neuropathy in her feet as well but is able to still feel the ground when she walks.    No Known Allergies  Current Outpatient Prescriptions on File Prior to  Visit   Medication Sig Dispense Refill   • docusate sodium (COLACE) 50 MG Cap Take 50 mg by mouth 2 times a day.     • famotidine (PEPCID) 20 MG Tab Take 1 Tab by mouth 2 times a day. 60 Tab 2   • atorvastatin (LIPITOR) 10 MG Tab Take 5 mg by mouth every evening.     • montelukast (SINGULAIR) 10 MG Tab Take 10 mg by mouth every bedtime. Indications: Hayfever     • cetirizine (ZYRTEC) 10 MG Tab Take 10 mg by mouth every morning. Indications: Hayfever     • metformin (GLUCOPHAGE) 500 MG TABS Take 500 mg by mouth 2 times a day, with meals.     • citalopram (CELEXA) 40 MG TABS Take 40 mg by mouth every bedtime.     • loperamide (IMODIUM A-D) 2 MG tablet Take 1 Tab by mouth See Admin Instructions. 30 Tab 6   • menthol (CEPACOL) 3 MG lozenge Take 1 Lozenge by mouth as needed.     • Melatonin 1 MG Cap Take  by mouth.     • ondansetron (ZOFRAN) 4 MG Tab tablet Take 1 Tab by mouth every four hours as needed for Nausea/Vomiting. 30 Tab 3   • prochlorperazine (COMPAZINE) 10 MG Tab Take 1 Tab by mouth every 6 hours as needed. 30 Tab 3   • lidocaine-prilocaine (EMLA) 2.5-2.5 % Cream Apply to port 1 hour prior to access of port and cover with plastic wrap. 1 Tube 3   • loperamide (IMODIUM) 2 MG Cap Give loperamide 4 mg orally first dose, then 2 mg orally with every loose bowel movement.  Contact physician if maximum of 16 mg/24 hours is exceeded. 30 Cap 3   • valacyclovir (VALTREX) 500 MG Tab Take 500 mg by mouth 2 times a day. 3 day course       No current facility-administered medications on file prior to visit.       Review of Systems   Constitutional: Negative for fever, chills and weight loss.   Respiratory: Positive for cough (nasal drainage which is chronic). Negative for sputum production, shortness of breath and wheezing.    Cardiovascular: Negative for chest pain, palpitations and leg swelling.   Gastrointestinal: Positive for nausea. Negative for vomiting, abdominal pain, diarrhea and constipation.   Genitourinary:  "Negative for dysuria.   Musculoskeletal: Negative for myalgias.   Neurological: Positive for tingling. Negative for dizziness and headaches.          Objective:     /68 mmHg  Pulse 77  Temp(Src) 37.2 °C (98.9 °F)  Resp 16  Ht 1.575 m (5' 2\")  Wt 75.4 kg (166 lb 3.6 oz)  BMI 30.40 kg/m2  SpO2 97%     Physical Exam   Constitutional: She is oriented to person, place, and time. She appears well-developed and well-nourished. No distress.   HENT:   Head: Normocephalic and atraumatic.   Mouth/Throat: Oropharynx is clear and moist. No oropharyngeal exudate.   Eyes: Conjunctivae and EOM are normal. Pupils are equal, round, and reactive to light. Right eye exhibits no discharge. Left eye exhibits no discharge. No scleral icterus.   Cardiovascular: Normal rate, regular rhythm, normal heart sounds and intact distal pulses.  Exam reveals no gallop and no friction rub.    No murmur heard.  Pulmonary/Chest: Effort normal and breath sounds normal. No respiratory distress. She has no wheezes.   Abdominal: Soft. Bowel sounds are normal. She exhibits no distension. There is no tenderness.   Musculoskeletal: Normal range of motion. She exhibits no edema or tenderness.   Neurological: She is alert and oriented to person, place, and time.   Skin: Skin is warm and dry. No rash noted. She is not diaphoretic. No erythema. No pallor.   Psychiatric: She has a normal mood and affect. Her behavior is normal.   Vitals reviewed.              Assessment/Plan:     1. Cancer of sigmoid colon (CMS-HCC)       Plan  1. Patient is scheduled for cycle 6 of FOLFOX tomorrow. I did review her CBC and CMP and okay to proceed with treatment. I spoke with Dr. Burgos regarding patient's peripheral neuropathy. She did do a dose reduction of 20% of the oxaliplatin on cycle 5 with not much resolution. At this time we are going to trial holding the oxaliplatin for just cycle 6 and see how she does. Questioning whether to start her back on oxaliplatin " if the neuropathy resolves for further cycles. Patient is aware of the change for this cycle.    2. Patient has noticed nausea most of the first week after chemotherapy but she does experience it every single day. We initially had to change the premedication to Aloxi with dexamethasone. We'll see how patient does this cycle holding the oxaliplatin, and if proceed with oxaliplatin for further cycles may actually consider addition of emend for symptom management.    3. Patient did experience some mouth sores with this cycle. She uses saltwater and baking soda rinses when she noticed the mouth sores appear. I recommend that patient due to mouth sores as a preventative measure after her chemotherapy this week. Patient verbalized understanding.    4. Patient is to establish care with the medical oncologist, Dr. Gomes next week on April 20. She is to follow-up in 2 weeks prior to her next cycle of chemotherapy or sooner if needed.

## 2017-04-15 ENCOUNTER — OUTPATIENT INFUSION SERVICES (OUTPATIENT)
Dept: ONCOLOGY | Facility: MEDICAL CENTER | Age: 62
End: 2017-04-15
Attending: INTERNAL MEDICINE
Payer: COMMERCIAL

## 2017-04-15 VITALS
OXYGEN SATURATION: 100 % | HEART RATE: 81 BPM | WEIGHT: 168.43 LBS | SYSTOLIC BLOOD PRESSURE: 131 MMHG | TEMPERATURE: 97.1 F | HEIGHT: 62 IN | BODY MASS INDEX: 31 KG/M2 | RESPIRATION RATE: 18 BRPM | DIASTOLIC BLOOD PRESSURE: 66 MMHG

## 2017-04-15 DIAGNOSIS — C18.7 CANCER OF SIGMOID COLON (HCC): ICD-10-CM

## 2017-04-15 PROCEDURE — 700111 HCHG RX REV CODE 636 W/ 250 OVERRIDE (IP): Performed by: INTERNAL MEDICINE

## 2017-04-15 PROCEDURE — 96367 TX/PROPH/DG ADDL SEQ IV INF: CPT

## 2017-04-15 PROCEDURE — G0498 CHEMO EXTEND IV INFUS W/PUMP: HCPCS

## 2017-04-15 PROCEDURE — 96375 TX/PRO/DX INJ NEW DRUG ADDON: CPT

## 2017-04-15 PROCEDURE — 96374 THER/PROPH/DIAG INJ IV PUSH: CPT

## 2017-04-15 PROCEDURE — 96409 CHEMO IV PUSH SNGL DRUG: CPT

## 2017-04-15 PROCEDURE — 96366 THER/PROPH/DIAG IV INF ADDON: CPT

## 2017-04-15 RX ORDER — PALONOSETRON 0.05 MG/ML
0.25 INJECTION, SOLUTION INTRAVENOUS ONCE
Status: COMPLETED | OUTPATIENT
Start: 2017-04-15 | End: 2017-04-15

## 2017-04-15 RX ADMIN — FLUOROURACIL 4390 MG: 50 INJECTION, SOLUTION INTRAVENOUS at 14:10

## 2017-04-15 RX ADMIN — PALONOSETRON HYDROCHLORIDE 0.25 MG: 0.25 INJECTION INTRAVENOUS at 11:12

## 2017-04-15 RX ADMIN — LEUCOVORIN CALCIUM 732 MG: 350 INJECTION, POWDER, LYOPHILIZED, FOR SOLUTION INTRAMUSCULAR; INTRAVENOUS at 12:07

## 2017-04-15 RX ADMIN — DEXAMETHASONE SODIUM PHOSPHATE 12 MG: 4 INJECTION, SOLUTION INTRAMUSCULAR; INTRAVENOUS at 11:26

## 2017-04-15 RX ADMIN — FLUOROURACIL 730 MG: 50 INJECTION, SOLUTION INTRAVENOUS at 14:04

## 2017-04-15 ASSESSMENT — PAIN SCALES - GENERAL: PAINLEVEL: NO PAIN

## 2017-04-15 NOTE — PROGRESS NOTES
"Pharmacy Chemotherapy Calculations Note:    Patient Name: Tim Crump      Dx: Colon Cancer     Cycle 6 Previous treatment: C5 = 04/01/17     Protocol: [id number]   Oxaliplatin (Eloxatin) 85 mg/m2 IV Day 1   04/01/17: dose reduced to 68 mg/m2 (20% dose reduction) d/t neuropathy per Dr. Burgos     04/15/17: HOLD cycle 6 oxaliplatin for significant neuropathy per Dr. Burgos.  MD to reassess next cycle.    Leucovorin 400 mg/m2 IV over 2 hrs on Day 1. Run concurrently with oxaliplatin  Fluorouracil 400 mg/m2 IV bolus Day 1 followed by 2400 mg/m2 CIVI over 46 hrs   Q14 days until disease progression or unacceptable toxicity    NCCN Guidelines for Colon Cancer. V.2.2017  Xu T, et al. N Engl J Med. 2004;350:2184-7409  Renetta RAY et al. J Clin Oncol. 2008;26(12):2006-12  Hong SL, et al. Br J Cancer. 2002;87(4):393-9         /66 mmHg  Pulse 81  Temp(Src) 36.2 °C (97.1 °F)  Resp 18  Ht 1.57 m (5' 1.81\")  Wt 76.4 kg (168 lb 6.9 oz)  BMI 31.00 kg/m2  SpO2 100% Body surface area is 1.83 meters squared.    04/14/17 ANC ~2000  Plt = 164k    Hgb = 12.6     SCr = 1.10 mg/dL  CrCl ~66 mL/min    LFT = AST/ALT/AlkPhos/Tbili = 29/28/111/0.4          <5% difference, ok to treat with final dose = 0 mg IV    Leucovorin 400 mg/m² x 1.83 m² = 732 mg   <5% difference, ok to treat with final dose = 732 mg IV    Fluorouracil (5-FU) 400 mg/m² x 1.83 m² = 732 mg   <5% difference, ok to treat with final dose = 730 mg IVP    Fluorouracil (5-FU) 2400 mg/m² x 1.83 m²  = 4392 mg    <5% difference, ok to treat with final dose = 4390 mg CIVI over 46 hrs via CADD pump       Glenda Mendoza, PharmD              "

## 2017-04-15 NOTE — PROGRESS NOTES
"Patient Name: Tim Crump   Dx: Colon Cancer        Protocol: [id number]   Oxaliplatin (Eloxatin) 85 mg/m2 IV Day 1  --4/1/17 dose reduced 20% by Aubrey MEDRANO for tolerance  -4/15/17 Hold for Cycle 6 to assess neuropathy, MD will reassess with next cycle per Dr. Burgos.   Leucovorin 400 mg/m2 IV over 2 hrs on Day 1. Run concurrently with oxaliplatin  Fluorouracil 400 mg/m2 IV bolus Day 1 followed by 2400 mg/m2 CIVI over 46 hrs    Q14 days until disease progression or unacceptable toxicity    NCCN Guidelines for Colon Cancer. V.2.2017  Xu T, et al. N Engl J Med. 2004;350:5053-5512  Renetta J, et al. J Clin Oncol. 2008;26(12):2006-12  Hong VERDUGO, et al. Br J Cancer. 2002;87(4):393-9    Allergies:  Review of patient's allergies indicates no known allergies.     /66 mmHg  Pulse 81  Temp(Src) 36.2 °C (97.1 °F)  Resp 18  Ht 1.57 m (5' 1.81\")  Wt 76.4 kg (168 lb 6.9 oz)  BMI 31.00 kg/m2  SpO2 100% Body surface area is 1.83 meters squared.    Labs 4/14/17:  ANC~ 2030 Plt = 164 k   Hgb = 12.6   SCr = 1.1 mg/dL CrCl ~ 66 mL/min   LFT's = WNL except alk phos = 111 TBili = 0.4         Drug Order   (Drug name, dose, route, IV Fluid & volume, frequency, number of doses) Cycle: 6     Previous treatment: C5 = 4/1/17        Held for cycle 6 to assess neuropathy       <5% difference, ok to treat with final dose     Medication = Leucovorin (Wellcovorin)  Base Dose = 400 mg/m2  Calc Dose: Base Dose x 1.83 m2 = 732 mg  Final Dose = 732 mg  Route = IV  Fluid & Volume = D5W 250 mL  Admin Duration = Over 2 hours          <5% difference, ok to treat with final dose   Medication = Fluorouracil (5-FU) bolus  Base Dose = 400 mg/m2  Calc Dose:Base Dose x 1.83 m2 = 732 mg  Final Dose = 730 mg  Route = IVP  Fluid & Volume = in syringe 14.6 mL  Conc = 50 mg/mL  Admin Duration = Over 5 minutes          <5% difference, ok to treat with final dose   Medication = Fluorouracil (5-FU)  Base Dose = 2400 mg/m2  Calc Dose:Base " Dose x 1.83 m2 = 4392 mg  Final Dose = 4390 mg  Route = CIVI  Conc = 50 mg/mL = 87.8 mL   Fluid & Volume = 87.8 mL +3 mL overfill   Admin Duration = Over 46 hours @ 1.9 mL/hr     Via CADD pump for home CIVI       <5% difference, ok to treat with final dose     By my signature below, I confirm this process was performed independently with the BSA and all final chemotherapy dosing calculations congruent. I have reviewed the above chemotherapy order and that my calculation of the final dose and BSA (when applicable) corroborate those calculations of the  pharmacist. Discrepancies of 5% or greater in the written dose have been addressed and documented within the EPIC Progress notes.    Kathe MullerD.

## 2017-04-15 NOTE — PROGRESS NOTES
Chemotherapy Verification - SECONDARY RN       Height = 61.81in  Weight = 76.4kg  BSA = 1.82m2       Medication: Fluorouracil  Dose: 400mg/m2  Calculated Dose: 728mg (within 5% of ordered dose)                             (In mg/m2, AUC, mg/kg)     Medication: Fluorouracil  Dose: 2400mg/m2  Calculated Dose: 4368mg (within 5% of ordered dose) continuous pump over 46hours                             (In mg/m2, AUC, mg/kg)        I confirm that this process was performed independently.

## 2017-04-16 NOTE — PROGRESS NOTES
Late note:  Pt returns for Folfox without the oxaliplatin re neuropathies.  Reports cold sensitivity is finally better today.  Expressing concern how she will do without the Oxaliplatin.  Discussed many people on this regimen and will review with MD. Port accessed using sterile technique per policy and showing good blood return.  Premeds, leucovorin and 5FU push and pump hook up without issue.  DC to care of son.

## 2017-04-17 ENCOUNTER — OUTPATIENT INFUSION SERVICES (OUTPATIENT)
Dept: ONCOLOGY | Facility: MEDICAL CENTER | Age: 62
End: 2017-04-17
Attending: INTERNAL MEDICINE
Payer: COMMERCIAL

## 2017-04-17 VITALS
RESPIRATION RATE: 18 BRPM | OXYGEN SATURATION: 98 % | TEMPERATURE: 98.2 F | HEIGHT: 62 IN | BODY MASS INDEX: 30.91 KG/M2 | SYSTOLIC BLOOD PRESSURE: 116 MMHG | WEIGHT: 167.99 LBS | HEART RATE: 79 BPM | DIASTOLIC BLOOD PRESSURE: 66 MMHG

## 2017-04-17 DIAGNOSIS — C18.7 CANCER OF SIGMOID COLON (HCC): ICD-10-CM

## 2017-04-17 PROCEDURE — 700111 HCHG RX REV CODE 636 W/ 250 OVERRIDE (IP)

## 2017-04-17 PROCEDURE — 96523 IRRIG DRUG DELIVERY DEVICE: CPT

## 2017-04-17 RX ADMIN — HEPARIN 500 UNITS: 100 SYRINGE at 14:10

## 2017-04-17 ASSESSMENT — PAIN SCALES - GENERAL: PAINLEVEL: NO PAIN

## 2017-04-17 NOTE — PROGRESS NOTES
"Pt arrives ambulatory for pump de-access from continuous 5FU home infusion. Reports that since her chemotherapy regimen was modified and oxaliplatin was removed she is feeling \"great\". Pt reports she will meet w/ Dr. Gomes this week prior to next chemotherapy. Assessment complete. POC reviewed. Reservoir volume is \"0mL\". Volume given \"88.5mL\". Pt disconnected from pump. Empty chemo cassette & tubing disposed of per hospital policy. Pump and carrying case wiped clean w/ cavecide wipes. Batteries removed and pump and carrying case placed in pharmacy. Port flushed w/ saline per policy, blood return observed, flushed w/ saline per policy, heparinized & de-accessed w/ NCN intact, gauze dressing applied. Future appt confirmed. Pt d/c'd in great spirits no distress observed.   "

## 2017-04-20 ENCOUNTER — OFFICE VISIT (OUTPATIENT)
Dept: HEMATOLOGY ONCOLOGY | Facility: MEDICAL CENTER | Age: 62
End: 2017-04-20
Payer: MEDICAID

## 2017-04-20 VITALS
DIASTOLIC BLOOD PRESSURE: 70 MMHG | WEIGHT: 166.6 LBS | BODY MASS INDEX: 30.66 KG/M2 | TEMPERATURE: 98.5 F | OXYGEN SATURATION: 95 % | HEIGHT: 62 IN | SYSTOLIC BLOOD PRESSURE: 138 MMHG | RESPIRATION RATE: 16 BRPM | HEART RATE: 77 BPM

## 2017-04-20 DIAGNOSIS — C18.7 CANCER OF SIGMOID COLON (HCC): ICD-10-CM

## 2017-04-20 PROCEDURE — 99215 OFFICE O/P EST HI 40 MIN: CPT | Performed by: INTERNAL MEDICINE

## 2017-04-20 ASSESSMENT — PAIN SCALES - GENERAL: PAINLEVEL: NO PAIN

## 2017-04-20 NOTE — MR AVS SNAPSHOT
"        Tim Crump   2017 3:40 PM   Office Visit   MRN: [id number]    Department:  [facility]   Dept Phone:  [phone]    Description:  Female : 1955   Provider:  Sonu Gomes M.D.           Reason for Visit     New Patient transfer from Dr. Burgos, Dx: Moderately differentiated adenocarcinoma       Allergies as of 2017     No Known Allergies      Vital Signs     Blood Pressure Pulse Temperature Respirations Height Weight    138/70 mmHg 77 36.9 °C (98.5 °F) 16 1.575 m (5' 2.01\") 75.569 kg (166 lb 9.6 oz)    Body Mass Index Oxygen Saturation Smoking Status             30.46 kg/m2 95% Never Smoker          Basic Information     Date Of Birth Sex Race Ethnicity Preferred Language    1955 Female  or   Origin ([place] Zealander,Albanian,Lithuanian,Djiboutian, etc) English      Your appointments     2017  9:30 AM   Non Provider 1 with ONC RN 1   [facility] (--)    [address] [id number] [phone]           You will be receiving a confirmation call a few days before your appointment from our automated call confirmation system.            2017 10:30 AM   ONCOLOGY EST PATIENT 30 MIN with LUIS CARLOS Forman   [facility] (--)    [address], [facility] [address]  [facility] [address]   [phone]            2017 11:00 AM   Est Chemo 2 with RN 2   [facility] ([facility])    [address]  [facility] [address]   [phone]            May 01, 2017  4:00 PM   EST Port Flush / Central Line Care with RN 6   [facility] ([facility])    [address] [facility] [address]  [facility] [address]   [phone]            May 12, 2017 10:00 AM   Non Provider 1 with ONC RN 1   [facility] (--)    [address], [address] [address] [phone]           You will be receiving a confirmation call a few days before your appointment from our automated call confirmation system.      "    May 12, 2017 11:30 AM   ONCOLOGY EST PATIENT 30 MIN with LUIS CARLOS Forman   [facility] (--)    [address], [address]  [facility] [address]   [phone]            May 13, 2017  1:30 PM   Est Chemo 2 with RN 2   [facility] ([facility])    [address] [facility] [address]  [facility] [address]   [phone]            May 15, 2017  1:30 PM   EST Port Flush / Central Line Care with RN 5   [facility] ([facility])    [address]  [facility] [address]   [phone]            May 26, 2017 10:00 AM   Non Provider 1 with ONC RN 1   [facility] (--)    [address], [facility] [address]  [facility] [address]   [phone]           You will be receiving a confirmation call a few days before your appointment from our automated call confirmation system.            May 26, 2017 11:20 AM   ONCOLOGY EST PATIENT 30 MIN with Sonu Gomes M.D.   [facility] (--)    [address], [facility] [address]  [facility] [address]   [phone]            May 27, 2017 11:30 AM   EST Port Flush / Central Line Care with RN 2   [facility] ([facility])    [address]  [facility] [address]   [phone]            May 29, 2017 11:00 AM   Est Chemo 2 with INFUSION QUICK INJECT   [facility] ([facility])    [address]  [facility] [address]   [phone]              Problem List              ICD-10-CM Priority Class Noted - Resolved    Cancer of sigmoid colon (CMS-HCC) C18.7   12/7/2016 - Present    Fourth degree hemorrhoids K64.3   12/7/2016 - Present      Health Maintenance        Date Due Completion Dates    IMM DTaP/Tdap/Td Vaccine (1 - Tdap) 1/23/1976 ---    PAP SMEAR 1/23/1978 ---    COLONOSCOPY 1/23/2007 ---    MAMMOGRAM 2/7/2014 2/7/2013, 1/23/2012, 2/9/2011, 7/9/2010, 7/9/2010, 1/5/2010, 12/23/2009, 12/23/2009, 11/26/2008, 11/26/2008, 12/4/2007, 12/4/2007, 11/10/2006, 10/27/2005    IMM ZOSTER VACCINE 1/23/2017 ---            Current Immunizations     Influenza  Vaccine Quad Inj (Pf) 10/1/2016      Below and/or attached are the medications your provider expects you to take. Review all of your home medications and newly ordered medications with your provider and/or pharmacist. Follow medication instructions as directed by your provider and/or pharmacist. Please keep your medication list with you and share with your provider. Update the information when medications are discontinued, doses are changed, or new medications (including over-the-counter products) are added; and carry medication information at all times in the event of emergency situations     Allergies:  No Known Allergies          Medications  Valid as of: April 20, 2017 -  4:11 PM    Generic Name Brand Name Tablet Size Instructions for use    Atorvastatin Calcium (Tab) LIPITOR 10 MG Take 5 mg by mouth every evening.        Cetirizine HCl (Tab) ZYRTEC 10 MG Take 10 mg by mouth every morning. Indications: Hayfever        Citalopram Hydrobromide (Tab) CELEXA 40 MG Take 40 mg by mouth every bedtime.        Docusate Sodium (Cap) COLACE 50 MG Take 50 mg by mouth 2 times a day.        Famotidine (Tab) PEPCID 20 MG Take 1 Tab by mouth 2 times a day.        Lidocaine-Prilocaine (Cream) EMLA 2.5-2.5 % Apply to port 1 hour prior to access of port and cover with plastic wrap.        Loperamide HCl (Cap) IMODIUM 2 MG Give loperamide 4 mg orally first dose, then 2 mg orally with every loose bowel movement.  Contact physician if maximum of 16 mg/24 hours is exceeded.        Loperamide HCl (Tab) IMODIUM A-D 2 MG Take 1 Tab by mouth See Admin Instructions.        Melatonin (Cap) Melatonin 1 MG Take  by mouth.        Menthol (Lozenge) CEPACOL 3 MG Take 1 Lozenge by mouth as needed.        MetFORMIN HCl (Tab) GLUCOPHAGE 500 MG Take 500 mg by mouth 2 times a day, with meals.        Montelukast Sodium (Tab) SINGULAIR 10 MG Take 10 mg by mouth every bedtime. Indications: Hayfever        Ondansetron HCl (Tab) ZOFRAN 4 MG Take 1 Tab by  mouth every four hours as needed for Nausea/Vomiting.        Prochlorperazine Maleate (Tab) COMPAZINE 10 MG Take 1 Tab by mouth every 6 hours as needed.        ValACYclovir HCl (Tab) VALTREX 500 MG Take 500 mg by mouth 2 times a day. 3 day course        .                 Medicines prescribed today were sent to:     [facility] [address] [place] [address] [place], [place] - [address] AT [facility] [address]    [address] [place] [address]    Phone: [phone] Fax: [phone]    Open 24 Hours?: No      Medication refill instructions:       If your prescription bottle indicates you have medication refills left, it is not necessary to call your provider’s office. Please contact your pharmacy and they will refill your medication.    If your prescription bottle indicates you do not have any refills left, you may request refills at any time through one of the following ways: The online [employer] system (except Urgent Care), by calling your provider’s office, or by asking your pharmacy to contact your provider’s office with a refill request. Medication refills are processed only during regular business hours and may not be available until the next business day. Your provider may request additional information or to have a follow-up visit with you prior to refilling your medication.   *Please Note: Medication refills are assigned a new Rx number when refilled electronically. Your pharmacy may indicate that no refills were authorized even though a new prescription for the same medication is available at the pharmacy. Please request the medicine by name with the pharmacy before contacting your provider for a refill.           [facility] Access Code: Activation code not generated  Current [employer] Status: Active

## 2017-04-20 NOTE — PROGRESS NOTES
Follow Up Visit:  Oncology    Date: 17  Time: 3.40 pm        Referring Physician: Dr. Enoch Shaw  Primary Care:  William Naranjo D.O.  Surgeon:  Dr. Shaw  Gastroenterology: Dr. Fuller  Prior oncologist- Dr. 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    History of Presenting Illness:  Tim Crump is a 62-year-old female diagnosed in 2016 with colon cancer.  She was diagnosed secondary to positive call occult testing and alteration in her bowel movements. Colonoscopy showed a polyp in the sigmoid colon which was removed. Pathology was positive for poorly differentiated adenocarcinoma with indeterminate margins. In underwent a sigmoidoscopy showed tattooed polyp stalk without any evidence of disease. Imaging showed no evidence of metastatic disease. Her CEA was 1 at diagnosis. She is s/p laparoscopic sigmoid resection with low anterior pelvic anastomosis on 2016. Pathology showed evidence of local malignancy but she was found to have 1/13 nodes positive and was staged pT1 pN1a and MLH1, MSH2, MSH6 and PMS2 are intact. Staging workup was negative for metastatic disease. She was started on adjuvant chemotherapy with modified FOLFOX in 17.  She has been tolerating the chemotherapy fairly well.    She is here for follow-up visit. S/p C3,d15 on . She had issues with worsening neuropathy and nausea and Dr. Burgos has deleted oxaliplatin from the regimen. Her neuropathy is currently stable.   Past Medical History:  1.  Colon cancer   2. Pre DM  3. H/o HTN   4. Hyperlipidemia   5. Seasonal allergies   6. Arthritis, Knee and hands  7. Anxiety and depression         Past Surgical History   Procedure Laterality Date   • Gyn surgery       hysterectomy   • Gyn surgery        x 2   • Other       varicose vein stripping rubina   • Other orthopedic  surgery Left      left wrist   • Low anterior resection laparoscopic  12/7/2016     Procedure: LOW ANTERIOR RESECTION LAPAROSCOPIC;  Surgeon: Enoch Shaw M.D.;  Location: SURGERY [facility];  Service:        Allergies as of 03/31/2017   • (No Known Allergies)         Current outpatient prescriptions:   •  docusate sodium (COLACE) 50 MG Cap, Take 50 mg by mouth 2 times a day., Disp: , Rfl:   •  famotidine (PEPCID) 20 MG Tab, Take 1 Tab by mouth 2 times a day., Disp: 60 Tab, Rfl: 2  •  loperamide (IMODIUM A-D) 2 MG tablet, Take 1 Tab by mouth See Admin Instructions., Disp: 30 Tab, Rfl: 6  •  lidocaine-prilocaine (EMLA) 2.5-2.5 % Cream, Apply to port one hour prior to access and cover with plastic wrap., Disp: 1 Tube, Rfl: 3  •  menthol (CEPACOL) 3 MG lozenge, Take 1 Lozenge by mouth as needed., Disp: , Rfl:   •  Melatonin 1 MG Cap, Take  by mouth., Disp: , Rfl:   •  ondansetron (ZOFRAN) 4 MG Tab tablet, Take 1 Tab by mouth every four hours as needed for Nausea/Vomiting., Disp: 30 Tab, Rfl: 3  •  prochlorperazine (COMPAZINE) 10 MG Tab, Take 1 Tab by mouth every 6 hours as needed., Disp: 30 Tab, Rfl: 3  •  lidocaine-prilocaine (EMLA) 2.5-2.5 % Cream, Apply to port 1 hour prior to access of port and cover with plastic wrap., Disp: 1 Tube, Rfl: 3  •  loperamide (IMODIUM) 2 MG Cap, Give loperamide 4 mg orally first dose, then 2 mg orally with every loose bowel movement.  Contact physician if maximum of 16 mg/24 hours is exceeded., Disp: 30 Cap, Rfl: 3  •  valacyclovir (VALTREX) 500 MG Tab, Take 500 mg by mouth 2 times a day. 3 day course, Disp: , Rfl:   •  atorvastatin (LIPITOR) 10 MG Tab, Take 5 mg by mouth every evening., Disp: , Rfl:   •  montelukast (SINGULAIR) 10 MG Tab, Take 10 mg by mouth every bedtime. Indications: Hayfever, Disp: , Rfl:   •  cetirizine (ZYRTEC) 10 MG Tab, Take 10 mg by mouth every morning. Indications: Hayfever, Disp: , Rfl:   •  metformin (GLUCOPHAGE) 500 MG TABS, Take 500 mg by  "mouth 2 times a day, with meals., Disp: , Rfl:   •  citalopram (CELEXA) 40 MG TABS, Take 40 mg by mouth every bedtime., Disp: , Rfl:     Review of Systems:  All other review of systems are negative except what was mentioned above in the HPI.  Constitutional: Negative for fever, chills, and weight loss. Less malaise/fatigue.    HENT: Negative for ear pain and nosebleeds.     Eyes: Negative for blurred vision.    Respiratory: Negative for cough, sputum production and shortness of breath.    Cardiovascular: Negative for chest pain and leg swelling.    Gastrointestinal: Negative for  abdominal pain. Positive for constipaiton and chemotherapy-induced nausea  Genitourinary: Negative for dysuria.    Musculoskeletal: Negative for myalgias. Positive for lower back pain and joint pain stable.    Skin: Negative for rash and itching.    Neurological: Negative for dizziness. Neuropathy, stable to improving   Endo/Heme/Allergies: No bruise/bleed easily.    Psychiatric/Behavioral: Negative for depression and memory loss.      Physical Exam:  Filed Vitals:    03/31/17 1047   BP: 126/90   Pulse: 70   Temp: 36.6 °C (97.9 °F)   Resp: 16   Height: 1.565 m (5' 1.61\")   Weight: 74.7 kg (164 lb 10.9 oz)   SpO2: 95%       General: Not in acute distress, alert and oriented x 3  HEENT: normalcephalic, atraumatic, xtra ocular muscles intact, moist oral mucus membranes, and oral cavity without any lesions.  Neck: Supple neck and full range of motion. Prominent submandibular glands bilaterally stable  Lymph nodes: No palpable bilateral cervical and supraclavicular lymphadenopathy.    CVS: Regular rate and rhythm  RESP: Clear to auscultate bilaterally.   ABD: Soft, non tender, non distended, positive bowel sounds, and no palpable hepatomegaly   EXT: No edema or cyanosis  CNS: Alert and oriented x3, cranial nerves grossly intact, muscle strength grossly intact, and normal gait.       Assessment & Plan:  1. Moderately differentiated " adenocarcinoma, 1/13 nodes, T1 pN1aM0, Stage IIIA: she is s/p resection and was found to have node-positive disease. She has started adjuvant chemotherapy with FOLFOX. Oxaliplatin was completed with cycle #3, day 15 dose . She will receive cycle #4, day one dose next week. Counts are adequate. I informed her that most patients tolerate single agent 5-FU much better.  2. GERD:  continue  Pepcid.      Complex patient requiring complex decision making. I have extensively reviewed her prior medical records as part of establishing care with me and formulated the plan.  Please note that this dictation was created using voice recognition software. I have made every reasonable attempt to correct obvious errors, but I expect that there are errors of grammar and possibly content that I did not discover before finalizing the note.

## 2017-04-27 DIAGNOSIS — C18.7 CANCER OF SIGMOID COLON (HCC): ICD-10-CM

## 2017-04-28 ENCOUNTER — OFFICE VISIT (OUTPATIENT)
Dept: HEMATOLOGY ONCOLOGY | Facility: MEDICAL CENTER | Age: 62
End: 2017-04-28
Payer: MEDICAID

## 2017-04-28 ENCOUNTER — HOSPITAL ENCOUNTER (OUTPATIENT)
Facility: MEDICAL CENTER | Age: 62
End: 2017-04-28
Attending: NURSE PRACTITIONER
Payer: COMMERCIAL

## 2017-04-28 ENCOUNTER — NON-PROVIDER VISIT (OUTPATIENT)
Dept: HEMATOLOGY ONCOLOGY | Facility: MEDICAL CENTER | Age: 62
End: 2017-04-28
Payer: MEDICAID

## 2017-04-28 VITALS
OXYGEN SATURATION: 100 % | HEIGHT: 62 IN | RESPIRATION RATE: 18 BRPM | DIASTOLIC BLOOD PRESSURE: 76 MMHG | BODY MASS INDEX: 30.26 KG/M2 | TEMPERATURE: 98.4 F | SYSTOLIC BLOOD PRESSURE: 120 MMHG | HEART RATE: 72 BPM | WEIGHT: 164.46 LBS

## 2017-04-28 VITALS
SYSTOLIC BLOOD PRESSURE: 120 MMHG | DIASTOLIC BLOOD PRESSURE: 76 MMHG | RESPIRATION RATE: 18 BRPM | TEMPERATURE: 98.4 F | HEART RATE: 72 BPM

## 2017-04-28 DIAGNOSIS — C18.7 CANCER OF SIGMOID COLON (HCC): ICD-10-CM

## 2017-04-28 LAB
ALBUMIN SERPL BCP-MCNC: 3.7 G/DL (ref 3.2–4.9)
ALBUMIN/GLOB SERPL: 1.5 G/DL
ALP SERPL-CCNC: 111 U/L (ref 30–99)
ALT SERPL-CCNC: 20 U/L (ref 2–50)
ANION GAP SERPL CALC-SCNC: 8 MMOL/L (ref 0–11.9)
AST SERPL-CCNC: 21 U/L (ref 12–45)
BASOPHILS # BLD AUTO: 1.3 % (ref 0–1.8)
BASOPHILS # BLD: 0.08 K/UL (ref 0–0.12)
BILIRUB SERPL-MCNC: 0.6 MG/DL (ref 0.1–1.5)
BUN SERPL-MCNC: 18 MG/DL (ref 8–22)
CALCIUM SERPL-MCNC: 9.5 MG/DL (ref 8.5–10.5)
CHLORIDE SERPL-SCNC: 109 MMOL/L (ref 96–112)
CO2 SERPL-SCNC: 23 MMOL/L (ref 20–33)
CREAT SERPL-MCNC: 0.91 MG/DL (ref 0.5–1.4)
EOSINOPHIL # BLD AUTO: 0.32 K/UL (ref 0–0.51)
EOSINOPHIL NFR BLD: 5.3 % (ref 0–6.9)
ERYTHROCYTE [DISTWIDTH] IN BLOOD BY AUTOMATED COUNT: 57.9 FL (ref 35.9–50)
GFR SERPL CREATININE-BSD FRML MDRD: >60 ML/MIN/1.73 M 2
GLOBULIN SER CALC-MCNC: 2.5 G/DL (ref 1.9–3.5)
GLUCOSE SERPL-MCNC: 157 MG/DL (ref 65–99)
HCT VFR BLD AUTO: 36.4 % (ref 37–47)
HGB BLD-MCNC: 12.4 G/DL (ref 12–16)
IMM GRANULOCYTES # BLD AUTO: 0.02 K/UL (ref 0–0.11)
IMM GRANULOCYTES NFR BLD AUTO: 0.3 % (ref 0–0.9)
LYMPHOCYTES # BLD AUTO: 2.34 K/UL (ref 1–4.8)
LYMPHOCYTES NFR BLD: 38.7 % (ref 22–41)
MCH RBC QN AUTO: 30.5 PG (ref 27–33)
MCHC RBC AUTO-ENTMCNC: 34.1 G/DL (ref 33.6–35)
MCV RBC AUTO: 89.4 FL (ref 81.4–97.8)
MONOCYTES # BLD AUTO: 0.71 K/UL (ref 0–0.85)
MONOCYTES NFR BLD AUTO: 11.7 % (ref 0–13.4)
NEUTROPHILS # BLD AUTO: 2.58 K/UL (ref 2–7.15)
NEUTROPHILS NFR BLD: 42.7 % (ref 44–72)
NRBC # BLD AUTO: 0 K/UL
NRBC BLD AUTO-RTO: 0 /100 WBC
PLATELET # BLD AUTO: 191 K/UL (ref 164–446)
PMV BLD AUTO: 8.7 FL (ref 9–12.9)
POTASSIUM SERPL-SCNC: 3.9 MMOL/L (ref 3.6–5.5)
PROT SERPL-MCNC: 6.2 G/DL (ref 6–8.2)
RBC # BLD AUTO: 4.07 M/UL (ref 4.2–5.4)
SODIUM SERPL-SCNC: 140 MMOL/L (ref 135–145)
WBC # BLD AUTO: 6.1 K/UL (ref 4.8–10.8)

## 2017-04-28 PROCEDURE — 85025 COMPLETE CBC W/AUTO DIFF WBC: CPT

## 2017-04-28 PROCEDURE — 36591 DRAW BLOOD OFF VENOUS DEVICE: CPT | Performed by: INTERNAL MEDICINE

## 2017-04-28 PROCEDURE — 99213 OFFICE O/P EST LOW 20 MIN: CPT | Performed by: NURSE PRACTITIONER

## 2017-04-28 PROCEDURE — 80053 COMPREHEN METABOLIC PANEL: CPT

## 2017-04-28 RX ADMIN — Medication 500 UNITS: at 09:45

## 2017-04-28 ASSESSMENT — ENCOUNTER SYMPTOMS
COUGH: 0
TINGLING: 1
PALPITATIONS: 0
FEVER: 0
WEIGHT LOSS: 0
CHILLS: 0
CONSTIPATION: 0
NAUSEA: 0
MYALGIAS: 0
WHEEZING: 0
DIARRHEA: 0
VOMITING: 0
DIZZINESS: 0
SHORTNESS OF BREATH: 0
HEADACHES: 0

## 2017-04-28 ASSESSMENT — PAIN SCALES - GENERAL
PAINLEVEL: NO PAIN
PAINLEVEL: NO PAIN

## 2017-04-28 NOTE — PROGRESS NOTES
Pt presents ambulatory to clinic for port access with labs and  pre chemo appointment with JULIET Casarez.  She is accompanied by herself for today's visit.  Plan of care discussed, patient is in agreement.  Pt states she has been feeling well with the exception of mild allergies.  Pt with EMLA cream to R chest port site.  Port accessed in sterile fashion; brisk blood return noted.  Labs drawn per orders in [place].  Port flushed per protocol with NS and heparin.  Port then de-accessed, as patient does not have chemo until tomorrow, 4/29.  Port site covered with sterile gauze and tape. Pt tolerated well.

## 2017-04-28 NOTE — MR AVS SNAPSHOT
Tim Crump   2017 9:30 AM   Appointment   MRN: [id number]    Department:  [facility]   Dept Phone:  [phone]    Description:  Female : 1955   Provider:  ONC RN 1           Allergies as of 2017     No Known Allergies      Vital Signs     Smoking Status                   Never Smoker            Basic Information     Date Of Birth Sex Race Ethnicity Preferred Language    1955 Female  or   Origin (Finnish,Israeli,Rwandan,Niuean, etc) English      Your appointments     2017 10:30 AM   ONCOLOGY EST PATIENT 30 MIN with Iliana Casarez A.P.N.   [facility] (--)    [address] [facility] [address]  [facility] [id number] [phone]            2017 11:00 AM   Est Chemo 2 with RN 2   Infusion Services ([facility])    [address] [facility] [address]  [facility] [address]   [phone]            May 01, 2017  4:00 PM   EST Port Flush / Central Line Care with RN 6   Infusion Services ([facility])    [id number]  [facility] [address]   [phone]            May 12, 2017 10:00 AM   Non Provider 1 with ONC RN 1   [facility] (--)    [address] [facility] [address]  [facility] [id number] [phone]           You will be receiving a confirmation call a few days before your appointment from our automated call confirmation system.            May 12, 2017 11:30 AM   ONCOLOGY EST PATIENT 30 MIN with LATRICIA Forman.P.N.   [facility] (--)    [address] [facility] [address]  [facility] [address] [phone]            May 13, 2017  1:30 PM   Est Chemo 2 with RN 2   Infusion Services ([facility])    [address] [facility] [address]  [facility] [address] [phone]            May 15, 2017  1:30 PM   EST Port Flush / Central Line Care with RN 5   Infusion Services ([facility])    [address] [facility] [address]  [facility] [address]   [phone]            May 26, 2017 10:00 AM   Non Provider 1 with ONC RN 1   [facility] (--)    [address]  [facility] [address]   [phone]           You will be receiving a confirmation call a few days before your appointment from our automated call confirmation system.            May 26, 2017 11:20 AM   ONCOLOGY EST PATIENT 30 MIN with Sonu Gomes M.D.   [facility] (--)    [address]  [facility] [address]   [phone]            May 27, 2017 11:30 AM   EST Port Flush / Central Line Care with RN 2   Infusion Services ([facility])    [address] [facility] [address]  [facility] [address]   [phone]            May 29, 2017 11:00 AM   Est Chemo 2 with INFUSION QUICK INJECT   Infusion Services ([facility])    [address] [facility] [address]  [facility] [address]   [phone]              Problem List              ICD-10-CM Priority Class Noted - Resolved    Cancer of sigmoid colon (CMS-HCC) C18.7   12/7/2016 - Present    Fourth degree hemorrhoids K64.3   12/7/2016 - Present      Health Maintenance        Date Due Completion Dates    IMM DTaP/Tdap/Td Vaccine (1 - Tdap) 1/23/1976 ---    PAP SMEAR 1/23/1978 ---    COLONOSCOPY 1/23/2007 ---    MAMMOGRAM 2/7/2014 2/7/2013, 1/23/2012, 2/9/2011, 7/9/2010, 7/9/2010, 1/5/2010, 12/23/2009, 12/23/2009, 11/26/2008, 11/26/2008, 12/4/2007, 12/4/2007, 11/10/2006, 10/27/2005    IMM ZOSTER VACCINE 1/23/2017 ---            Current Immunizations     Influenza Vaccine Quad Inj (Pf) 10/1/2016      Below and/or attached are the medications your provider expects you to take. Review all of your home medications and newly ordered medications with your provider and/or pharmacist. Follow medication instructions as directed by your provider and/or pharmacist. Please keep your medication list with you and share with your provider. Update the information when medications are discontinued, doses are changed, or new medications (including over-the-counter products) are added; and carry medication information at all times in the event of emergency situations      Allergies:  No Known Allergies          Medications  Valid as of: April 28, 2017 -  9:42 AM    Generic Name Brand Name Tablet Size Instructions for use    Atorvastatin Calcium (Tab) LIPITOR 10 MG Take 5 mg by mouth every evening.        Cetirizine HCl (Tab) ZYRTEC 10 MG Take 10 mg by mouth every morning. Indications: Hayfever        Citalopram Hydrobromide (Tab) CELEXA 40 MG Take 40 mg by mouth every bedtime.        Docusate Sodium (Cap) COLACE 50 MG Take 50 mg by mouth 2 times a day.        Famotidine (Tab) PEPCID 20 MG Take 1 Tab by mouth 2 times a day.        Lidocaine-Prilocaine (Cream) EMLA 2.5-2.5 % Apply to port 1 hour prior to access of port and cover with plastic wrap.        Loperamide HCl (Cap) IMODIUM 2 MG Give loperamide 4 mg orally first dose, then 2 mg orally with every loose bowel movement.  Contact physician if maximum of 16 mg/24 hours is exceeded.        Loperamide HCl (Tab) IMODIUM A-D 2 MG Take 1 Tab by mouth See Admin Instructions.        Melatonin (Cap) Melatonin 1 MG Take  by mouth.        Menthol (Lozenge) CEPACOL 3 MG Take 1 Lozenge by mouth as needed.        MetFORMIN HCl (Tab) GLUCOPHAGE 500 MG Take 500 mg by mouth 2 times a day, with meals.        Montelukast Sodium (Tab) SINGULAIR 10 MG Take 10 mg by mouth every bedtime. Indications: Hayfever        Ondansetron HCl (Tab) ZOFRAN 4 MG Take 1 Tab by mouth every four hours as needed for Nausea/Vomiting.        Prochlorperazine Maleate (Tab) COMPAZINE 10 MG Take 1 Tab by mouth every 6 hours as needed.        ValACYclovir HCl (Tab) VALTREX 500 MG Take 500 mg by mouth 2 times a day. 3 day course        .                 Medicines prescribed today were sent to:     [id number]  [place] - [address] AT [facility] [address] [employer] [address]    [address] [address]    Phone: [phone] Fax: [phone]    Open 24 Hours?: No      Medication refill instructions:       If your prescription bottle indicates you have  medication refills left, it is not necessary to call your provider’s office. Please contact your pharmacy and they will refill your medication.    If your prescription bottle indicates you do not have any refills left, you may request refills at any time through one of the following ways: The online MakeMeReach system (except Urgent Care), by calling your provider’s office, or by asking your pharmacy to contact your provider’s office with a refill request. Medication refills are processed only during regular business hours and may not be available until the next business day. Your provider may request additional information or to have a follow-up visit with you prior to refilling your medication.   *Please Note: Medication refills are assigned a new Rx number when refilled electronically. Your pharmacy may indicate that no refills were authorized even though a new prescription for the same medication is available at the pharmacy. Please request the medicine by name with the pharmacy before contacting your provider for a refill.           MakeMeReach Access Code: Activation code not generated  Current MakeMeReach Status: Active

## 2017-04-28 NOTE — MR AVS SNAPSHOT
"Tim Crump   2017 10:30 AM   Office Visit   MRN: [id number]    Department:  [facility]   Dept Phone:  [phone]    Description:  Female : 1955   Provider:  LATRICIA Forman.P.N.           Reason for Visit     Follow-Up prechemo      Allergies as of 2017     No Known Allergies      Vital Signs     Blood Pressure Pulse Temperature Respirations Height Weight    120/76 mmHg 72 36.9 °C (98.4 °F) 18 1.575 m (5' 2\") 74.6 kg (164 lb 7.4 oz)    Body Mass Index Oxygen Saturation Smoking Status             30.07 kg/m2 100% Never Smoker          Basic Information     Date Of Birth Sex Race Ethnicity Preferred Language    1955 Female  or   Origin (Trinidadian,Citizen of the [place],British,[place] citizen, etc) English      Your appointments     2017 11:00 AM   Est Chemo 2 with RN 2   Infusion Services ([facility])    [address]  [facility] [address] [phone]            May 01, 2017  4:00 PM   EST Port Flush / Central Line Care with RN 6   Infusion Services ([facility])    [address] [address]   [phone]            May 12, 2017 10:00 AM   Non Provider 1 with ONC RN 1   [facility] (--)    [address] [facility] [address]  [facility] [address] [phone]           You will be receiving a confirmation call a few days before your appointment from our automated call confirmation system.            May 12, 2017 11:30 AM   ONCOLOGY EST PATIENT 30 MIN with Iliana Casarez, A.P.N.   [facility] (--)    [address] [facility] [address]  [facility] [address] [phone]            May 13, 2017  1:30 PM   Est Chemo 2 with RN 2   Infusion Services ([facility])    [address]  [facility] [address] [phone]            May 15, 2017  1:30 PM   EST Port Flush / Central Line Care with RN 5   Infusion Services ([facility])    [address]  [facility] [address] [phone]            May 26, 2017 10:00 AM   Non " Provider 1 with ONC RN 1   [facility] (--)    [address] [facility]  [facility] [address]   [phone]           You will be receiving a confirmation call a few days before your appointment from our automated call confirmation system.            May 26, 2017 11:20 AM   ONCOLOGY EST PATIENT 30 MIN with Sonu Gomes M.D.   [facility] (--)    [address] [facility]  [facility] [address]   [phone]            May 27, 2017 11:30 AM   EST Port Flush / Central Line Care with RN 2   Infusion Services ([facility])    [address] [facility] [address]  [facility] [address]   [phone]            May 29, 2017 11:00 AM   Est Chemo 2 with INFUSION QUICK INJECT   Infusion Services ([facility])    [address] [facility] [address]  [facility] [address]   [phone]              Problem List              ICD-10-CM Priority Class Noted - Resolved    Cancer of sigmoid colon (CMS-HCC) C18.7   12/7/2016 - Present    Fourth degree hemorrhoids K64.3   12/7/2016 - Present      Health Maintenance        Date Due Completion Dates    IMM DTaP/Tdap/Td Vaccine (1 - Tdap) 1/23/1976 ---    PAP SMEAR 1/23/1978 ---    COLONOSCOPY 1/23/2007 ---    MAMMOGRAM 2/7/2014 2/7/2013, 1/23/2012, 2/9/2011, 7/9/2010, 7/9/2010, 1/5/2010, 12/23/2009, 12/23/2009, 11/26/2008, 11/26/2008, 12/4/2007, 12/4/2007, 11/10/2006, 10/27/2005    IMM ZOSTER VACCINE 1/23/2017 ---            Current Immunizations     Influenza Vaccine Quad Inj (Pf) 10/1/2016      Below and/or attached are the medications your provider expects you to take. Review all of your home medications and newly ordered medications with your provider and/or pharmacist. Follow medication instructions as directed by your provider and/or pharmacist. Please keep your medication list with you and share with your provider. Update the information when medications are discontinued, doses are changed, or new medications (including over-the-counter products) are added; and carry medication information  at all times in the event of emergency situations     Allergies:  No Known Allergies          Medications  Valid as of: April 28, 2017 - 11:07 AM    Generic Name Brand Name Tablet Size Instructions for use    Atorvastatin Calcium (Tab) LIPITOR 10 MG Take 5 mg by mouth every evening.        Cetirizine HCl (Tab) ZYRTEC 10 MG Take 10 mg by mouth every morning. Indications: Hayfever        Citalopram Hydrobromide (Tab) CELEXA 40 MG Take 40 mg by mouth every bedtime.        Docusate Sodium (Cap) COLACE 50 MG Take 50 mg by mouth 2 times a day.        Famotidine (Tab) PEPCID 20 MG Take 1 Tab by mouth 2 times a day.        Lidocaine-Prilocaine (Cream) EMLA 2.5-2.5 % Apply to port 1 hour prior to access of port and cover with plastic wrap.        Loperamide HCl (Cap) IMODIUM 2 MG Give loperamide 4 mg orally first dose, then 2 mg orally with every loose bowel movement.  Contact physician if maximum of 16 mg/24 hours is exceeded.        Loperamide HCl (Tab) IMODIUM A-D 2 MG Take 1 Tab by mouth See Admin Instructions.        Melatonin (Cap) Melatonin 1 MG Take  by mouth.        Menthol (Lozenge) CEPACOL 3 MG Take 1 Lozenge by mouth as needed.        MetFORMIN HCl (Tab) GLUCOPHAGE 500 MG Take 500 mg by mouth 2 times a day, with meals.        Montelukast Sodium (Tab) SINGULAIR 10 MG Take 10 mg by mouth every bedtime. Indications: Hayfever        Ondansetron HCl (Tab) ZOFRAN 4 MG Take 1 Tab by mouth every four hours as needed for Nausea/Vomiting.        Prochlorperazine Maleate (Tab) COMPAZINE 10 MG Take 1 Tab by mouth every 6 hours as needed.        ValACYclovir HCl (Tab) VALTREX 500 MG Take 500 mg by mouth 2 times a day. 3 day course        .                 Medicines prescribed today were sent to:     [id number] - [place] - [address] AT [facility] OF [facility] [address]    [address] [address]    Phone: [phone] Fax: [phone]    Open 24 Hours?: No      Medication refill instructions:         If your prescription bottle indicates you have medication refills left, it is not necessary to call your provider’s office. Please contact your pharmacy and they will refill your medication.    If your prescription bottle indicates you do not have any refills left, you may request refills at any time through one of the following ways: The online DealerSocket system (except Urgent Care), by calling your provider’s office, or by asking your pharmacy to contact your provider’s office with a refill request. Medication refills are processed only during regular business hours and may not be available until the next business day. Your provider may request additional information or to have a follow-up visit with you prior to refilling your medication.   *Please Note: Medication refills are assigned a new Rx number when refilled electronically. Your pharmacy may indicate that no refills were authorized even though a new prescription for the same medication is available at the pharmacy. Please request the medicine by name with the pharmacy before contacting your provider for a refill.           DealerSocket Access Code: Activation code not generated  Current DealerSocket Status: Active

## 2017-04-29 ENCOUNTER — OUTPATIENT INFUSION SERVICES (OUTPATIENT)
Dept: ONCOLOGY | Facility: MEDICAL CENTER | Age: 62
End: 2017-04-29
Attending: INTERNAL MEDICINE
Payer: COMMERCIAL

## 2017-04-29 VITALS
SYSTOLIC BLOOD PRESSURE: 122 MMHG | HEIGHT: 62 IN | RESPIRATION RATE: 18 BRPM | HEART RATE: 83 BPM | DIASTOLIC BLOOD PRESSURE: 66 MMHG | BODY MASS INDEX: 30.55 KG/M2 | OXYGEN SATURATION: 100 % | WEIGHT: 166.01 LBS | TEMPERATURE: 98.3 F

## 2017-04-29 DIAGNOSIS — C18.7 CANCER OF SIGMOID COLON (HCC): ICD-10-CM

## 2017-04-29 PROCEDURE — 700111 HCHG RX REV CODE 636 W/ 250 OVERRIDE (IP): Performed by: INTERNAL MEDICINE

## 2017-04-29 PROCEDURE — 96415 CHEMO IV INFUSION ADDL HR: CPT

## 2017-04-29 PROCEDURE — 96375 TX/PRO/DX INJ NEW DRUG ADDON: CPT

## 2017-04-29 PROCEDURE — 96368 THER/DIAG CONCURRENT INF: CPT

## 2017-04-29 PROCEDURE — 96367 TX/PROPH/DG ADDL SEQ IV INF: CPT

## 2017-04-29 PROCEDURE — 96413 CHEMO IV INFUSION 1 HR: CPT

## 2017-04-29 PROCEDURE — 700111 HCHG RX REV CODE 636 W/ 250 OVERRIDE (IP)

## 2017-04-29 PROCEDURE — 96411 CHEMO IV PUSH ADDL DRUG: CPT

## 2017-04-29 PROCEDURE — 700105 HCHG RX REV CODE 258: Performed by: NURSE PRACTITIONER

## 2017-04-29 PROCEDURE — 700111 HCHG RX REV CODE 636 W/ 250 OVERRIDE (IP): Performed by: NURSE PRACTITIONER

## 2017-04-29 PROCEDURE — A4212 NON CORING NEEDLE OR STYLET: HCPCS

## 2017-04-29 PROCEDURE — G0498 CHEMO EXTEND IV INFUS W/PUMP: HCPCS

## 2017-04-29 RX ORDER — DEXTROSE MONOHYDRATE 50 MG/ML
INJECTION, SOLUTION INTRAVENOUS CONTINUOUS
Status: DISCONTINUED | OUTPATIENT
Start: 2017-04-29 | End: 2017-04-29 | Stop reason: HOSPADM

## 2017-04-29 RX ORDER — PALONOSETRON 0.05 MG/ML
0.25 INJECTION, SOLUTION INTRAVENOUS ONCE
Status: COMPLETED | OUTPATIENT
Start: 2017-04-29 | End: 2017-04-29

## 2017-04-29 RX ADMIN — DEXTROSE MONOHYDRATE: 50 INJECTION, SOLUTION INTRAVENOUS at 11:15

## 2017-04-29 RX ADMIN — PALONOSETRON HYDROCHLORIDE 0.25 MG: 0.25 INJECTION INTRAVENOUS at 11:33

## 2017-04-29 RX ADMIN — DEXTROSE MONOHYDRATE 123.08 MG: 50 INJECTION, SOLUTION INTRAVENOUS at 12:31

## 2017-04-29 RX ADMIN — FLUOROURACIL 725 MG: 50 INJECTION, SOLUTION INTRAVENOUS at 14:49

## 2017-04-29 RX ADMIN — LEUCOVORIN CALCIUM 724 MG: 350 INJECTION, POWDER, LYOPHILIZED, FOR SOLUTION INTRAMUSCULAR; INTRAVENOUS at 12:32

## 2017-04-29 RX ADMIN — FLUOROURACIL 4345 MG: 50 INJECTION, SOLUTION INTRAVENOUS at 14:50

## 2017-04-29 RX ADMIN — DEXAMETHASONE SODIUM PHOSPHATE 12 MG: 4 INJECTION, SOLUTION INTRAMUSCULAR; INTRAVENOUS at 11:35

## 2017-04-29 RX ADMIN — SODIUM CHLORIDE 150 MG: 9 INJECTION, SOLUTION INTRAVENOUS at 11:51

## 2017-04-29 ASSESSMENT — PAIN SCALES - GENERAL: PAINLEVEL: NO PAIN

## 2017-04-29 NOTE — PROGRESS NOTES
"Pharmacy Chemotherapy Calculations Note:    Patient Name: iTm Crump        Dx: Colon Cancer     Cycle 7 Previous treatment: C6 = 04/15/17     Protocol: mFOLFOX6   Oxaliplatin (Eloxatin) 85 mg/m2 IV Day 1   04/01/17: dose reduced to 68 mg/m2 (20% dose reduction) d/t neuropathy per Dr. Burgos     04/15/17: HOLD cycle 6 oxaliplatin for significant neuropathy per Dr. Burgos.  MD to reassess next cycle.                04/29/17 Neuropathy improved, adding back oxaliplatin at 20% dose reduction (68 mg/m2) cycle 7  Leucovorin 400 mg/m2 IV over 2 hrs on Day 1. Run concurrently with oxaliplatin  Fluorouracil 400 mg/m2 IV bolus Day 1 followed by 2400 mg/m2 CIVI over 46 hrs   Q14 days until disease progression or unacceptable toxicity    NCCN Guidelines for Colon Cancer. V.2.2017  Xu T, et al. N Engl J Med. 2004;350:6682-6403  Renetta J, et al. J Clin Oncol. 2008;26(12):2006-12  Hong VERDUGO, et al. Br J Cancer. 2002;87(4):393-9         /66 mmHg  Pulse 83  Temp(Src) 36.8 °C (98.3 °F)  Resp 18  Ht 1.57 m (5' 1.81\")  Wt 75.3 kg (166 lb 0.1 oz)  BMI 30.55 kg/m2  SpO2 100% Body surface area is 1.81 meters squared.    04/28/17 ANC ~2580  Plt = 191 k    Hgb = 12.4     SCr = 0.91 mg/dL  CrCl ~78 mL/min    LFT = AST/ALT/AlkPhos/Tbili = 21/20/111/0.6    OXALIplatin (Eloxatin) 68 mg/m² x 1.81 m² = 123.08 mg   <5% difference, ok to treat with final dose = 123.08 mg IV    Leucovorin 400 mg/m² x 1.81 m² = 724 mg   <5% difference, ok to treat with final dose = 724 mg IV    Fluorouracil (5-FU) 400 mg/m² x 1.81 m² = 724 mg   <5% difference, ok to treat with final dose = 725 mg IVP    Fluorouracil (5-FU) 2400 mg/m² x 1.81 m²  = 4344 mg    <5% difference, ok to treat with final dose = 4345 mg CIVI over 46 hrs via CADD pump       Meredith KiserD                "

## 2017-04-29 NOTE — PROGRESS NOTES
"Patient Name: Tim Crump   Dx: Colon Cancer        Protocol: mFOLFOX6   Oxaliplatin (Eloxatin) 85 mg/m2 IV Day 1  --4/1/17 dose reduced 20% by Aubrey MEDRANO for tolerance  -4/15/17 Hold for Cycle 6 to assess neuropathy, MD will reassess with next cycle per Dr. Burgos.   --4/29/17 added back to treatment plan, neuropathy resolved per C Angelap APN/Aubrey MEDRANO  Leucovorin 400 mg/m2 IV over 2 hrs on Day 1. Run concurrently with oxaliplatin  Fluorouracil 400 mg/m2 IV bolus Day 1 followed by 2400 mg/m2 CIVI over 46 hrs    Q14 days until disease progression or unacceptable toxicity    NCCN Guidelines for Colon Cancer. V.2.2017  Xu T, et al. N Engl J Med. 2004;350:2275-3297  Renetta RAY et al. J Clin Oncol. 2008;26(12):2006-12  Hong SL, et al. Br J Cancer. 2002;87(4):393-9    Allergies:  Review of patient's allergies indicates no known allergies.     /66 mmHg  Pulse 83  Temp(Src) 36.8 °C (98.3 °F)  Resp 18  Ht 1.57 m (5' 1.81\")  Wt 75.3 kg (166 lb 0.1 oz)  BMI 30.55 kg/m2  SpO2 100% Body surface area is 1.81 meters squared.    Labs 4/28/17  ANC ~2580 Plt = 191k   Hgb = 12.4   SCr = 0.91 mg/dL CrCl ~78 mL/min     LFT's = 21/20/111 TBili = 0.6        Drug Order   (Drug name, dose, route, IV Fluid & volume, frequency, number of doses) Cycle: 7  Previous treatment: C6 = 4/15/17     Medication = OXALIplatin (Eloxitan)  Base Dose = 68 mg/m2  Calc Dose: Base Dose x 1.83 m2 = 123.08 mg  Final Dose = 123.08 mg  Route = IV  Fluid & Volume = D5W 250 mL  Admin Duration = Over 2 hours          <5% difference, ok to treat with final dose     Medication = Leucovorin (Wellcovorin)  Base Dose = 400 mg/m2  Calc Dose: Base Dose x 1.81 m2 = 724 mg  Final Dose = 724 mg  Route = IV  Fluid & Volume = D5W 250 mL  Admin Duration = Over 2 hours          <5% difference, ok to treat with final dose   Medication = Fluorouracil (5-FU) bolus  Base Dose = 400 mg/m2  Calc Dose:Base Dose x 1.81 m2 = 724 mg  Final Dose = 725 " mg  Route = IVP  Fluid & Volume = in syringe 14.5 mL  Conc = 50 mg/mL  Admin Duration = Over 5 minutes          <5% difference, ok to treat with final dose   Medication = Fluorouracil (5-FU)  Base Dose = 2400 mg/m2  Calc Dose:Base Dose x 1.81 m2 = 4344 mg  Final Dose = 4345 mg  Route = CIVI  Conc = 50 mg/mL = 86.9 mL   Fluid & Volume = 86.9 mL + 3 mL overfill = 89.9 mL  Admin Duration = Over 46 hours @ 1.9 mL/hr     Via CADD pump for home CIVI       <5% difference, ok to treat with final dose     By my signature below, I confirm this process was performed independently with the BSA and all final chemotherapy dosing calculations congruent. I have reviewed the above chemotherapy order and that my calculation of the final dose and BSA (when applicable) corroborate those calculations of the  pharmacist. Discrepancies of 5% or greater in the written dose have been addressed and documented within the EPIC Progress notes.    Charles Muniz, MeredithD

## 2017-04-29 NOTE — PROGRESS NOTES
Chemotherapy Verification - SECONDARY RN       Height = 61.81in  Weight = 75.3kg  BSA = 1.81m2       Medication: Leucovorin  Dose: 400mg/m2  Calculated Dose: 724mg                             (In mg/m2, AUC, mg/kg)     Medication: Oxaliplatin  Dose: 68mg/m2 (80% of original dose of 85mg/m2)   Calculated Dose: 123.08mg                             (In mg/m2, AUC, mg/kg)    Medication: Fluorouracil  Dose: 400mg/m2  Calculated Dose: 724mg                             (In mg/m2, AUC, mg/kg)    Medication: Fluorouracil  Dose: 2400mg/m2  Calculated Dose: 4344mg                             (In mg/m2, AUC, mg/kg)      I confirm that this process was performed independently.

## 2017-04-29 NOTE — PROGRESS NOTES
Chemotherapy Verification - PRIMARY RN      Height = 157 cm Weight = 75.3 kg BSA = 1.81 m2       Medication: Oxaliplatin  Dose: 68 mg/m2  Calculated Dose: 123 mg                              Medication: Leucovorin  Dose: 400 mg/m2 Calculated Dose: 724 mg     Medication: Fluorouracil  Dose: 400 mg/m2  Calculated Dose: 724 mg    Medication: Fluorouracil Dose: 2,400 mg/m2   Calculated Dose: 4,344 mg    17:  ANC: 2,604; Plt: 191; Hb.4; Cr: 0.91; T.Bili: 0.6; AST: 21; ALT: 20                       I confirm this process was performed independently with the BSA and all final chemotherapy dosing calculations congruent.  Any discrepancies of 5% or greater have been addressed with the chemotherapy pharmacist. The resolution of the discrepancy has been documented in the EPIC progress notes.

## 2017-04-29 NOTE — PROGRESS NOTES
Subjective:      Tim Crump is a 62 y.o. female who presents for Follow-Up for colon cancer.         HPI    Patient seen today in follow-up for colon cancer. She is scheduled to receive cycle 7 of FOLFOX today. Patient is accompanied by herself at today's visit.    Patient denies fevers, chills, appetite loss or weight loss. Patient is slightly fatigued however she does stay up late at night to wait for her son to get off work and gets up late in the day. Today's in early appointment for patient. Otherwise she denies any fatigue. She denies any rashes or itching or dryness and cracking of her hands and feet. She does have some mild peeling of the skin on one toe on her right foot. Patient denies headaches, chest pain, heart palpitations or swelling in her legs. She denies coughing, wheezing, shortness of breath. She denies nausea, vomiting with this last cycle. She stated that she did not have very much nausea at all for cycle 6. Cycle 6 we did hold the oxaliplatin which she seemed to tolerate well. She denies any diarrhea or constipation. She stated she had at least bowel movement this morning and one 2 days ago. Otherwise she is having normal formed stool. She is voiding without difficulty. She denies pain, or dizziness. Patient with significant peripheral neuropathy and cold sensitivity likely due to the oxaliplatin. She had received dose reduction of the oxaliplatin in the past but last cycle was held. Patient stated the neuropathy has completely gone away and she still has very mild sensitivity to her fingertips when she touches anything in the freezer. Otherwise she is happy at how her hands and feet feel.    No Known Allergies  Current Outpatient Prescriptions on File Prior to Visit   Medication Sig Dispense Refill   • famotidine (PEPCID) 20 MG Tab Take 1 Tab by mouth 2 times a day. 60 Tab 2   • atorvastatin (LIPITOR) 10 MG Tab Take 5 mg by mouth every evening.     • montelukast (SINGULAIR) 10 MG  Tab Take 10 mg by mouth every bedtime. Indications: Hayfever     • cetirizine (ZYRTEC) 10 MG Tab Take 10 mg by mouth every morning. Indications: Hayfever     • metformin (GLUCOPHAGE) 500 MG TABS Take 500 mg by mouth 2 times a day, with meals.     • docusate sodium (COLACE) 50 MG Cap Take 50 mg by mouth 2 times a day.     • loperamide (IMODIUM A-D) 2 MG tablet Take 1 Tab by mouth See Admin Instructions. 30 Tab 6   • menthol (CEPACOL) 3 MG lozenge Take 1 Lozenge by mouth as needed.     • Melatonin 1 MG Cap Take  by mouth.     • ondansetron (ZOFRAN) 4 MG Tab tablet Take 1 Tab by mouth every four hours as needed for Nausea/Vomiting. 30 Tab 3   • prochlorperazine (COMPAZINE) 10 MG Tab Take 1 Tab by mouth every 6 hours as needed. 30 Tab 3   • lidocaine-prilocaine (EMLA) 2.5-2.5 % Cream Apply to port 1 hour prior to access of port and cover with plastic wrap. 1 Tube 3   • loperamide (IMODIUM) 2 MG Cap Give loperamide 4 mg orally first dose, then 2 mg orally with every loose bowel movement.  Contact physician if maximum of 16 mg/24 hours is exceeded. 30 Cap 3   • valacyclovir (VALTREX) 500 MG Tab Take 500 mg by mouth 2 times a day. 3 day course     • citalopram (CELEXA) 40 MG TABS Take 40 mg by mouth every bedtime.       No current facility-administered medications on file prior to visit.       Review of Systems   Constitutional: Positive for malaise/fatigue. Negative for fever, chills and weight loss.   Respiratory: Negative for cough, shortness of breath and wheezing.    Cardiovascular: Negative for chest pain, palpitations and leg swelling.   Gastrointestinal: Negative for nausea, vomiting, diarrhea and constipation.   Genitourinary: Negative for dysuria.   Musculoskeletal: Negative for myalgias.   Skin: Negative for itching and rash.   Neurological: Positive for tingling (very mild cold sensitivity still present). Negative for dizziness and headaches.          Objective:     /76 mmHg  Pulse 72  Temp(Src) 36.9 °C  "(98.4 °F)  Resp 18  Ht 1.575 m (5' 2\")  Wt 74.6 kg (164 lb 7.4 oz)  BMI 30.07 kg/m2  SpO2 100%     Physical Exam   Constitutional: She is oriented to person, place, and time. She appears well-developed and well-nourished. No distress.   HENT:   Head: Normocephalic and atraumatic.   Mouth/Throat: Oropharynx is clear and moist. No oropharyngeal exudate.   Eyes: Conjunctivae and EOM are normal. Pupils are equal, round, and reactive to light. Right eye exhibits no discharge. Left eye exhibits no discharge. No scleral icterus.   Cardiovascular: Normal rate, regular rhythm, normal heart sounds and intact distal pulses.  Exam reveals no gallop and no friction rub.    No murmur heard.  Pulmonary/Chest: Effort normal and breath sounds normal. No respiratory distress. She has no wheezes.   Abdominal: Soft. Bowel sounds are normal. She exhibits no distension. There is no tenderness.   Musculoskeletal: Normal range of motion. She exhibits no edema or tenderness.   Neurological: She is alert and oriented to person, place, and time.   Skin: Skin is warm and dry. No rash noted. She is not diaphoretic. No erythema. No pallor.   Dryness noted to her feet. Encouraged increase in lotion.   Psychiatric: She has a normal mood and affect. Her behavior is normal.   Vitals reviewed.      Hospital Outpatient Visit on 04/28/2017   Component Date Value Ref Range Status   • WBC 04/28/2017 6.1  4.8 - 10.8 K/uL Final   • RBC 04/28/2017 4.07* 4.20 - 5.40 M/uL Final   • Hemoglobin 04/28/2017 12.4  12.0 - 16.0 g/dL Final   • Hematocrit 04/28/2017 36.4* 37.0 - 47.0 % Final   • MCV 04/28/2017 89.4  81.4 - 97.8 fL Final   • MCH 04/28/2017 30.5  27.0 - 33.0 pg Final   • MCHC 04/28/2017 34.1  33.6 - 35.0 g/dL Final   • RDW 04/28/2017 57.9* 35.9 - 50.0 fL Final   • Platelet Count 04/28/2017 191  164 - 446 K/uL Final   • MPV 04/28/2017 8.7* 9.0 - 12.9 fL Final   • Neutrophils-Polys 04/28/2017 42.70* 44.00 - 72.00 % Final   • Lymphocytes 04/28/2017 " 38.70  22.00 - 41.00 % Final   • Monocytes 04/28/2017 11.70  0.00 - 13.40 % Final   • Eosinophils 04/28/2017 5.30  0.00 - 6.90 % Final   • Basophils 04/28/2017 1.30  0.00 - 1.80 % Final   • Immature Granulocytes 04/28/2017 0.30  0.00 - 0.90 % Final   • Nucleated RBC 04/28/2017 0.00   Final   • Neutrophils (Absolute) 04/28/2017 2.58  2.00 - 7.15 K/uL Final    Includes immature neutrophils, if present.   • Lymphs (Absolute) 04/28/2017 2.34  1.00 - 4.80 K/uL Final   • Monos (Absolute) 04/28/2017 0.71  0.00 - 0.85 K/uL Final   • Eos (Absolute) 04/28/2017 0.32  0.00 - 0.51 K/uL Final   • Baso (Absolute) 04/28/2017 0.08  0.00 - 0.12 K/uL Final   • Immature Granulocytes (abs) 04/28/2017 0.02  0.00 - 0.11 K/uL Final   • NRBC (Absolute) 04/28/2017 0.00   Final   • Sodium 04/28/2017 140  135 - 145 mmol/L Final   • Potassium 04/28/2017 3.9  3.6 - 5.5 mmol/L Final   • Chloride 04/28/2017 109  96 - 112 mmol/L Final   • Co2 04/28/2017 23  20 - 33 mmol/L Final   • Anion Gap 04/28/2017 8.0  0.0 - 11.9 Final   • Glucose 04/28/2017 157* 65 - 99 mg/dL Final   • Bun 04/28/2017 18  8 - 22 mg/dL Final   • Creatinine 04/28/2017 0.91  0.50 - 1.40 mg/dL Final   • Calcium 04/28/2017 9.5  8.5 - 10.5 mg/dL Final   • AST(SGOT) 04/28/2017 21  12 - 45 U/L Final   • ALT(SGPT) 04/28/2017 20  2 - 50 U/L Final   • Alkaline Phosphatase 04/28/2017 111* 30 - 99 U/L Final   • Total Bilirubin 04/28/2017 0.6  0.1 - 1.5 mg/dL Final   • Albumin 04/28/2017 3.7  3.2 - 4.9 g/dL Final   • Total Protein 04/28/2017 6.2  6.0 - 8.2 g/dL Final   • Globulin 04/28/2017 2.5  1.9 - 3.5 g/dL Final   • A-G Ratio 04/28/2017 1.5   Final   • GFR If  04/28/2017 >60  >60 mL/min/1.73 m 2 Final   • GFR If Non  04/28/2017 >60  >60 mL/min/1.73 m 2 Final          Assessment/Plan:     1. Cancer of sigmoid colon (CMS-HCC)       Plan  1. Patient is scheduled to receive cycle 7 of FOLFOX today. I reviewed her CBC and CMP and okay to proceed with treatment  as planned. After discussion with Dr. Gomes he would like to rechallenge patient with the addition of oxaliplatin on this cycle. Patient has had complete resolution of peripheral neuropathy after holding the oxaliplatin on cycle 6. She does still have very slight cold sensitivity when touching things in the freezer. Patient will have FOLFOX with the oxaliplatin reduced at 20%.    2. Patient did experience some nausea with her previous cycles. Recommendation was to add Emend to her premedication if we were to rechallenge patient with oxaliplatin. I discussed with Dr. Gomes and he is in agreement with the plan to go ahead and add Emend for this cycle to see if she has better control with her nausea.    3. Encourage patient to use more lotion to her feet. Patient verbalized understanding.    4. Patient is aware of the plan and in agreement. She will follow up in 2 weeks or sooner if needed.

## 2017-04-30 NOTE — PROGRESS NOTES
"LATE ENTRY:    Pt ambulated into department with her son \"Gregoria" for Cycle 7 of FOLFOX. Pt reported that her neuropathies have resolved and Dr. Gomes wanted her to try Oxaliplatin again. Pt declined having any new or acute complaints today. Port accessed in a sterile manner, had + blood return, flushed briskly. Pt given pre-medications, Oxaliplatin, Leucovorin, and 5-FU push as prescribed. Tolerated all medication administration well. Pt connected to home 5FU continuous infusion pump with primary RN and Roxanne MALONE. 86.9 ml's was noted as the amount of medication that should be given, 87.6 ml's was noted to be in the pump (this included Pt's overfill). Pump programed to run at 1.9 ml/hr for 46 hrs. Pump started by primary RN and secured into Pt's personal carrier. Future appointments confirmed with Pt and son prior to leaving. Pt let department with son appearing in good spirits and NAD.  "

## 2017-05-01 ENCOUNTER — OUTPATIENT INFUSION SERVICES (OUTPATIENT)
Dept: ONCOLOGY | Facility: MEDICAL CENTER | Age: 62
End: 2017-05-01
Attending: INTERNAL MEDICINE
Payer: COMMERCIAL

## 2017-05-01 VITALS
TEMPERATURE: 97.9 F | SYSTOLIC BLOOD PRESSURE: 112 MMHG | DIASTOLIC BLOOD PRESSURE: 60 MMHG | OXYGEN SATURATION: 96 % | HEART RATE: 79 BPM | BODY MASS INDEX: 30.71 KG/M2 | RESPIRATION RATE: 18 BRPM | WEIGHT: 166.89 LBS | HEIGHT: 62 IN

## 2017-05-01 DIAGNOSIS — C18.7 CANCER OF SIGMOID COLON (HCC): ICD-10-CM

## 2017-05-01 PROCEDURE — 96523 IRRIG DRUG DELIVERY DEVICE: CPT

## 2017-05-01 PROCEDURE — 700111 HCHG RX REV CODE 636 W/ 250 OVERRIDE (IP)

## 2017-05-01 RX ADMIN — HEPARIN 500 UNITS: 100 SYRINGE at 16:16

## 2017-05-01 ASSESSMENT — PAIN SCALES - GENERAL: PAINLEVEL: NO PAIN

## 2017-05-02 NOTE — PROGRESS NOTES
Patient arrived for CADD pump de-access. Pt stated pump finished about 1pm today, and she stopped infusion.  Confirmed settings on pump; pump stated 0ml remaining, 87.6ml given.  Disconnected CADD pump, cleaned pts pump and bag, placed in pharmacy drawer.  Port flushed per protocol, noted brisk blood return.  Port de-accessed and gauze dressing in place. Confirmed pts next appt. Pt discharged in good spirits under no apparent distress.

## 2017-05-11 DIAGNOSIS — C18.7 CANCER OF SIGMOID COLON (HCC): ICD-10-CM

## 2017-05-12 ENCOUNTER — HOSPITAL ENCOUNTER (OUTPATIENT)
Facility: MEDICAL CENTER | Age: 62
End: 2017-05-12
Attending: NURSE PRACTITIONER
Payer: COMMERCIAL

## 2017-05-12 ENCOUNTER — NON-PROVIDER VISIT (OUTPATIENT)
Dept: HEMATOLOGY ONCOLOGY | Facility: MEDICAL CENTER | Age: 62
End: 2017-05-12
Payer: MEDICAID

## 2017-05-12 ENCOUNTER — OFFICE VISIT (OUTPATIENT)
Dept: HEMATOLOGY ONCOLOGY | Facility: MEDICAL CENTER | Age: 62
End: 2017-05-12
Payer: MEDICAID

## 2017-05-12 VITALS
BODY MASS INDEX: 31.26 KG/M2 | WEIGHT: 165.57 LBS | DIASTOLIC BLOOD PRESSURE: 80 MMHG | SYSTOLIC BLOOD PRESSURE: 124 MMHG | TEMPERATURE: 98.5 F | HEART RATE: 69 BPM | RESPIRATION RATE: 16 BRPM | OXYGEN SATURATION: 95 % | HEIGHT: 61 IN

## 2017-05-12 DIAGNOSIS — C18.7 CANCER OF SIGMOID COLON (HCC): ICD-10-CM

## 2017-05-12 LAB
ALBUMIN SERPL BCP-MCNC: 3.9 G/DL (ref 3.2–4.9)
ALBUMIN/GLOB SERPL: 1.4 G/DL
ALP SERPL-CCNC: 122 U/L (ref 30–99)
ALT SERPL-CCNC: 25 U/L (ref 2–50)
ANION GAP SERPL CALC-SCNC: 7 MMOL/L (ref 0–11.9)
AST SERPL-CCNC: 28 U/L (ref 12–45)
BASOPHILS # BLD AUTO: 1.7 % (ref 0–1.8)
BASOPHILS # BLD: 0.09 K/UL (ref 0–0.12)
BILIRUB SERPL-MCNC: 0.5 MG/DL (ref 0.1–1.5)
BUN SERPL-MCNC: 23 MG/DL (ref 8–22)
CALCIUM SERPL-MCNC: 9.2 MG/DL (ref 8.5–10.5)
CEA SERPL-MCNC: 2.1 NG/ML (ref 0–3)
CHLORIDE SERPL-SCNC: 107 MMOL/L (ref 96–112)
CO2 SERPL-SCNC: 24 MMOL/L (ref 20–33)
CREAT SERPL-MCNC: 0.96 MG/DL (ref 0.5–1.4)
EOSINOPHIL # BLD AUTO: 0.35 K/UL (ref 0–0.51)
EOSINOPHIL NFR BLD: 6.8 % (ref 0–6.9)
ERYTHROCYTE [DISTWIDTH] IN BLOOD BY AUTOMATED COUNT: 55.6 FL (ref 35.9–50)
GFR SERPL CREATININE-BSD FRML MDRD: 59 ML/MIN/1.73 M 2
GLOBULIN SER CALC-MCNC: 2.7 G/DL (ref 1.9–3.5)
GLUCOSE SERPL-MCNC: 135 MG/DL (ref 65–99)
HCT VFR BLD AUTO: 37.4 % (ref 37–47)
HGB BLD-MCNC: 12.8 G/DL (ref 12–16)
IMM GRANULOCYTES # BLD AUTO: 0.01 K/UL (ref 0–0.11)
IMM GRANULOCYTES NFR BLD AUTO: 0.2 % (ref 0–0.9)
LYMPHOCYTES # BLD AUTO: 2.01 K/UL (ref 1–4.8)
LYMPHOCYTES NFR BLD: 38.9 % (ref 22–41)
MCH RBC QN AUTO: 31.1 PG (ref 27–33)
MCHC RBC AUTO-ENTMCNC: 34.2 G/DL (ref 33.6–35)
MCV RBC AUTO: 91 FL (ref 81.4–97.8)
MONOCYTES # BLD AUTO: 0.81 K/UL (ref 0–0.85)
MONOCYTES NFR BLD AUTO: 15.7 % (ref 0–13.4)
NEUTROPHILS # BLD AUTO: 1.9 K/UL (ref 2–7.15)
NEUTROPHILS NFR BLD: 36.7 % (ref 44–72)
NRBC # BLD AUTO: 0 K/UL
NRBC BLD AUTO-RTO: 0 /100 WBC
PLATELET # BLD AUTO: 197 K/UL (ref 164–446)
PMV BLD AUTO: 8.8 FL (ref 9–12.9)
POTASSIUM SERPL-SCNC: 4 MMOL/L (ref 3.6–5.5)
PROT SERPL-MCNC: 6.6 G/DL (ref 6–8.2)
RBC # BLD AUTO: 4.11 M/UL (ref 4.2–5.4)
SODIUM SERPL-SCNC: 138 MMOL/L (ref 135–145)
WBC # BLD AUTO: 5.2 K/UL (ref 4.8–10.8)

## 2017-05-12 PROCEDURE — 99213 OFFICE O/P EST LOW 20 MIN: CPT | Performed by: NURSE PRACTITIONER

## 2017-05-12 PROCEDURE — 80053 COMPREHEN METABOLIC PANEL: CPT

## 2017-05-12 PROCEDURE — 85025 COMPLETE CBC W/AUTO DIFF WBC: CPT

## 2017-05-12 PROCEDURE — 36591 DRAW BLOOD OFF VENOUS DEVICE: CPT | Performed by: NURSE PRACTITIONER

## 2017-05-12 PROCEDURE — 82378 CARCINOEMBRYONIC ANTIGEN: CPT

## 2017-05-12 RX ORDER — POLYETHYLENE GLYCOL 3350 17 G/17G
17 POWDER, FOR SOLUTION ORAL DAILY
COMMUNITY
End: 2018-08-18

## 2017-05-12 ASSESSMENT — ENCOUNTER SYMPTOMS
DIARRHEA: 0
PALPITATIONS: 0
HEADACHES: 0
NAUSEA: 0
FEVER: 0
DIZZINESS: 0
WEIGHT LOSS: 0
SHORTNESS OF BREATH: 0
TINGLING: 1
CONSTIPATION: 0
COUGH: 0
MYALGIAS: 0
VOMITING: 0
CHILLS: 0
WHEEZING: 0

## 2017-05-12 ASSESSMENT — PAIN SCALES - GENERAL
PAINLEVEL: NO PAIN
PAINLEVEL: NO PAIN

## 2017-05-12 NOTE — PROGRESS NOTES
Subjective:      Tim Crump is a 62 y.o. female who presents for Follow-Up for colon cancer.         HPI    Patient seen today in follow-up for colon cancer. She is accompanied by herself for today's visit. Patient is scheduled to begin cycle 8 of FOLFOX tomorrow.    Patient has been tolerating the treatment fairly well. She denies any fevers chills or weight loss. Her appetite has been stable. Patient noticed mild increase in fatigue with this last cycle, she stated she had experienced 2 episodes of feeling fatigued more than normal. She is doing well today. Patient denies coughing, wheezing, shortness of breath. She also denies chest pain, heart palpitations or swelling in her legs. Last cycle patient was given Emend in addition to the other anti-emetic treatments prior to her chemotherapy due to worsening of nausea with treatment. Patient stated the amend worked well and she denied nausea, vomiting. She's also having normal formed bowel movements and denies any diarrhea or constipation. Patient is voiding without difficulties. She also denies myalgias or pain. She does have some dry skin to her hands, but no cracking or peeling of her hands or feet. Patient has been having peripheral neuropathy, due to the oxaliplatin. Cycle 6 patient have her oxaliplatin held and she had complete resolution of symptoms with very mild cold sensitivity. We did rechallenge her for cycle 7 with a 20% dose reduction of oxaliplatin. Patient stated that she does not have any peripheral neuropathy, with the exception of cold sensitivity that is still present. Patient stated she cannot touch anything in the refrigerator or freezer without gloves on. She also has some sensitivity to her lips and is unable to drink anything cold.    No Known Allergies  Current Outpatient Prescriptions on File Prior to Visit   Medication Sig Dispense Refill   • docusate sodium (COLACE) 50 MG Cap Take 50 mg by mouth 2 times a day.     •  famotidine (PEPCID) 20 MG Tab Take 1 Tab by mouth 2 times a day. 60 Tab 2   • atorvastatin (LIPITOR) 10 MG Tab Take 5 mg by mouth every evening.     • montelukast (SINGULAIR) 10 MG Tab Take 10 mg by mouth every bedtime. Indications: Hayfever     • cetirizine (ZYRTEC) 10 MG Tab Take 10 mg by mouth every morning. Indications: Hayfever     • metformin (GLUCOPHAGE) 500 MG TABS Take 500 mg by mouth 2 times a day, with meals.     • loperamide (IMODIUM A-D) 2 MG tablet Take 1 Tab by mouth See Admin Instructions. 30 Tab 6   • menthol (CEPACOL) 3 MG lozenge Take 1 Lozenge by mouth as needed.     • Melatonin 1 MG Cap Take  by mouth.     • ondansetron (ZOFRAN) 4 MG Tab tablet Take 1 Tab by mouth every four hours as needed for Nausea/Vomiting. 30 Tab 3   • prochlorperazine (COMPAZINE) 10 MG Tab Take 1 Tab by mouth every 6 hours as needed. 30 Tab 3   • lidocaine-prilocaine (EMLA) 2.5-2.5 % Cream Apply to port 1 hour prior to access of port and cover with plastic wrap. 1 Tube 3   • loperamide (IMODIUM) 2 MG Cap Give loperamide 4 mg orally first dose, then 2 mg orally with every loose bowel movement.  Contact physician if maximum of 16 mg/24 hours is exceeded. 30 Cap 3   • valacyclovir (VALTREX) 500 MG Tab Take 500 mg by mouth 2 times a day. 3 day course     • citalopram (CELEXA) 40 MG TABS Take 40 mg by mouth every bedtime.       No current facility-administered medications on file prior to visit.       Review of Systems   Constitutional: Positive for malaise/fatigue (mild increase in fatigue with last cycle). Negative for fever, chills and weight loss.   Respiratory: Negative for cough, shortness of breath and wheezing.    Cardiovascular: Negative for chest pain, palpitations and leg swelling.   Gastrointestinal: Negative for nausea, vomiting, diarrhea and constipation.   Genitourinary: Negative for dysuria.   Musculoskeletal: Negative for myalgias.   Skin: Negative for itching and rash.        Mildly dry skin    Neurological:  "Positive for tingling. Negative for dizziness and headaches.          Objective:     /80 mmHg  Pulse 69  Temp(Src) 36.9 °C (98.5 °F)  Resp 16  Ht 1.549 m (5' 1\")  Wt 75.1 kg (165 lb 9.1 oz)  BMI 31.30 kg/m2  SpO2 95%     Physical Exam   Constitutional: She is oriented to person, place, and time. She appears well-developed and well-nourished. No distress.   HENT:   Head: Normocephalic and atraumatic.   Mouth/Throat: Oropharynx is clear and moist. No oropharyngeal exudate.   Eyes: Conjunctivae and EOM are normal. Pupils are equal, round, and reactive to light. Right eye exhibits no discharge. Left eye exhibits no discharge. No scleral icterus.   Cardiovascular: Normal rate, regular rhythm, normal heart sounds and intact distal pulses.  Exam reveals no gallop and no friction rub.    No murmur heard.  Pulmonary/Chest: Effort normal and breath sounds normal. No respiratory distress. She has no wheezes.   Abdominal: Soft. Bowel sounds are normal. She exhibits no distension. There is no tenderness.   Musculoskeletal: Normal range of motion. She exhibits no edema or tenderness.   Neurological: She is alert and oriented to person, place, and time.   Skin: Skin is warm and dry. No rash noted. She is not diaphoretic. No erythema. No pallor.   Psychiatric: She has a normal mood and affect. Her behavior is normal.   Vitals reviewed.      Hospital Outpatient Visit on 05/12/2017   Component Date Value Ref Range Status   • WBC 05/12/2017 5.2  4.8 - 10.8 K/uL Final   • RBC 05/12/2017 4.11* 4.20 - 5.40 M/uL Final   • Hemoglobin 05/12/2017 12.8  12.0 - 16.0 g/dL Final   • Hematocrit 05/12/2017 37.4  37.0 - 47.0 % Final   • MCV 05/12/2017 91.0  81.4 - 97.8 fL Final   • MCH 05/12/2017 31.1  27.0 - 33.0 pg Final   • MCHC 05/12/2017 34.2  33.6 - 35.0 g/dL Final   • RDW 05/12/2017 55.6* 35.9 - 50.0 fL Final   • Platelet Count 05/12/2017 197  164 - 446 K/uL Final   • MPV 05/12/2017 8.8* 9.0 - 12.9 fL Final   • Neutrophils-Polys " 05/12/2017 36.70* 44.00 - 72.00 % Final   • Lymphocytes 05/12/2017 38.90  22.00 - 41.00 % Final   • Monocytes 05/12/2017 15.70* 0.00 - 13.40 % Final   • Eosinophils 05/12/2017 6.80  0.00 - 6.90 % Final   • Basophils 05/12/2017 1.70  0.00 - 1.80 % Final   • Immature Granulocytes 05/12/2017 0.20  0.00 - 0.90 % Final   • Nucleated RBC 05/12/2017 0.00   Final   • Neutrophils (Absolute) 05/12/2017 1.90* 2.00 - 7.15 K/uL Final    Includes immature neutrophils, if present.   • Lymphs (Absolute) 05/12/2017 2.01  1.00 - 4.80 K/uL Final   • Monos (Absolute) 05/12/2017 0.81  0.00 - 0.85 K/uL Final   • Eos (Absolute) 05/12/2017 0.35  0.00 - 0.51 K/uL Final   • Baso (Absolute) 05/12/2017 0.09  0.00 - 0.12 K/uL Final   • Immature Granulocytes (abs) 05/12/2017 0.01  0.00 - 0.11 K/uL Final   • NRBC (Absolute) 05/12/2017 0.00   Final   • Sodium 05/12/2017 138  135 - 145 mmol/L Final   • Potassium 05/12/2017 4.0  3.6 - 5.5 mmol/L Final   • Chloride 05/12/2017 107  96 - 112 mmol/L Final   • Co2 05/12/2017 24  20 - 33 mmol/L Final   • Anion Gap 05/12/2017 7.0  0.0 - 11.9 Final   • Glucose 05/12/2017 135* 65 - 99 mg/dL Final   • Bun 05/12/2017 23* 8 - 22 mg/dL Final   • Creatinine 05/12/2017 0.96  0.50 - 1.40 mg/dL Final   • Calcium 05/12/2017 9.2  8.5 - 10.5 mg/dL Final   • AST(SGOT) 05/12/2017 28  12 - 45 U/L Final   • ALT(SGPT) 05/12/2017 25  2 - 50 U/L Final   • Alkaline Phosphatase 05/12/2017 122* 30 - 99 U/L Final   • Total Bilirubin 05/12/2017 0.5  0.1 - 1.5 mg/dL Final   • Albumin 05/12/2017 3.9  3.2 - 4.9 g/dL Final   • Total Protein 05/12/2017 6.6  6.0 - 8.2 g/dL Final   • Globulin 05/12/2017 2.7  1.9 - 3.5 g/dL Final   • A-G Ratio 05/12/2017 1.4   Final   • Carcinoembryonic Antigen 05/12/2017 2.1  0.0 - 3.0 ng/mL Final    Comment: Effective September 17, 2013 the quantitative determination  of Carcinoembryonic Antigen (CEA) will now be performed at  [facility].  The Access CEA paramagnetic  particle  chemiluminescent immunoassay is used.  Results  obtained with different test methods or kits cannot be used interchangeably.  Measurement of CEA has been shown to be  clinically relevant in the management of patients with  colorectal, breast, lung, prostatic, pancreatic, and ovarian  carcinomas.  Smokers may have slightly elevated levels of CEA.     • GFR If  05/12/2017 >60  >60 mL/min/1.73 m 2 Final   • GFR If Non  05/12/2017 59* >60 mL/min/1.73 m 2 Final          Assessment/Plan:     1. Cancer of sigmoid colon (CMS-HCC)       Plan  1. Patient is due to receive cycle 8 of FOLFOX tomorrow. I did review her CBC CMP and CEA and okay to proceed with treatment. Patient's CEA has been stable at 2.1. After discussing with patient her peripheral neuropathy and cold sensitivity and discussion in detail with Dr. Gomes we will hold oxaliplatin with her cycle 8 as patient still with cold sensitivity causing her to not be able to touch anything in the refrigerator or freezer or drink anything cold. With the patient and she is in agreement with the plan.    2. Patient is to follow up in 2 weeks or sooner if needed with Dr. Gomes.

## 2017-05-12 NOTE — MR AVS SNAPSHOT
Tim Crump   2017 10:00 AM   Appointment   MRN: [id number]    Department:  [facility]   Dept Phone:  [phone]    Description:  Female : 1955   Provider:  ONC RN 2           Allergies as of 2017     No Known Allergies      Vital Signs     Smoking Status                   Never Smoker            Basic Information     Date Of Birth Sex Race Ethnicity Preferred Language    1955 Female  or   Origin (Kinyarwanda,Malian,Jordanian,Paul, etc) English      Your appointments     May 12, 2017 11:30 AM   ONCOLOGY EST PATIENT 30 MIN with LUIS CARLOS Forman   [facility] (--)    [address], [facility] [address]  [facility] [address] [phone]            May 13, 2017  1:30 PM   Est Chemo 2 with RN 2   Infusion [facility] ([facility])    [address] [facility] [address]  [facility] [id number] [phone]            May 15, 2017  1:30 PM   EST Port Flush / Central Line Care with RN 5   In[facility] ([facility])    [address] [facility] [address]  [facility] [address] [phone]            May 26, 2017 10:00 AM   Non Provider 1 with ONC RN 1   [facility] (--)    [address] [facility] [address]  [facility] [id number] [phone]           You will be receiving a confirmation call a few days before your appointment from our automated call confirmation system.            May 26, 2017 11:20 AM   ONCOLOGY EST PATIENT 30 MIN with Sonu Gomes M.D.   [facility] (--)    [address], [facility] [address]  [facility] [address] [phone]            May 27, 2017 11:30 AM   Est Chemo 2 with RN 2   Infusion [facility] ([facility])    [address] [facility] [address]  [facility] [address] [phone]            May 29, 2017 11:00 AM   EST Port Flush / Central Line Care with INFUSION QUICK INJECT   Infusion [facility] ([facility])    [address] [facility] [address]  [facility] [address] [phone]              Problem List              ICD-10-CM Priority Class Noted - Resolved    Cancer of sigmoid colon (CMS-HCC) C18.7   12/7/2016 - Present    Fourth degree hemorrhoids K64.3   12/7/2016 - Present      Health Maintenance        Date Due Completion Dates    IMM DTaP/Tdap/Td Vaccine (1 - Tdap) 1/23/1976 ---    PAP SMEAR 1/23/1978 ---    COLONOSCOPY 1/23/2007 ---    MAMMOGRAM 2/7/2014 2/7/2013, 1/23/2012, 2/9/2011, 7/9/2010, 7/9/2010, 1/5/2010, 12/23/2009, 12/23/2009, 11/26/2008, 11/26/2008, 12/4/2007, 12/4/2007, 11/10/2006, 10/27/2005    IMM ZOSTER VACCINE 1/23/2017 ---            Current Immunizations     Influenza Vaccine Quad Inj (Pf) 10/1/2016      Below and/or attached are the medications your provider expects you to take. Review all of your home medications and newly ordered medications with your provider and/or pharmacist. Follow medication instructions as directed by your provider and/or pharmacist. Please keep your medication list with you and share with your provider. Update the information when medications are discontinued, doses are changed, or new medications (including over-the-counter products) are added; and carry medication information at all times in the event of emergency situations     Allergies:  No Known Allergies          Medications  Valid as of: May 12, 2017 - 10:19 AM    Generic Name Brand Name Tablet Size Instructions for use    Atorvastatin Calcium (Tab) LIPITOR 10 MG Take 5 mg by mouth every evening.        Cetirizine HCl (Tab) ZYRTEC 10 MG Take 10 mg by mouth every morning. Indications: Hayfever        Citalopram Hydrobromide (Tab) CELEXA 40 MG Take 40 mg by mouth every bedtime.        Docusate Sodium (Cap) COLACE 50 MG Take 50 mg by mouth 2 times a day.        Famotidine (Tab) PEPCID 20 MG Take 1 Tab by mouth 2 times a day.        Lidocaine-Prilocaine (Cream) EMLA 2.5-2.5 % Apply to port 1 hour prior to access of port and cover with plastic wrap.        Loperamide HCl (Cap) IMODIUM 2 MG Give loperamide 4 mg orally first dose, then 2 mg orally with every loose  bowel movement.  Contact physician if maximum of 16 mg/24 hours is exceeded.        Loperamide HCl (Tab) IMODIUM A-D 2 MG Take 1 Tab by mouth See Admin Instructions.        Melatonin (Cap) Melatonin 1 MG Take  by mouth.        Menthol (Lozenge) CEPACOL 3 MG Take 1 Lozenge by mouth as needed.        MetFORMIN HCl (Tab) GLUCOPHAGE 500 MG Take 500 mg by mouth 2 times a day, with meals.        Montelukast Sodium (Tab) SINGULAIR 10 MG Take 10 mg by mouth every bedtime. Indications: Hayfever        Ondansetron HCl (Tab) ZOFRAN 4 MG Take 1 Tab by mouth every four hours as needed for Nausea/Vomiting.        Prochlorperazine Maleate (Tab) COMPAZINE 10 MG Take 1 Tab by mouth every 6 hours as needed.        ValACYclovir HCl (Tab) VALTREX 500 MG Take 500 mg by mouth 2 times a day. 3 day course        .                 Medicines prescribed today were sent to:     [facility] [address] [place] [address] - [address] [facility] [email or web address]    [facility] [place] [address]    Phone: [phone] Fax: [phone]    Open 24 Hours?: No      Medication refill instructions:       If your prescription bottle indicates you have medication refills left, it is not necessary to call your provider’s office. Please contact your pharmacy and they will refill your medication.    If your prescription bottle indicates you do not have any refills left, you may request refills at any time through one of the following ways: The online [employer] system (except Urgent Care), by calling your provider’s office, or by asking your pharmacy to contact your provider’s office with a refill request. Medication refills are processed only during regular business hours and may not be available until the next business day. Your provider may request additional information or to have a follow-up visit with you prior to refilling your medication.   *Please Note: Medication refills are assigned a new Rx number when refilled electronically. Your  pharmacy may indicate that no refills were authorized even though a new prescription for the same medication is available at the pharmacy. Please request the medicine by name with the pharmacy before contacting your provider for a refill.           MyChart Access Code: Activation code not generated  Current [employer] Status: Active

## 2017-05-12 NOTE — MR AVS SNAPSHOT
"Tim Crump   2017 11:30 AM   Office Visit   MRN: [id number]    Department:  [facility]   Dept Phone:  [phone]    Description:  Female : 1955   Provider:  FREDERICK FormanPERIKA.           Reason for Visit     Follow-Up           Allergies as of 2017     No Known Allergies      You were diagnosed with     Cancer of sigmoid colon (CMS-HCC)   [071906]         Vital Signs     Blood Pressure Pulse Temperature Respirations Height Weight    124/80 mmHg 69 36.9 °C (98.5 °F) 16 1.549 m (5' 1\") 75.1 kg (165 lb 9.1 oz)    Body Mass Index Oxygen Saturation Smoking Status             31.30 kg/m2 95% Never Smoker          Basic Information     Date Of Birth Sex Race Ethnicity Preferred Language    1955 Female  or   Origin (Algerian,Lithuanian,North Korean,Paul, etc) English      Your appointments     May 13, 2017  1:30 PM   Est Chemo 2 with RN 2   Infusion Services ([facility])    [address]  [facility] [address]   [phone]            May 15, 2017  1:30 PM   EST Port Flush / Central Line Care with RN 5   [facility] ([facility])    [address] [facility] [address]  [facility] [address]   [phone]            May 26, 2017 10:00 AM   Non Provider 1 with ONC RN 1   [facility] (--)    [address], [facility] [address]  [facility] [address] [phone]           You will be receiving a confirmation call a few days before your appointment from our automated call confirmation system.            May 26, 2017 11:20 AM   ONCOLOGY EST PATIENT 30 MIN with Sonu Gomes M.D.   [facility] (--)    [address], [facility] [address]  [facility] [address] [phone]            May 27, 2017 11:30 AM   Est Chemo 2 with RN 2   Infusion [facility] ([facility])    [address]  [facility] [address]   [phone]            May 29, 2017 11:00 AM   EST Port Flush / Central Line Care with INFUSION QUICK INJECT   [facility] ([facility])    [address] [address]  [place] [address]   [phone]              Problem List              ICD-10-CM Priority Class Noted - Resolved    Cancer of sigmoid colon (CMS-HCC) C18.7   12/7/2016 - Present    Fourth degree hemorrhoids K64.3   12/7/2016 - Present      Health Maintenance        Date Due Completion Dates    IMM DTaP/Tdap/Td Vaccine (1 - Tdap) 1/23/1976 ---    PAP SMEAR 1/23/1978 ---    COLONOSCOPY 1/23/2007 ---    MAMMOGRAM 2/7/2014 2/7/2013, 1/23/2012, 2/9/2011, 7/9/2010, 7/9/2010, 1/5/2010, 12/23/2009, 12/23/2009, 11/26/2008, 11/26/2008, 12/4/2007, 12/4/2007, 11/10/2006, 10/27/2005    IMM ZOSTER VACCINE 1/23/2017 ---            Current Immunizations     Influenza Vaccine Quad Inj (Pf) 10/1/2016      Below and/or attached are the medications your provider expects you to take. Review all of your home medications and newly ordered medications with your provider and/or pharmacist. Follow medication instructions as directed by your provider and/or pharmacist. Please keep your medication list with you and share with your provider. Update the information when medications are discontinued, doses are changed, or new medications (including over-the-counter products) are added; and carry medication information at all times in the event of emergency situations     Allergies:  No Known Allergies          Medications  Valid as of: May 12, 2017 - 12:07 PM    Generic Name Brand Name Tablet Size Instructions for use    Atorvastatin Calcium (Tab) LIPITOR 10 MG Take 5 mg by mouth every evening.        Cetirizine HCl (Tab) ZYRTEC 10 MG Take 10 mg by mouth every morning. Indications: Hayfever        Citalopram Hydrobromide (Tab) CELEXA 40 MG Take 40 mg by mouth every bedtime.        Docusate Sodium (Cap) COLACE 50 MG Take 50 mg by mouth 2 times a day.        Famotidine (Tab) PEPCID 20 MG Take 1 Tab by mouth 2 times a day.        Lidocaine-Prilocaine (Cream) EMLA 2.5-2.5 % Apply to port 1 hour prior to access of port and cover  with plastic wrap.        Loperamide HCl (Cap) IMODIUM 2 MG Give loperamide 4 mg orally first dose, then 2 mg orally with every loose bowel movement.  Contact physician if maximum of 16 mg/24 hours is exceeded.        Loperamide HCl (Tab) IMODIUM A-D 2 MG Take 1 Tab by mouth See Admin Instructions.        Melatonin (Cap) Melatonin 1 MG Take  by mouth.        Menthol (Lozenge) CEPACOL 3 MG Take 1 Lozenge by mouth as needed.        MetFORMIN HCl (Tab) GLUCOPHAGE 500 MG Take 500 mg by mouth 2 times a day, with meals.        Montelukast Sodium (Tab) SINGULAIR 10 MG Take 10 mg by mouth every bedtime. Indications: Hayfever        Ondansetron HCl (Tab) ZOFRAN 4 MG Take 1 Tab by mouth every four hours as needed for Nausea/Vomiting.        Polyethylene Glycol 3350 (Powder) MIRALAX  Take 17 g by mouth every day.        Prochlorperazine Maleate (Tab) COMPAZINE 10 MG Take 1 Tab by mouth every 6 hours as needed.        ValACYclovir HCl (Tab) VALTREX 500 MG Take 500 mg by mouth 2 times a day. 3 day course        .                 Medicines prescribed today were sent to:     [facility] [address] [place] [address], [place] - [address] [facility] [address] [address] [place] [address]    Phone: [phone] Fax: [phone]    Open 24 Hours?: No      Medication refill instructions:       If your prescription bottle indicates you have medication refills left, it is not necessary to call your provider’s office. Please contact your pharmacy and they will refill your medication.    If your prescription bottle indicates you do not have any refills left, you may request refills at any time through one of the following ways: The online [employer] system (except Urgent Care), by calling your provider’s office, or by asking your pharmacy to contact your provider’s office with a refill request. Medication refills are processed only during regular business hours and may not be available until the next business day. Your provider  may request additional information or to have a follow-up visit with you prior to refilling your medication.   *Please Note: Medication refills are assigned a new Rx number when refilled electronically. Your pharmacy may indicate that no refills were authorized even though a new prescription for the same medication is available at the pharmacy. Please request the medicine by name with the pharmacy before contacting your provider for a refill.           [employer] Access Code: Activation code not generated  Current [employer] Status: Active

## 2017-05-12 NOTE — NON-PROVIDER
10:10am Patient presents today for port access/lab draw prior to prechemo appointment with JULIET Acosta. Port accessed using sterile technique with brisk blood return verified.  Labs drawn per written treatment plan. Port flushed with NS and heparin per protocol.  Port deaccessed and site covered with sterile gauze and tape. Patient tolerated well.

## 2017-05-13 ENCOUNTER — OUTPATIENT INFUSION SERVICES (OUTPATIENT)
Dept: ONCOLOGY | Facility: MEDICAL CENTER | Age: 62
End: 2017-05-13
Attending: INTERNAL MEDICINE
Payer: COMMERCIAL

## 2017-05-13 VITALS
OXYGEN SATURATION: 97 % | WEIGHT: 166.45 LBS | HEIGHT: 62 IN | HEART RATE: 88 BPM | TEMPERATURE: 98 F | SYSTOLIC BLOOD PRESSURE: 104 MMHG | BODY MASS INDEX: 30.63 KG/M2 | RESPIRATION RATE: 18 BRPM | DIASTOLIC BLOOD PRESSURE: 66 MMHG

## 2017-05-13 DIAGNOSIS — C18.7 CANCER OF SIGMOID COLON (HCC): ICD-10-CM

## 2017-05-13 PROCEDURE — 700101 HCHG RX REV CODE 250

## 2017-05-13 PROCEDURE — 96367 TX/PROPH/DG ADDL SEQ IV INF: CPT

## 2017-05-13 PROCEDURE — G0498 CHEMO EXTEND IV INFUS W/PUMP: HCPCS

## 2017-05-13 PROCEDURE — 700105 HCHG RX REV CODE 258: Performed by: NURSE PRACTITIONER

## 2017-05-13 PROCEDURE — 96409 CHEMO IV PUSH SNGL DRUG: CPT

## 2017-05-13 PROCEDURE — 700105 HCHG RX REV CODE 258

## 2017-05-13 PROCEDURE — 700111 HCHG RX REV CODE 636 W/ 250 OVERRIDE (IP)

## 2017-05-13 PROCEDURE — 700111 HCHG RX REV CODE 636 W/ 250 OVERRIDE (IP): Performed by: NURSE PRACTITIONER

## 2017-05-13 PROCEDURE — 96375 TX/PRO/DX INJ NEW DRUG ADDON: CPT

## 2017-05-13 PROCEDURE — A4212 NON CORING NEEDLE OR STYLET: HCPCS

## 2017-05-13 RX ORDER — PALONOSETRON 0.05 MG/ML
0.25 INJECTION, SOLUTION INTRAVENOUS ONCE
Status: COMPLETED | OUTPATIENT
Start: 2017-05-13 | End: 2017-05-13

## 2017-05-13 RX ORDER — LIDOCAINE HYDROCHLORIDE 10 MG/ML
INJECTION, SOLUTION INFILTRATION; PERINEURAL
Status: COMPLETED
Start: 2017-05-13 | End: 2017-05-13

## 2017-05-13 RX ADMIN — FLUOROURACIL 725 MG: 50 INJECTION, SOLUTION INTRAVENOUS at 16:09

## 2017-05-13 RX ADMIN — LEUCOVORIN CALCIUM 724 MG: 350 INJECTION, POWDER, LYOPHILIZED, FOR SOLUTION INTRAMUSCULAR; INTRAVENOUS at 14:24

## 2017-05-13 RX ADMIN — PALONOSETRON HYDROCHLORIDE 0.25 MG: 0.25 INJECTION INTRAVENOUS at 14:00

## 2017-05-13 RX ADMIN — LIDOCAINE HYDROCHLORIDE: 10 INJECTION, SOLUTION INFILTRATION; PERINEURAL at 13:39

## 2017-05-13 RX ADMIN — FLUOROURACIL 4345 MG: 50 INJECTION, SOLUTION INTRAVENOUS at 16:48

## 2017-05-13 RX ADMIN — DEXAMETHASONE SODIUM PHOSPHATE 12 MG: 4 INJECTION, SOLUTION INTRAMUSCULAR; INTRAVENOUS at 14:06

## 2017-05-13 ASSESSMENT — PAIN SCALES - GENERAL: PAINLEVEL: NO PAIN

## 2017-05-13 NOTE — PROGRESS NOTES
"Pharmacy Chemotherapy Calculations Note:    Patient Name: Tim Crump        Dx: Colon Cancer     Cycle 8 Previous treatment: C7 = 04/29/17     Protocol: mFOLFOX6   Oxaliplatin (Eloxatin) 85 mg/m2 IV Day 1   04/01/17: dose reduced to 68 mg/m2 (20% dose reduction) d/t neuropathy per Dr. Burgos     04/15/17: HOLD cycle 6 oxaliplatin for significant neuropathy per Dr. Burgos.  MD to reassess next cycle.                04/29/17 Neuropathy improved, adding back oxaliplatin at 20% dose reduction (68 mg/m2) cycle 7   5/131/7 Continuing neuropathy Oxaliplatin to be held.   Leucovorin 400 mg/m2 IV over 2 hrs on Day 1. Run concurrently with oxaliplatin  Fluorouracil 400 mg/m2 IV bolus Day 1 followed by 2400 mg/m2 CIVI over 46 hrs   Q14 days until disease progression or unacceptable toxicity    NCCN Guidelines for Colon Cancer. V.2.2017  Xu T, et al. N Engl J Med. 2004;350:9491-1380  Renetta RAY et al. J Clin Oncol. 2008;26(12):2006-12  Hong VERDUGO, et al. Br J Cancer. 2002;87(4):393-9         /66 mmHg  Pulse 88  Temp(Src) 36.7 °C (98 °F)  Resp 18  Ht 1.57 m (5' 1.81\")  Wt 75.5 kg (166 lb 7.2 oz)  BMI 30.63 kg/m2  SpO2 97% Body surface area is 1.81 meters squared.    05/12/17 ANC ~1900  Plt = 197 k    Hgb = 12.8     SCr = 0.96 mg/dL  CrCl ~74 mL/min    LFT = AST/ALT/AlkPhos/Tbili = 28/25/122/0.5        Leucovorin 400 mg/m² x 1.81 m² = 724 mg   <5% difference, ok to treat with final dose = 724 mg IV    Fluorouracil (5-FU) 400 mg/m² x 1.81 m² = 724 mg   <5% difference, ok to treat with final dose = 725 mg IVP    Fluorouracil (5-FU) 2400 mg/m² x 1.81 m²  = 4344 mg    <5% difference, ok to treat with final dose = 4345 mg CIVI over 46 hrs via CADD pump       Kelly Vega, PharmD                "

## 2017-05-13 NOTE — PROGRESS NOTES
"Pharmacy Chemotherapy Verification  Patient Name: Tim Crump   Dx: Colon Cancer        Protocol: mFOLFOX6   Oxaliplatin (Eloxatin) 85 mg/m2 IV Day 1  --4/1/17 dose reduced 20% by Aubrey MEDRANO for tolerance  -4/15/17 Hold for Cycle 6 to assess neuropathy, MD will reassess with next cycle per Dr. Burgos.   --4/29/17 added back to treatment plan, neuropathy resolved per C Alsop KELSI/Aubrey MEDRANO  --5/13/17 Hold for C8 due to persistent neuropathy per C Alsop KELSI/Mireya MEDRANO  Leucovorin 400 mg/m2 IV over 2 hrs on Day 1. Run concurrently with oxaliplatin  Fluorouracil 400 mg/m2 IV bolus Day 1 followed by 2400 mg/m2 CIVI over 46 hrs    Q14 days until disease progression or unacceptable toxicity    NCCN Guidelines for Colon Cancer. V.2.2017  Xu T, et al. N Engl J Med. 2004;350:7742-3870  Renetta J, et al. J Clin Oncol. 2008;26(12):2006-12  Hong VERDUGO, et al. Br J Cancer. 2002;87(4):393-9    Allergies:  Review of patient's allergies indicates no known allergies.     /66 mmHg  Pulse 88  Temp(Src) 36.7 °C (98 °F)  Resp 18  Ht 1.57 m (5' 1.81\")  Wt 75.5 kg (166 lb 7.2 oz)  BMI 30.63 kg/m2  SpO2 97% Body surface area is 1.81 meters squared.    Labs 5/12/17  ANC ~1900 Plt = 197k   Hgb = 12.8  SCr = 0.96 mg/dL CrCl ~74 mL/min     AST/ALT/AP/Tbili = 28/25/122/0.5        Drug Order   (Drug name, dose, route, IV Fluid & volume, frequency, number of doses) Cycle: 8  Previous treatment: C7 = 4/26/17     Medication = Leucovorin (Wellcovorin)  Base Dose = 400 mg/m2  Calc Dose: Base Dose x 1.81 m2 = 724 mg  Final Dose = 724 mg  Route = IV  Fluid & Volume = D5W 250 mL  Admin Duration = Over 2 hours          <5% difference, OK to treat with final dose   Medication = Fluorouracil (5-FU) bolus  Base Dose = 400 mg/m2  Calc Dose:Base Dose x 1.81 m2 = 724 mg  Final Dose = 725 mg  Route = IVP  Fluid & Volume = in syringe 14.5 mL  Conc = 50 mg/mL  Admin Duration = Over 5 minutes          <5% difference, OK to treat with " final dose   Medication = Fluorouracil (5-FU)  Base Dose = 2400 mg/m2  Calc Dose:Base Dose x 1.81 m2 = 4344 mg  Final Dose = 4345 mg  Route = CIVI  Conc = 50 mg/mL = 86.9 mL   Fluid & Volume = 86.9 mL + 3 mL overfill = 89.9 mL  Admin Duration = Over 46 hours @ 1.9 mL/hr     Via CADD pump for home CIVI       <5% difference, OK to treat with final dose     By my signature below, I confirm this process was performed independently with the BSA and all final chemotherapy dosing calculations congruent. I have reviewed the above chemotherapy order and that my calculation of the final dose and BSA (when applicable) corroborate those calculations of the  pharmacist. Discrepancies of 5% or greater in the written dose have been addressed and documented within the [facility] Progress notes.    Maggie Hoffman, PharmD

## 2017-05-13 NOTE — PROGRESS NOTES
Chemotherapy Verification - PRIMARY RN      Height = 157 cm  Weight = 75.5 kg  BSA = 1.81 m2       Medication: Leucovorin  Dose: 400 mg/m2  Calculated Dose: 724 mg                              (In mg/m2, AUC, mg/kg)     Medication: Fluorouracil Injection  Dose: 400 mg/m2  Calculated Dose: 724 mg                             (In mg/m2, AUC, mg/kg)    Medication: Fluorouracil Infusion (CADD pump)  Dose: 2400 mg/m2  Calculated Dose: 4344 mg over 46 hours                            (In mg/m2, AUC, mg/kg)        I confirm this process was performed independently with the BSA and all final chemotherapy dosing calculations congruent.  Any discrepancies of 5% or greater have been addressed with the chemotherapy pharmacist. The resolution of the discrepancy has been documented in the EPIC progress notes.

## 2017-05-13 NOTE — PROGRESS NOTES
Chemotherapy Verification - SECONDARY RN       Height = 157 cm  Weight = 75.5 kg  BSA = 1.81 m2       Medication: Fluorouracil  Dose: 400 mg/m2  Calculated Dose: 724 mg                             (In mg/m2, AUC, mg/kg)     Medication: Fluorouracil  Dose: 2400 mg/m2  Calculated Dose: 4344 mg                             (In mg/m2, AUC, mg/kg)    I confirm that this process was performed independently.

## 2017-05-14 NOTE — PROGRESS NOTES
Pt presented to infusion center with her son for D1C8 Folfox, however oxaliplatin to be held due to severe cold intolerance per Iliana DOLAN. Pt denies any current s/s of infection or open wounds. Labs previously drawn at MD office yesterday, reviewed, pt OK for treatment. Right chest port in place, pt forgot to put on EMLA cream so lidocaine administered per request. Port accessed in sterile manner, brisk blood return observed. Pre-meds given per orders. Leucovorin infused. 5FU push given over 5 mins, with positive blood return observed after every 2 ml's, no s/s of adverse reaction. Line flushed and brisk blood return observed. 5FU home CADD pump connected to run over 46 hours, 87.5 ml with overfill, 86.9 ml to be delivered. Pump on, all alarms off, all clamps open, put in pt's carrying pouch, extra batteries present, labeled bag returned to pharmacy. Changed de-access appt time for this and next cycle to match hook up times. Pt left on foot with son in great spirits.

## 2017-05-15 ENCOUNTER — OUTPATIENT INFUSION SERVICES (OUTPATIENT)
Dept: ONCOLOGY | Facility: MEDICAL CENTER | Age: 62
End: 2017-05-15
Attending: INTERNAL MEDICINE
Payer: COMMERCIAL

## 2017-05-15 VITALS
BODY MASS INDEX: 30.91 KG/M2 | TEMPERATURE: 97.1 F | DIASTOLIC BLOOD PRESSURE: 62 MMHG | OXYGEN SATURATION: 95 % | HEART RATE: 84 BPM | RESPIRATION RATE: 18 BRPM | WEIGHT: 167.99 LBS | HEIGHT: 62 IN | SYSTOLIC BLOOD PRESSURE: 112 MMHG

## 2017-05-15 DIAGNOSIS — C18.7 CANCER OF SIGMOID COLON (HCC): ICD-10-CM

## 2017-05-15 PROCEDURE — 96523 IRRIG DRUG DELIVERY DEVICE: CPT

## 2017-05-15 PROCEDURE — 700101 HCHG RX REV CODE 250: Performed by: NURSE PRACTITIONER

## 2017-05-15 PROCEDURE — 700111 HCHG RX REV CODE 636 W/ 250 OVERRIDE (IP)

## 2017-05-15 RX ADMIN — Medication 20 ML: at 15:35

## 2017-05-15 RX ADMIN — HEPARIN 500 UNITS: 100 SYRINGE at 15:36

## 2017-05-15 ASSESSMENT — PAIN SCALES - GENERAL: PAINLEVEL: NO PAIN

## 2017-05-15 NOTE — PROGRESS NOTES
Pt ambulated into department to have her 5FU pump de-accessed. Pt reported that the home infusion was uneventful and tolerated well, stated that her pump started to beep at 15:00. Pump reviewed by RN, display showed that there was 0 ml left in bag and that 87.5 ml's were delivered, no medication appeared to remain in chamber . RN disconnected Pt from home infusion. Port had + blood return, flushed per [facility] policy, de-accessed, needle intact, insertion site covered with sterile gauze and paper tape. Future appointments confirmed with Pt prior to departure. Pt left department by self appearing in good spirits and NAD.    Pt's pump and personal carrier stored in [facility] pharmacy for next infusion.

## 2017-05-25 DIAGNOSIS — C18.7 CANCER OF SIGMOID COLON (HCC): ICD-10-CM

## 2017-05-26 ENCOUNTER — HOSPITAL ENCOUNTER (OUTPATIENT)
Facility: MEDICAL CENTER | Age: 62
End: 2017-05-26
Attending: NURSE PRACTITIONER
Payer: COMMERCIAL

## 2017-05-26 ENCOUNTER — NON-PROVIDER VISIT (OUTPATIENT)
Dept: HEMATOLOGY ONCOLOGY | Facility: MEDICAL CENTER | Age: 62
End: 2017-05-26
Payer: MEDICAID

## 2017-05-26 ENCOUNTER — OFFICE VISIT (OUTPATIENT)
Dept: HEMATOLOGY ONCOLOGY | Facility: MEDICAL CENTER | Age: 62
End: 2017-05-26
Payer: MEDICAID

## 2017-05-26 VITALS
RESPIRATION RATE: 16 BRPM | HEART RATE: 80 BPM | OXYGEN SATURATION: 96 % | SYSTOLIC BLOOD PRESSURE: 138 MMHG | TEMPERATURE: 97.5 F | DIASTOLIC BLOOD PRESSURE: 78 MMHG

## 2017-05-26 VITALS
HEART RATE: 80 BPM | HEIGHT: 62 IN | RESPIRATION RATE: 16 BRPM | OXYGEN SATURATION: 96 % | BODY MASS INDEX: 30.31 KG/M2 | SYSTOLIC BLOOD PRESSURE: 138 MMHG | DIASTOLIC BLOOD PRESSURE: 78 MMHG | TEMPERATURE: 97.6 F | WEIGHT: 164.68 LBS

## 2017-05-26 DIAGNOSIS — C18.7 CANCER OF SIGMOID COLON (HCC): ICD-10-CM

## 2017-05-26 LAB
ALBUMIN SERPL BCP-MCNC: 3.6 G/DL (ref 3.2–4.9)
ALBUMIN/GLOB SERPL: 1.3 G/DL
ALP SERPL-CCNC: 104 U/L (ref 30–99)
ALT SERPL-CCNC: 21 U/L (ref 2–50)
ANION GAP SERPL CALC-SCNC: 6 MMOL/L (ref 0–11.9)
AST SERPL-CCNC: 26 U/L (ref 12–45)
BASOPHILS # BLD AUTO: 1.4 % (ref 0–1.8)
BASOPHILS # BLD: 0.06 K/UL (ref 0–0.12)
BILIRUB SERPL-MCNC: 0.5 MG/DL (ref 0.1–1.5)
BUN SERPL-MCNC: 15 MG/DL (ref 8–22)
CALCIUM SERPL-MCNC: 9.2 MG/DL (ref 8.5–10.5)
CHLORIDE SERPL-SCNC: 107 MMOL/L (ref 96–112)
CO2 SERPL-SCNC: 24 MMOL/L (ref 20–33)
CREAT SERPL-MCNC: 0.82 MG/DL (ref 0.5–1.4)
EOSINOPHIL # BLD AUTO: 0.37 K/UL (ref 0–0.51)
EOSINOPHIL NFR BLD: 8.7 % (ref 0–6.9)
ERYTHROCYTE [DISTWIDTH] IN BLOOD BY AUTOMATED COUNT: 51.8 FL (ref 35.9–50)
GFR SERPL CREATININE-BSD FRML MDRD: >60 ML/MIN/1.73 M 2
GLOBULIN SER CALC-MCNC: 2.7 G/DL (ref 1.9–3.5)
GLUCOSE SERPL-MCNC: 132 MG/DL (ref 65–99)
HCT VFR BLD AUTO: 34.9 % (ref 37–47)
HGB BLD-MCNC: 12.1 G/DL (ref 12–16)
IMM GRANULOCYTES # BLD AUTO: 0 K/UL (ref 0–0.11)
IMM GRANULOCYTES NFR BLD AUTO: 0 % (ref 0–0.9)
LYMPHOCYTES # BLD AUTO: 1.53 K/UL (ref 1–4.8)
LYMPHOCYTES NFR BLD: 36.1 % (ref 22–41)
MCH RBC QN AUTO: 31.4 PG (ref 27–33)
MCHC RBC AUTO-ENTMCNC: 34.7 G/DL (ref 33.6–35)
MCV RBC AUTO: 90.6 FL (ref 81.4–97.8)
MONOCYTES # BLD AUTO: 0.69 K/UL (ref 0–0.85)
MONOCYTES NFR BLD AUTO: 16.3 % (ref 0–13.4)
NEUTROPHILS # BLD AUTO: 1.59 K/UL (ref 2–7.15)
NEUTROPHILS NFR BLD: 37.5 % (ref 44–72)
NRBC # BLD AUTO: 0 K/UL
NRBC BLD AUTO-RTO: 0 /100 WBC
PLATELET # BLD AUTO: 189 K/UL (ref 164–446)
PMV BLD AUTO: 8.4 FL (ref 9–12.9)
POTASSIUM SERPL-SCNC: 3.5 MMOL/L (ref 3.6–5.5)
PROT SERPL-MCNC: 6.3 G/DL (ref 6–8.2)
RBC # BLD AUTO: 3.85 M/UL (ref 4.2–5.4)
SODIUM SERPL-SCNC: 137 MMOL/L (ref 135–145)
WBC # BLD AUTO: 4.2 K/UL (ref 4.8–10.8)

## 2017-05-26 PROCEDURE — 36591 DRAW BLOOD OFF VENOUS DEVICE: CPT | Performed by: INTERNAL MEDICINE

## 2017-05-26 PROCEDURE — 85025 COMPLETE CBC W/AUTO DIFF WBC: CPT

## 2017-05-26 PROCEDURE — 80053 COMPREHEN METABOLIC PANEL: CPT

## 2017-05-26 PROCEDURE — 99214 OFFICE O/P EST MOD 30 MIN: CPT | Performed by: INTERNAL MEDICINE

## 2017-05-26 ASSESSMENT — PAIN SCALES - GENERAL
PAINLEVEL: NO PAIN
PAINLEVEL: NO PAIN

## 2017-05-26 NOTE — PROGRESS NOTES
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    History of Presenting Illness:  Tim Crump is a 62-year-old female diagnosed in 5/2016 with colon cancer.  She was diagnosed secondary to positive call occult testing and alteration in her bowel movements. Colonoscopy showed a polyp in the sigmoid colon which was removed. Pathology was positive for poorly differentiated adenocarcinoma with indeterminate margins. In underwent a sigmoidoscopy showed tattooed polyp stalk without any evidence of disease. Imaging showed no evidence of metastatic disease. Her CEA was 1 at diagnosis. She is s/p laparoscopic sigmoid resection with low anterior pelvic anastomosis on 12/2016. Pathology showed evidence of local malignancy but she was found to have 1/13 nodes positive and was staged pT1 pN1a and MLH1, MSH2, MSH6 and PMS2 are intact. Staging workup was negative for metastatic disease. She was started on adjuvant chemotherapy with modified FOLFOX in 2/2/17.  She had recently good tolerance to treatment except severe of cold sensitivity. Oxaliplatin was held intermittently over the past 2 months due to severe cold sensitivity and some component of reversible peripheral neuropathy. She did not get oxaliplatin with the last dose and denies any acute complaints  Past Medical History:      Past Medical History   Diagnosis Date   • Snoring      no sleep study   • Arthritis      left knee   • Diabetes (CMS-HCC)      oral meds   • Bowel habit changes      diarrhea   • Psychiatric problem      anxiety   • Hypertension    • Cancer (CMS-HCC)      colon   • High cholesterol    • Pain 12-     left wrist and knee, 3-4/10   • H/O seasonal allergies        Past surgical history:       Past Surgical History   Procedure Laterality Date   • Gyn surgery  2007     hysterectomy   • Gyn surgery         x 2   • Other  1980     varicose vein stripping rubina   • Other orthopedic surgery Left      left wrist   • Low anterior resection laparoscopic  2016     Procedure: LOW ANTERIOR RESECTION LAPAROSCOPIC;  Surgeon: Enoch Shaw M.D.;  Location: SURGERY [facility];  Service:        Allergies:       Review of patient's allergies indicates no known allergies.    Medications:         Current Outpatient Prescriptions   Medication Sig Dispense Refill   • polyethylene glycol 3350 (MIRALAX) Powder Take 17 g by mouth every day.     • docusate sodium (COLACE) 50 MG Cap Take 50 mg by mouth 2 times a day.     • famotidine (PEPCID) 20 MG Tab Take 1 Tab by mouth 2 times a day. 60 Tab 2   • loperamide (IMODIUM A-D) 2 MG tablet Take 1 Tab by mouth See Admin Instructions. 30 Tab 6   • menthol (CEPACOL) 3 MG lozenge Take 1 Lozenge by mouth as needed.     • Melatonin 1 MG Cap Take  by mouth.     • ondansetron (ZOFRAN) 4 MG Tab tablet Take 1 Tab by mouth every four hours as needed for Nausea/Vomiting. 30 Tab 3   • prochlorperazine (COMPAZINE) 10 MG Tab Take 1 Tab by mouth every 6 hours as needed. 30 Tab 3   • lidocaine-prilocaine (EMLA) 2.5-2.5 % Cream Apply to port 1 hour prior to access of port and cover with plastic wrap. 1 Tube 3   • loperamide (IMODIUM) 2 MG Cap Give loperamide 4 mg orally first dose, then 2 mg orally with every loose bowel movement.  Contact physician if maximum of 16 mg/24 hours is exceeded. 30 Cap 3   • valacyclovir (VALTREX) 500 MG Tab Take 500 mg by mouth 2 times a day. 3 day course     • atorvastatin (LIPITOR) 10 MG Tab Take 5 mg by mouth every evening.     • montelukast (SINGULAIR) 10 MG Tab Take 10 mg by mouth every bedtime. Indications: Hayfever     • cetirizine (ZYRTEC) 10 MG Tab Take 10 mg by mouth every morning. Indications: Hayfever     • metformin (GLUCOPHAGE) 500 MG TABS Take 500 mg by mouth 2 times a day, with meals.     • citalopram (CELEXA) 40 MG TABS Take 40 mg by mouth  "every bedtime.       No current facility-administered medications for this visit.         Social History:     Social History     Social History   • Marital Status:      Spouse Name: N/A   • Number of Children: N/A   • Years of Education: N/A     Occupational History   • Not on file.     Social History Main Topics   • Smoking status: Never Smoker    • Smokeless tobacco: Never Used   • Alcohol Use: No   • Drug Use: No   • Sexual Activity: Not on file     Other Topics Concern   • Not on file     Social History Narrative       Family History:      Family History   Problem Relation Age of Onset   • Cancer Paternal Aunt        Review of Systems:  All other review of systems are negative except what was mentioned above in the HPI.    Constitutional: Negative for fever, chills, weight loss and malaise/fatigue.    HEENT: No new auditory or visual complaints. No sore throat and neck pain.     Respiratory: Negative for cough, sputum production, shortness of breath and wheezing.    Cardiovascular: Negative for chest pain, palpitations, orthopnea and leg swelling.    Gastrointestinal: Negative for heartburn, nausea, vomiting and abdominal pain.    Genitourinary: Negative for dysuria, hematuria    Musculoskeletal: No new arthralgias or myalgias   Skin: Negative for rash and itching.    Neurological: Negative for focal weakness and headaches.    Endo/Heme/Allergies: No abnormal bleed/bruise.    Psychiatric/Behavioral: No new depression/anxiety.    Physical Exam:  Vitals: /78 mmHg  Pulse 80  Temp(Src) 36.4 °C (97.6 °F)  Resp 16  Ht 1.57 m (5' 1.81\")  Wt 74.7 kg (164 lb 10.9 oz)  BMI 30.31 kg/m2  SpO2 96%  Breastfeeding? No    General: Not in acute distress, alert and oriented x 3  HEENT: No pallor, icterus. Oropharynx clear.   Neck: Supple, no palpable masses.  Lymph nodes: No palpable cervical, supraclavicular, axillary or inguinal lymphadenopathy.    CVS: regular rate and rhythm, no rubs or gallops  RESP: " Clear to auscultate bilaterally, no wheezing or crackles.   ABD: Soft, non tender, non distended, positive bowel sounds, no palpable organomegaly  EXT: No edema or cyanosis  CNS: Alert and oriented x3, No focal deficits.  Skin- No rash      Labs:   Hospital Outpatient Visit on 05/26/2017   Component Date Value Ref Range Status   • WBC 05/26/2017 4.2* 4.8 - 10.8 K/uL Final   • RBC 05/26/2017 3.85* 4.20 - 5.40 M/uL Final   • Hemoglobin 05/26/2017 12.1  12.0 - 16.0 g/dL Final   • Hematocrit 05/26/2017 34.9* 37.0 - 47.0 % Final   • MCV 05/26/2017 90.6  81.4 - 97.8 fL Final   • MCH 05/26/2017 31.4  27.0 - 33.0 pg Final   • MCHC 05/26/2017 34.7  33.6 - 35.0 g/dL Final   • RDW 05/26/2017 51.8* 35.9 - 50.0 fL Final   • Platelet Count 05/26/2017 189  164 - 446 K/uL Final   • MPV 05/26/2017 8.4* 9.0 - 12.9 fL Final   • Neutrophils-Polys 05/26/2017 37.50* 44.00 - 72.00 % Final   • Lymphocytes 05/26/2017 36.10  22.00 - 41.00 % Final   • Monocytes 05/26/2017 16.30* 0.00 - 13.40 % Final   • Eosinophils 05/26/2017 8.70* 0.00 - 6.90 % Final   • Basophils 05/26/2017 1.40  0.00 - 1.80 % Final   • Immature Granulocytes 05/26/2017 0.00  0.00 - 0.90 % Final   • Nucleated RBC 05/26/2017 0.00   Final   • Neutrophils (Absolute) 05/26/2017 1.59* 2.00 - 7.15 K/uL Final    Includes immature neutrophils, if present.   • Lymphs (Absolute) 05/26/2017 1.53  1.00 - 4.80 K/uL Final   • Monos (Absolute) 05/26/2017 0.69  0.00 - 0.85 K/uL Final   • Eos (Absolute) 05/26/2017 0.37  0.00 - 0.51 K/uL Final   • Baso (Absolute) 05/26/2017 0.06  0.00 - 0.12 K/uL Final   • Immature Granulocytes (abs) 05/26/2017 0.00  0.00 - 0.11 K/uL Final   • NRBC (Absolute) 05/26/2017 0.00   Final   • Sodium 05/26/2017 137  135 - 145 mmol/L Final   • Potassium 05/26/2017 3.5* 3.6 - 5.5 mmol/L Final   • Chloride 05/26/2017 107  96 - 112 mmol/L Final   • Co2 05/26/2017 24  20 - 33 mmol/L Final   • Anion Gap 05/26/2017 6.0  0.0 - 11.9 Final   • Glucose 05/26/2017 132* 65 - 99  mg/dL Final   • Bun 05/26/2017 15  8 - 22 mg/dL Final   • Creatinine 05/26/2017 0.82  0.50 - 1.40 mg/dL Final   • Calcium 05/26/2017 9.2  8.5 - 10.5 mg/dL Final   • AST(SGOT) 05/26/2017 26  12 - 45 U/L Final   • ALT(SGPT) 05/26/2017 21  2 - 50 U/L Final   • Alkaline Phosphatase 05/26/2017 104* 30 - 99 U/L Final   • Total Bilirubin 05/26/2017 0.5  0.1 - 1.5 mg/dL Final   • Albumin 05/26/2017 3.6  3.2 - 4.9 g/dL Final   • Total Protein 05/26/2017 6.3  6.0 - 8.2 g/dL Final   • Globulin 05/26/2017 2.7  1.9 - 3.5 g/dL Final   • A-G Ratio 05/26/2017 1.3   Final   • GFR If  05/26/2017 >60  >60 mL/min/1.73 m 2 Final   • GFR If Non  05/26/2017 >60  >60 mL/min/1.73 m 2 Final             Assessment and Plan:  1. 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 She has started adjuvant chemotherapy with FOLFOX. Oxaliplatin was on hold intermittently due to severe cold sensitivity/neuropathy which is reversible . She will receive cycle #5, day one dose next week with the oxaliplatin. Counts are adequate. I informed her that most patients tolerate single agent 5-FU much better. Informed her that we are awaiting the [facility] results about 3 months versus 6 months. FOLFOX for low risk colon carcinoma  2. GERD:  continue  Pepcid  3. Chemotherapy-induced emesis-she will receive Emend with oxaliplatin.    She agreed and verbalized her agreement and understanding with the current plan.  I answered all questions and concerns she has at this time         Please note that this dictation was created using voice recognition software. I have made every reasonable attempt to correct obvious errors, but I expect that there are errors of grammar and possibly content that I did not discover before finalizing the note.      SIGNATURES:  Sonu Gomes    CC:  William Naranjo D.O.  No ref. provider found

## 2017-05-26 NOTE — MR AVS SNAPSHOT
"Tim Crump   2017 11:20 AM   Office Visit   MRN: [id number]    Department:  [facility]   Dept Phone:  [phone]    Description:  Female : 1955   Provider:  Sonu Gomes M.D.           Reason for Visit     Follow-Up Prechemo      Allergies as of 2017     No Known Allergies      Vital Signs     Blood Pressure Pulse Temperature Respirations Height Weight    138/78 mmHg 80 36.4 °C (97.6 °F) 16 1.57 m (5' 1.81\") 74.7 kg (164 lb 10.9 oz)    Body Mass Index Oxygen Saturation Breastfeeding? Smoking Status          30.31 kg/m2 96% No Never Smoker         Basic Information     Date Of Birth Sex Race Ethnicity Preferred Language    1955 Female  or   Origin (Yi,Gibraltarian,Saudi Arabian,Malawian, etc) English      Your appointments     May 26, 2017 11:20 AM   ONCOLOGY EST PATIENT 30 MIN with Sonu Gomes M.D.   [facility] (--)    [address] [place] [address], [address]  [facility] [address]   [phone]            May 27, 2017 11:30 AM   Est Chemo 2 with RN 2   Infusion Services ([facility])    [address]  [facility] [address]   [phone]            May 29, 2017  4:00 PM   EST Port Flush / Central Line Care with RN 6   Infusion Services ([facility])    [address]  [facility] [address]   [phone]              Problem List              ICD-10-CM Priority Class Noted - Resolved    Cancer of sigmoid colon (CMS-HCC) C18.7   2016 - Present    Fourth degree hemorrhoids K64.3   2016 - Present      Health Maintenance        Date Due Completion Dates    IMM DTaP/Tdap/Td Vaccine (1 - Tdap) 1976 ---    PAP SMEAR 1978 ---    COLONOSCOPY 2007 ---    MAMMOGRAM 2014, 2012, 2011, 2010, 2010, 2010, 2009, 2009, 2008, 2008, 2007, 2007, 11/10/2006, 10/27/2005    IMM ZOSTER VACCINE 2017 ---            Current Immunizations     Influenza Vaccine Quad Inj " (Pf) 10/1/2016      Below and/or attached are the medications your provider expects you to take. Review all of your home medications and newly ordered medications with your provider and/or pharmacist. Follow medication instructions as directed by your provider and/or pharmacist. Please keep your medication list with you and share with your provider. Update the information when medications are discontinued, doses are changed, or new medications (including over-the-counter products) are added; and carry medication information at all times in the event of emergency situations     Allergies:  No Known Allergies          Medications  Valid as of: May 26, 2017 - 11:12 AM    Generic Name Brand Name Tablet Size Instructions for use    Atorvastatin Calcium (Tab) LIPITOR 10 MG Take 5 mg by mouth every evening.        Cetirizine HCl (Tab) ZYRTEC 10 MG Take 10 mg by mouth every morning. Indications: Hayfever        Citalopram Hydrobromide (Tab) CELEXA 40 MG Take 40 mg by mouth every bedtime.        Docusate Sodium (Cap) COLACE 50 MG Take 50 mg by mouth 2 times a day.        Famotidine (Tab) PEPCID 20 MG Take 1 Tab by mouth 2 times a day.        Lidocaine-Prilocaine (Cream) EMLA 2.5-2.5 % Apply to port 1 hour prior to access of port and cover with plastic wrap.        Loperamide HCl (Cap) IMODIUM 2 MG Give loperamide 4 mg orally first dose, then 2 mg orally with every loose bowel movement.  Contact physician if maximum of 16 mg/24 hours is exceeded.        Loperamide HCl (Tab) IMODIUM A-D 2 MG Take 1 Tab by mouth See Admin Instructions.        Melatonin (Cap) Melatonin 1 MG Take  by mouth.        Menthol (Lozenge) CEPACOL 3 MG Take 1 Lozenge by mouth as needed.        MetFORMIN HCl (Tab) GLUCOPHAGE 500 MG Take 500 mg by mouth 2 times a day, with meals.        Montelukast Sodium (Tab) SINGULAIR 10 MG Take 10 mg by mouth every bedtime. Indications: Hayfever        Ondansetron HCl (Tab) ZOFRAN 4 MG Take 1 Tab by mouth every four  hours as needed for Nausea/Vomiting.        Polyethylene Glycol 3350 (Powder) MIRALAX  Take 17 g by mouth every day.        Prochlorperazine Maleate (Tab) COMPAZINE 10 MG Take 1 Tab by mouth every 6 hours as needed.        ValACYclovir HCl (Tab) VALTREX 500 MG Take 500 mg by mouth 2 times a day. 3 day course        .                 Medicines prescribed today were sent to:     [facility] [address] [place] [address], [place] - [address] AT [facility] [address]    [address] [place] [address]    Phone: [phone] Fax: [phone]    Open 24 Hours?: No      Medication refill instructions:       If your prescription bottle indicates you have medication refills left, it is not necessary to call your provider’s office. Please contact your pharmacy and they will refill your medication.    If your prescription bottle indicates you do not have any refills left, you may request refills at any time through one of the following ways: The online [employer] system (except Urgent Care), by calling your provider’s office, or by asking your pharmacy to contact your provider’s office with a refill request. Medication refills are processed only during regular business hours and may not be available until the next business day. Your provider may request additional information or to have a follow-up visit with you prior to refilling your medication.   *Please Note: Medication refills are assigned a new Rx number when refilled electronically. Your pharmacy may indicate that no refills were authorized even though a new prescription for the same medication is available at the pharmacy. Please request the medicine by name with the pharmacy before contacting your provider for a refill.           [employer] Access Code: Activation code not generated  Current [employer] [employer] Status: Active

## 2017-05-26 NOTE — MR AVS SNAPSHOT
Tim Crump   2017 10:00 AM   Appointment   MRN: [id number]    Department:  [facility]   Dept Phone:  [phone]    Description:  Female : 1955   Provider:  ON[facility] RN 1           Allergies as of 2017     No Known Allergies      Vital Signs     Smoking Status                   Never Smoker            Basic Information     Date Of Birth Sex Race Ethnicity Preferred Language    1955 Female  or   Origin (Mongolian,Eritrean,Maldivian,Paul, etc) English      Your appointments     May 26, 2017 11:20 AM   ONCOLOGY EST PATIENT 30 MIN with Sonu Gomes M.D.   [facility] (--)    [address], [address]  [facility] [id number] [phone]            May 27, 2017 11:30 AM   Est Chemo 2 with RN 2   Infusion Services ([facility])    [address] [facility] [address]  [facility] [id number] [phone]            May 29, 2017  4:00 PM   EST Port Flush / Central Line Care with RN 6   Infusion Services ([facility])    [address] [facility] [address]  [facility] [address] [phone]              Problem List              ICD-10-CM Priority Class Noted - Resolved    Cancer of sigmoid colon (CMS-HCC) C18.7   2016 - Present    Fourth degree hemorrhoids K64.3   2016 - Present      Health Maintenance        Date Due Completion Dates    IMM DTaP/Tdap/Td Vaccine (1 - Tdap) 1976 ---    PAP SMEAR 1978 ---    COLONOSCOPY 2007 ---    MAMMOGRAM 2014, 2012, 2011, 2010, 2010, 2010, 2009, 2009, 2008, 2008, 2007, 2007, 11/10/2006, 10/27/2005    IMM ZOSTER VACCINE 2017 ---            Current Immunizations     Influenza Vaccine Quad Inj (Pf) 10/1/2016      Below and/or attached are the medications your provider expects you to take. Review all of your home medications and newly ordered medications with your provider and/or pharmacist. Follow medication instructions as directed by your provider  and/or pharmacist. Please keep your medication list with you and share with your provider. Update the information when medications are discontinued, doses are changed, or new medications (including over-the-counter products) are added; and carry medication information at all times in the event of emergency situations     Allergies:  No Known Allergies          Medications  Valid as of: May 26, 2017 - 10:32 AM    Generic Name Brand Name Tablet Size Instructions for use    Atorvastatin Calcium (Tab) LIPITOR 10 MG Take 5 mg by mouth every evening.        Cetirizine HCl (Tab) ZYRTEC 10 MG Take 10 mg by mouth every morning. Indications: Hayfever        Citalopram Hydrobromide (Tab) CELEXA 40 MG Take 40 mg by mouth every bedtime.        Docusate Sodium (Cap) COLACE 50 MG Take 50 mg by mouth 2 times a day.        Famotidine (Tab) PEPCID 20 MG Take 1 Tab by mouth 2 times a day.        Lidocaine-Prilocaine (Cream) EMLA 2.5-2.5 % Apply to port 1 hour prior to access of port and cover with plastic wrap.        Loperamide HCl (Cap) IMODIUM 2 MG Give loperamide 4 mg orally first dose, then 2 mg orally with every loose bowel movement.  Contact physician if maximum of 16 mg/24 hours is exceeded.        Loperamide HCl (Tab) IMODIUM A-D 2 MG Take 1 Tab by mouth See Admin Instructions.        Melatonin (Cap) Melatonin 1 MG Take  by mouth.        Menthol (Lozenge) CEPACOL 3 MG Take 1 Lozenge by mouth as needed.        MetFORMIN HCl (Tab) GLUCOPHAGE 500 MG Take 500 mg by mouth 2 times a day, with meals.        Montelukast Sodium (Tab) SINGULAIR 10 MG Take 10 mg by mouth every bedtime. Indications: Hayfever        Ondansetron HCl (Tab) ZOFRAN 4 MG Take 1 Tab by mouth every four hours as needed for Nausea/Vomiting.        Polyethylene Glycol 3350 (Powder) MIRALAX  Take 17 g by mouth every day.        Prochlorperazine Maleate (Tab) COMPAZINE 10 MG Take 1 Tab by mouth every 6 hours as needed.        ValACYclovir HCl (Tab) VALTREX 500 MG  Take 500 mg by mouth 2 times a day. 3 day course        .                 Medicines prescribed today were sent to:     [facility] [id number]  [place], [place] - [address] AT [facility] [address] [address] [address] [place] [address]    Phone: [phone] Fax: [phone]    Open 24 Hours?: No      Medication refill instructions:       If your prescription bottle indicates you have medication refills left, it is not necessary to call your provider’s office. Please contact your pharmacy and they will refill your medication.    If your prescription bottle indicates you do not have any refills left, you may request refills at any time through one of the following ways: The online [facility] system (except Urgent Care), by calling your provider’s office, or by asking your pharmacy to contact your provider’s office with a refill request. Medication refills are processed only during regular business hours and may not be available until the next business day. Your provider may request additional information or to have a follow-up visit with you prior to refilling your medication.   *Please Note: Medication refills are assigned a new Rx number when refilled electronically. Your pharmacy may indicate that no refills were authorized even though a new prescription for the same medication is available at the pharmacy. Please request the medicine by name with the pharmacy before contacting your provider for a refill.           [employer] Access Code: Activation code not generated  Current [employer] Status: Active

## 2017-05-26 NOTE — PROGRESS NOTES
Pt presents ambulatory to clinic for port access with labs and  pre chemo appointment with Dr. Gomes.  She is accompanied by paulo for today's visit.  Plan of care discussed, patient is in agreement.  Pt states she has been feeling well.  Pt with EMLA cream to R chest port site.  Port accessed in sterile fashion; brisk blood return noted.  Labs drawn per orders in [place].  Port flushed per protocol with NS and heparin.  Port then de-accessed, as patient does not have chemo until tomorrow, 5/27.  Port site covered with sterile gauze and tape. Pt tolerated well.    Tim Crump is a 62 y.o. female here for a non-provider visit for: Lab Draws  on 5/26/2017 at 1:40 PM    Procedure Performed: Port Access with Blood Draw: Accessed using sterile technique with brisk blood return verified. Port flushed per protocol with NS and heparin.  Port deaccessed and covered with sterile gauze/paper tape.     Anatomical site: Right Chest    Equipment used: Port Access Kit     Labs drawn: CBC w/diff and CMP    Ordering Provider: JULIET Acosta By: Maria Del Carmen Paulson R.N.

## 2017-05-27 ENCOUNTER — OUTPATIENT INFUSION SERVICES (OUTPATIENT)
Dept: ONCOLOGY | Facility: MEDICAL CENTER | Age: 62
End: 2017-05-27
Attending: INTERNAL MEDICINE
Payer: COMMERCIAL

## 2017-05-27 VITALS
WEIGHT: 167.33 LBS | BODY MASS INDEX: 30.79 KG/M2 | SYSTOLIC BLOOD PRESSURE: 135 MMHG | OXYGEN SATURATION: 96 % | DIASTOLIC BLOOD PRESSURE: 71 MMHG | RESPIRATION RATE: 18 BRPM | TEMPERATURE: 98.3 F | HEART RATE: 76 BPM | HEIGHT: 62 IN

## 2017-05-27 DIAGNOSIS — C18.7 CANCER OF SIGMOID COLON (HCC): ICD-10-CM

## 2017-05-27 PROCEDURE — 700111 HCHG RX REV CODE 636 W/ 250 OVERRIDE (IP): Performed by: INTERNAL MEDICINE

## 2017-05-27 PROCEDURE — G0498 CHEMO EXTEND IV INFUS W/PUMP: HCPCS

## 2017-05-27 PROCEDURE — A4212 NON CORING NEEDLE OR STYLET: HCPCS

## 2017-05-27 PROCEDURE — 700105 HCHG RX REV CODE 258

## 2017-05-27 PROCEDURE — 96375 TX/PRO/DX INJ NEW DRUG ADDON: CPT

## 2017-05-27 PROCEDURE — 96367 TX/PROPH/DG ADDL SEQ IV INF: CPT

## 2017-05-27 PROCEDURE — 700105 HCHG RX REV CODE 258: Performed by: INTERNAL MEDICINE

## 2017-05-27 PROCEDURE — 700111 HCHG RX REV CODE 636 W/ 250 OVERRIDE (IP)

## 2017-05-27 PROCEDURE — 96368 THER/DIAG CONCURRENT INF: CPT

## 2017-05-27 PROCEDURE — 96411 CHEMO IV PUSH ADDL DRUG: CPT

## 2017-05-27 PROCEDURE — 96413 CHEMO IV INFUSION 1 HR: CPT

## 2017-05-27 PROCEDURE — 96415 CHEMO IV INFUSION ADDL HR: CPT

## 2017-05-27 RX ORDER — PALONOSETRON 0.05 MG/ML
0.25 INJECTION, SOLUTION INTRAVENOUS ONCE
Status: COMPLETED | OUTPATIENT
Start: 2017-05-27 | End: 2017-05-27

## 2017-05-27 RX ADMIN — PALONOSETRON HYDROCHLORIDE 0.25 MG: 0.25 INJECTION INTRAVENOUS at 12:37

## 2017-05-27 RX ADMIN — FLUOROURACIL 730 MG: 50 INJECTION, SOLUTION INTRAVENOUS at 16:13

## 2017-05-27 RX ADMIN — DEXAMETHASONE SODIUM PHOSPHATE 12 MG: 4 INJECTION, SOLUTION INTRAMUSCULAR; INTRAVENOUS at 12:18

## 2017-05-27 RX ADMIN — FLUOROURACIL 4390 MG: 50 INJECTION, SOLUTION INTRAVENOUS at 16:26

## 2017-05-27 RX ADMIN — LEUCOVORIN CALCIUM 732 MG: 350 INJECTION, POWDER, LYOPHILIZED, FOR SOLUTION INTRAMUSCULAR; INTRAVENOUS at 13:48

## 2017-05-27 RX ADMIN — SODIUM CHLORIDE 150 MG: 9 INJECTION, SOLUTION INTRAVENOUS at 12:46

## 2017-05-27 RX ADMIN — DEXTROSE MONOHYDRATE 124.44 MG: 50 INJECTION, SOLUTION INTRAVENOUS at 13:49

## 2017-05-27 ASSESSMENT — PAIN SCALES - GENERAL: PAINLEVEL: NO PAIN

## 2017-05-27 NOTE — PROGRESS NOTES
Chemotherapy Verification - SECONDARY RN       Height = 62.21in  Weight = 75.9kg  BSA = 1.82m2       Medication: Oxaliplatin  Dose: 68mg/m2 (80% of original dose of 85mg/m2)  Calculated Dose: 123.76mg                             (In mg/m2, AUC, mg/kg)     Medication: Leucovorin  Dose: 400mg/m2  Calculated Dose: 728mg                             (In mg/m2, AUC, mg/kg)    Medication: Fluorouracil  Dose: 400mg/m2  Calculated Dose: 728mg                             (In mg/m2, AUC, mg/kg)    Medication: Fluorouracil  Dose: 2400mg/m2  Calculated Dose: 4368mg                             (In mg/m2, AUC, mg/kg)        I confirm that this process was performed independently.

## 2017-05-27 NOTE — PROGRESS NOTES
"Pharmacy Chemotherapy Verification  Patient Name: Tim Crump   Dx: Colon Cancer        Protocol: [id number]   Oxaliplatin (Eloxatin) 85 mg/m2 IV Day 1  --4/1/17 dose reduced 20% by Aubrey MEDRANO for tolerance  --4/15/17 Hold for Cycle 6 to assess neuropathy, MD will reassess with next cycle per Dr. Burgos.   --4/29/17 added back to treatment plan, neuropathy resolved per C Alsop APN/Aubrey MEDRANO  --5/13/17 Hold for C8 due to persistent neuropathy per C Alsop APN/Mireya MEDRANO  --5/27/17 Oxaliplatin added back to treatment plan by Mireya MEDRANO for improved neuropathy  Leucovorin 400 mg/m2 IV over 2 hrs on Day 1. Run concurrently with oxaliplatin  Fluorouracil 400 mg/m2 IV bolus Day 1 followed by 2400 mg/m2 CIVI over 46 hrs    Q14 days until disease progression or unacceptable toxicity    NCCN Guidelines for Colon Cancer. V.2.2017  Xu T, et al. N Engl J Med. 2004;350:6801-6002  Renetta RAY et al. J Clin Oncol. 2008;26(12):2006-12  Hong VERDUGO et al. Br J Cancer. 2002;87(4):393-9    Allergies:  Review of patient's allergies indicates no known allergies.     /71 mmHg  Pulse 76  Temp(Src) 36.8 °C (98.3 °F)  Resp 18  Ht 1.58 m (5' 2.21\")  Wt 75.9 kg (167 lb 5.3 oz)  BMI 30.40 kg/m2  SpO2 96% Body surface area is 1.83 meters squared.    Labs 5/26/17  ANC ~1600 Plt = 189k   Hgb = 12.1            SCr = 0.82 mg/dL CrCl ~74 mL/min     AST/ALT/AP = 26/21/104 T bili = 0.5          Drug Order   (Drug name, dose, route, IV Fluid & volume, frequency, number of doses) Cycle: 9  Previous treatment: C8 = 5/13/17     Medication = OXALIplatin (Eloxitan)  Base Dose = 68 mg/m2  Calc Dose: Base Dose x 1.83 m2 = 124.4 mg  Final Dose = 124.44 mg  Route = IV  Fluid & Volume = D5W 250 mL  Admin Duration = Over 2 hours           <5% difference, ok to treat with final dose   Medication = Leucovorin (Wellcovorin)  Base Dose = 400 mg/m2  Calc Dose: Base Dose x 1.83 m2 = 732 mg  Final Dose = 732 mg  Route = IV  Fluid & Volume = " D5W 250 mL  Admin Duration = Over 2 hours    Run concurrently with oxaliplatin      <5% difference, ok to treat with final dose   Medication = Fluorouracil (5-FU) bolus  Base Dose = 400 mg/m2  Calc Dose:Base Dose x 1.83 m2 = 732 mg  Final Dose = 730 mg  Route = IVP  Fluid & Volume = in syringe 14.6 mL  Conc = 50 mg/mL  Admin Duration = Over 5 minutes          <5% difference, ok to treat with final dose   Medication = Fluorouracil (5-FU)  Base Dose = 2400 mg/m2  Calc Dose:Base Dose x 1.83  m2 = 4392 mg  Final Dose = 4390 mg  Route = CIVI  Conc = 50 mg/mL = 87.8 mL   Fluid & Volume = 87.8 mL + 3 mL overfill = 90.8 mL  Admin Duration = Over 46 hours @ 1.9 mL/hr      Via CADD pump for home CIVI         <5% difference, ok to treat with final dose       By my signature below, I confirm this process was performed independently with the BSA and all final chemotherapy dosing calculations congruent. I have reviewed the above chemotherapy order and that my calculation of the final dose and BSA (when applicable) corroborate those calculations of the  pharmacist. Discrepancies of 5% or greater in the written dose have been addressed and documented within the EPIC Progress notes.    Charles Muniz, MeredithD

## 2017-05-27 NOTE — PROGRESS NOTES
"Pharmacy Chemotherapy Verification  Patient Name: Tim Crump   Dx: Colon Cancer        Protocol: [id number]   Oxaliplatin (Eloxatin) 85 mg/m2 IV Day 1  --4/01/17 dose reduced 20% by Aubrey MEDRANO for tolerance  --4/15/17 Hold for Cycle 6 to assess neuropathy, MD will reassess with next cycle per Dr. Burgos.   --4/29/17 added back to treatment plan, neuropathy resolved per C Alsop KELSI/Aubrey MEDRANO  --5/13/17 Hold for C8 due to persistent neuropathy per C Alsop KELSI/Mireya MEDRANO  - 5/27/17 Resume oxaliplatin at 20% dose reduction for improved neuropathy per Dr. Gomes - verified   Leucovorin 400 mg/m2 IV over 2 hrs on Day 1. Run concurrently with oxaliplatin  Fluorouracil 400 mg/m2 IV bolus Day 1 followed by 2400 mg/m2 CIVI over 46 hrs    Q14 days until disease progression or unacceptable toxicity    NCCN Guidelines for Colon Cancer. V.2.2017  Xu T, et al. N Engl J Med. 2004;350:3870-6932  Renetta J et al. J Clin Oncol. 2008;26(12):2006-12  Hong SL, et al. Br J Cancer. 2002;87(4):393-9    Allergies:  Review of patient's allergies indicates no known allergies.     /71 mmHg  Pulse 76  Temp(Src) 36.8 °C (98.3 °F)  Resp 18  Ht 1.58 m (5' 2.21\")  Wt 75.9 kg (167 lb 5.3 oz)  BMI 30.40 kg/m2  SpO2 96% Body surface area is 1.83 meters squared.    Labs 5/26/17  ANC ~1600 Plt = 189k   Hgb = 12.1   SCr = 0.82 mg/dL CrCl ~88 mL/min     AST/ALT/AP/Tbili = 26/21/104/0.5        Drug Order   (Drug name, dose, route, IV Fluid & volume, frequency, number of doses) Cycle: 9  Previous treatment: C8 = 05/13/17     Medication = Oxaliplatin (Eloxatin)  Base Dose = 68 mg/m2  Calc Dose: Base Dose x 1.83 m2 = 124.44 mg  Final Dose = 124.44 mg  Route = IV  Fluid & Volume = D5W 250 mL  Admin Duration = Over 2 hours <5% difference, ok to treat with final dose     Medication = Leucovorin (Wellcovorin)  Base Dose = 400 mg/m2  Calc Dose: Base Dose x 1.83 m2 = 732 mg  Final Dose = 732 mg  Route = IV  Fluid & Volume = D5W " 250 mL  Admin Duration = Over 2 hours          <5% difference, OK to treat with final dose   Medication = Fluorouracil (5-FU) bolus  Base Dose = 400 mg/m2  Calc Dose:Base Dose x 1.83 m2 = 732 mg  Final Dose = 730 mg  Route = IVP  Fluid & Volume = in syringe 14.6 mL  Conc = 50 mg/mL  Admin Duration = Over 5 minutes          <5% difference, OK to treat with final dose   Medication = Fluorouracil (5-FU)  Base Dose = 2400 mg/m2  Calc Dose:Base Dose x 1.83 m2 = 4344 mg  Final Dose = 4390 mg  Route = CIVI  Conc = 50 mg/mL = 87.8 mL   Fluid & Volume = 87.8 mL + 3 mL overfill = 90.8 mL  Admin Duration = Over 46 hours @ 1.9 mL/hr     Via CADD pump for home CIVI       <5% difference, OK to treat with final dose     By my signature below, I confirm this process was performed independently with the BSA and all final chemotherapy dosing calculations congruent. I have reviewed the above chemotherapy order and that my calculation of the final dose and BSA (when applicable) corroborate those calculations of the  pharmacist. Discrepancies of 5% or greater in the written dose have been addressed and documented within the EPIC Progress notes.    Glenda Mendoza, MeredithD

## 2017-05-27 NOTE — PROGRESS NOTES
Returns for Folfox.  Reports resuming Oxaliplatin per MD.  Saw MD yesterday.  Pharm [facility] confirmed with Dr Gomes.  Reports neuropathies gone.  Feels good.  Fatigue remains with good and bad days.  Also does get nauseated, has meds.  Reviewed staying ahead of this. Oxaliplatin, 5FU push and pump attachment without issue.  DC to care of family.

## 2017-05-27 NOTE — PROGRESS NOTES
Chemotherapy Verification - SECONDARY RN       Height = 158 cm  Weight = 75.9 kg  BSA = 1.825 m2       Medication: Oxaliplatin  Dose: 68 mg/m2 dr  Calculated Dose: 124.1 mg                             (In mg/m2, AUC, mg/kg)     Medication: 5FU  Dose: 400 mg/m2  Calculated Dose: 730 mg                             (In mg/m2, AUC, mg/kg)    Medication: Home Infusional 5FU  Dose: 2400 mg/m2  Calculated Dose: 4380 mg                             (In mg/m2, AUC, mg/kg)      I confirm that this process was performed independently.

## 2017-05-29 ENCOUNTER — OUTPATIENT INFUSION SERVICES (OUTPATIENT)
Dept: ONCOLOGY | Facility: MEDICAL CENTER | Age: 62
End: 2017-05-29
Attending: INTERNAL MEDICINE
Payer: COMMERCIAL

## 2017-05-29 VITALS
OXYGEN SATURATION: 96 % | SYSTOLIC BLOOD PRESSURE: 134 MMHG | WEIGHT: 166.89 LBS | HEART RATE: 70 BPM | HEIGHT: 62 IN | RESPIRATION RATE: 18 BRPM | BODY MASS INDEX: 30.71 KG/M2 | DIASTOLIC BLOOD PRESSURE: 75 MMHG | TEMPERATURE: 98 F

## 2017-05-29 DIAGNOSIS — C18.7 CANCER OF SIGMOID COLON (HCC): ICD-10-CM

## 2017-05-29 PROCEDURE — 700111 HCHG RX REV CODE 636 W/ 250 OVERRIDE (IP): Performed by: INTERNAL MEDICINE

## 2017-05-29 PROCEDURE — 96523 IRRIG DRUG DELIVERY DEVICE: CPT

## 2017-05-29 PROCEDURE — 700101 HCHG RX REV CODE 250: Performed by: INTERNAL MEDICINE

## 2017-05-29 RX ADMIN — HEPARIN 500 UNITS: 100 SYRINGE at 16:19

## 2017-05-29 RX ADMIN — Medication 20 ML: at 16:16

## 2017-05-29 ASSESSMENT — PAIN SCALES - GENERAL: PAINLEVEL: NO PAIN

## 2017-05-30 NOTE — PROGRESS NOTES
"Late entry for 1620:  Pt to infusion services ambulatory per self and accompanied by son.  Pt here for disconnect from CIVI of 5FU via CADD pump.  Plan of care reviewed.  Pt verbalizes understanding.  Pt denies any issues with home infusion pump and tells me it alarmed and she stopped pump.  Pump settings verified and pump reservoir displayed to be \"0\" and volume given as \"88.5 mL\".  Pump turned off and disconnected from patient.  Port flushed with normal saline; continued + blood return verified.  Port flushed with normal saline and heparin per policy.  Port de-accessed; port access device intact.  Pressure dressing applied.  Pt released to self care and care of son in no apparent distress after completion of treatment, ambulatory.  Pt to return in 2 weeks; next appointment scheduled and confirmed with patient and son.   "

## 2017-06-08 DIAGNOSIS — C18.7 CANCER OF SIGMOID COLON (HCC): ICD-10-CM

## 2017-06-09 ENCOUNTER — NON-PROVIDER VISIT (OUTPATIENT)
Dept: HEMATOLOGY ONCOLOGY | Facility: MEDICAL CENTER | Age: 62
End: 2017-06-09
Payer: MEDICAID

## 2017-06-09 ENCOUNTER — PATIENT OUTREACH (OUTPATIENT)
Dept: OTHER | Facility: MEDICAL CENTER | Age: 62
End: 2017-06-09

## 2017-06-09 ENCOUNTER — OFFICE VISIT (OUTPATIENT)
Dept: HEMATOLOGY ONCOLOGY | Facility: MEDICAL CENTER | Age: 62
End: 2017-06-09
Payer: MEDICAID

## 2017-06-09 ENCOUNTER — HOSPITAL ENCOUNTER (OUTPATIENT)
Facility: MEDICAL CENTER | Age: 62
End: 2017-06-09
Attending: NURSE PRACTITIONER
Payer: COMMERCIAL

## 2017-06-09 VITALS
DIASTOLIC BLOOD PRESSURE: 100 MMHG | HEIGHT: 62 IN | RESPIRATION RATE: 16 BRPM | OXYGEN SATURATION: 95 % | BODY MASS INDEX: 30.69 KG/M2 | TEMPERATURE: 97.7 F | HEART RATE: 73 BPM | SYSTOLIC BLOOD PRESSURE: 120 MMHG | WEIGHT: 166.78 LBS

## 2017-06-09 VITALS
SYSTOLIC BLOOD PRESSURE: 120 MMHG | HEART RATE: 73 BPM | RESPIRATION RATE: 16 BRPM | TEMPERATURE: 97.7 F | OXYGEN SATURATION: 95 % | DIASTOLIC BLOOD PRESSURE: 100 MMHG

## 2017-06-09 DIAGNOSIS — C18.7 CANCER OF SIGMOID COLON (HCC): ICD-10-CM

## 2017-06-09 LAB
ALBUMIN SERPL BCP-MCNC: 3.7 G/DL (ref 3.2–4.9)
ALBUMIN/GLOB SERPL: 1.5 G/DL
ALP SERPL-CCNC: 120 U/L (ref 30–99)
ALT SERPL-CCNC: 35 U/L (ref 2–50)
ANION GAP SERPL CALC-SCNC: 7 MMOL/L (ref 0–11.9)
AST SERPL-CCNC: 39 U/L (ref 12–45)
BASOPHILS # BLD AUTO: 1.4 % (ref 0–1.8)
BASOPHILS # BLD: 0.08 K/UL (ref 0–0.12)
BILIRUB SERPL-MCNC: 0.6 MG/DL (ref 0.1–1.5)
BUN SERPL-MCNC: 20 MG/DL (ref 8–22)
CALCIUM SERPL-MCNC: 9 MG/DL (ref 8.5–10.5)
CEA SERPL-MCNC: 2.4 NG/ML (ref 0–3)
CHLORIDE SERPL-SCNC: 106 MMOL/L (ref 96–112)
CO2 SERPL-SCNC: 24 MMOL/L (ref 20–33)
CREAT SERPL-MCNC: 0.77 MG/DL (ref 0.5–1.4)
EOSINOPHIL # BLD AUTO: 0.36 K/UL (ref 0–0.51)
EOSINOPHIL NFR BLD: 6.3 % (ref 0–6.9)
ERYTHROCYTE [DISTWIDTH] IN BLOOD BY AUTOMATED COUNT: 49.1 FL (ref 35.9–50)
GFR SERPL CREATININE-BSD FRML MDRD: >60 ML/MIN/1.73 M 2
GLOBULIN SER CALC-MCNC: 2.5 G/DL (ref 1.9–3.5)
GLUCOSE SERPL-MCNC: 127 MG/DL (ref 65–99)
HCT VFR BLD AUTO: 35.6 % (ref 37–47)
HGB BLD-MCNC: 12.2 G/DL (ref 12–16)
IMM GRANULOCYTES # BLD AUTO: 0.02 K/UL (ref 0–0.11)
IMM GRANULOCYTES NFR BLD AUTO: 0.4 % (ref 0–0.9)
LYMPHOCYTES # BLD AUTO: 1.92 K/UL (ref 1–4.8)
LYMPHOCYTES NFR BLD: 33.9 % (ref 22–41)
MCH RBC QN AUTO: 31.6 PG (ref 27–33)
MCHC RBC AUTO-ENTMCNC: 34.3 G/DL (ref 33.6–35)
MCV RBC AUTO: 92.2 FL (ref 81.4–97.8)
MONOCYTES # BLD AUTO: 0.89 K/UL (ref 0–0.85)
MONOCYTES NFR BLD AUTO: 15.7 % (ref 0–13.4)
NEUTROPHILS # BLD AUTO: 2.4 K/UL (ref 2–7.15)
NEUTROPHILS NFR BLD: 42.3 % (ref 44–72)
NRBC # BLD AUTO: 0 K/UL
NRBC BLD AUTO-RTO: 0 /100 WBC
PLATELET # BLD AUTO: 162 K/UL (ref 164–446)
PMV BLD AUTO: 9 FL (ref 9–12.9)
POTASSIUM SERPL-SCNC: 3.7 MMOL/L (ref 3.6–5.5)
PROT SERPL-MCNC: 6.2 G/DL (ref 6–8.2)
RBC # BLD AUTO: 3.86 M/UL (ref 4.2–5.4)
SODIUM SERPL-SCNC: 137 MMOL/L (ref 135–145)
WBC # BLD AUTO: 5.7 K/UL (ref 4.8–10.8)

## 2017-06-09 PROCEDURE — 82378 CARCINOEMBRYONIC ANTIGEN: CPT

## 2017-06-09 PROCEDURE — 99213 OFFICE O/P EST LOW 20 MIN: CPT | Performed by: NURSE PRACTITIONER

## 2017-06-09 PROCEDURE — 85025 COMPLETE CBC W/AUTO DIFF WBC: CPT

## 2017-06-09 PROCEDURE — 36591 DRAW BLOOD OFF VENOUS DEVICE: CPT | Performed by: INTERNAL MEDICINE

## 2017-06-09 PROCEDURE — 80053 COMPREHEN METABOLIC PANEL: CPT

## 2017-06-09 ASSESSMENT — ENCOUNTER SYMPTOMS
COUGH: 1
WHEEZING: 0
SPUTUM PRODUCTION: 0
MYALGIAS: 0
NAUSEA: 1
VOMITING: 0
WEIGHT LOSS: 0
HEMOPTYSIS: 0
TINGLING: 1
DIZZINESS: 0
CONSTIPATION: 0
HEADACHES: 0
CHILLS: 0
SHORTNESS OF BREATH: 1
PALPITATIONS: 0
DIARRHEA: 0
FEVER: 0

## 2017-06-09 ASSESSMENT — PAIN SCALES - GENERAL
PAINLEVEL: NO PAIN
PAINLEVEL: NO PAIN

## 2017-06-09 NOTE — MR AVS SNAPSHOT
Tim Crump   2017 10:00 AM   Appointment   MRN: [id number]    Department:  [facility]   Dept Phone:  [phone]    Description:  Female : 1955   Provider:  ONC RN 1           Allergies as of 2017     No Known Allergies      Vital Signs     Smoking Status                   Never Smoker            Basic Information     Date Of Birth Sex Race Ethnicity Preferred Language    1955 Female  or   Origin (Saudi Arabian,Kittitian,Bahraini,Paul, etc) English      Your appointments     2017 11:00 AM   ONCOLOGY EST PATIENT 30 MIN with LUIS CARLOS Forman   [facility] (--)    [address], [address]  [facility] [id number] [phone]            Clemente 10, 2017 10:30 AM   Est Chemo 2 with RN 4   [facility] ([facility])    [address]  [facility] [address]   [phone]            2017  4:00 PM   EST Port Flush / Central Line Care with RN 2   [facility] ([facility])    [address]  [facility] [address]   [phone]              Problem List              ICD-10-CM Priority Class Noted - Resolved    Cancer of sigmoid colon (CMS-HCC) C18.7   2016 - Present    Fourth degree hemorrhoids K64.3   2016 - Present      Health Maintenance        Date Due Completion Dates    IMM DTaP/Tdap/Td Vaccine (1 - Tdap) 1976 ---    PAP SMEAR 1978 ---    COLONOSCOPY 2007 ---    MAMMOGRAM 2014, 2012, 2011, 2010, 2010, 2010, 2009, 2009, 2008, 2008, 2007, 2007, 11/10/2006, 10/27/2005    IMM ZOSTER VACCINE 2017 ---            Current Immunizations     Influenza Vaccine Quad Inj (Pf) 10/1/2016      Below and/or attached are the medications your provider expects you to take. Review all of your home medications and newly ordered medications with your provider and/or pharmacist. Follow medication instructions as directed by your  provider and/or pharmacist. Please keep your medication list with you and share with your provider. Update the information when medications are discontinued, doses are changed, or new medications (including over-the-counter products) are added; and carry medication information at all times in the event of emergency situations     Allergies:  No Known Allergies          Medications  Valid as of: June 09, 2017 - 10:08 AM    Generic Name Brand Name Tablet Size Instructions for use    Atorvastatin Calcium (Tab) LIPITOR 10 MG Take 5 mg by mouth every evening.        Cetirizine HCl (Tab) ZYRTEC 10 MG Take 10 mg by mouth every morning. Indications: Hayfever        Citalopram Hydrobromide (Tab) CELEXA 40 MG Take 40 mg by mouth every bedtime.        Docusate Sodium (Cap) COLACE 50 MG Take 50 mg by mouth 2 times a day.        Famotidine (Tab) PEPCID 20 MG Take 1 Tab by mouth 2 times a day.        Lidocaine-Prilocaine (Cream) EMLA 2.5-2.5 % Apply to port 1 hour prior to access of port and cover with plastic wrap.        Loperamide HCl (Cap) IMODIUM 2 MG Give loperamide 4 mg orally first dose, then 2 mg orally with every loose bowel movement.  Contact physician if maximum of 16 mg/24 hours is exceeded.        Loperamide HCl (Tab) IMODIUM A-D 2 MG Take 1 Tab by mouth See Admin Instructions.        Melatonin (Cap) Melatonin 1 MG Take  by mouth.        Menthol (Lozenge) CEPACOL 3 MG Take 1 Lozenge by mouth as needed.        MetFORMIN HCl (Tab) GLUCOPHAGE 500 MG Take 500 mg by mouth 2 times a day, with meals.        Montelukast Sodium (Tab) SINGULAIR 10 MG Take 10 mg by mouth every bedtime. Indications: Hayfever        Ondansetron HCl (Tab) ZOFRAN 4 MG Take 1 Tab by mouth every four hours as needed for Nausea/Vomiting.        Polyethylene Glycol 3350 (Powder) MIRALAX  Take 17 g by mouth every day.        Prochlorperazine Maleate (Tab) COMPAZINE 10 MG Take 1 Tab by mouth every 6 hours as needed.        ValACYclovir HCl (Tab) VALTREX  500 MG Take 500 mg by mouth 2 times a day. 3 day course        .                 Medicines prescribed today were sent to:     [email or web address] [address] [place] [address] [place], [place] - [address] AT [facility] & [address] [place] [address] [address] [place] [address]    Phone: [phone] Fax: [phone]    Open 24 Hours?: No      Medication refill instructions:       If your prescription bottle indicates you have medication refills left, it is not necessary to call your provider’s office. Please contact your pharmacy and they will refill your medication.    If your prescription bottle indicates you do not have any refills left, you may request refills at any time through one of the following ways: The online [employer] system (except Urgent Care), by calling your provider’s office, or by asking your pharmacy to contact your provider’s office with a refill request. Medication refills are processed only during regular business hours and may not be available until the next business day. Your provider may request additional information or to have a follow-up visit with you prior to refilling your medication.   *Please Note: Medication refills are assigned a new Rx number when refilled electronically. Your pharmacy may indicate that no refills were authorized even though a new prescription for the same medication is available at the pharmacy. Please request the medicine by name with the pharmacy before contacting your provider for a refill.           [employer] Access Code: Activation code not generated  Current Scheduling [employer] Scheduling Software Status: Active

## 2017-06-09 NOTE — MR AVS SNAPSHOT
"Tim Crump   2017 11:00 AM   Office Visit   MRN: [id number]    Department:  [facility]   Dept Phone:  [phone]    Description:  Female : 1955   Provider:  Iliana Casarez A.PJuanisN.           Reason for Visit     Follow-Up           Allergies as of 2017     No Known Allergies      You were diagnosed with     Cancer of sigmoid colon (CMS-HCC)   [339415]         Vital Signs     Blood Pressure Pulse Temperature Respirations Height Weight    120/100 mmHg 73 36.5 °C (97.7 °F) 16 1.58 m (5' 2.21\") 75.65 kg (166 lb 12.5 oz)    Body Mass Index Oxygen Saturation Smoking Status             30.30 kg/m2 95% Never Smoker          Basic Information     Date Of Birth Sex Race Ethnicity Preferred Language    1955 Female  or   Origin (Urdu,Indonesian,Argentine,Paul, etc) English      Your appointments     Clemente 10, 2017 10:30 AM   Est Chemo 2 with RN 4   Infusion [facility] ([facility])    [address] [address]   [phone]            2017  4:00 PM   EST Port Flush / Central Line Care with RN 2   [facility] ([facility])    [address] [address]   [phone]            2017  8:30 AM   Est Chemo 2 with RN 3   [facility] ([facility])    [address] [address]   [phone]            2017  3:00 PM   EST Port Flush / Central Line Care with INFUSION QUICK INJECT   In[facility] ([facility])    [address] [address]  [place] [address]   [phone]            2017  8:30 AM   Est Chemo 2 with RN 3   [facility] ([facility])    [address] [address]   [phone]            Jul 10, 2017  3:00 PM   EST Port Flush / Central Line Care with INFUSION QUICK INJECT   [facility] ([facility])    [address] [address]   [phone]              Problem List              ICD-10-CM Priority Class Noted - Resolved   "    Cancer of sigmoid colon (CMS-HCC) C18.7   12/7/2016 - Present    Fourth degree hemorrhoids K64.3   12/7/2016 - Present      Health Maintenance        Date Due Completion Dates    IMM DTaP/Tdap/Td Vaccine (1 - Tdap) 1/23/1976 ---    PAP SMEAR 1/23/1978 ---    COLONOSCOPY 1/23/2007 ---    MAMMOGRAM 2/7/2014 2/7/2013, 1/23/2012, 2/9/2011, 7/9/2010, 7/9/2010, 1/5/2010, 12/23/2009, 12/23/2009, 11/26/2008, 11/26/2008, 12/4/2007, 12/4/2007, 11/10/2006, 10/27/2005    IMM ZOSTER VACCINE 1/23/2017 ---            Current Immunizations     Influenza Vaccine Quad Inj (Pf) 10/1/2016      Below and/or attached are the medications your provider expects you to take. Review all of your home medications and newly ordered medications with your provider and/or pharmacist. Follow medication instructions as directed by your provider and/or pharmacist. Please keep your medication list with you and share with your provider. Update the information when medications are discontinued, doses are changed, or new medications (including over-the-counter products) are added; and carry medication information at all times in the event of emergency situations     Allergies:  No Known Allergies          Medications  Valid as of: June 09, 2017 - 11:47 AM    Generic Name Brand Name Tablet Size Instructions for use    Atorvastatin Calcium (Tab) LIPITOR 10 MG Take 5 mg by mouth every evening.        Cetirizine HCl (Tab) ZYRTEC 10 MG Take 10 mg by mouth every morning. Indications: Hayfever        Citalopram Hydrobromide (Tab) CELEXA 40 MG Take 40 mg by mouth every bedtime.        Docusate Sodium (Cap) COLACE 50 MG Take 50 mg by mouth 2 times a day.        Famotidine (Tab) PEPCID 20 MG Take 1 Tab by mouth 2 times a day.        Lidocaine-Prilocaine (Cream) EMLA 2.5-2.5 % Apply to port 1 hour prior to access of port and cover with plastic wrap.        Loperamide HCl (Cap) IMODIUM 2 MG Give loperamide 4 mg orally first dose, then 2 mg orally with every loose  bowel movement.  Contact physician if maximum of 16 mg/24 hours is exceeded.        Loperamide HCl (Tab) IMODIUM A-D 2 MG Take 1 Tab by mouth See Admin Instructions.        maalox plus-benadryl-visc lidocaine (MAGIC MOUTHWASH) MAGIC MOUTHWASH  Take 5 mL by mouth every 6 hours as needed. 1:1:1 ratio        Melatonin (Cap) Melatonin 1 MG Take  by mouth.        Menthol (Lozenge) CEPACOL 3 MG Take 1 Lozenge by mouth as needed.        MetFORMIN HCl (Tab) GLUCOPHAGE 500 MG Take 500 mg by mouth 2 times a day, with meals.        Montelukast Sodium (Tab) SINGULAIR 10 MG Take 10 mg by mouth every bedtime. Indications: Hayfever        Ondansetron HCl (Tab) ZOFRAN 4 MG Take 1 Tab by mouth every four hours as needed for Nausea/Vomiting.        Polyethylene Glycol 3350 (Powder) MIRALAX  Take 17 g by mouth every day.        Prochlorperazine Maleate (Tab) COMPAZINE 10 MG Take 1 Tab by mouth every 6 hours as needed.        ValACYclovir HCl (Tab) VALTREX 500 MG Take 500 mg by mouth 2 times a day. 3 day course        .                 Medicines prescribed today were sent to:     [facility] [address] [place] [address], [place] - [address] AT [facility] & [address] [address] [address] [place] [address]    Phone: [phone] Fax: [phone]    Open 24 Hours?: No      Medication refill instructions:       If your prescription bottle indicates you have medication refills left, it is not necessary to call your provider’s office. Please contact your pharmacy and they will refill your medication.    If your prescription bottle indicates you do not have any refills left, you may request refills at any time through one of the following ways: The online [employer] system (except Urgent Care), by calling your provider’s office, or by asking your pharmacy to contact your provider’s office with a refill request. Medication refills are processed only during regular business hours and may not be available until the next business day. Your provider  may request additional information or to have a follow-up visit with you prior to refilling your medication.   *Please Note: Medication refills are assigned a new Rx number when refilled electronically. Your pharmacy may indicate that no refills were authorized even though a new prescription for the same medication is available at the pharmacy. Please request the medicine by name with the pharmacy before contacting your provider for a refill.           [employer] Access Code: Activation code not generated  Current [employer] Status: Active

## 2017-06-09 NOTE — PROGRESS NOTES
Subjective:      Tim Crump is a 62 y.o. female who presents for Follow-Up for colon cancer.         HPI    Patient seen today in follow-up for colon cancer. She is accompanied by her son for today's visit. Patient is scheduled to receive cycle 10 of FOLFOX tomorrow.    Patient has been tolerating treatment very well. She denies fevers, chills, weight loss or fatigue. She does have some days better than others but it is very tolerable. Patient stated she is has a cough, likely due to allergies that she does experience this more early in the morning. She has noticed some slight increase in shortness of breath at times, per her son the patient stated she doesn't notice. She denies chest pain, heart palpitations or swelling her legs. She does experience some nausea the first week after chemotherapy but not during the second week. Her nausea is controlled with antiemetics. She denies vomiting, diarrhea or constipation. She is voiding without difficulty and denies any pain. She denies rashes or itching. She does have some peeling in her feet and toes which has increased significantly since last seen by me about one month ago. She has experienced persistent peripheral neuropathy, more so with cold sensitivity due to the oxaliplatin. She has been receiving oxaliplatin approximately every other week since cycle 6 with a 20% reduction. She did have oxaliplatin last cycle and stated that the cold sensitivity is present when she touches cold things but the neuropathy is not significant overall. Patient also experiencing some mouth sores at the bottom of her lip. She is using the baking soda and salt water rinses.    No Known Allergies  Current Outpatient Prescriptions on File Prior to Visit   Medication Sig Dispense Refill   • polyethylene glycol 3350 (MIRALAX) Powder Take 17 g by mouth every day.     • docusate sodium (COLACE) 50 MG Cap Take 50 mg by mouth 2 times a day.     • famotidine (PEPCID) 20 MG Tab Take 1  Tab by mouth 2 times a day. 60 Tab 2   • atorvastatin (LIPITOR) 10 MG Tab Take 5 mg by mouth every evening.     • montelukast (SINGULAIR) 10 MG Tab Take 10 mg by mouth every bedtime. Indications: Hayfever     • cetirizine (ZYRTEC) 10 MG Tab Take 10 mg by mouth every morning. Indications: Hayfever     • metformin (GLUCOPHAGE) 500 MG TABS Take 500 mg by mouth 2 times a day, with meals.     • citalopram (CELEXA) 40 MG TABS Take 40 mg by mouth every bedtime.     • loperamide (IMODIUM A-D) 2 MG tablet Take 1 Tab by mouth See Admin Instructions. 30 Tab 6   • menthol (CEPACOL) 3 MG lozenge Take 1 Lozenge by mouth as needed.     • Melatonin 1 MG Cap Take  by mouth.     • ondansetron (ZOFRAN) 4 MG Tab tablet Take 1 Tab by mouth every four hours as needed for Nausea/Vomiting. 30 Tab 3   • prochlorperazine (COMPAZINE) 10 MG Tab Take 1 Tab by mouth every 6 hours as needed. 30 Tab 3   • lidocaine-prilocaine (EMLA) 2.5-2.5 % Cream Apply to port 1 hour prior to access of port and cover with plastic wrap. 1 Tube 3   • loperamide (IMODIUM) 2 MG Cap Give loperamide 4 mg orally first dose, then 2 mg orally with every loose bowel movement.  Contact physician if maximum of 16 mg/24 hours is exceeded. 30 Cap 3   • valacyclovir (VALTREX) 500 MG Tab Take 500 mg by mouth 2 times a day. 3 day course       No current facility-administered medications on file prior to visit.          Review of Systems   Constitutional: Negative for fever, chills, weight loss and malaise/fatigue (some days are better than others but it is tolerable. ).   Respiratory: Positive for cough (in the morning likely d/t allergies) and shortness of breath (sometimes she may have a little SOB per her son). Negative for hemoptysis, sputum production and wheezing.    Cardiovascular: Negative for chest pain, palpitations and leg swelling.   Gastrointestinal: Positive for nausea (first week after chemo but not the second week). Negative for vomiting, diarrhea and  "constipation.   Genitourinary: Negative for dysuria.   Musculoskeletal: Negative for myalgias.   Skin: Negative for rash.   Neurological: Positive for tingling (cold sensitivity in her hands). Negative for dizziness and headaches (normally no HA but she did have one day).          Objective:     /100 mmHg  Pulse 73  Temp(Src) 36.5 °C (97.7 °F)  Resp 16  Ht 1.58 m (5' 2.21\")  Wt 75.65 kg (166 lb 12.5 oz)  BMI 30.30 kg/m2  SpO2 95%     Physical Exam   Constitutional: She is oriented to person, place, and time. She appears well-developed and well-nourished. No distress.   HENT:   Head: Normocephalic and atraumatic.   Mouth/Throat: Oropharynx is clear and moist. No oropharyngeal exudate.   Eyes: Conjunctivae and EOM are normal. Pupils are equal, round, and reactive to light. Right eye exhibits no discharge. Left eye exhibits no discharge. No scleral icterus.   Cardiovascular: Normal rate, regular rhythm, normal heart sounds and intact distal pulses.  Exam reveals no gallop and no friction rub.    No murmur heard.  Pulmonary/Chest: Effort normal and breath sounds normal. No respiratory distress. She has no wheezes.   Abdominal: Soft. Bowel sounds are normal. She exhibits no distension. There is no tenderness.   Musculoskeletal: Normal range of motion. She exhibits no edema or tenderness.   Neurological: She is alert and oriented to person, place, and time.   Skin: Skin is warm and dry. No rash noted. She is not diaphoretic. No erythema. No pallor.   Psychiatric: She has a normal mood and affect. Her behavior is normal.   Vitals reviewed.      Hospital Outpatient Visit on 06/09/2017   Component Date Value Ref Range Status   • Carcinoembryonic Antigen 06/09/2017 2.4  0.0 - 3.0 ng/mL Final    Comment: Effective September 17, 2013 the quantitative determination  of Carcinoembryonic Antigen (CEA) will now be performed at  [facility].  The Access CEA paramagnetic  particle chemiluminescent " immunoassay is used.  Results  obtained with different test methods or kits cannot be used interchangeably.  Measurement of CEA has been shown to be  clinically relevant in the management of patients with  colorectal, breast, lung, prostatic, pancreatic, and ovarian  carcinomas.  Smokers may have slightly elevated levels of CEA.     • WBC 06/09/2017 5.7  4.8 - 10.8 K/uL Final   • RBC 06/09/2017 3.86* 4.20 - 5.40 M/uL Final   • Hemoglobin 06/09/2017 12.2  12.0 - 16.0 g/dL Final   • Hematocrit 06/09/2017 35.6* 37.0 - 47.0 % Final   • MCV 06/09/2017 92.2  81.4 - 97.8 fL Final   • MCH 06/09/2017 31.6  27.0 - 33.0 pg Final   • MCHC 06/09/2017 34.3  33.6 - 35.0 g/dL Final   • RDW 06/09/2017 49.1  35.9 - 50.0 fL Final   • Platelet Count 06/09/2017 162* 164 - 446 K/uL Final   • MPV 06/09/2017 9.0  9.0 - 12.9 fL Final   • Neutrophils-Polys 06/09/2017 42.30* 44.00 - 72.00 % Final   • Lymphocytes 06/09/2017 33.90  22.00 - 41.00 % Final   • Monocytes 06/09/2017 15.70* 0.00 - 13.40 % Final   • Eosinophils 06/09/2017 6.30  0.00 - 6.90 % Final   • Basophils 06/09/2017 1.40  0.00 - 1.80 % Final   • Immature Granulocytes 06/09/2017 0.40  0.00 - 0.90 % Final   • Nucleated RBC 06/09/2017 0.00   Final   • Neutrophils (Absolute) 06/09/2017 2.40  2.00 - 7.15 K/uL Final    Includes immature neutrophils, if present.   • Lymphs (Absolute) 06/09/2017 1.92  1.00 - 4.80 K/uL Final   • Monos (Absolute) 06/09/2017 0.89* 0.00 - 0.85 K/uL Final   • Eos (Absolute) 06/09/2017 0.36  0.00 - 0.51 K/uL Final   • Baso (Absolute) 06/09/2017 0.08  0.00 - 0.12 K/uL Final   • Immature Granulocytes (abs) 06/09/2017 0.02  0.00 - 0.11 K/uL Final   • NRBC (Absolute) 06/09/2017 0.00   Final   • Sodium 06/09/2017 137  135 - 145 mmol/L Final   • Potassium 06/09/2017 3.7  3.6 - 5.5 mmol/L Final   • Chloride 06/09/2017 106  96 - 112 mmol/L Final   • Co2 06/09/2017 24  20 - 33 mmol/L Final   • Anion Gap 06/09/2017 7.0  0.0 - 11.9 Final   • Glucose 06/09/2017 127* 65 -  99 mg/dL Final   • Bun 06/09/2017 20  8 - 22 mg/dL Final   • Creatinine 06/09/2017 0.77  0.50 - 1.40 mg/dL Final   • Calcium 06/09/2017 9.0  8.5 - 10.5 mg/dL Final   • AST(SGOT) 06/09/2017 39  12 - 45 U/L Final   • ALT(SGPT) 06/09/2017 35  2 - 50 U/L Final   • Alkaline Phosphatase 06/09/2017 120* 30 - 99 U/L Final   • Total Bilirubin 06/09/2017 0.6  0.1 - 1.5 mg/dL Final   • Albumin 06/09/2017 3.7  3.2 - 4.9 g/dL Final   • Total Protein 06/09/2017 6.2  6.0 - 8.2 g/dL Final   • Globulin 06/09/2017 2.5  1.9 - 3.5 g/dL Final   • A-G Ratio 06/09/2017 1.5   Final   • GFR If  06/09/2017 >60  >60 mL/min/1.73 m 2 Final   • GFR If Non  06/09/2017 >60  >60 mL/min/1.73 m 2 Final          Assessment/Plan:     1. Cancer of sigmoid colon (CMS-HCC)  REFERRAL TO ONCOLOGY PSYCHOSOCIAL SCREENING Referral to?: Outpatient Oncology Social Worker; Reason for Consult: Oncology Distress Screening Score; This patient has a screening score of (must be 6 or above):: 7; Please indicate the family problems so    maalox plus-benadryl-visc lidocaine (MAGIC MOUTHWASH)     Plan  1. Patient is due to receive cycle 10 of FOLFOX tomorrow. I reviewed her labs, CBC and CMP proceed with treatment as planned.    2. Patient has been receiving oxaliplatin every other cycle with a 20% reduction due to cold sensitivity and neuropathy. She did receive oxaliplatin last cycle and stated the culture sensitivity was a little bit more prominent now. She does not complain of neuropathy at this time only when she touches school things from the refrigerator. I discussed in detail with Dr. Gomes and he has discussed the possibility of discontinuing treatment altogether. He has stated that there is data out that shows patients get good response with 3 months of treatment versus 6 with the treatment. Patient is anxious to stop the planned 12 cycles of treatment and would like to continue as planned. Also after discussion with Dr. Gomes  he stated that patient can discontinue oxaliplatin altogether. I did have that discussion with the patient today and she is aware we will be holding her oxaliplatin dose this cycle. More than likely she will no longer receive oxaliplatin for the duration of her treatment. Patient to complete 12 cycles of treatment.    3. Patient has been experiencing some peeling of her feet and toes. It's pretty significant along all of her toes bilaterally. Her hands look great and are not dry or any cracking and peeling. After further discussion with Dr. Gomes we will proceed with a slight dose reduction of the continuous infusion of the 5-FU from 2400 mg/m² to 2200 mg/m². We will continue with the 5-FU bolus as planned.    4. Patient with mild mouth sores and the bottom lip. She is using baking soda and salt water rinses. I have given patient a per prescription in for Magic mouthwash. She is to use as needed.    5. Patient to follow up in 2 weeks or sooner if needed.

## 2017-06-09 NOTE — PROGRESS NOTES
Met with patient in [facility]. Patient states she is doing well with treatment. Patient denies issues at this time. Patient's son is present and states that he is losing one of his two jobs for the summer. Patient and son admit to being worried about bills will Artie is back down to one job. Patient and patient's son deny immediate needs financially. Discussed following up next week with this ONN and KEYANA Parker, will plan to reach out to patient. Patient and patient's son agreeable.

## 2017-06-09 NOTE — PROGRESS NOTES
Tim Crump is a 62 y.o. female here for a non-provider visit for: Lab Draws  on 6/9/2017 at 0930 PM    Procedure Performed: Pt presents ambulatory to clinic for port access with labs and  pre chemo appointment with Dr. Gomes.  She is accompanied by niece for today's visit.  Plan of care discussed, patient is in agreement.  Pt states she has been feeling well.  Pt with EMLA cream to R chest port site.  Port accessed in sterile fashion; brisk blood return noted.  Labs drawn per orders in [place].  Port flushed per protocol with NS and heparin.  Port then de-accessed, as patient does not have chemo until tomorrow, 6/10.  Port site covered with sterile gauze and tape. Pt tolerated well.    Anatomical site: Right Chest    Equipment used: Port Access Kit     Labs drawn: CBC w/diff, CMP, and CEA    Ordering Provider: JULIET Acosta By: Maria Del Carmen Paulson R.N.

## 2017-06-10 ENCOUNTER — OUTPATIENT INFUSION SERVICES (OUTPATIENT)
Dept: ONCOLOGY | Facility: MEDICAL CENTER | Age: 62
End: 2017-06-10
Attending: INTERNAL MEDICINE
Payer: COMMERCIAL

## 2017-06-10 VITALS
DIASTOLIC BLOOD PRESSURE: 73 MMHG | WEIGHT: 166.89 LBS | RESPIRATION RATE: 18 BRPM | TEMPERATURE: 97.7 F | OXYGEN SATURATION: 96 % | SYSTOLIC BLOOD PRESSURE: 125 MMHG | HEIGHT: 62 IN | HEART RATE: 85 BPM | BODY MASS INDEX: 30.71 KG/M2

## 2017-06-10 DIAGNOSIS — C18.7 CANCER OF SIGMOID COLON (HCC): ICD-10-CM

## 2017-06-10 PROCEDURE — 700105 HCHG RX REV CODE 258

## 2017-06-10 PROCEDURE — 700105 HCHG RX REV CODE 258: Performed by: NURSE PRACTITIONER

## 2017-06-10 PROCEDURE — 96375 TX/PRO/DX INJ NEW DRUG ADDON: CPT

## 2017-06-10 PROCEDURE — A4212 NON CORING NEEDLE OR STYLET: HCPCS

## 2017-06-10 PROCEDURE — 96409 CHEMO IV PUSH SNGL DRUG: CPT

## 2017-06-10 PROCEDURE — 96367 TX/PROPH/DG ADDL SEQ IV INF: CPT

## 2017-06-10 PROCEDURE — G0498 CHEMO EXTEND IV INFUS W/PUMP: HCPCS

## 2017-06-10 PROCEDURE — 700111 HCHG RX REV CODE 636 W/ 250 OVERRIDE (IP): Performed by: NURSE PRACTITIONER

## 2017-06-10 PROCEDURE — 700111 HCHG RX REV CODE 636 W/ 250 OVERRIDE (IP)

## 2017-06-10 RX ORDER — PALONOSETRON 0.05 MG/ML
0.25 INJECTION, SOLUTION INTRAVENOUS ONCE
Status: COMPLETED | OUTPATIENT
Start: 2017-06-10 | End: 2017-06-10

## 2017-06-10 RX ADMIN — FLUOROURACIL 730 MG: 50 INJECTION, SOLUTION INTRAVENOUS at 13:22

## 2017-06-10 RX ADMIN — LEUCOVORIN CALCIUM 728 MG: 350 INJECTION, POWDER, LYOPHILIZED, FOR SOLUTION INTRAMUSCULAR; INTRAVENOUS at 11:02

## 2017-06-10 RX ADMIN — DEXAMETHASONE SODIUM PHOSPHATE 12 MG: 4 INJECTION, SOLUTION INTRAMUSCULAR; INTRAVENOUS at 10:35

## 2017-06-10 RX ADMIN — FLUOROURACIL 4005 MG: 50 INJECTION, SOLUTION INTRAVENOUS at 13:27

## 2017-06-10 RX ADMIN — PALONOSETRON HYDROCHLORIDE 0.25 MG: 0.25 INJECTION INTRAVENOUS at 10:29

## 2017-06-10 ASSESSMENT — PAIN SCALES - GENERAL: PAINLEVEL: NO PAIN

## 2017-06-10 NOTE — PROGRESS NOTES
Pt arrived to [facility], ambulatory with family, for Folfox. Pt states that she is aware that the MD is holding the oxaliplatin for this cycle and potentially all future cycles. Port accessed in sterile manner, positive blood return noted. Labs reviewed from 6/9, pt within parameters to treat today. Pre-medications given with no s/sx of adverse reaction. Leucovorin and 5-FU bolus administered with no s/sx of adverse reaction. 5-FU pump connected with no s/sx of adverse reaction. Pt left [facility] with no s/sx of distress. Follow up appointment confirmed.

## 2017-06-10 NOTE — PROGRESS NOTES
"Pharmacy Chemotherapy Calculations Note:    Patient Name: Tim Crump        Dx: Colon Cancer     Cycle 10 Previous treatment: C 9 = 05/27/17     Protocol: [id number]   Oxaliplatin (Eloxatin) 85 mg/m2 IV Day 1   04/01/17: dose reduced to 68 mg/m2 (20% dose reduction) d/t neuropathy per Dr. Burgos     04/15/17: HOLD cycle 6 oxaliplatin for significant neuropathy per Dr. Burgos.  MD to reassess next cycle.                04/29/17 Neuropathy improved, adding back oxaliplatin at 20% dose reduction (68 mg/m2) cycle 7   5/131/7 Continuing neuropathy Oxaliplatin to be held.   6/10/17 Oxaliplatin discontinued    Leucovorin 400 mg/m2 IV over 2 hrs on Day 1. Run concurrently with oxaliplatin  Fluorouracil 400 mg/m2 IV bolus Day 1 followed by 2400 mg/m2 CIVI over 46 hrs --6/10/17 CIVI dose reduced to 2200 mg/m2 over 46 hours for dermatologic reactions.  Q14 days until disease progression or unacceptable toxicity    NCCN Guidelines for Colon Cancer. V.2.2017  Xu T, et al. N Engl J Med. 2004;350:8216-1327  Renetta J, et al. J Clin Oncol. 2008;26(12):2006-12  Hong SL, et al. Br J Cancer. 2002;87(4):393-9         /73 mmHg  Pulse 85  Temp(Src) 36.5 °C (97.7 °F)  Resp 18  Ht 1.58 m (5' 2.21\")  Wt 75.7 kg (166 lb 14.2 oz)  BMI 30.32 kg/m2  SpO2 96% Body surface area is 1.82 meters squared.    06/9/17 ANC ~2400  Plt = 162 k    Hgb = 12.2     SCr = 0.77 mg/dL  CrCl ~93 mL/min   LFT = AST/ALT/AlkPhos/Tbili = 39/35/120/0.6        Leucovorin 400 mg/m² x 1.82 m² = 728 mg  <5% difference, ok to treat with final dose = 728 mg IV    Fluorouracil (5-FU) 400 mg/m² x 1.82 m² = 728 mg   <5% difference, ok to treat with final dose = 730 mg IVP    Fluorouracil (5-FU) 2200 mg/m² x 1.82 m²  = 4004 mg   <5% difference, ok to treat with final dose = 4005 mg CIVI over 46 hrs via CADD pump       Meredith KiserD                "

## 2017-06-10 NOTE — PROGRESS NOTES
"Pharmacy Chemotherapy Verification  Patient Name: Tim Crump   Dx: Colon Cancer        Protocol: [id number]   Oxaliplatin (Eloxatin) 85 mg/m2 IV Day 1  --4/01/17 dose reduced 20% by Aubrey MEDRANO for tolerance  --4/15/17 Hold for Cycle 6 to assess neuropathy, MD will reassess with next cycle per Dr. Burgos.   --4/29/17 added back to treatment plan, neuropathy resolved per C Alsoyi DOLAN/Aubrey MEDRANO  --5/13/17 Hold for C8 due to persistent neuropathy per C Alsoyi DOLAN/Mireya MEDRANO  - 5/27/17 Resume oxaliplatin at 20% dose reduction for improved neuropathy per Dr. Gomes - verified   -6/10/17 hold oxaliplatin with cycle 10 and possibly for the duration of her treatment per MD  Leucovorin 400 mg/m2 IV over 2 hrs on Day 1. Run concurrently with oxaliplatin  Fluorouracil 400 mg/m2 IV bolus Day 1 followed by 2400 mg/m2 CIVI over 46 hrs-reduced to 2200 mg/m2 with Cycle 10 for peeling of her feet and toes.   Q14 days until disease progression or unacceptable toxicity    NCCN Guidelines for Colon Cancer. V.2.2017  Xu T, et al. N Engl J Med. 2004;350:0096-1280  Renetta J, et al. J Clin Oncol. 2008;26(12):2006-12  Hong VERDUGO, et al. Br J Cancer. 2002;87(4):393-9    Allergies:  Review of patient's allergies indicates no known allergies.     /73 mmHg  Pulse 85  Temp(Src) 36.5 °C (97.7 °F)  Resp 18  Ht 1.58 m (5' 2.21\")  Wt 75.7 kg (166 lb 14.2 oz)  BMI 30.32 kg/m2  SpO2 96% Body surface area is 1.82 meters squared.    Labs 6/9/17:  ANC~ 2400 Plt = 162k   Hgb = 12.2   SCr = 0.77 mg/dL CrCl ~ 93 mL/min   LFT's = WNL except alk phs 120 TBili = 0.6   K+ = 3.7      Drug Order   (Drug name, dose, route, IV Fluid & volume, frequency, number of doses) Cycle: 10  Previous treatment: C9 = 05/27/17     Medication = Leucovorin (Wellcovorin)  Base Dose = 400 mg/m2  Calc Dose: Base Dose x 1.82 m2 = 728 mg  Final Dose = 728 mg  Route = IV  Fluid & Volume = D5W 250 mL  Admin Duration = Over 2 hours          <5% difference, " OK to treat with final dose   Medication = Fluorouracil (5-FU) bolus  Base Dose = 400 mg/m2  Calc Dose:Base Dose x 1.82 m2 = 728 mg  Final Dose = 728 mg  Route = IVP  Fluid & Volume = in syringe 14.6 mL  Conc = 50 mg/mL  Admin Duration = Over 5 minutes          <5% difference, OK to treat with final dose   Medication = Fluorouracil (5-FU)  Base Dose = 2200 mg/m2  Calc Dose:Base Dose x 1.82 m2 = 4004 mg  Final Dose = 4005 mg  Route = CIVI  Conc = 50 mg/mL   Fluid & Volume = 80.1 mL + 3 mL overfill = 83.1 mL  Admin Duration = Over 46 hours @ 1.7 mL/hr     Via CADD pump for home CIVI       <5% difference, OK to treat with final dose     By my signature below, I confirm this process was performed independently with the BSA and all final chemotherapy dosing calculations congruent. I have reviewed the above chemotherapy order and that my calculation of the final dose and BSA (when applicable) corroborate those calculations of the  pharmacist. Discrepancies of 5% or greater in the written dose have been addressed and documented within the [facility] Progress notes.    Kathe Daniel, MeredithD

## 2017-06-10 NOTE — PROGRESS NOTES
Chemotherapy Verification - PRIMARY RN      Height = 158 cm  Weight = 75.7 kg  BSA = 1.82 m2       Medication: Fluorouracil  Dose: 400 mg/m2  Calculated Dose: 728 mg                             (In mg/m2, AUC, mg/kg)     Medication: Fluorouracil  Dose: 2,200 mg/m2  Calculated Dose: 4004 mg (CIVI over 46 hours)                            (In mg/m2, AUC, mg/kg)      I confirm this process was performed independently with the BSA and all final chemotherapy dosing calculations congruent.  Any discrepancies of 5% or greater have been addressed with the chemotherapy pharmacist. The resolution of the discrepancy has been documented in the EPIC progress notes.

## 2017-06-10 NOTE — PROGRESS NOTES
Chemotherapy Verification - SECONDARY RN       Height = 158 cm  Weight = 75.7 kg  BSA = 1.82 m2       Medication: Adrucil  Dose: 400 mg/m2  Calculated Dose: 740 mg                             (In mg/m2, AUC, mg/kg)     Medication: Adrucil  Dose: 2,200 mg/m2 over 46 hours  Calculated Dose: 4,004 mg over 46 hours                             (In mg/m2, AUC, mg/kg)    I confirm that this process was performed independently.

## 2017-06-12 ENCOUNTER — PATIENT OUTREACH (OUTPATIENT)
Dept: HEALTH INFORMATION MANAGEMENT | Facility: OTHER | Age: 62
End: 2017-06-12

## 2017-06-12 ENCOUNTER — PATIENT OUTREACH (OUTPATIENT)
Dept: OTHER | Facility: MEDICAL CENTER | Age: 62
End: 2017-06-12

## 2017-06-12 ENCOUNTER — OUTPATIENT INFUSION SERVICES (OUTPATIENT)
Dept: ONCOLOGY | Facility: MEDICAL CENTER | Age: 62
End: 2017-06-12
Attending: INTERNAL MEDICINE
Payer: COMMERCIAL

## 2017-06-12 VITALS
OXYGEN SATURATION: 94 % | RESPIRATION RATE: 18 BRPM | WEIGHT: 168.21 LBS | SYSTOLIC BLOOD PRESSURE: 102 MMHG | HEIGHT: 62 IN | TEMPERATURE: 98.6 F | HEART RATE: 80 BPM | BODY MASS INDEX: 30.95 KG/M2 | DIASTOLIC BLOOD PRESSURE: 56 MMHG

## 2017-06-12 DIAGNOSIS — C18.7 CANCER OF SIGMOID COLON (HCC): ICD-10-CM

## 2017-06-12 PROCEDURE — 96523 IRRIG DRUG DELIVERY DEVICE: CPT

## 2017-06-12 PROCEDURE — 700111 HCHG RX REV CODE 636 W/ 250 OVERRIDE (IP)

## 2017-06-12 RX ADMIN — HEPARIN 500 UNITS: 100 SYRINGE at 16:15

## 2017-06-12 ASSESSMENT — PAIN SCALES - GENERAL: PAINLEVEL: NO PAIN

## 2017-06-12 NOTE — PROGRESS NOTES
Called patient's son Artie on 6/12/17 with KEYANA Parker. KEYANA Parker discussed with Artie financial status. 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 Artie still maintains his second job but states it does not pay as well. Artie states that he believes that a local [employer] will be helping pay for July's rent. KEYANA Parker requested that if the [employer] is unable to assist with rent then Artie call KEYANA Parker and discuss options for financial assistance. Artie states that they have been going to food leon and otherwise are doing fine. Encouraged Artie to reach back out to this ONN and KEYANA Parker as needed.     After speaking to Artie KEYANA Parker coordinated [facility] check to be picked up from [facility] when patient is back today 6/12/17 for pump disconnection.     Please see KEYANA Parker note for more information.

## 2017-06-12 NOTE — PROGRESS NOTES
On June 12, 2017,  Sophia Parker and Oncology Nurse Navigator Marlyn Bella attempted telephone contact with pt. but was not successful.  KEYANA Parker and SETH Bella contacted pt.'s son via telephone to inquire on pt.'s health.  Pt.'s son, Artie reported things were going ok and they were busy.  Artie reports her mother is going to be at [employer] this afternoon.  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  Artie reports they are losing half of their income for the summer.  Artie also works a full time job for [employer] where he has a monthly income of $2000.  Artie reports they are going to the food pantry as much as they can but are not always successful in getting food from pantry.  Artie reports they were able to request assistance from the [employer] for their July rent but they do not know how they will pay for August.  KEYANA Parker informed Artie she would be requesting financial assistance in the amount of $300 to help with food and gas and stated they will contact [facility] to find out if they can assist family with rent for August.      KEYANA Parker was able to obtain a check in the amount of $300 and left the check at [facility] for pt. to .

## 2017-06-12 NOTE — PROGRESS NOTES
Pt here for pump de-access.  Volume on pump was 0.0 remaining, pump disconnected.  Port flushed per policy and de-accessed, brisk blood return noted.  Handout of future apts given.  Pt left IC with family, no s/s of distress.

## 2017-06-19 ENCOUNTER — PATIENT OUTREACH (OUTPATIENT)
Dept: OTHER | Facility: MEDICAL CENTER | Age: 62
End: 2017-06-19

## 2017-06-19 NOTE — PROGRESS NOTES
On 6/19/17 at 1148 patient's son Artie called and left a voicemail. At 1342 this ONN returned phone call. Artie stated that that his mother would like to do follow-up colonoscopies with a different physician than the physician who initially did the procedure. Artie stated that his mother had seen a physician at [facility]. This ONN instructed Artie to contact his mother's primary care office and request a referral to a different physician. Artie stated his mother would like to see Dr. Caraballo, explained that Dr. Caraballo works at [employer]. Artie made verbal understanding of instructions. Artie confirmed that his and his mother's July rent would be paid for by the [employer]. Encouraged Artie to call should other questions or needs arise.

## 2017-06-22 DIAGNOSIS — C18.7 CANCER OF SIGMOID COLON (HCC): ICD-10-CM

## 2017-06-23 ENCOUNTER — HOSPITAL ENCOUNTER (OUTPATIENT)
Facility: MEDICAL CENTER | Age: 62
End: 2017-06-23
Attending: NURSE PRACTITIONER
Payer: COMMERCIAL

## 2017-06-23 ENCOUNTER — NON-PROVIDER VISIT (OUTPATIENT)
Dept: HEMATOLOGY ONCOLOGY | Facility: MEDICAL CENTER | Age: 62
End: 2017-06-23
Payer: MEDICAID

## 2017-06-23 ENCOUNTER — OFFICE VISIT (OUTPATIENT)
Dept: HEMATOLOGY ONCOLOGY | Facility: MEDICAL CENTER | Age: 62
End: 2017-06-23
Payer: MEDICAID

## 2017-06-23 VITALS
RESPIRATION RATE: 16 BRPM | HEART RATE: 71 BPM | SYSTOLIC BLOOD PRESSURE: 126 MMHG | DIASTOLIC BLOOD PRESSURE: 74 MMHG | OXYGEN SATURATION: 97 % | TEMPERATURE: 98.1 F

## 2017-06-23 VITALS
SYSTOLIC BLOOD PRESSURE: 126 MMHG | TEMPERATURE: 98.1 F | RESPIRATION RATE: 16 BRPM | HEART RATE: 71 BPM | OXYGEN SATURATION: 97 % | WEIGHT: 168.98 LBS | BODY MASS INDEX: 31.1 KG/M2 | HEIGHT: 62 IN | DIASTOLIC BLOOD PRESSURE: 74 MMHG

## 2017-06-23 DIAGNOSIS — C18.7 CANCER OF SIGMOID COLON (HCC): ICD-10-CM

## 2017-06-23 LAB
ALBUMIN SERPL BCP-MCNC: 3.7 G/DL (ref 3.2–4.9)
ALBUMIN/GLOB SERPL: 1.4 G/DL
ALP SERPL-CCNC: 130 U/L (ref 30–99)
ALT SERPL-CCNC: 26 U/L (ref 2–50)
ANION GAP SERPL CALC-SCNC: 8 MMOL/L (ref 0–11.9)
AST SERPL-CCNC: 25 U/L (ref 12–45)
BASOPHILS # BLD AUTO: 1.4 % (ref 0–1.8)
BASOPHILS # BLD: 0.08 K/UL (ref 0–0.12)
BILIRUB SERPL-MCNC: 0.3 MG/DL (ref 0.1–1.5)
BUN SERPL-MCNC: 21 MG/DL (ref 8–22)
CALCIUM SERPL-MCNC: 9.2 MG/DL (ref 8.5–10.5)
CHLORIDE SERPL-SCNC: 106 MMOL/L (ref 96–112)
CO2 SERPL-SCNC: 24 MMOL/L (ref 20–33)
CREAT SERPL-MCNC: 0.93 MG/DL (ref 0.5–1.4)
EOSINOPHIL # BLD AUTO: 0.59 K/UL (ref 0–0.51)
EOSINOPHIL NFR BLD: 10.2 % (ref 0–6.9)
ERYTHROCYTE [DISTWIDTH] IN BLOOD BY AUTOMATED COUNT: 49.4 FL (ref 35.9–50)
GFR SERPL CREATININE-BSD FRML MDRD: >60 ML/MIN/1.73 M 2
GLOBULIN SER CALC-MCNC: 2.6 G/DL (ref 1.9–3.5)
GLUCOSE SERPL-MCNC: 131 MG/DL (ref 65–99)
HCT VFR BLD AUTO: 36 % (ref 37–47)
HGB BLD-MCNC: 12.5 G/DL (ref 12–16)
IMM GRANULOCYTES # BLD AUTO: 0.01 K/UL (ref 0–0.11)
IMM GRANULOCYTES NFR BLD AUTO: 0.2 % (ref 0–0.9)
LYMPHOCYTES # BLD AUTO: 1.76 K/UL (ref 1–4.8)
LYMPHOCYTES NFR BLD: 30.3 % (ref 22–41)
MCH RBC QN AUTO: 32.6 PG (ref 27–33)
MCHC RBC AUTO-ENTMCNC: 34.7 G/DL (ref 33.6–35)
MCV RBC AUTO: 94 FL (ref 81.4–97.8)
MONOCYTES # BLD AUTO: 0.77 K/UL (ref 0–0.85)
MONOCYTES NFR BLD AUTO: 13.3 % (ref 0–13.4)
NEUTROPHILS # BLD AUTO: 2.59 K/UL (ref 2–7.15)
NEUTROPHILS NFR BLD: 44.6 % (ref 44–72)
NRBC # BLD AUTO: 0 K/UL
NRBC BLD AUTO-RTO: 0 /100 WBC
PLATELET # BLD AUTO: 195 K/UL (ref 164–446)
PMV BLD AUTO: 8.9 FL (ref 9–12.9)
POTASSIUM SERPL-SCNC: 3.9 MMOL/L (ref 3.6–5.5)
PROT SERPL-MCNC: 6.3 G/DL (ref 6–8.2)
RBC # BLD AUTO: 3.83 M/UL (ref 4.2–5.4)
SODIUM SERPL-SCNC: 138 MMOL/L (ref 135–145)
WBC # BLD AUTO: 5.8 K/UL (ref 4.8–10.8)

## 2017-06-23 PROCEDURE — 36591 DRAW BLOOD OFF VENOUS DEVICE: CPT | Performed by: INTERNAL MEDICINE

## 2017-06-23 PROCEDURE — 80053 COMPREHEN METABOLIC PANEL: CPT

## 2017-06-23 PROCEDURE — 99213 OFFICE O/P EST LOW 20 MIN: CPT | Performed by: NURSE PRACTITIONER

## 2017-06-23 PROCEDURE — 85025 COMPLETE CBC W/AUTO DIFF WBC: CPT

## 2017-06-23 RX ADMIN — Medication 500 UNITS: at 10:15

## 2017-06-23 ASSESSMENT — ENCOUNTER SYMPTOMS
TINGLING: 0
PALPITATIONS: 0
VOMITING: 0
WHEEZING: 0
DIARRHEA: 0
HEADACHES: 0
MYALGIAS: 0
CHILLS: 0
NAUSEA: 1
FEVER: 0
WEIGHT LOSS: 0
DIZZINESS: 0
CONSTIPATION: 1
COUGH: 0
SHORTNESS OF BREATH: 0

## 2017-06-23 ASSESSMENT — PAIN SCALES - GENERAL
PAINLEVEL: NO PAIN
PAINLEVEL: NO PAIN

## 2017-06-23 NOTE — PROGRESS NOTES
Subjective:      Tim Crump is a 62 y.o. female who presents for Follow-Up for colon cancer.         HPI    Patient seen today in follow-up for colon cancer. She is accompanied by her son for today's visit. Patient is due to receive cycle #11 of FOLFOX tomorrow.    Patient has tolerated treatment very well. She denies any fevers, chills or weight loss. She denies coughing, wheezing or shortness of breath. Patient does have some leg swelling noted to be at the end of the day after she started on her feet for a while. She denies any chest pain or heart palpitations. Patient does have some intermittent nausea at times but it does resolve with antiemetics. She notices the nausea more so at night. She has some very mild constipation but she is taking daily MiraLAX and states she is having a bowel movement every day. She states that if she has more difficulty with bowel movements she will take milk of magnesia. Patient is urinating without difficulty. She does have some joint pain in her knees. She used to take glucosamine past but stopped the medication due to chemotherapy. She denies pain anywhere else. She denies any itching or rashes.     Patient did have some peeling of her skin significantly in her feet 2 weeks ago. Dr. Gomes did reduce the dose of 5-FU from 2400 mg/m² to 2200 mg/m² and patient tolerated it well. Her skin on her feet does look much better this week. Patient and son are applying lotion liberally daily.    Patient also had significant cold sensitivity in the past related to oxaliplatin. Patient has had a 20% dose reduction in the past with oxaliplatin or held the treatment altogether. For cycle #10 the oxaliplatin was held and after further discussion with Dr. Dr. Gomes we will no longer proceed with oxaliplatin for the duration of her treatment. Patient stated the cold sensitivity and neuropathy has improved since 2 weeks ago. She does have some sensitivity still with ice cubes in her  mouth.    No Known Allergies  Current Outpatient Prescriptions on File Prior to Visit   Medication Sig Dispense Refill   • polyethylene glycol 3350 (MIRALAX) Powder Take 17 g by mouth every day.     • docusate sodium (COLACE) 50 MG Cap Take 50 mg by mouth 2 times a day.     • famotidine (PEPCID) 20 MG Tab Take 1 Tab by mouth 2 times a day. 60 Tab 2   • atorvastatin (LIPITOR) 10 MG Tab Take 5 mg by mouth every evening.     • montelukast (SINGULAIR) 10 MG Tab Take 10 mg by mouth every bedtime. Indications: Hayfever     • cetirizine (ZYRTEC) 10 MG Tab Take 10 mg by mouth every morning. Indications: Hayfever     • metformin (GLUCOPHAGE) 500 MG TABS Take 500 mg by mouth 2 times a day, with meals.     • maalox plus-benadryl-visc lidocaine (MAGIC MOUTHWASH) Take 5 mL by mouth every 6 hours as needed. 1:1:1 ratio 250 mL 3   • loperamide (IMODIUM A-D) 2 MG tablet Take 1 Tab by mouth See Admin Instructions. 30 Tab 6   • menthol (CEPACOL) 3 MG lozenge Take 1 Lozenge by mouth as needed.     • Melatonin 1 MG Cap Take  by mouth.     • ondansetron (ZOFRAN) 4 MG Tab tablet Take 1 Tab by mouth every four hours as needed for Nausea/Vomiting. 30 Tab 3   • prochlorperazine (COMPAZINE) 10 MG Tab Take 1 Tab by mouth every 6 hours as needed. 30 Tab 3   • lidocaine-prilocaine (EMLA) 2.5-2.5 % Cream Apply to port 1 hour prior to access of port and cover with plastic wrap. 1 Tube 3   • loperamide (IMODIUM) 2 MG Cap Give loperamide 4 mg orally first dose, then 2 mg orally with every loose bowel movement.  Contact physician if maximum of 16 mg/24 hours is exceeded. 30 Cap 3   • valacyclovir (VALTREX) 500 MG Tab Take 500 mg by mouth 2 times a day. 3 day course     • citalopram (CELEXA) 40 MG TABS Take 40 mg by mouth every bedtime.       No current facility-administered medications on file prior to visit.         Review of Systems   Constitutional: Negative for fever, chills and weight loss.   Respiratory: Negative for cough, shortness of breath  "and wheezing.    Cardiovascular: Positive for leg swelling (at end of the day if on her feet for a long while). Negative for chest pain and palpitations.   Gastrointestinal: Positive for nausea (intermittent at times and resolves with anti-emetics - more so at night) and constipation. Negative for vomiting and diarrhea.   Genitourinary: Negative for dysuria.   Musculoskeletal: Positive for joint pain (knees). Negative for myalgias.   Skin: Negative for itching and rash.        Mild dryness to feet with improvement   Neurological: Negative for dizziness, tingling and headaches.          Objective:     /74 mmHg  Pulse 71  Temp(Src) 36.7 °C (98.1 °F)  Resp 16  Ht 1.58 m (5' 2.21\")  Wt 76.65 kg (168 lb 15.7 oz)  BMI 30.70 kg/m2  SpO2 97%  Breastfeeding? No     Physical Exam   Constitutional: She is oriented to person, place, and time. She appears well-developed and well-nourished. No distress.   HENT:   Head: Normocephalic and atraumatic.   Mouth/Throat: Oropharynx is clear and moist. No oropharyngeal exudate.   Eyes: Conjunctivae and EOM are normal. Pupils are equal, round, and reactive to light. Right eye exhibits no discharge. Left eye exhibits no discharge. No scleral icterus.   Neck: Normal range of motion. Neck supple. No thyromegaly present.   Cardiovascular: Normal rate, regular rhythm, normal heart sounds and intact distal pulses.  Exam reveals no gallop and no friction rub.    No murmur heard.  Pulmonary/Chest: Effort normal and breath sounds normal. No respiratory distress. She has no wheezes.   Abdominal: Soft. Bowel sounds are normal. She exhibits no distension. There is no tenderness.   Musculoskeletal: Normal range of motion. She exhibits no edema or tenderness.   Lymphadenopathy:     She has no cervical adenopathy.   Neurological: She is alert and oriented to person, place, and time.   Skin: Skin is warm and dry. No rash noted. She is not diaphoretic. No erythema. No pallor.   Psychiatric: " She has a normal mood and affect. Her behavior is normal.   Vitals reviewed.      Hospital Outpatient Visit on 06/23/2017   Component Date Value Ref Range Status   • WBC 06/23/2017 5.8  4.8 - 10.8 K/uL Final   • RBC 06/23/2017 3.83* 4.20 - 5.40 M/uL Final   • Hemoglobin 06/23/2017 12.5  12.0 - 16.0 g/dL Final   • Hematocrit 06/23/2017 36.0* 37.0 - 47.0 % Final   • MCV 06/23/2017 94.0  81.4 - 97.8 fL Final   • MCH 06/23/2017 32.6  27.0 - 33.0 pg Final   • MCHC 06/23/2017 34.7  33.6 - 35.0 g/dL Final   • RDW 06/23/2017 49.4  35.9 - 50.0 fL Final   • Platelet Count 06/23/2017 195  164 - 446 K/uL Final   • MPV 06/23/2017 8.9* 9.0 - 12.9 fL Final   • Neutrophils-Polys 06/23/2017 44.60  44.00 - 72.00 % Final   • Lymphocytes 06/23/2017 30.30  22.00 - 41.00 % Final   • Monocytes 06/23/2017 13.30  0.00 - 13.40 % Final   • Eosinophils 06/23/2017 10.20* 0.00 - 6.90 % Final   • Basophils 06/23/2017 1.40  0.00 - 1.80 % Final   • Immature Granulocytes 06/23/2017 0.20  0.00 - 0.90 % Final   • Nucleated RBC 06/23/2017 0.00   Final   • Neutrophils (Absolute) 06/23/2017 2.59  2.00 - 7.15 K/uL Final    Includes immature neutrophils, if present.   • Lymphs (Absolute) 06/23/2017 1.76  1.00 - 4.80 K/uL Final   • Monos (Absolute) 06/23/2017 0.77  0.00 - 0.85 K/uL Final   • Eos (Absolute) 06/23/2017 0.59* 0.00 - 0.51 K/uL Final   • Baso (Absolute) 06/23/2017 0.08  0.00 - 0.12 K/uL Final   • Immature Granulocytes (abs) 06/23/2017 0.01  0.00 - 0.11 K/uL Final   • NRBC (Absolute) 06/23/2017 0.00   Final   • Sodium 06/23/2017 138  135 - 145 mmol/L Final   • Potassium 06/23/2017 3.9  3.6 - 5.5 mmol/L Final   • Chloride 06/23/2017 106  96 - 112 mmol/L Final   • Co2 06/23/2017 24  20 - 33 mmol/L Final   • Anion Gap 06/23/2017 8.0  0.0 - 11.9 Final   • Glucose 06/23/2017 131* 65 - 99 mg/dL Final   • Bun 06/23/2017 21  8 - 22 mg/dL Final   • Creatinine 06/23/2017 0.93  0.50 - 1.40 mg/dL Final   • Calcium 06/23/2017 9.2  8.5 - 10.5 mg/dL Final   •  AST(SGOT) 06/23/2017 25  12 - 45 U/L Final   • ALT(SGPT) 06/23/2017 26  2 - 50 U/L Final   • Alkaline Phosphatase 06/23/2017 130* 30 - 99 U/L Final   • Total Bilirubin 06/23/2017 0.3  0.1 - 1.5 mg/dL Final   • Albumin 06/23/2017 3.7  3.2 - 4.9 g/dL Final   • Total Protein 06/23/2017 6.3  6.0 - 8.2 g/dL Final   • Globulin 06/23/2017 2.6  1.9 - 3.5 g/dL Final   • A-G Ratio 06/23/2017 1.4   Final   • GFR If  06/23/2017 >60  >60 mL/min/1.73 m 2 Final   • GFR If Non  06/23/2017 >60  >60 mL/min/1.73 m 2 Final          Assessment/Plan:     1. Cancer of sigmoid colon (CMS-HCC)       Plan  1. Patient is doing very well and tolerating treatment well. She is currently scheduled to receive cycle #11 of FOLFOX tomorrow. Due to cold sensitivity and peripheral neuropathy we have held oxaliplatin or dose reduce the dose intermittently. After further discussion with Dr. Mireya harden to proceed with continuing to hold the oxaliplatin for cycle #11 and 12. Also, due to skin toxicity of her feet her 5-FU dose was decreased from 2400 mg/m² to 2200 mg/m². After assessment of her feet and skin today patient does have mild resolution of the skin toxicity. I did review her CBC and CMP today, as well as spoke with Dr. Gomes regarding patient's clinical status and okay to proceed with treatment as planned. We will continue to dose reduce the 5-FU to 2200 mg/m². No Emend will be needed for premedications as patient is not receiving oxaliplatin. She did tolerate treatment very well with cycle #10.    2. Patient is to continue to monitor her bowel movements and treat accordingly if she develops constipation. She is also to monitor her peripheral neuropathy and cold sensitivity as well as continue to monitor her skin and apply lotions liberally daily in order to prevent further peeling of the skin of her feet.    3. Patient is scheduled to follow up in 2 weeks or sooner if needed prior to treatment.

## 2017-06-23 NOTE — MR AVS SNAPSHOT
"Tim Crump   2017 11:00 AM   Office Visit   MRN: [id number]    Department:  [facility]   Dept Phone:  [phone]    Description:  Female : 1955   Provider:  LTARICIA Forman.P.N.           Reason for Visit     Follow-Up Prechemo      Allergies as of 2017     No Known Allergies      Vital Signs     Blood Pressure Pulse Temperature Respirations Height Weight    126/74 mmHg 71 36.7 °C (98.1 °F) 16 1.58 m (5' 2.21\") 76.65 kg (168 lb 15.7 oz)    Body Mass Index Oxygen Saturation Breastfeeding? Smoking Status          30.70 kg/m2 97% No Never Smoker         Basic Information     Date Of Birth Sex Race Ethnicity Preferred Language    1955 Female  or   Origin (Portuguese,Peruvian,Ugandan,Citizen of [place], etc) English      Your appointments     2017  8:30 AM   Est Chemo 2 with RN 3   [facility] ([facility])    [address]  [facility] [address] [phone]            2017  3:00 PM   EST Port Flush / Central Line Care with INFUSION QUICK INJECT   [facility] ([facility])    [address]  [facility] [id number] [phone]            2017 11:00 AM   Non Provider 1 with ONC RN 1   [facility] (--)    [address] [facility] [address]  [facility] [id number] [phone]           You will be receiving a confirmation call a few days before your appointment from our automated call confirmation system.            2017 12:00 PM   ONCOLOGY EST PATIENT 30 MIN with Iliana Casarez, A.P.N.   [facility] (--)    [address], [address]  [facility] [id number] [phone]            2017 10:30 AM   Est Chemo 2 with RN 3   [facility] ([facility])    [address]  [facility] [id number] [phone]            Jul 10, 2017  4:00 PM   EST Port Flush / Central Line Care with INFUSION QUICK INJECT   [facility] ([facility])    [address]  [facility] [address]   "   [phone]            Jul 20, 2017 11:00 AM   Non Provider 1 with ONC RN 2   [facility] (--)    [address] [facility]  [facility] [address] [phone]           You will be receiving a confirmation call a few days before your appointment from our automated call confirmation system.            Jul 20, 2017  1:00 PM   ONCOLOGY EST PATIENT 30 MIN with Sonu Gomse M.D.   [facility] (--)    [address] [facility]  [facility] [address]   [phone]              Problem List              ICD-10-CM Priority Class Noted - Resolved    Cancer of sigmoid colon (CMS-HCC) C18.7   12/7/2016 - Present    Fourth degree hemorrhoids K64.3   12/7/2016 - Present      Health Maintenance        Date Due Completion Dates    IMM DTaP/Tdap/Td Vaccine (1 - Tdap) 1/23/1976 ---    PAP SMEAR 1/23/1978 ---    COLONOSCOPY 1/23/2007 ---    MAMMOGRAM 2/7/2014 2/7/2013, 1/23/2012, 2/9/2011, 7/9/2010, 7/9/2010, 1/5/2010, 12/23/2009, 12/23/2009, 11/26/2008, 11/26/2008, 12/4/2007, 12/4/2007, 11/10/2006, 10/27/2005    IMM ZOSTER VACCINE 1/23/2017 ---            Current Immunizations     Influenza Vaccine Quad Inj (Pf) 10/1/2016      Below and/or attached are the medications your provider expects you to take. Review all of your home medications and newly ordered medications with your provider and/or pharmacist. Follow medication instructions as directed by your provider and/or pharmacist. Please keep your medication list with you and share with your provider. Update the information when medications are discontinued, doses are changed, or new medications (including over-the-counter products) are added; and carry medication information at all times in the event of emergency situations     Allergies:  No Known Allergies          Medications  Valid as of: June 23, 2017 - 11:31 AM    Generic Name Brand Name Tablet Size Instructions for use    Atorvastatin Calcium (Tab) LIPITOR 10 MG Take 5 mg by mouth every evening.         Cetirizine HCl (Tab) ZYRTEC 10 MG Take 10 mg by mouth every morning. Indications: Hayfever        Citalopram Hydrobromide (Tab) CELEXA 40 MG Take 40 mg by mouth every bedtime.        Docusate Sodium (Cap) COLACE 50 MG Take 50 mg by mouth 2 times a day.        Famotidine (Tab) PEPCID 20 MG Take 1 Tab by mouth 2 times a day.        Lidocaine-Prilocaine (Cream) EMLA 2.5-2.5 % Apply to port 1 hour prior to access of port and cover with plastic wrap.        Loperamide HCl (Cap) IMODIUM 2 MG Give loperamide 4 mg orally first dose, then 2 mg orally with every loose bowel movement.  Contact physician if maximum of 16 mg/24 hours is exceeded.        Loperamide HCl (Tab) IMODIUM A-D 2 MG Take 1 Tab by mouth See Admin Instructions.        maalox plus-benadryl-visc lidocaine (MAGIC MOUTHWASH) MAGIC MOUTHWASH  Take 5 mL by mouth every 6 hours as needed. 1:1:1 ratio        Melatonin (Cap) Melatonin 1 MG Take  by mouth.        Menthol (Lozenge) CEPACOL 3 MG Take 1 Lozenge by mouth as needed.        MetFORMIN HCl (Tab) GLUCOPHAGE 500 MG Take 500 mg by mouth 2 times a day, with meals.        Montelukast Sodium (Tab) SINGULAIR 10 MG Take 10 mg by mouth every bedtime. Indications: Hayfever        Ondansetron HCl (Tab) ZOFRAN 4 MG Take 1 Tab by mouth every four hours as needed for Nausea/Vomiting.        Polyethylene Glycol 3350 (Powder) MIRALAX  Take 17 g by mouth every day.        Prochlorperazine Maleate (Tab) COMPAZINE 10 MG Take 1 Tab by mouth every 6 hours as needed.        ValACYclovir HCl (Tab) VALTREX 500 MG Take 500 mg by mouth 2 times a day. 3 day course        .                 Medicines prescribed today were sent to:     [facility]  [place] [address] AT [facility] [address]    [address] [address]    Phone: [phone] Fax: [phone]    Open 24 Hours?: No      Medication refill instructions:       If your prescription bottle indicates you have medication refills left,  it is not necessary to call your provider’s office. Please contact your pharmacy and they will refill your medication.    If your prescription bottle indicates you do not have any refills left, you may request refills at any time through one of the following ways: The online [employer] system (except Urgent Care), by calling your provider’s office, or by asking your pharmacy to contact your provider’s office with a refill request. Medication refills are processed only during regular business hours and may not be available until the next business day. Your provider may request additional information or to have a follow-up visit with you prior to refilling your medication.   *Please Note: Medication refills are assigned a new Rx number when refilled electronically. Your pharmacy may indicate that no refills were authorized even though a new prescription for the same medication is available at the pharmacy. Please request the medicine by name with the pharmacy before contacting your provider for a refill.           [employer] Access Code: Activation code not generated  Current [employer] Status: Active

## 2017-06-23 NOTE — MR AVS SNAPSHOT
Tim Crump   2017 10:00 AM   Appointment   MRN: [id number]    Department:  [facility]   Dept Phone:  [phone]    Description:  Female : 1955   Provider:  ONC RN 1           Allergies as of 2017     No Known Allergies      Vital Signs     Smoking Status                   Never Smoker            Basic Information     Date Of Birth Sex Race Ethnicity Preferred Language    1955 Female  or   Origin (Malay,Mozambican,Japanese,Paul, etc) English      Your appointments     2017 11:00 AM   ONCOLOGY EST PATIENT 30 MIN with Iliana Casarez A.P.N.   [facility] (--)    [address], [facility] [address]  [facility] [address] [phone]            2017  8:30 AM   Est Chemo 2 with RN 3   [facility] ([facility])    [address]  [facility] [address]   [phone]            2017  3:00 PM   EST Port Flush / Central Line Care with INFUSION QUICK INJECT   [facility] ([facility])    [address]  [facility] [address]   [phone]            2017 10:00 AM   Non Provider 1 with ONC RN 2   [facility] (--)    [address] [facility] [address]  [facility] [address] [phone]           You will be receiving a confirmation call a few days before your appointment from our automated call confirmation system.            2017 11:00 AM   ONCOLOGY EST PATIENT 30 MIN with Iliana Casarez A.P.N.   [facility] (--)    [address], [facility] [address]  [facility] [address]   [phone]            2017 10:30 AM   Est Chemo 2 with RN 3   [facility] ([facility])    [address]  [facility] [address]   [phone]            Jul 10, 2017  4:00 PM   EST Port Flush / Central Line Care with INFUSION QUICK INJECT   [facility] ([facility])    [address] [address]  [facility] [address]   [phone]              Problem List              ICD-10-CM Priority Class  Noted - Resolved    Cancer of sigmoid colon (CMS-HCC) C18.7   12/7/2016 - Present    Fourth degree hemorrhoids K64.3   12/7/2016 - Present      Health Maintenance        Date Due Completion Dates    IMM DTaP/Tdap/Td Vaccine (1 - Tdap) 1/23/1976 ---    PAP SMEAR 1/23/1978 ---    COLONOSCOPY 1/23/2007 ---    MAMMOGRAM 2/7/2014 2/7/2013, 1/23/2012, 2/9/2011, 7/9/2010, 7/9/2010, 1/5/2010, 12/23/2009, 12/23/2009, 11/26/2008, 11/26/2008, 12/4/2007, 12/4/2007, 11/10/2006, 10/27/2005    IMM ZOSTER VACCINE 1/23/2017 ---            Current Immunizations     Influenza Vaccine Quad Inj (Pf) 10/1/2016      Below and/or attached are the medications your provider expects you to take. Review all of your home medications and newly ordered medications with your provider and/or pharmacist. Follow medication instructions as directed by your provider and/or pharmacist. Please keep your medication list with you and share with your provider. Update the information when medications are discontinued, doses are changed, or new medications (including over-the-counter products) are added; and carry medication information at all times in the event of emergency situations     Allergies:  No Known Allergies          Medications  Valid as of: June 23, 2017 - 10:14 AM    Generic Name Brand Name Tablet Size Instructions for use    Atorvastatin Calcium (Tab) LIPITOR 10 MG Take 5 mg by mouth every evening.        Cetirizine HCl (Tab) ZYRTEC 10 MG Take 10 mg by mouth every morning. Indications: Hayfever        Citalopram Hydrobromide (Tab) CELEXA 40 MG Take 40 mg by mouth every bedtime.        Docusate Sodium (Cap) COLACE 50 MG Take 50 mg by mouth 2 times a day.        Famotidine (Tab) PEPCID 20 MG Take 1 Tab by mouth 2 times a day.        Lidocaine-Prilocaine (Cream) EMLA 2.5-2.5 % Apply to port 1 hour prior to access of port and cover with plastic wrap.        Loperamide HCl (Cap) IMODIUM 2 MG Give loperamide 4 mg orally first dose, then 2 mg orally  with every loose bowel movement.  Contact physician if maximum of 16 mg/24 hours is exceeded.        Loperamide HCl (Tab) IMODIUM A-D 2 MG Take 1 Tab by mouth See Admin Instructions.        maalox plus-benadryl-visc lidocaine (MAGIC MOUTHWASH) MAGIC MOUTHWASH  Take 5 mL by mouth every 6 hours as needed. 1:1:1 ratio        Melatonin (Cap) Melatonin 1 MG Take  by mouth.        Menthol (Lozenge) CEPACOL 3 MG Take 1 Lozenge by mouth as needed.        MetFORMIN HCl (Tab) GLUCOPHAGE 500 MG Take 500 mg by mouth 2 times a day, with meals.        Montelukast Sodium (Tab) SINGULAIR 10 MG Take 10 mg by mouth every bedtime. Indications: Hayfever        Ondansetron HCl (Tab) ZOFRAN 4 MG Take 1 Tab by mouth every four hours as needed for Nausea/Vomiting.        Polyethylene Glycol 3350 (Powder) MIRALAX  Take 17 g by mouth every day.        Prochlorperazine Maleate (Tab) COMPAZINE 10 MG Take 1 Tab by mouth every 6 hours as needed.        ValACYclovir HCl (Tab) VALTREX 500 MG Take 500 mg by mouth 2 times a day. 3 day course        .                 Medicines prescribed today were sent to:     [facility] [address] [place], [place] [address]O, [place] - [address] AT [email or web address]    [address] [place] [address]    Phone: [phone] Fax: [phone]    Open 24 Hours?: No      Medication refill instructions:       If your prescription bottle indicates you have medication refills left, it is not necessary to call your provider’s office. Please contact your pharmacy and they will refill your medication.    If your prescription bottle indicates you do not have any refills left, you may request refills at any time through one of the following ways: The online [employer] system (except Urgent Care), by calling your provider’s office, or by asking your pharmacy to contact your provider’s office with a refill request. Medication refills are processed only during regular business hours and may not be available until the next business  day. Your provider may request additional information or to have a follow-up visit with you prior to refilling your medication.   *Please Note: Medication refills are assigned a new Rx number when refilled electronically. Your pharmacy may indicate that no refills were authorized even though a new prescription for the same medication is available at the pharmacy. Please request the medicine by name with the pharmacy before contacting your provider for a refill.           [employer] Access Code: Activation code not generated  Current [employer] Status: Active

## 2017-06-23 NOTE — PROGRESS NOTES
Tim Crump is a 62 y.o. female here for a non-provider visit for: Lab Draws  on 6/23/2017 at 10:00 AM    Procedure Performed: Port Access with Blood Draw: Accessed using sterile technique with brisk blood return verified. Port flushed per protocol with NS and heparin.  Port deaccessed and covered with sterile gauze/paper tape.     Anatomical site: R chest    Equipment used: Port Access Kit    Labs drawn: CBC w/diff and CMP    Ordering Provider:Iliana Mayen By: Maria Del Carmen Paulson R.N.

## 2017-06-24 ENCOUNTER — OUTPATIENT INFUSION SERVICES (OUTPATIENT)
Dept: ONCOLOGY | Facility: MEDICAL CENTER | Age: 62
End: 2017-06-24
Attending: INTERNAL MEDICINE
Payer: COMMERCIAL

## 2017-06-24 VITALS
OXYGEN SATURATION: 97 % | BODY MASS INDEX: 29.98 KG/M2 | HEIGHT: 62 IN | HEART RATE: 79 BPM | WEIGHT: 162.92 LBS | RESPIRATION RATE: 18 BRPM | SYSTOLIC BLOOD PRESSURE: 116 MMHG | DIASTOLIC BLOOD PRESSURE: 77 MMHG | TEMPERATURE: 97.5 F

## 2017-06-24 DIAGNOSIS — C18.7 CANCER OF SIGMOID COLON (HCC): ICD-10-CM

## 2017-06-24 PROCEDURE — 700111 HCHG RX REV CODE 636 W/ 250 OVERRIDE (IP): Performed by: NURSE PRACTITIONER

## 2017-06-24 PROCEDURE — 700105 HCHG RX REV CODE 258: Performed by: NURSE PRACTITIONER

## 2017-06-24 PROCEDURE — 96409 CHEMO IV PUSH SNGL DRUG: CPT

## 2017-06-24 PROCEDURE — 700111 HCHG RX REV CODE 636 W/ 250 OVERRIDE (IP)

## 2017-06-24 PROCEDURE — G0498 CHEMO EXTEND IV INFUS W/PUMP: HCPCS

## 2017-06-24 PROCEDURE — 96367 TX/PROPH/DG ADDL SEQ IV INF: CPT

## 2017-06-24 PROCEDURE — A4212 NON CORING NEEDLE OR STYLET: HCPCS

## 2017-06-24 PROCEDURE — 96375 TX/PRO/DX INJ NEW DRUG ADDON: CPT

## 2017-06-24 PROCEDURE — 700105 HCHG RX REV CODE 258

## 2017-06-24 RX ORDER — PALONOSETRON 0.05 MG/ML
0.25 INJECTION, SOLUTION INTRAVENOUS ONCE
Status: COMPLETED | OUTPATIENT
Start: 2017-06-24 | End: 2017-06-24

## 2017-06-24 RX ORDER — DEXTROSE MONOHYDRATE 50 MG/ML
INJECTION, SOLUTION INTRAVENOUS CONTINUOUS
Status: DISCONTINUED | OUTPATIENT
Start: 2017-06-24 | End: 2017-06-24 | Stop reason: HOSPADM

## 2017-06-24 RX ADMIN — LEUCOVORIN CALCIUM 720 MG: 350 INJECTION, POWDER, LYOPHILIZED, FOR SOLUTION INTRAMUSCULAR; INTRAVENOUS at 09:30

## 2017-06-24 RX ADMIN — FLUOROURACIL 720 MG: 50 INJECTION, SOLUTION INTRAVENOUS at 10:41

## 2017-06-24 RX ADMIN — DEXAMETHASONE SODIUM PHOSPHATE 12 MG: 4 INJECTION, SOLUTION INTRAMUSCULAR; INTRAVENOUS at 08:58

## 2017-06-24 RX ADMIN — DEXTROSE MONOHYDRATE: 50 INJECTION, SOLUTION INTRAVENOUS at 08:56

## 2017-06-24 RX ADMIN — PALONOSETRON HYDROCHLORIDE 0.25 MG: 0.25 INJECTION INTRAVENOUS at 08:54

## 2017-06-24 RX ADMIN — FLUOROURACIL 3960 MG: 50 INJECTION, SOLUTION INTRAVENOUS at 10:46

## 2017-06-24 ASSESSMENT — PAIN SCALES - GENERAL: PAINLEVEL: NO PAIN

## 2017-06-24 NOTE — PROGRESS NOTES
"Pharmacy Chemotherapy Verification    Patient Name: Tim Crump   Dx: Colon Cancer        Protocol: mFOLFOX6   Oxaliplatin (Eloxatin) 85 mg/m2 IV Day 1  --4/01/17 dose reduced 20% by Aubrey MEDRANO for tolerance  --4/15/17 Hold for Cycle 6 to assess neuropathy, MD will reassess with next cycle per Dr. Burgos.   --4/29/17 added back to treatment plan, neuropathy resolved per C Alsop KELSI/Aubrey MEDRANO  --5/13/17 Hold for C8 due to persistent neuropathy per C Alsop KELSI/Mireya MEDRANO  - 5/27/17 Resume oxaliplatin at 20% dose reduction for improved neuropathy per Dr. Gomes - verified   - 6/10/17 hold oxaliplatin with cycle 10 and possibly for the duration of her treatment per MD  - 6/24/17 Continue to hold Oxaliplatin for the duration of her treatment per C. Alsop, APN  Leucovorin 400 mg/m2 IV over 2 hrs on Day 1. Run concurrently with oxaliplatin  Fluorouracil 400 mg/m2 IV bolus Day 1 followed by 2400 mg/m2 CIVI over 46 hrs-reduced to 2200 mg/m2 with Cycle 10 for peeling of her feet and toes.   - 06/24/17: Continue dose reduction of 5-FU   Q14 days until disease progression or unacceptable toxicity    NCCN Guidelines for Colon Cancer. V.2.2017  Xu T, et al. N Engl J Med. 2004;350:6601-4434  Renetta RAY et al. J Clin Oncol. 2008;26(12):2006-12  Hong SL, et al. Br J Cancer. 2002;87(4):393-9    Allergies:  Review of patient's allergies indicates no known allergies.     /77 mmHg  Pulse 79  Temp(Src) 36.4 °C (97.5 °F)  Resp 18  Ht 1.57 m (5' 1.81\")  Wt 73.9 kg (162 lb 14.7 oz)  BMI 29.98 kg/m2  SpO2 97% Body surface area is 1.80 meters squared.    Labs 6/23/17:  ANC~ 2600 Plt = 195k   Hgb = 12.5   SCr = 0.93 mg/dL CrCl ~ 75 mL/min   LFT's = WNL except alk phs 130 TBili = 0.3   K+ = 3.9      Drug Order   (Drug name, dose, route, IV Fluid & volume, frequency, number of doses) Cycle: 11  Previous treatment: C10 = 05/27/17     Medication = Leucovorin (Wellcovorin)  Base Dose = 400 mg/m2  Calc Dose: Base " Dose x 1.8 m2 = 720 mg  Final Dose = 720 mg  Route = IV  Fluid & Volume = D5W 250 mL  Admin Duration = Over 2 hours          <5% difference, OK to treat with final dose   Medication = Fluorouracil (5-FU) bolus  Base Dose = 400 mg/m2  Calc Dose:Base Dose x 1.8 m2 = 720 mg  Final Dose = 720 mg  Route = IVP  Fluid & Volume = in syringe 14.4 mL  Conc = 50 mg/mL  Admin Duration = Over 5 minutes          <5% difference, OK to treat with final dose   Medication = Fluorouracil (5-FU)  Base Dose = 2200 mg/m2  Calc Dose:Base Dose x 1.8 m2 = 3960 mg  Final Dose = 3960 mg  Route = CIVI  Conc = 50 mg/mL   Fluid & Volume = 79.2 mL + 3 mL overfill = 82.2 mL  Admin Duration = Over 46 hours @ 1.7 mL/hr     Via CADD pump for home CIVI       <5% difference, OK to treat with final dose     By my signature below, I confirm this process was performed independently with the BSA and all final chemotherapy dosing calculations congruent. I have reviewed the above chemotherapy order and that my calculation of the final dose and BSA (when applicable) corroborate those calculations of the  pharmacist. Discrepancies of 5% or greater in the written dose have been addressed and documented within the EPIC Progress notes.    Glenda Mendoza, MeredithD

## 2017-06-24 NOTE — PROGRESS NOTES
"Pharmacy Chemotherapy Calculations Note:    Patient Name: Tim Crump        Dx: Colon Cancer     Cycle 11 Previous treatment: C10 = 6/10/17     Protocol: mFOLFOX6   Oxaliplatin (Eloxatin) 85 mg/m2 IV Day 1   04/01/17: dose reduced to 68 mg/m2 (20% dose reduction) d/t neuropathy per Dr. Burgos     04/15/17: HOLD cycle 6 oxaliplatin for significant neuropathy per Dr. Burgos.  MD to reassess next cycle.                04/29/17 Neuropathy improved, adding back oxaliplatin at 20% dose reduction (68 mg/m2) cycle 7   5/131/7 Continuing neuropathy Oxaliplatin to be held.   6/10/17 Oxaliplatin discontinued    Leucovorin 400 mg/m2 IV over 2 hrs on Day 1. Run concurrently with oxaliplatin  Fluorouracil 400 mg/m2 IV bolus Day 1 followed by 2400 mg/m2 CIVI over 46 hrs --6/10/17 CIVI dose reduced to 2200 mg/m2 over 46 hours for dermatologic reactions.  Q14 days until disease progression or unacceptable toxicity    NCCN Guidelines for Colon Cancer. V.2.2017  Xu T, et al. N Engl J Med. 2004;350:4543-7362  Renetta J, et al. J Clin Oncol. 2008;26(12):2006-12  Hong SL, et al. Br J Cancer. 2002;87(4):393-9         /77 mmHg  Pulse 79  Temp(Src) 36.4 °C (97.5 °F)  Resp 18  Ht 1.57 m (5' 1.81\")  Wt 73.9 kg (162 lb 14.7 oz)  BMI 29.98 kg/m2  SpO2 97% Body surface area is 1.80 meters squared.    Labs 6/23/17   ANC ~2600  Plt = 195k    Hgb = 12.5     SCr = 0.93 mg/dL  CrCl ~75 mL/min   AST/ALT/AlkPhos = 25/26/130 Tbili = 0.3      Leucovorin 400 mg/m² x 1.8 m² = 720 mg   <5% difference, ok to treat with final dose = 720 mg IV    Fluorouracil (5-FU) 400 mg/m² x 1.8 m² = 720 mg   <5% difference, ok to treat with final dose = 720 mg IVP    Fluorouracil (5-FU) 2200 mg/m² x 1.80 m²  = 3960 mg    <5% difference, ok to treat with final dose = 3960 mg CIVI over 46 hrs via CADD pump       Meredith NairD                  "

## 2017-06-24 NOTE — PROGRESS NOTES
Chemotherapy Verification - SECONDARY RN       Height = 61.81in  Weight = 73.9kg  BSA = 1.79       Medication: Leucovrin  Dose: 400mg/m2  Calculated Dose: 716mg                             (In mg/m2, AUC, mg/kg)     Medication: Fluorouracil  Dose: 400mg/m2  Calculated Dose: 716mg                             (In mg/m2, AUC, mg/kg)    Medication: Fluorouracil  Dose: 2200mg/m2 (91.7% of original dose of 2400mg/m2)  Calculated Dose: 3938mg                             (In mg/m2, AUC, mg/kg)            I confirm that this process was performed independently.

## 2017-06-24 NOTE — PROGRESS NOTES
Pt here for 5 FU pump connect.  Labs from yesterday reviewed.  Pt reports tolerating chemo well.  Port accessed in sterile field, brisk blood return.  Premeds given.  Confirmed with pharmacy, ok to give leucovorin over 1 hour.  Chemo push given, pump connected.  Pt must receive 79.2 ml of medication to get full dose, pump with overfill is 80.4 ml.  Pt left IC with son.  De-access apt confirmed.

## 2017-06-24 NOTE — PROGRESS NOTES
Chemotherapy Verification - PRIMARY RN      Height = 157 cm  Weight = 73.9 kg  BSA = 1.8 m2       Medication: Fluorouracil push  Dose: 400 mg/m2  Calculated Dose: 720 mg                             (In mg/m2, AUC, mg/kg)     Medication: Fluorouracil pump  Dose: 2200 mg/m2  Calculated Dose: 3960 mg                             (In mg/m2, AUC, mg/kg)      I confirm this process was performed independently with the BSA and all final chemotherapy dosing calculations congruent.  Any discrepancies of 5% or greater have been addressed with the chemotherapy pharmacist. The resolution of the discrepancy has been documented in the EPIC progress notes.

## 2017-06-26 ENCOUNTER — OUTPATIENT INFUSION SERVICES (OUTPATIENT)
Dept: ONCOLOGY | Facility: MEDICAL CENTER | Age: 62
End: 2017-06-26
Attending: INTERNAL MEDICINE
Payer: COMMERCIAL

## 2017-06-26 VITALS
BODY MASS INDEX: 31.24 KG/M2 | DIASTOLIC BLOOD PRESSURE: 61 MMHG | HEART RATE: 74 BPM | RESPIRATION RATE: 18 BRPM | WEIGHT: 169.75 LBS | OXYGEN SATURATION: 99 % | TEMPERATURE: 97.2 F | HEIGHT: 62 IN | SYSTOLIC BLOOD PRESSURE: 115 MMHG

## 2017-06-26 DIAGNOSIS — C18.7 CANCER OF SIGMOID COLON (HCC): ICD-10-CM

## 2017-06-26 PROCEDURE — 96523 IRRIG DRUG DELIVERY DEVICE: CPT

## 2017-06-26 PROCEDURE — 700111 HCHG RX REV CODE 636 W/ 250 OVERRIDE (IP): Performed by: NURSE PRACTITIONER

## 2017-06-26 RX ADMIN — HEPARIN 500 UNITS: 100 SYRINGE at 15:01

## 2017-06-26 ASSESSMENT — PAIN SCALES - GENERAL: PAINLEVEL: NO PAIN

## 2017-06-26 NOTE — PROGRESS NOTES
Pt returns to infusion center for pump disconnect/port de-access.  80.4ml given; reservoir volume 0ml.  Port flushed with saline and heparin per policy, de-accessed, gauze dressing placed.  Pt left infusion center ambulatory and in good condition.  Returns on 7/8/2017 for next chemotherapy appointment.

## 2017-07-05 DIAGNOSIS — C18.7 CANCER OF SIGMOID COLON (HCC): ICD-10-CM

## 2017-07-06 ENCOUNTER — OFFICE VISIT (OUTPATIENT)
Dept: HEMATOLOGY ONCOLOGY | Facility: MEDICAL CENTER | Age: 62
End: 2017-07-06
Payer: MEDICAID

## 2017-07-06 ENCOUNTER — HOSPITAL ENCOUNTER (OUTPATIENT)
Facility: MEDICAL CENTER | Age: 62
End: 2017-07-06
Attending: NURSE PRACTITIONER
Payer: COMMERCIAL

## 2017-07-06 ENCOUNTER — NON-PROVIDER VISIT (OUTPATIENT)
Dept: HEMATOLOGY ONCOLOGY | Facility: MEDICAL CENTER | Age: 62
End: 2017-07-06
Payer: MEDICAID

## 2017-07-06 VITALS
OXYGEN SATURATION: 98 % | HEIGHT: 62 IN | RESPIRATION RATE: 14 BRPM | DIASTOLIC BLOOD PRESSURE: 80 MMHG | TEMPERATURE: 96.1 F | WEIGHT: 169 LBS | BODY MASS INDEX: 31.1 KG/M2 | HEART RATE: 72 BPM | SYSTOLIC BLOOD PRESSURE: 110 MMHG

## 2017-07-06 VITALS
SYSTOLIC BLOOD PRESSURE: 110 MMHG | DIASTOLIC BLOOD PRESSURE: 80 MMHG | RESPIRATION RATE: 14 BRPM | TEMPERATURE: 96.1 F | HEART RATE: 72 BPM | OXYGEN SATURATION: 98 %

## 2017-07-06 DIAGNOSIS — C18.7 CANCER OF SIGMOID COLON (HCC): ICD-10-CM

## 2017-07-06 LAB
ALBUMIN SERPL BCP-MCNC: 3.6 G/DL (ref 3.2–4.9)
ALBUMIN/GLOB SERPL: 1.4 G/DL
ALP SERPL-CCNC: 133 U/L (ref 30–99)
ALT SERPL-CCNC: 22 U/L (ref 2–50)
ANION GAP SERPL CALC-SCNC: 9 MMOL/L (ref 0–11.9)
AST SERPL-CCNC: 24 U/L (ref 12–45)
BASOPHILS # BLD AUTO: 1.2 % (ref 0–1.8)
BASOPHILS # BLD: 0.09 K/UL (ref 0–0.12)
BILIRUB SERPL-MCNC: 0.4 MG/DL (ref 0.1–1.5)
BUN SERPL-MCNC: 26 MG/DL (ref 8–22)
CALCIUM SERPL-MCNC: 8.9 MG/DL (ref 8.5–10.5)
CEA SERPL-MCNC: 2.3 NG/ML (ref 0–3)
CHLORIDE SERPL-SCNC: 107 MMOL/L (ref 96–112)
CO2 SERPL-SCNC: 24 MMOL/L (ref 20–33)
CREAT SERPL-MCNC: 1.24 MG/DL (ref 0.5–1.4)
EOSINOPHIL # BLD AUTO: 0.79 K/UL (ref 0–0.51)
EOSINOPHIL NFR BLD: 10.3 % (ref 0–6.9)
ERYTHROCYTE [DISTWIDTH] IN BLOOD BY AUTOMATED COUNT: 47.6 FL (ref 35.9–50)
GFR SERPL CREATININE-BSD FRML MDRD: 44 ML/MIN/1.73 M 2
GLOBULIN SER CALC-MCNC: 2.6 G/DL (ref 1.9–3.5)
GLUCOSE SERPL-MCNC: 117 MG/DL (ref 65–99)
HCT VFR BLD AUTO: 38 % (ref 37–47)
HGB BLD-MCNC: 13 G/DL (ref 12–16)
IMM GRANULOCYTES # BLD AUTO: 0.03 K/UL (ref 0–0.11)
IMM GRANULOCYTES NFR BLD AUTO: 0.4 % (ref 0–0.9)
LYMPHOCYTES # BLD AUTO: 2.22 K/UL (ref 1–4.8)
LYMPHOCYTES NFR BLD: 28.9 % (ref 22–41)
MCH RBC QN AUTO: 32.2 PG (ref 27–33)
MCHC RBC AUTO-ENTMCNC: 34.2 G/DL (ref 33.6–35)
MCV RBC AUTO: 94.1 FL (ref 81.4–97.8)
MONOCYTES # BLD AUTO: 0.96 K/UL (ref 0–0.85)
MONOCYTES NFR BLD AUTO: 12.5 % (ref 0–13.4)
NEUTROPHILS # BLD AUTO: 3.6 K/UL (ref 2–7.15)
NEUTROPHILS NFR BLD: 46.7 % (ref 44–72)
NRBC # BLD AUTO: 0 K/UL
NRBC BLD AUTO-RTO: 0 /100 WBC
PLATELET # BLD AUTO: 212 K/UL (ref 164–446)
PMV BLD AUTO: 8.6 FL (ref 9–12.9)
POTASSIUM SERPL-SCNC: 4.1 MMOL/L (ref 3.6–5.5)
PROT SERPL-MCNC: 6.2 G/DL (ref 6–8.2)
RBC # BLD AUTO: 4.04 M/UL (ref 4.2–5.4)
SODIUM SERPL-SCNC: 140 MMOL/L (ref 135–145)
WBC # BLD AUTO: 7.7 K/UL (ref 4.8–10.8)

## 2017-07-06 PROCEDURE — 82378 CARCINOEMBRYONIC ANTIGEN: CPT

## 2017-07-06 PROCEDURE — 36591 DRAW BLOOD OFF VENOUS DEVICE: CPT | Performed by: INTERNAL MEDICINE

## 2017-07-06 PROCEDURE — 80053 COMPREHEN METABOLIC PANEL: CPT

## 2017-07-06 PROCEDURE — 85025 COMPLETE CBC W/AUTO DIFF WBC: CPT

## 2017-07-06 PROCEDURE — 99213 OFFICE O/P EST LOW 20 MIN: CPT | Performed by: NURSE PRACTITIONER

## 2017-07-06 RX ADMIN — Medication 500 UNITS: at 11:10

## 2017-07-06 ASSESSMENT — ENCOUNTER SYMPTOMS
DOUBLE VISION: 0
WHEEZING: 0
CONSTIPATION: 0
DIARRHEA: 0
BRUISES/BLEEDS EASILY: 1
ABDOMINAL PAIN: 0
FEVER: 0
NAUSEA: 1
DIZZINESS: 0
SPUTUM PRODUCTION: 0
BLOOD IN STOOL: 0
COUGH: 0
HEADACHES: 0
PALPITATIONS: 0
WEIGHT LOSS: 0
SHORTNESS OF BREATH: 0
VOMITING: 0
HEMOPTYSIS: 0
CHILLS: 0
BLURRED VISION: 0
TINGLING: 0

## 2017-07-06 ASSESSMENT — PAIN SCALES - GENERAL
PAINLEVEL: 8=MODERATE-SEVERE PAIN
PAINLEVEL: 8=MODERATE-SEVERE PAIN

## 2017-07-06 NOTE — PROGRESS NOTES
Tim Crump is a 62 y.o. female here for a non-provider visit for: Lab Draws  on 7/06/2017 at 11:00 AM    Procedure Performed: Port Access with Blood Draw: Accessed using sterile technique with brisk blood return verified. Port flushed per protocol with NS and heparin.  Port deaccessed and covered with sterile gauze/paper tape.     Anatomical site: R chest    Equipment used: Port Access Kit    Labs drawn: CBC w/diff and CMP, CEA    Ordering Provider:Iliana Mayen By: Maria Del Carmen Paulson R.N

## 2017-07-06 NOTE — MR AVS SNAPSHOT
"        Tim Crump   2017 12:00 PM   Office Visit   MRN: [id number]    Department:  [facility]   Dept Phone:  [phone]    Description:  Female : 1955   Provider:  Iliana Casarez AJuanisPERIKA.           Reason for Visit     Follow-Up prechemo       Allergies as of 2017     No Known Allergies      You were diagnosed with     Cancer of sigmoid colon (CMS-HCC)   [523621]         Vital Signs     Blood Pressure Pulse Temperature Respirations Height Weight    110/80 mmHg 72 35.6 °C (96.1 °F) 14 1.58 m (5' 2.21\") 76.658 kg (169 lb)    Body Mass Index Oxygen Saturation Smoking Status             30.71 kg/m2 98% Never Smoker          Basic Information     Date Of Birth Sex Race Ethnicity Preferred Language    1955 Female  or   Origin (Libyan,[place]orese,Greek,Paul, etc) English      Your appointments     2017 10:30 AM   Est Chemo 2 with RN 3   [facility]    [address] [address]   [phone]            Jul 10, 2017  4:00 PM   EST Port Flush / Central Line Care with INFUSION QUICK INJECT   [facility])    [address] [facility] [address]  [facility] [address] [phone]            2017 11:00 AM   Non Provider 1 with ONC RN 2   [facility] (--)    [address], [facility] [address]  [facility] [address] [phone]           You will be receiving a confirmation call a few days before your appointment from our automated call confirmation system.            2017  1:00 PM   ONCOLOGY EST PATIENT 30 MIN with Sonu Gomes M.D.   [facility] (--)    [address], [address]  [facility] [address] [phone]              Problem List              ICD-10-CM Priority Class Noted - Resolved    Cancer of sigmoid colon (CMS-HCC) C18.7   2016 - Present    Fourth degree hemorrhoids K64.3   2016 - Present      Health Maintenance        Date Due Completion Dates    IMM " DTaP/Tdap/Td Vaccine (1 - Tdap) 1/23/1976 ---    PAP SMEAR 1/23/1978 ---    COLONOSCOPY 1/23/2007 ---    MAMMOGRAM 2/7/2014 2/7/2013, 1/23/2012, 2/9/2011, 7/9/2010, 7/9/2010, 1/5/2010, 12/23/2009, 12/23/2009, 11/26/2008, 11/26/2008, 12/4/2007, 12/4/2007, 11/10/2006, 10/27/2005    IMM ZOSTER VACCINE 1/23/2017 ---    IMM INFLUENZA (1) 9/1/2017 10/1/2016            Current Immunizations     Influenza Vaccine Quad Inj (Pf) 10/1/2016      Below and/or attached are the medications your provider expects you to take. Review all of your home medications and newly ordered medications with your provider and/or pharmacist. Follow medication instructions as directed by your provider and/or pharmacist. Please keep your medication list with you and share with your provider. Update the information when medications are discontinued, doses are changed, or new medications (including over-the-counter products) are added; and carry medication information at all times in the event of emergency situations     Allergies:  No Known Allergies          Medications  Valid as of: July 06, 2017 - 12:46 PM    Generic Name Brand Name Tablet Size Instructions for use    Atorvastatin Calcium (Tab) LIPITOR 10 MG Take 5 mg by mouth every evening.        Cetirizine HCl (Tab) ZYRTEC 10 MG Take 10 mg by mouth every morning. Indications: Hayfever        Citalopram Hydrobromide (Tab) CELEXA 40 MG Take 40 mg by mouth every bedtime.        Docusate Sodium (Cap) COLACE 50 MG Take 50 mg by mouth 2 times a day.        Famotidine (Tab) PEPCID 20 MG Take 1 Tab by mouth 2 times a day.        Lidocaine-Prilocaine (Cream) EMLA 2.5-2.5 % Apply to port 1 hour prior to access of port and cover with plastic wrap.        Loperamide HCl (Cap) IMODIUM 2 MG Give loperamide 4 mg orally first dose, then 2 mg orally with every loose bowel movement.  Contact physician if maximum of 16 mg/24 hours is exceeded.        maalox plus-benadryl-visc lidocaine (MAGIC MOUTHWASH) MAGIC  MOUTHWASH  Take 5 mL by mouth every 6 hours as needed. 1:1:1 ratio        Melatonin (Cap) Melatonin 1 MG Take  by mouth.        Menthol (Lozenge) CEPACOL 3 MG Take 1 Lozenge by mouth as needed.        MetFORMIN HCl (Tab) GLUCOPHAGE 500 MG Take 500 mg by mouth 2 times a day, with meals.        Montelukast Sodium (Tab) SINGULAIR 10 MG Take 10 mg by mouth every bedtime. Indications: Hayfever        Ondansetron HCl (Tab) ZOFRAN 4 MG Take 1 Tab by mouth every four hours as needed for Nausea/Vomiting.        Polyethylene Glycol 3350 (Powder) MIRALAX  Take 17 g by mouth every day.        Prochlorperazine Maleate (Tab) COMPAZINE 10 MG Take 1 Tab by mouth every 6 hours as needed.        ValACYclovir HCl (Tab) VALTREX 500 MG Take 500 mg by mouth 2 times a day. 3 day course        .                 Medicines prescribed today were sent to:     [facility] [address] [place] [address] - [address] [facility]    [facility] [place] [address]    Phone: [phone] Fax: [phone]    Open 24 Hours?: No      Medication refill instructions:       If your prescription bottle indicates you have medication refills left, it is not necessary to call your provider’s office. Please contact your pharmacy and they will refill your medication.    If your prescription bottle indicates you do not have any refills left, you may request refills at any time through one of the following ways: The online [employer] system (except Urgent Care), by calling your provider’s office, or by asking your pharmacy to contact your provider’s office with a refill request. Medication refills are processed only during regular business hours and may not be available until the next business day. Your provider may request additional information or to have a follow-up visit with you prior to refilling your medication.   *Please Note: Medication refills are assigned a new Rx number when refilled electronically. Your pharmacy may indicate that  no refills were authorized even though a new prescription for the same medication is available at the pharmacy. Please request the medicine by name with the pharmacy before contacting your provider for a refill.        Your To Do List     Future Labs/Procedures Complete By Expires    CT-CHEST,ABDOMEN,PELVIS WITH  As directed 1/3/2018         MyChart Access Code: Activation code not generated  Current Mobile System 7 Status: Active

## 2017-07-06 NOTE — PROGRESS NOTES
Subjective:      Tim Crump is a 62 y.o. female who presents for Follow-Up for colon cancer.         HPI    Patient is seen today in follow-up for colon cancer. She is accompanied by her son for today's visit. Patient is scheduled to receive cycle #12 of FOLFOX tomorrow.     Patient's previous lower extremity edema has completely resolved. She notes occasional mild nausea that improves with food. She also experiences occasional constipation that is controlled with Miralax. She continues to experience left knee pain due to arthritis, along with peeling of skin on her feet due to chemotherapy, but it is stable and not worsening. Her cold sensitivity continues to improve as she can now touch cold objects and drink cold water without problems, although she is still sensitive to ice in her mouth. She continues to experience chronic stable fatigue and occasional dry cough.    Patient notes a new bruise in her upper left thigh which appeared approximately one week ago. The bruise is dark purple and well-circumscribed, approximately 3 cm in diameter. She stated that a similar bruise was present on her right thigh 2 weeks ago but has since completely resolved. She also has experienced pain in her right left for the past 3 weeks.    She denies any fever, chills, rash, headaches, dizziness, vision problems, mouth sores, cough, dyspnea, wheezing, chest pain, palpitations, abdominal pain, diarrhea, vomiting, numbness, and weakness.    Labs were fully reviewed with the patient today. Her BUN is increased and GFR decreased today, and she was encouraged to increase fluid intake.    No Known Allergies  Current Outpatient Prescriptions on File Prior to Visit   Medication Sig Dispense Refill   • polyethylene glycol 3350 (MIRALAX) Powder Take 17 g by mouth every day.     • Melatonin 1 MG Cap Take  by mouth.     • atorvastatin (LIPITOR) 10 MG Tab Take 5 mg by mouth every evening.     • montelukast (SINGULAIR) 10 MG Tab Take  10 mg by mouth every bedtime. Indications: Hayfever     • cetirizine (ZYRTEC) 10 MG Tab Take 10 mg by mouth every morning. Indications: Hayfever     • metformin (GLUCOPHAGE) 500 MG TABS Take 500 mg by mouth 2 times a day, with meals.     • citalopram (CELEXA) 40 MG TABS Take 40 mg by mouth every bedtime.     • maalox plus-benadryl-visc lidocaine (MAGIC MOUTHWASH) Take 5 mL by mouth every 6 hours as needed. 1:1:1 ratio 250 mL 3   • docusate sodium (COLACE) 50 MG Cap Take 50 mg by mouth 2 times a day.     • famotidine (PEPCID) 20 MG Tab Take 1 Tab by mouth 2 times a day. 60 Tab 2   • menthol (CEPACOL) 3 MG lozenge Take 1 Lozenge by mouth as needed.     • ondansetron (ZOFRAN) 4 MG Tab tablet Take 1 Tab by mouth every four hours as needed for Nausea/Vomiting. 30 Tab 3   • prochlorperazine (COMPAZINE) 10 MG Tab Take 1 Tab by mouth every 6 hours as needed. 30 Tab 3   • lidocaine-prilocaine (EMLA) 2.5-2.5 % Cream Apply to port 1 hour prior to access of port and cover with plastic wrap. 1 Tube 3   • loperamide (IMODIUM) 2 MG Cap Give loperamide 4 mg orally first dose, then 2 mg orally with every loose bowel movement.  Contact physician if maximum of 16 mg/24 hours is exceeded. 30 Cap 3   • valacyclovir (VALTREX) 500 MG Tab Take 500 mg by mouth 2 times a day. 3 day course       No current facility-administered medications on file prior to visit.         Review of Systems   Constitutional: Positive for malaise/fatigue (Chronic stable fatigue). Negative for fever, chills and weight loss.   Eyes: Negative for blurred vision and double vision.   Respiratory: Negative for cough, hemoptysis, sputum production, shortness of breath and wheezing.    Cardiovascular: Negative for chest pain, palpitations and leg swelling (Previous lower extremity edema has resolved).   Gastrointestinal: Positive for nausea (Occasional mild nausea). Negative for vomiting, abdominal pain, diarrhea, constipation and blood in stool.   Genitourinary:  "Negative for dysuria, urgency and frequency.   Musculoskeletal: Positive for joint pain (Chronic left knee pain due to arthritis).   Skin: Negative for itching and rash.   Neurological: Negative for dizziness, tingling and headaches.   Endo/Heme/Allergies: Bruises/bleeds easily (New bruise on left upper thigh).          Objective:     /80 mmHg  Pulse 72  Temp(Src) 35.6 °C (96.1 °F)  Resp 14  Ht 1.58 m (5' 2.21\")  Wt 76.658 kg (169 lb)  BMI 30.71 kg/m2  SpO2 98%     Physical Exam   Constitutional: She is oriented to person, place, and time. She appears well-developed and well-nourished. No distress.   HENT:   Head: Normocephalic and atraumatic.   Mouth/Throat: No oropharyngeal exudate.   Eyes: EOM are normal. Pupils are equal, round, and reactive to light.   Neck: Normal range of motion. Neck supple. No thyromegaly present.   Cardiovascular: Normal rate, regular rhythm and normal heart sounds.  Exam reveals no gallop and no friction rub.    No murmur heard.  Pulmonary/Chest: Effort normal and breath sounds normal. No respiratory distress. She has no wheezes. She has no rales.   Abdominal: Soft. Bowel sounds are normal. She exhibits no distension. There is no tenderness.   Musculoskeletal: Normal range of motion. She exhibits no edema or tenderness.   Lymphadenopathy:     She has no cervical adenopathy.   Neurological: She is alert and oriented to person, place, and time. No cranial nerve deficit. Coordination normal.   Skin: Skin is warm and dry. No rash noted. She is not diaphoretic. No erythema.   Psychiatric: She has a normal mood and affect. Her behavior is normal.          Hospital Outpatient Visit on 07/06/2017   Component Date Value Ref Range Status   • WBC 07/06/2017 7.7  4.8 - 10.8 K/uL Final   • RBC 07/06/2017 4.04* 4.20 - 5.40 M/uL Final   • Hemoglobin 07/06/2017 13.0  12.0 - 16.0 g/dL Final   • Hematocrit 07/06/2017 38.0  37.0 - 47.0 % Final   • MCV 07/06/2017 94.1  81.4 - 97.8 fL Final   • " MCH 07/06/2017 32.2  27.0 - 33.0 pg Final   • MCHC 07/06/2017 34.2  33.6 - 35.0 g/dL Final   • RDW 07/06/2017 47.6  35.9 - 50.0 fL Final   • Platelet Count 07/06/2017 212  164 - 446 K/uL Final   • MPV 07/06/2017 8.6* 9.0 - 12.9 fL Final   • Neutrophils-Polys 07/06/2017 46.70  44.00 - 72.00 % Final   • Lymphocytes 07/06/2017 28.90  22.00 - 41.00 % Final   • Monocytes 07/06/2017 12.50  0.00 - 13.40 % Final   • Eosinophils 07/06/2017 10.30* 0.00 - 6.90 % Final   • Basophils 07/06/2017 1.20  0.00 - 1.80 % Final   • Immature Granulocytes 07/06/2017 0.40  0.00 - 0.90 % Final   • Nucleated RBC 07/06/2017 0.00   Final   • Neutrophils (Absolute) 07/06/2017 3.60  2.00 - 7.15 K/uL Final    Includes immature neutrophils, if present.   • Lymphs (Absolute) 07/06/2017 2.22  1.00 - 4.80 K/uL Final   • Monos (Absolute) 07/06/2017 0.96* 0.00 - 0.85 K/uL Final   • Eos (Absolute) 07/06/2017 0.79* 0.00 - 0.51 K/uL Final   • Baso (Absolute) 07/06/2017 0.09  0.00 - 0.12 K/uL Final   • Immature Granulocytes (abs) 07/06/2017 0.03  0.00 - 0.11 K/uL Final   • NRBC (Absolute) 07/06/2017 0.00   Final   • Sodium 07/06/2017 140  135 - 145 mmol/L Final   • Potassium 07/06/2017 4.1  3.6 - 5.5 mmol/L Final   • Chloride 07/06/2017 107  96 - 112 mmol/L Final   • Co2 07/06/2017 24  20 - 33 mmol/L Final   • Anion Gap 07/06/2017 9.0  0.0 - 11.9 Final   • Glucose 07/06/2017 117* 65 - 99 mg/dL Final   • Bun 07/06/2017 26* 8 - 22 mg/dL Final   • Creatinine 07/06/2017 1.24  0.50 - 1.40 mg/dL Final   • Calcium 07/06/2017 8.9  8.5 - 10.5 mg/dL Final   • AST(SGOT) 07/06/2017 24  12 - 45 U/L Final   • ALT(SGPT) 07/06/2017 22  2 - 50 U/L Final   • Alkaline Phosphatase 07/06/2017 133* 30 - 99 U/L Final   • Total Bilirubin 07/06/2017 0.4  0.1 - 1.5 mg/dL Final   • Albumin 07/06/2017 3.6  3.2 - 4.9 g/dL Final   • Total Protein 07/06/2017 6.2  6.0 - 8.2 g/dL Final   • Globulin 07/06/2017 2.6  1.9 - 3.5 g/dL Final   • A-G Ratio 07/06/2017 1.4   Final   •  Carcinoembryonic Antigen 07/06/2017 2.3  0.0 - 3.0 ng/mL Final    Comment: Effective September 17, 2013 the quantitative determination  of Carcinoembryonic Antigen (CEA) will now be performed at  [facility].  The Access CEA paramagnetic  particle chemiluminescent immunoassay is used.  Results  obtained with different test methods or kits cannot be used interchangeably.  Measurement of CEA has been shown to be  clinically relevant in the management of patients with  colorectal, breast, lung, prostatic, pancreatic, and ovarian  carcinomas.  Smokers may have slightly elevated levels of CEA.     • GFR If  07/06/2017 53* >60 mL/min/1.73 m 2 Final   • GFR If Non  07/06/2017 44* >60 mL/min/1.73 m 2 Final          Assessment/Plan:     1. Cancer of sigmoid colon (CMS-HCC)  Ms. Crump presents to clinic for follow up of cancer of sigmoid colon. I reviewed her CBC and CMP today and is ok to proceed with treatment. She is scheduled for cycle #12 of FOLFOX tomorrow. We have held oxaliplatin for last 3 cycles and will continue to hold oxaliplatin. Continue with slight reduction in dose of 5-FU due to skin toxicity. CT scan and follow up in clinic in 4 weeks.     2. We encouraged patient to increase fluid intake due to increased BUN and decreased GFR.

## 2017-07-06 NOTE — MR AVS SNAPSHOT
Henoknaz Mackilva   2017 11:00 AM   Appointment   MRN: [id number]    Department:  [facility]   Dept Phone:  [phone]    Description:  Female : 1955   Provider:  ONC RN 1           Allergies as of 2017     No Known Allergies      Vital Signs     Smoking Status                   Never Smoker            Basic Information     Date Of Birth Sex Race Ethnicity Preferred Language    1955 Female  or   Origin (English,Austrian,Sri Lankan,Paul, etc) English      Your appointments     2017 12:00 PM   ONCOLOGY EST PATIENT 30 MIN with LUIS CARLOS Forman   [facility] (--)    [address], [facility] [address]  [facility] [id number] [phone]            2017 10:30 AM   Est Chemo 2 with RN 3   [facility] ([facility])    [address] [address]   [phone]            Jul 10, 2017  4:00 PM   EST Port Flush / Central Line Care with INFUSION QUICK INJECT   [facility] ([facility])    [address]  [facility] [address]   [phone]            2017 11:00 AM   Non Provider 1 with ONC RN 2   [facility] (--)    [address], [facility] [address]  [facility] [id number] [phone]           You will be receiving a confirmation call a few days before your appointment from our automated call confirmation system.            2017  1:00 PM   ONCOLOGY EST PATIENT 30 MIN with Sonu Gomes M.D.   [facility] (--)    [address], [facility] [address]  [facility] [address] [phone]              Problem List              ICD-10-CM Priority Class Noted - Resolved    Cancer of sigmoid colon (CMS-HCC) C18.7   2016 - Present    Fourth degree hemorrhoids K64.3   2016 - Present      Health Maintenance        Date Due Completion Dates    IMM DTaP/Tdap/Td Vaccine (1 - Tdap) 1976 ---    PAP SMEAR 1978 ---    COLONOSCOPY 2007 ---    MAMMOGRAM 2014, 2012,  2/9/2011, 7/9/2010, 7/9/2010, 1/5/2010, 12/23/2009, 12/23/2009, 11/26/2008, 11/26/2008, 12/4/2007, 12/4/2007, 11/10/2006, 10/27/2005    IMM ZOSTER VACCINE 1/23/2017 ---    IMM INFLUENZA (1) 9/1/2017 10/1/2016            Current Immunizations     Influenza Vaccine Quad Inj (Pf) 10/1/2016      Below and/or attached are the medications your provider expects you to take. Review all of your home medications and newly ordered medications with your provider and/or pharmacist. Follow medication instructions as directed by your provider and/or pharmacist. Please keep your medication list with you and share with your provider. Update the information when medications are discontinued, doses are changed, or new medications (including over-the-counter products) are added; and carry medication information at all times in the event of emergency situations     Allergies:  No Known Allergies          Medications  Valid as of: July 06, 2017 - 11:23 AM    Generic Name Brand Name Tablet Size Instructions for use    Atorvastatin Calcium (Tab) LIPITOR 10 MG Take 5 mg by mouth every evening.        Cetirizine HCl (Tab) ZYRTEC 10 MG Take 10 mg by mouth every morning. Indications: Hayfever        Citalopram Hydrobromide (Tab) CELEXA 40 MG Take 40 mg by mouth every bedtime.        Docusate Sodium (Cap) COLACE 50 MG Take 50 mg by mouth 2 times a day.        Famotidine (Tab) PEPCID 20 MG Take 1 Tab by mouth 2 times a day.        Lidocaine-Prilocaine (Cream) EMLA 2.5-2.5 % Apply to port 1 hour prior to access of port and cover with plastic wrap.        Loperamide HCl (Cap) IMODIUM 2 MG Give loperamide 4 mg orally first dose, then 2 mg orally with every loose bowel movement.  Contact physician if maximum of 16 mg/24 hours is exceeded.        maalox plus-benadryl-visc lidocaine (MAGIC MOUTHWASH) MAGIC MOUTHWASH  Take 5 mL by mouth every 6 hours as needed. 1:1:1 ratio        Melatonin (Cap) Melatonin 1 MG Take  by mouth.        Menthol (Lozenge)  CEPACOL 3 MG Take 1 Lozenge by mouth as needed.        MetFORMIN HCl (Tab) GLUCOPHAGE 500 MG Take 500 mg by mouth 2 times a day, with meals.        Montelukast Sodium (Tab) SINGULAIR 10 MG Take 10 mg by mouth every bedtime. Indications: Hayfever        Ondansetron HCl (Tab) ZOFRAN 4 MG Take 1 Tab by mouth every four hours as needed for Nausea/Vomiting.        Polyethylene Glycol 3350 (Powder) MIRALAX  Take 17 g by mouth every day.        Prochlorperazine Maleate (Tab) COMPAZINE 10 MG Take 1 Tab by mouth every 6 hours as needed.        ValACYclovir HCl (Tab) VALTREX 500 MG Take 500 mg by mouth 2 times a day. 3 day course        .                 Medicines prescribed today were sent to:     [facility] [address] [place] [address] [place] [facility] [address] [facility]    [address] [place] [address]    Phone: [phone] Fax: [phone]    Open 24 Hours?: No      Medication refill instructions:       If your prescription bottle indicates you have medication refills left, it is not necessary to call your provider’s office. Please contact your pharmacy and they will refill your medication.    If your prescription bottle indicates you do not have any refills left, you may request refills at any time through one of the following ways: The online [employer] system (except Urgent Care), by calling your provider’s office, or by asking your pharmacy to contact your provider’s office with a refill request. Medication refills are processed only during regular business hours and may not be available until the next business day. Your provider may request additional information or to have a follow-up visit with you prior to refilling your medication.   *Please Note: Medication refills are assigned a new Rx number when refilled electronically. Your pharmacy may indicate that no refills were authorized even though a new prescription for the same medication is available at the pharmacy. Please request the medicine by  name with the pharmacy before contacting your provider for a refill.           MyChart Access Code: Activation code not generated  Current MyChart Status: Active

## 2017-07-07 NOTE — PROGRESS NOTES
Appointment date: 7/8/17   Patient assistance medication: Aloxi  Patient assistant medication received and in bin: yes  Patient to be charged: no

## 2017-07-08 ENCOUNTER — OUTPATIENT INFUSION SERVICES (OUTPATIENT)
Dept: ONCOLOGY | Facility: MEDICAL CENTER | Age: 62
End: 2017-07-08
Attending: INTERNAL MEDICINE
Payer: COMMERCIAL

## 2017-07-08 VITALS
BODY MASS INDEX: 31.24 KG/M2 | WEIGHT: 169.75 LBS | SYSTOLIC BLOOD PRESSURE: 116 MMHG | HEART RATE: 90 BPM | RESPIRATION RATE: 18 BRPM | TEMPERATURE: 97.8 F | HEIGHT: 62 IN | OXYGEN SATURATION: 100 % | DIASTOLIC BLOOD PRESSURE: 65 MMHG

## 2017-07-08 DIAGNOSIS — C18.7 CANCER OF SIGMOID COLON (HCC): ICD-10-CM

## 2017-07-08 PROCEDURE — 700105 HCHG RX REV CODE 258: Performed by: NURSE PRACTITIONER

## 2017-07-08 PROCEDURE — 96409 CHEMO IV PUSH SNGL DRUG: CPT

## 2017-07-08 PROCEDURE — 306780 HCHG STAT FOR TRANSFUSION PER CASE

## 2017-07-08 PROCEDURE — 96367 TX/PROPH/DG ADDL SEQ IV INF: CPT

## 2017-07-08 PROCEDURE — G0498 CHEMO EXTEND IV INFUS W/PUMP: HCPCS

## 2017-07-08 PROCEDURE — A4212 NON CORING NEEDLE OR STYLET: HCPCS

## 2017-07-08 PROCEDURE — 96375 TX/PRO/DX INJ NEW DRUG ADDON: CPT

## 2017-07-08 PROCEDURE — 700111 HCHG RX REV CODE 636 W/ 250 OVERRIDE (IP): Performed by: NURSE PRACTITIONER

## 2017-07-08 RX ORDER — DEXTROSE MONOHYDRATE 50 MG/ML
INJECTION, SOLUTION INTRAVENOUS CONTINUOUS
Status: DISCONTINUED | OUTPATIENT
Start: 2017-07-08 | End: 2017-07-08 | Stop reason: HOSPADM

## 2017-07-08 RX ORDER — PALONOSETRON 0.05 MG/ML
0.25 INJECTION, SOLUTION INTRAVENOUS ONCE
Status: COMPLETED | OUTPATIENT
Start: 2017-07-08 | End: 2017-07-08

## 2017-07-08 RX ORDER — LIDOCAINE HYDROCHLORIDE 10 MG/ML
0.5 INJECTION, SOLUTION INFILTRATION; PERINEURAL ONCE
Status: DISCONTINUED | OUTPATIENT
Start: 2017-07-08 | End: 2017-07-08 | Stop reason: HOSPADM

## 2017-07-08 RX ADMIN — LEUCOVORIN CALCIUM 732 MG: 350 INJECTION, POWDER, LYOPHILIZED, FOR SOLUTION INTRAMUSCULAR; INTRAVENOUS at 11:21

## 2017-07-08 RX ADMIN — DEXAMETHASONE SODIUM PHOSPHATE 12 MG: 4 INJECTION, SOLUTION INTRAMUSCULAR; INTRAVENOUS at 10:43

## 2017-07-08 RX ADMIN — FLUOROURACIL 730 MG: 50 INJECTION, SOLUTION INTRAVENOUS at 12:32

## 2017-07-08 RX ADMIN — PALONOSETRON HYDROCHLORIDE 0.25 MG: 0.25 INJECTION INTRAVENOUS at 10:40

## 2017-07-08 RX ADMIN — FLUOROURACIL 4025 MG: 50 INJECTION, SOLUTION INTRAVENOUS at 12:40

## 2017-07-08 RX ADMIN — DEXTROSE MONOHYDRATE: 50 INJECTION, SOLUTION INTRAVENOUS at 10:40

## 2017-07-08 ASSESSMENT — PAIN SCALES - GENERAL: PAINLEVEL: NO PAIN

## 2017-07-08 NOTE — PROGRESS NOTES
"Pharmacy Chemotherapy Verification    Patient Name: Tim Crump   Dx: Colon Cancer        Protocol: [id number]   Oxaliplatin (Eloxatin) 85 mg/m2 IV Day 1  --4/01/17 dose reduced 20% by Aubrey MEDRANO for tolerance  --4/15/17 Hold for Cycle 6 to assess neuropathy, MD will reassess with next cycle per Dr. Burgos.   --4/29/17 added back to treatment plan, neuropathy resolved per C Alsop KELSI/Aubrey MEDRANO  --5/13/17 Hold for C8 due to persistent neuropathy per C Alsop KELSI/Mireya MEDRANO  - 5/27/17 Resume oxaliplatin at 20% dose reduction for improved neuropathy per Dr. Gomes - verified   - 6/10/17 hold oxaliplatin with cycle 10 and possibly for the duration of her treatment per MD  - 6/24/17 Continue to hold Oxaliplatin for the duration of her treatment per C. Alsop, APN  Leucovorin 400 mg/m2 IV over 2 hrs on Day 1. Run concurrently with oxaliplatin  Fluorouracil 400 mg/m2 IV bolus Day 1 followed by 2400 mg/m2 CIVI over 46 hrs  --reduced to 2200 mg/m2 with Cycle 10 for peeling of her feet and toes.   - 06/24/17: Continue dose reduction of 5-FU   Q14 days until disease progression or unacceptable toxicity    NCCN Guidelines for Colon Cancer. V.2.2017  Xu T, et al. N Engl J Med. 2004;350:3595-4419  Renetta J et al. J Clin Oncol. 2008;26(12):2006-12  Hong SL, et al. Br J Cancer. 2002;87(4):393-9    Allergies:  Review of patient's allergies indicates no known allergies.     /65 mmHg  Pulse 90  Temp(Src) 36.6 °C (97.8 °F)  Resp 18  Ht 1.57 m (5' 1.81\")  Wt 77 kg (169 lb 12.1 oz)  BMI 31.24 kg/m2  SpO2 100%   Body surface area is 1.83 meters squared.    Labs 7/6/17  ANC ~3600 Plt = 212k   Hgb = 13.0   SCr = 1.24 mg/dL CrCl ~60 mL/min   LFTs = 24/22/133 TBili = 0.4   K+ = 4.1      Drug Order   (Drug name, dose, route, IV Fluid & volume, frequency, number of doses) Cycle: 12  Previous treatment: C11 = 6/24/17     Medication = Leucovorin (Wellcovorin)  Base Dose = 400 mg/m2  Calc Dose: Base Dose x 1.83 m2 " = 732 mg  Final Dose = 732 mg  Route = IV  Fluid & Volume = D5W 250 mL  Admin Duration = Over 2 hours          <5% difference, OK to treat with final dose   Medication = Fluorouracil (5-FU) bolus  Base Dose = 400 mg/m2  Calc Dose:Base Dose x 1.83 m2 = 732 mg  Final Dose = 730 mg  Route = IVP  Fluid & Volume = in syringe 14.6 mL  Conc = 50 mg/mL  Admin Duration = Over 5 minutes          <5% difference, OK to treat with final dose   Medication = Fluorouracil (5-FU)  Base Dose = 2200 mg/m2  Calc Dose:Base Dose x 1.83 m2 = 4026 mg  Final Dose = 4025 mg  Route = CIVI  Conc = 50 mg/mL   Fluid & Volume = 80.5 mL + 3 mL overfill = 83.5 mL  Admin Duration = Over 46 hours @ 1.8 mL/hr     Via CADD pump for home CIVI       <5% difference, OK to treat with final dose     By my signature below, I confirm this process was performed independently with the BSA and all final chemotherapy dosing calculations congruent. I have reviewed the above chemotherapy order and that my calculation of the final dose and BSA (when applicable) corroborate those calculations of the  pharmacist. Discrepancies of 5% or greater in the written dose have been addressed and documented within the EPIC Progress notes.    Charles Muniz, MeredithD

## 2017-07-08 NOTE — PROGRESS NOTES
Chemotherapy Verification - PRIMARY RN      Height = 157 cm  Weight = 77 kg  BSA = 1.83 m2       Medication: Fluorouracil  Dose: 400 mg/m2  Calculated Dose: 732 mg                             (In mg/m2, AUC, mg/kg)     Medication: Fluorouracil pump  Dose: 2200 mg/m2  Calculated Dose: 4026 mg                             (In mg/m2, AUC, mg/kg)      I confirm this process was performed independently with the BSA and all final chemotherapy dosing calculations congruent.  Any discrepancies of 5% or greater have been addressed with the chemotherapy pharmacist. The resolution of the discrepancy has been documented in the EPIC progress notes.

## 2017-07-08 NOTE — PROGRESS NOTES
Chemotherapy Verification - SECONDARY RN       Height = 61.81in  Weight = 77kg  BSA = 1.83m2       Medication: Leucovorin  Dose: 400mg/m2  Calculated Dose: 732mg                             (In mg/m2, AUC, mg/kg)     Medication: Fluorouracil  Dose: 400mg/m2  Calculated Dose: 732mg                             (In mg/m2, AUC, mg/kg)    Medication: Fluorouracil  Dose: 2200mg/m2 (original dose of 2400mg/m2)Calculated Dose: 4027mg                             (In mg/m2, AUC, mg/kg)            I confirm that this process was performed independently.

## 2017-07-08 NOTE — PROGRESS NOTES
Pt here for last cycle chemo.  Labs from yesterday reviewed, pt within parameters to treat.  Port accessed in sterile field, premeds and leucovorin infused without issue.  5 FU push given, pump connected to pt.  Total volume with overfill 81.6 mg, pt to receive 80.5 mg to get dose.  Bracelet given to pt, pt to return on Monday for de-access.  Pt left IC with family, no s/s of distress.

## 2017-07-08 NOTE — PROGRESS NOTES
"Pharmacy Chemotherapy Calculations Note:    Patient Name: Tim Crump     Dx: Colon Cancer     Cycle 12 Previous treatment: 6/24/17     Protocol: [id number]   Oxaliplatin (Eloxatin) 85 mg/m2 IV Day 1  04/01/17: dose reduced to 68 mg/m2 (20% dose reduction) d/t neuropathy per Dr. Burgos    04/15/17: HOLD cycle 6 oxaliplatin for significant neuropathy per Dr. Burgos.  MD to reassess next cycle.    04/29/17 Neuropathy improved, adding back oxaliplatin at 20% dose reduction (68 mg/m2) cycle 7  5/131/7 Continuing neuropathy Oxaliplatin to be held.  6/10/17 Oxaliplatin discontinued    Leucovorin 400 mg/m2 IV over 2 hrs on Day 1. Run concurrently with oxaliplatin  Fluorouracil 400 mg/m2 IV bolus Day 1 followed by 2400 mg/m2 CIVI over 46 hrs --6/10/17 CIVI dose reduced to 2200 mg/m2 over 46 hours for dermatologic reactions.  Q14 days until disease progression or unacceptable toxicity    NCCN Guidelines for Colon Cancer. V.2.2017  Xu T, et al. N Engl J Med. 2004;350:4685-0650  Renetta J et al. J Clin Oncol. 2008;26(12):2006-12  Hong SL, et al. Br J Cancer. 2002;87(4):393-9         /65 mmHg  Pulse 90  Temp(Src) 36.6 °C (97.8 °F)  Resp 18  Ht 1.57 m (5' 1.81\")  Wt 77 kg (169 lb 12.1 oz)  BMI 31.24 kg/m2  SpO2 100% Body surface area is 1.83 meters squared.    Labs 7/6/17   ANC~ 3600 Plt = 212k   Hgb = 13     SCr = 1.24mg/dL CrCl ~ 59mL/min   AST/ALT/AP = 24/22/133 TBili = 0.4      Leucovorin 400 mg/m² x 1.83 m² = 732 mg   <5% difference, ok to treat with final dose = 732 mg IV    Fluorouracil (5-FU) 400 mg/m² x 1.83 m² = 732 mg   <5% difference, ok to treat with final dose = 730 mg IVP    Fluorouracil (5-FU) 2200 mg/m² x 1.83 m²  = 4026 mg    <5% difference, ok to treat with final dose = 4025 mg CIVI over 46 hrs via CADD pump       Orin Aguirre, PharmD                     "

## 2017-07-10 ENCOUNTER — OUTPATIENT INFUSION SERVICES (OUTPATIENT)
Dept: ONCOLOGY | Facility: MEDICAL CENTER | Age: 62
End: 2017-07-10
Attending: INTERNAL MEDICINE
Payer: COMMERCIAL

## 2017-07-10 VITALS
DIASTOLIC BLOOD PRESSURE: 50 MMHG | HEIGHT: 62 IN | BODY MASS INDEX: 31.36 KG/M2 | WEIGHT: 170.42 LBS | SYSTOLIC BLOOD PRESSURE: 102 MMHG | TEMPERATURE: 97.9 F | HEART RATE: 74 BPM | OXYGEN SATURATION: 96 % | RESPIRATION RATE: 18 BRPM

## 2017-07-10 DIAGNOSIS — C18.7 CANCER OF SIGMOID COLON (HCC): ICD-10-CM

## 2017-07-10 PROCEDURE — 96523 IRRIG DRUG DELIVERY DEVICE: CPT

## 2017-07-10 PROCEDURE — 700111 HCHG RX REV CODE 636 W/ 250 OVERRIDE (IP): Performed by: NURSE PRACTITIONER

## 2017-07-10 RX ADMIN — HEPARIN 500 UNITS: 100 SYRINGE at 10:55

## 2017-07-10 ASSESSMENT — PAIN SCALES - GENERAL: PAINLEVEL: NO PAIN

## 2017-07-10 NOTE — PROGRESS NOTES
Patient arrived for CADD pump de-access; pt's scheduled appt was not until 1600, but came at 1000 due to pump beeping.  Able to accommodate pt earlier; assessed CADD pump.  Oml remaining, 81.6ml given per pump.  Pump turned off, de-accessed; chemo waste properly disposed of and cleaned pt pump/bag.  Port flushed, required additional flushes and positional changes, but brisk blood return noted.  Flushed pump per protocol, de-accessed and gauze dressing applied.  Pt completed last cycle of treatment.  No further appts until f/u with Dr. Gomes.  Pt discharged with family in great spirits under no apparent distress.

## 2017-07-20 ENCOUNTER — APPOINTMENT (OUTPATIENT)
Dept: HEMATOLOGY ONCOLOGY | Facility: MEDICAL CENTER | Age: 62
End: 2017-07-20

## 2017-08-01 ENCOUNTER — HOSPITAL ENCOUNTER (OUTPATIENT)
Dept: RADIOLOGY | Facility: MEDICAL CENTER | Age: 62
End: 2017-08-01
Attending: NURSE PRACTITIONER
Payer: COMMERCIAL

## 2017-08-01 DIAGNOSIS — C18.7 CANCER OF SIGMOID COLON (HCC): ICD-10-CM

## 2017-08-01 PROCEDURE — 700117 HCHG RX CONTRAST REV CODE 255: Performed by: NURSE PRACTITIONER

## 2017-08-01 PROCEDURE — 71260 CT THORAX DX C+: CPT

## 2017-08-01 RX ADMIN — IOHEXOL 100 ML: 350 INJECTION, SOLUTION INTRAVENOUS at 09:35

## 2017-08-02 ENCOUNTER — OFFICE VISIT (OUTPATIENT)
Dept: HEMATOLOGY ONCOLOGY | Facility: MEDICAL CENTER | Age: 62
End: 2017-08-02

## 2017-08-02 ENCOUNTER — TELEPHONE (OUTPATIENT)
Dept: HEMATOLOGY ONCOLOGY | Facility: MEDICAL CENTER | Age: 62
End: 2017-08-02

## 2017-08-02 ENCOUNTER — OUTPATIENT INFUSION SERVICES (OUTPATIENT)
Dept: ONCOLOGY | Facility: MEDICAL CENTER | Age: 62
End: 2017-08-02
Attending: INTERNAL MEDICINE
Payer: COMMERCIAL

## 2017-08-02 VITALS
HEIGHT: 62 IN | OXYGEN SATURATION: 94 % | BODY MASS INDEX: 31.32 KG/M2 | DIASTOLIC BLOOD PRESSURE: 73 MMHG | RESPIRATION RATE: 18 BRPM | HEART RATE: 83 BPM | WEIGHT: 170.19 LBS | TEMPERATURE: 97.8 F | SYSTOLIC BLOOD PRESSURE: 101 MMHG

## 2017-08-02 VITALS
HEIGHT: 62 IN | HEART RATE: 93 BPM | OXYGEN SATURATION: 98 % | TEMPERATURE: 97.7 F | SYSTOLIC BLOOD PRESSURE: 120 MMHG | WEIGHT: 169.75 LBS | DIASTOLIC BLOOD PRESSURE: 78 MMHG | RESPIRATION RATE: 16 BRPM | BODY MASS INDEX: 31.24 KG/M2

## 2017-08-02 DIAGNOSIS — C18.7 CANCER OF SIGMOID COLON (HCC): ICD-10-CM

## 2017-08-02 LAB
ALBUMIN SERPL BCP-MCNC: 3.7 G/DL (ref 3.2–4.9)
ALBUMIN/GLOB SERPL: 1.4 G/DL
ALP SERPL-CCNC: 133 U/L (ref 30–99)
ALT SERPL-CCNC: 22 U/L (ref 2–50)
ANION GAP SERPL CALC-SCNC: 8 MMOL/L (ref 0–11.9)
AST SERPL-CCNC: 26 U/L (ref 12–45)
BASOPHILS # BLD AUTO: 1.3 % (ref 0–1.8)
BASOPHILS # BLD: 0.11 K/UL (ref 0–0.12)
BILIRUB SERPL-MCNC: 0.5 MG/DL (ref 0.1–1.5)
BUN SERPL-MCNC: 17 MG/DL (ref 8–22)
CALCIUM SERPL-MCNC: 9.3 MG/DL (ref 8.5–10.5)
CEA SERPL-MCNC: 1.4 NG/ML (ref 0–3)
CHLORIDE SERPL-SCNC: 105 MMOL/L (ref 96–112)
CO2 SERPL-SCNC: 23 MMOL/L (ref 20–33)
CREAT SERPL-MCNC: 0.84 MG/DL (ref 0.5–1.4)
EOSINOPHIL # BLD AUTO: 0.64 K/UL (ref 0–0.51)
EOSINOPHIL NFR BLD: 7.7 % (ref 0–6.9)
ERYTHROCYTE [DISTWIDTH] IN BLOOD BY AUTOMATED COUNT: 45.6 FL (ref 35.9–50)
GFR SERPL CREATININE-BSD FRML MDRD: >60 ML/MIN/1.73 M 2
GLOBULIN SER CALC-MCNC: 2.7 G/DL (ref 1.9–3.5)
GLUCOSE SERPL-MCNC: 119 MG/DL (ref 65–99)
HCT VFR BLD AUTO: 39.3 % (ref 37–47)
HGB BLD-MCNC: 13.2 G/DL (ref 12–16)
IMM GRANULOCYTES # BLD AUTO: 0.04 K/UL (ref 0–0.11)
IMM GRANULOCYTES NFR BLD AUTO: 0.5 % (ref 0–0.9)
LYMPHOCYTES # BLD AUTO: 2.26 K/UL (ref 1–4.8)
LYMPHOCYTES NFR BLD: 27.3 % (ref 22–41)
MCH RBC QN AUTO: 31.1 PG (ref 27–33)
MCHC RBC AUTO-ENTMCNC: 33.6 G/DL (ref 33.6–35)
MCV RBC AUTO: 92.7 FL (ref 81.4–97.8)
MONOCYTES # BLD AUTO: 0.94 K/UL (ref 0–0.85)
MONOCYTES NFR BLD AUTO: 11.4 % (ref 0–13.4)
NEUTROPHILS # BLD AUTO: 4.29 K/UL (ref 2–7.15)
NEUTROPHILS NFR BLD: 51.8 % (ref 44–72)
NRBC # BLD AUTO: 0 K/UL
NRBC BLD AUTO-RTO: 0 /100 WBC
PLATELET # BLD AUTO: 235 K/UL (ref 164–446)
PMV BLD AUTO: 8.9 FL (ref 9–12.9)
POTASSIUM SERPL-SCNC: 3.9 MMOL/L (ref 3.6–5.5)
PROT SERPL-MCNC: 6.4 G/DL (ref 6–8.2)
RBC # BLD AUTO: 4.24 M/UL (ref 4.2–5.4)
SODIUM SERPL-SCNC: 136 MMOL/L (ref 135–145)
WBC # BLD AUTO: 8.3 K/UL (ref 4.8–10.8)

## 2017-08-02 PROCEDURE — 99214 OFFICE O/P EST MOD 30 MIN: CPT | Performed by: INTERNAL MEDICINE

## 2017-08-02 PROCEDURE — 82378 CARCINOEMBRYONIC ANTIGEN: CPT

## 2017-08-02 PROCEDURE — 80053 COMPREHEN METABOLIC PANEL: CPT

## 2017-08-02 PROCEDURE — 700111 HCHG RX REV CODE 636 W/ 250 OVERRIDE (IP)

## 2017-08-02 PROCEDURE — 85025 COMPLETE CBC W/AUTO DIFF WBC: CPT

## 2017-08-02 PROCEDURE — A4212 NON CORING NEEDLE OR STYLET: HCPCS

## 2017-08-02 PROCEDURE — 36591 DRAW BLOOD OFF VENOUS DEVICE: CPT

## 2017-08-02 PROCEDURE — 700101 HCHG RX REV CODE 250

## 2017-08-02 RX ORDER — LIDOCAINE HYDROCHLORIDE 10 MG/ML
INJECTION, SOLUTION INFILTRATION; PERINEURAL
Status: COMPLETED
Start: 2017-08-02 | End: 2017-08-02

## 2017-08-02 RX ADMIN — LIDOCAINE HYDROCHLORIDE 0.1 ML: 10 INJECTION, SOLUTION INFILTRATION; PERINEURAL at 10:40

## 2017-08-02 RX ADMIN — HEPARIN 500 UNITS: 100 SYRINGE at 10:46

## 2017-08-02 ASSESSMENT — PAIN SCALES - GENERAL
PAINLEVEL: NO PAIN
PAINLEVEL: NO PAIN

## 2017-08-02 NOTE — PROGRESS NOTES
"Pt arrives for Port flush and lab draw, son at side. Reports she had a CT scan and just came from a follow up appt w/ Dr. Gomes. Reports the results of her scan were good and she will now be coming monthly for port flushes but unsure if blood work should be completed each visit. Call placed to Dory MALONE at Dr. Gomes's office and notified RN that the supportive plan is only for port flushes moving forward and today blood work will be drawn. Per Dory MALONE she will notify MD and add additional orders if wanted by MD. Intradermal lidocaine to R port per pt request as she \"forgot the cream\". R Port accessed in sterile fashion; brisk blood return, labs drawn as ordered, line flushed per policy, heparinized & de-accessed w/ NCN intact, gauze dressing applied. Future appt confirmed. Pt d/c'd in great spirits to care of son.   "

## 2017-08-02 NOTE — PROGRESS NOTES
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    History of Presenting Illness:  Tim Crump is a 62-year-old female diagnosed in 5/2016 with colon cancer secondary to positivel occult testing and alteration in her bowel movements. Colonoscopy showed a polyp in the sigmoid colon which was removed. Pathology was positive for poorly differentiated adenocarcinoma with indeterminate margins. In underwent a sigmoidoscopy showed tattooed polyp stalk without any evidence of disease. Imaging showed no evidence of metastatic disease. Her CEA was 1 at diagnosis.     S/p laparoscopic sigmoid resection with low anterior pelvic anastomosis on 12/2016. Pathology showed evidence of local malignancy but she was found to have 1/13 nodes positive and was staged pT1 pN1a and MLH1, MSH2, MSH6 and PMS2 are intact. Staging workup was negative for metastatic disease. She was started on adjuvant chemotherapy with modified FOLFOX in 2/2/17.  Oxaliplatin was held intermittently  due to severe cold sensitivity and some component of reversible peripheral neuropathy. She finished 6 cycles on 7/20/2017 with only single agent 5-FU for the last few cycles.    She is here for further follow-up. She had a restaging CT scan of her abdomen and pelvis done which was unremarkable. He denies any acute complaints. Neuropathy has improved overall.        Past Medical History:      Past Medical History   Diagnosis Date   • Snoring      no sleep study   • Arthritis      left knee   • Diabetes (CMS-HCC)      oral meds   • Bowel habit changes      diarrhea   • Psychiatric problem      anxiety   • Hypertension    • Cancer (CMS-HCC)      colon   • High cholesterol    • Pain 12-     left wrist and knee, 3-4/10   • H/O seasonal allergies        Past surgical history:       Past Surgical History   Procedure  Laterality Date   • Gyn surgery       hysterectomy   • Gyn surgery        x 2   • Other       varicose vein stripping rubina   • Other orthopedic surgery Left      left wrist   • Low anterior resection laparoscopic  2016     Procedure: LOW ANTERIOR RESECTION LAPAROSCOPIC;  Surgeon: Enoch Shaw M.D.;  Location: SURGERY [facility];  Service:        Allergies:       Review of patient's allergies indicates no known allergies.    Medications:         Current Outpatient Prescriptions   Medication Sig Dispense Refill   • maalox plus-benadryl-visc lidocaine (MAGIC MOUTHWASH) Take 5 mL by mouth every 6 hours as needed. 1:1:1 ratio 250 mL 3   • polyethylene glycol 3350 (MIRALAX) Powder Take 17 g by mouth every day.     • docusate sodium (COLACE) 50 MG Cap Take 50 mg by mouth 2 times a day.     • famotidine (PEPCID) 20 MG Tab Take 1 Tab by mouth 2 times a day. 60 Tab 2   • menthol (CEPACOL) 3 MG lozenge Take 1 Lozenge by mouth as needed.     • Melatonin 1 MG Cap Take  by mouth.     • ondansetron (ZOFRAN) 4 MG Tab tablet Take 1 Tab by mouth every four hours as needed for Nausea/Vomiting. 30 Tab 3   • prochlorperazine (COMPAZINE) 10 MG Tab Take 1 Tab by mouth every 6 hours as needed. 30 Tab 3   • lidocaine-prilocaine (EMLA) 2.5-2.5 % Cream Apply to port 1 hour prior to access of port and cover with plastic wrap. 1 Tube 3   • loperamide (IMODIUM) 2 MG Cap Give loperamide 4 mg orally first dose, then 2 mg orally with every loose bowel movement.  Contact physician if maximum of 16 mg/24 hours is exceeded. 30 Cap 3   • valacyclovir (VALTREX) 500 MG Tab Take 500 mg by mouth 2 times a day. 3 day course     • atorvastatin (LIPITOR) 10 MG Tab Take 5 mg by mouth every evening.     • montelukast (SINGULAIR) 10 MG Tab Take 10 mg by mouth every bedtime. Indications: Hayfever     • cetirizine (ZYRTEC) 10 MG Tab Take 10 mg by mouth every morning. Indications: Hayfever     • metformin (GLUCOPHAGE) 500 MG TABS  "Take 500 mg by mouth 2 times a day, with meals.     • citalopram (CELEXA) 40 MG TABS Take 40 mg by mouth every bedtime.       No current facility-administered medications for this visit.         Social History:     Social History     Social History   • Marital Status:      Spouse Name: N/A   • Number of Children: N/A   • Years of Education: N/A     Occupational History   • Not on file.     Social History Main Topics   • Smoking status: Never Smoker    • Smokeless tobacco: Never Used   • Alcohol Use: No   • Drug Use: No   • Sexual Activity: Not on file     Other Topics Concern   • Not on file     Social History Narrative       Family History:      Family History   Problem Relation Age of Onset   • Cancer Paternal Aunt        Review of Systems:  All other review of systems are negative except what was mentioned above in the HPI.    Constitutional: Negative for fever, chills, weight loss and malaise/fatigue.    HEENT: No new auditory or visual complaints. No sore throat and neck pain.     Respiratory: Negative for cough, sputum production, shortness of breath and wheezing.    Cardiovascular: Negative for chest pain, palpitations, orthopnea and leg swelling.    Gastrointestinal: Negative for heartburn, nausea, vomiting and abdominal pain.    Genitourinary: Negative for dysuria, hematuria    Musculoskeletal: No new arthralgias or myalgias   Skin: Negative for rash and itching.    Neurological: Negative for focal weakness and headaches.    Endo/Heme/Allergies: No abnormal bleed/bruise.    Psychiatric/Behavioral: No new depression/anxiety.    Physical Exam:  Vitals: /78 mmHg  Pulse 93  Temp(Src) 36.5 °C (97.7 °F)  Resp 16  Ht 1.57 m (5' 1.81\")  Wt 77 kg (169 lb 12.1 oz)  BMI 31.24 kg/m2  SpO2 98%  Breastfeeding? No    General: Not in acute distress, alert and oriented x 3  HEENT: No pallor, icterus. Oropharynx clear.   Neck: Supple, no palpable masses.  Lymph nodes: No palpable cervical, " supraclavicular, axillary or inguinal lymphadenopathy.    CVS: regular rate and rhythm, no rubs or gallops  RESP: Clear to auscultate bilaterally, no wheezing or crackles.   ABD: Soft, non tender, non distended, positive bowel sounds, no palpable organomegaly  EXT: No edema or cyanosis  CNS: Alert and oriented x3, No focal deficits.  Skin- No rash      Labs:   No visits with results within 1 Week(s) from this visit.  Latest known visit with results is:    Hospital Outpatient Visit on 07/06/2017   Component Date Value Ref Range Status   • WBC 07/06/2017 7.7  4.8 - 10.8 K/uL Final   • RBC 07/06/2017 4.04* 4.20 - 5.40 M/uL Final   • Hemoglobin 07/06/2017 13.0  12.0 - 16.0 g/dL Final   • Hematocrit 07/06/2017 38.0  37.0 - 47.0 % Final   • MCV 07/06/2017 94.1  81.4 - 97.8 fL Final   • MCH 07/06/2017 32.2  27.0 - 33.0 pg Final   • MCHC 07/06/2017 34.2  33.6 - 35.0 g/dL Final   • RDW 07/06/2017 47.6  35.9 - 50.0 fL Final   • Platelet Count 07/06/2017 212  164 - 446 K/uL Final   • MPV 07/06/2017 8.6* 9.0 - 12.9 fL Final   • Neutrophils-Polys 07/06/2017 46.70  44.00 - 72.00 % Final   • Lymphocytes 07/06/2017 28.90  22.00 - 41.00 % Final   • Monocytes 07/06/2017 12.50  0.00 - 13.40 % Final   • Eosinophils 07/06/2017 10.30* 0.00 - 6.90 % Final   • Basophils 07/06/2017 1.20  0.00 - 1.80 % Final   • Immature Granulocytes 07/06/2017 0.40  0.00 - 0.90 % Final   • Nucleated RBC 07/06/2017 0.00   Final   • Neutrophils (Absolute) 07/06/2017 3.60  2.00 - 7.15 K/uL Final    Includes immature neutrophils, if present.   • Lymphs (Absolute) 07/06/2017 2.22  1.00 - 4.80 K/uL Final   • Monos (Absolute) 07/06/2017 0.96* 0.00 - 0.85 K/uL Final   • Eos (Absolute) 07/06/2017 0.79* 0.00 - 0.51 K/uL Final   • Baso (Absolute) 07/06/2017 0.09  0.00 - 0.12 K/uL Final   • Immature Granulocytes (abs) 07/06/2017 0.03  0.00 - 0.11 K/uL Final   • NRBC (Absolute) 07/06/2017 0.00   Final   • Sodium 07/06/2017 140  135 - 145 mmol/L Final   • Potassium  07/06/2017 4.1  3.6 - 5.5 mmol/L Final   • Chloride 07/06/2017 107  96 - 112 mmol/L Final   • Co2 07/06/2017 24  20 - 33 mmol/L Final   • Anion Gap 07/06/2017 9.0  0.0 - 11.9 Final   • Glucose 07/06/2017 117* 65 - 99 mg/dL Final   • Bun 07/06/2017 26* 8 - 22 mg/dL Final   • Creatinine 07/06/2017 1.24  0.50 - 1.40 mg/dL Final   • Calcium 07/06/2017 8.9  8.5 - 10.5 mg/dL Final   • AST(SGOT) 07/06/2017 24  12 - 45 U/L Final   • ALT(SGPT) 07/06/2017 22  2 - 50 U/L Final   • Alkaline Phosphatase 07/06/2017 133* 30 - 99 U/L Final   • Total Bilirubin 07/06/2017 0.4  0.1 - 1.5 mg/dL Final   • Albumin 07/06/2017 3.6  3.2 - 4.9 g/dL Final   • Total Protein 07/06/2017 6.2  6.0 - 8.2 g/dL Final   • Globulin 07/06/2017 2.6  1.9 - 3.5 g/dL Final   • A-G Ratio 07/06/2017 1.4   Final   • Carcinoembryonic Antigen 07/06/2017 2.3  0.0 - 3.0 ng/mL Final    Comment: Effective September 17, 2013 the quantitative determination  of Carcinoembryonic Antigen (CEA) will now be performed at  [facility].  The Access CEA paramagnetic  particle chemiluminescent immunoassay is used.  Results  obtained with different test methods or kits cannot be used interchangeably.  Measurement of CEA has been shown to be  clinically relevant in the management of patients with  colorectal, breast, lung, prostatic, pancreatic, and ovarian  carcinomas.  Smokers may have slightly elevated levels of CEA.     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 She has started adjuvant chemotherapy with FOLFOX. Oxaliplatin was on hold intermittently due to severe cold sensitivity/neuropathy which is reversible . Given the data from the IDEA trial for abbreviated chemotherapy, missing oxaliplatin  dosing should not affect her prognosis.  Recent CT scan shows no evidence of relapse. Reviewed the images with the family. Informed her that she does not have any lifestyle restriction at this time. She will need a one-year colonoscopy and I will refer her back to gastroenterology. She will return to clinic in 6 months with restaging CT scan.      She agreed and verbalized her agreement and understanding with the current plan.  I answered all questions and concerns she has at this time         Please note that this dictation was created using voice recognition software. I have made every reasonable attempt to correct obvious errors, but I expect that there are errors of grammar and possibly content that I did not discover before finalizing the note.      SIGNATURES:  Sonu Gomes    CC:  William Naranjo D.O.  No ref. provider found

## 2017-08-02 NOTE — MR AVS SNAPSHOT
"Tim Mendozava   2017 9:00 AM   Office Visit   MRN: [id number]    Department:  [facility]   Dept Phone:  [phone]    Description:  Female : 1955   Provider:  Sonu Gomes M.D.           Reason for Visit     Follow-Up Post CT scan      Allergies as of 2017     No Known Allergies      You were diagnosed with     Cancer of sigmoid colon (CMS-HCC)   [977043]         Vital Signs     Blood Pressure Pulse Temperature Respirations Height Weight    120/78 mmHg 93 36.5 °C (97.7 °F) 16 1.57 m (5' 1.81\") 77 kg (169 lb 12.1 oz)    Body Mass Index Oxygen Saturation Breastfeeding? Smoking Status          31.24 kg/m2 98% No Never Smoker         Basic Information     Date Of Birth Sex Race Ethnicity Preferred Language    1955 Female  or   Origin (Sinhala,Czech,Portuguese,[place] citizen, etc) English      Your appointments     Aug 02, 2017 10:30 AM   EST Port Flush / Central Line Care with INFUSION QUICK INJECT   Infusion Services ([facility])    [address]  [place] [address] [phone]            2018 10:30 AM   CT BODY WITH with [address]   [facility] - [address] - [address]    [address]  [place] [id number] [phone]           Some exams require specific prep instructions that would have been given to you at time of scheduling. If you have any additional questions about the prep instructions, please call Imaging Scheduling at [phone] and press #2.            2018  9:00 AM   ONCOLOGY EST PATIENT 30 MIN with Sonu Gomes M.D.   [facility] (--)    [address], [address]  [place] [address] [phone]              Problem List              ICD-10-CM Priority Class Noted - Resolved    Cancer of sigmoid colon (CMS-HCC) C18.7   2016 - Present    Fourth degree hemorrhoids K64.3   2016 - Present      Health Maintenance        Date Due Completion Dates    IMM DTaP/Tdap/Td Vaccine (1 " - Tdap) 1/23/1976 ---    PAP SMEAR 1/23/1978 ---    COLONOSCOPY 1/23/2007 ---    MAMMOGRAM 2/7/2014 2/7/2013, 1/23/2012, 2/9/2011, 7/9/2010, 7/9/2010, 1/5/2010, 12/23/2009, 12/23/2009, 11/26/2008, 11/26/2008, 12/4/2007, 12/4/2007, 11/10/2006, 10/27/2005    IMM ZOSTER VACCINE 1/23/2017 ---    IMM INFLUENZA (1) 9/1/2017 10/1/2016            Current Immunizations     Influenza Vaccine Quad Inj (Pf) 10/1/2016      Below and/or attached are the medications your provider expects you to take. Review all of your home medications and newly ordered medications with your provider and/or pharmacist. Follow medication instructions as directed by your provider and/or pharmacist. Please keep your medication list with you and share with your provider. Update the information when medications are discontinued, doses are changed, or new medications (including over-the-counter products) are added; and carry medication information at all times in the event of emergency situations     Allergies:  No Known Allergies          Medications  Valid as of: August 02, 2017 -  9:12 AM    Generic Name Brand Name Tablet Size Instructions for use    Atorvastatin Calcium (Tab) LIPITOR 10 MG Take 5 mg by mouth every evening.        Cetirizine HCl (Tab) ZYRTEC 10 MG Take 10 mg by mouth every morning. Indications: Hayfever        Citalopram Hydrobromide (Tab) CELEXA 40 MG Take 40 mg by mouth every bedtime.        Docusate Sodium (Cap) COLACE 50 MG Take 50 mg by mouth 2 times a day.        Famotidine (Tab) PEPCID 20 MG Take 1 Tab by mouth 2 times a day.        Lidocaine-Prilocaine (Cream) EMLA 2.5-2.5 % Apply to port 1 hour prior to access of port and cover with plastic wrap.        Loperamide HCl (Cap) IMODIUM 2 MG Give loperamide 4 mg orally first dose, then 2 mg orally with every loose bowel movement.  Contact physician if maximum of 16 mg/24 hours is exceeded.        maalox plus-benadryl-visc lidocaine (MAGIC MOUTHWASH) MAGIC MOUTHWASH  Take 5 mL by  mouth every 6 hours as needed. 1:1:1 ratio        Melatonin (Cap) Melatonin 1 MG Take  by mouth.        Menthol (Lozenge) CEPACOL 3 MG Take 1 Lozenge by mouth as needed.        MetFORMIN HCl (Tab) GLUCOPHAGE 500 MG Take 500 mg by mouth 2 times a day, with meals.        Montelukast Sodium (Tab) SINGULAIR 10 MG Take 10 mg by mouth every bedtime. Indications: Hayfever        Ondansetron HCl (Tab) ZOFRAN 4 MG Take 1 Tab by mouth every four hours as needed for Nausea/Vomiting.        Polyethylene Glycol 3350 (Powder) MIRALAX  Take 17 g by mouth every day.        Prochlorperazine Maleate (Tab) COMPAZINE 10 MG Take 1 Tab by mouth every 6 hours as needed.        ValACYclovir HCl (Tab) VALTREX 500 MG Take 500 mg by mouth 2 times a day. 3 day course        .                 Medicines prescribed today were sent to:     [facility] [address] [place] [address], [place] - [address] [facility] [address] [place] [address] [address] [place] [address]    Phone: [phone] Fax: [phone]    Open 24 Hours?: No      Medication refill instructions:       If your prescription bottle indicates you have medication refills left, it is not necessary to call your provider’s office. Please contact your pharmacy and they will refill your medication.    If your prescription bottle indicates you do not have any refills left, you may request refills at any time through one of the following ways: The online [employer] system (except Urgent Care), by calling your provider’s office, or by asking your pharmacy to contact your provider’s office with a refill request. Medication refills are processed only during regular business hours and may not be available until the next business day. Your provider may request additional information or to have a follow-up visit with you prior to refilling your medication.   *Please Note: Medication refills are assigned a new Rx number when refilled electronically. Your pharmacy may indicate that no refills were  authorized even though a new prescription for the same medication is available at the pharmacy. Please request the medicine by name with the pharmacy before contacting your provider for a refill.        Your To Do List     Future Labs/Procedures Complete By Expires    CBC WITH DIFFERENTIAL  As directed 8/2/2018    COMP METABOLIC PANEL  As directed 8/2/2018    CT-ABDOMEN-PELVIS WITH  As directed 8/2/2018    Standing Orders Interval Expires    CBC WITH DIFFERENTIAL   8/2/2018    CEA  6 mo until 8/2/2018 8/2/2018    COMP METABOLIC PANEL  6 mo until 8/2/2018 8/2/2018      Referral     A referral request has been sent to our patient care coordination department. Please allow 3-5 business days for us to process this request and contact you either by phone or mail. If you do not hear from us by the 5th business day, please call us at [phone].           [employer] Access Code: Activation code not generated  Current Quanlight Status: Active

## 2017-08-02 NOTE — TELEPHONE ENCOUNTER
Per JULIET Acosta called patient and informed her that her CEA is 1.4.  Pt was pleased to hear the results.

## 2017-08-03 ENCOUNTER — TELEPHONE (OUTPATIENT)
Dept: HEMATOLOGY ONCOLOGY | Facility: MEDICAL CENTER | Age: 62
End: 2017-08-03

## 2017-08-03 NOTE — TELEPHONE ENCOUNTER
Called patient back and left voicemail letting her know it is ok to stop taking Pepcid per Iliana Casarez, APRN.  Informed her to start taking it again if the heart burn returns.  Requested call back to RN if she has further questions or concerns.

## 2017-08-16 ENCOUNTER — PATIENT OUTREACH (OUTPATIENT)
Dept: HEALTH INFORMATION MANAGEMENT | Facility: OTHER | Age: 62
End: 2017-08-16

## 2017-08-16 NOTE — PROGRESS NOTES
"On August 16, 2017,  Sophia Parker contacted pt. via telephone to check in.  Pt. states she's doing ok.  Pt. shared the pain in her knees is \"bad\" and sometimes makes it hard to walk around.  Pt. states she's attending support group at [facility] and finds it beneficial hearing from other cancer patients.  KEYANA Parker informed pt. there was a check for $800 for patient to  at [facility].  Pt. states she will pick it up tomorrow when she is at [facility] for support group.    "

## 2017-08-18 ENCOUNTER — PATIENT OUTREACH (OUTPATIENT)
Dept: OTHER | Facility: MEDICAL CENTER | Age: 62
End: 2017-08-18

## 2017-08-30 ENCOUNTER — PATIENT OUTREACH (OUTPATIENT)
Dept: OTHER | Facility: MEDICAL CENTER | Age: 62
End: 2017-08-30

## 2017-08-30 NOTE — PROGRESS NOTES
Phoned to schedule visit to deliver and review GI cancer treatment summary/ survivorship care plan.  No answer, left message to call back.

## 2017-09-02 ENCOUNTER — OUTPATIENT INFUSION SERVICES (OUTPATIENT)
Dept: ONCOLOGY | Facility: MEDICAL CENTER | Age: 62
End: 2017-09-02
Attending: INTERNAL MEDICINE
Payer: COMMERCIAL

## 2017-09-02 ENCOUNTER — PATIENT OUTREACH (OUTPATIENT)
Dept: OTHER | Facility: MEDICAL CENTER | Age: 62
End: 2017-09-02

## 2017-09-02 VITALS
HEIGHT: 62 IN | WEIGHT: 171.3 LBS | OXYGEN SATURATION: 91 % | RESPIRATION RATE: 18 BRPM | BODY MASS INDEX: 31.52 KG/M2 | HEART RATE: 83 BPM | SYSTOLIC BLOOD PRESSURE: 108 MMHG | TEMPERATURE: 97.2 F | DIASTOLIC BLOOD PRESSURE: 67 MMHG

## 2017-09-02 DIAGNOSIS — C18.7 CANCER OF SIGMOID COLON (HCC): ICD-10-CM

## 2017-09-02 PROCEDURE — 96523 IRRIG DRUG DELIVERY DEVICE: CPT

## 2017-09-02 PROCEDURE — A4212 NON CORING NEEDLE OR STYLET: HCPCS

## 2017-09-02 PROCEDURE — 700111 HCHG RX REV CODE 636 W/ 250 OVERRIDE (IP)

## 2017-09-02 RX ADMIN — HEPARIN 500 UNITS: 100 SYRINGE at 12:02

## 2017-09-02 ASSESSMENT — PAIN SCALES - GENERAL: PAINLEVEL: NO PAIN

## 2017-09-02 NOTE — PROGRESS NOTES
Met with patient and her sonsandra Keller to review colon cancer treatment summary and survivorship care plan.     Late or long term effects noted at this visit: fatigue    NCCN Survivorship Assessment:   Cardiac:pt did not receive anthracycline therapy. Pt denied persistent leg swelling or SOB at rest.  Anxiety/depression:pt anxious about being able to return to work, r/t arthritis in her left knee. Pt works as a  and needs to be able to perform ROM and physical tasks.    changes in memory or concentration: Pt denied. Son stated she might have had a little during treatment but it has resolved.   Fatigue: pt still has fatigue but stated it is improving.  Pain: in left knee, as above. Pt stated the arthritis was confirmed by MRI. SHe states since starting a swimming program she has better ROM in that leg.  Physical function: as above  Sexual function:not addressed   Fertility: not addressed  Sleep disorder:pt denied   Financial barriers: pt denied  Weight changes: pt states she has no taste or appetite changes and has gained some weight. Pt denied persistent diarrhea or constipation and is weaning off of constipation medication.     General ASCO colon cancer follow up guidelines reviewed with patient. Patient referred to treating physician for individualized follow up schedule and recommendations / questions.    Scanned to EPIC

## 2017-09-02 NOTE — PROGRESS NOTES
Pt returns to infusion center for monthly port flush.  Port accessed in sterile fashion; pt with EMLA cream to port site.  No blood return noted despite multiple attempts and repositioning.  Port flushed with saline and heparin per policy, de-accessed, gauze dressing placed.  Pt left infusion center ambulatory and in good condition.  Returns in one month, appointment scheduled.

## 2017-09-06 ENCOUNTER — APPOINTMENT (OUTPATIENT)
Dept: ONCOLOGY | Facility: MEDICAL CENTER | Age: 62
End: 2017-09-06
Attending: INTERNAL MEDICINE
Payer: MEDICAID

## 2017-09-07 ENCOUNTER — PATIENT OUTREACH (OUTPATIENT)
Dept: OTHER | Facility: MEDICAL CENTER | Age: 62
End: 2017-09-07

## 2017-09-14 ENCOUNTER — PATIENT OUTREACH (OUTPATIENT)
Dept: OTHER | Facility: MEDICAL CENTER | Age: 62
End: 2017-09-14

## 2017-09-14 ENCOUNTER — APPOINTMENT (OUTPATIENT)
Dept: OTHER | Facility: IMAGING CENTER | Age: 62
End: 2017-09-14

## 2017-09-14 NOTE — PROGRESS NOTES
Received phone call from [facility] stating that patient wanted to speak with this ONN. This ONN met with patient in lobby. Patient requesting a new copy of her Survivorship Care Plan. Patient states she needs it without any pen markings on it. Informed patient we would print a new copy and mail it to patient. Patient grateful.     Notified ONN Tim Syed of patient's request. SETH Syed to mail new SCP.

## 2017-10-06 RX ORDER — LIDOCAINE HYDROCHLORIDE 10 MG/ML
0.5 INJECTION, SOLUTION INFILTRATION; PERINEURAL PRN
Status: CANCELLED | OUTPATIENT
Start: 2018-06-16

## 2017-10-06 RX ORDER — LIDOCAINE HYDROCHLORIDE 10 MG/ML
0.5 INJECTION, SOLUTION INFILTRATION; PERINEURAL PRN
Status: CANCELLED | OUTPATIENT
Start: 2018-01-06

## 2017-10-06 RX ORDER — LIDOCAINE HYDROCHLORIDE 10 MG/ML
0.5 INJECTION, SOLUTION INFILTRATION; PERINEURAL PRN
Status: CANCELLED | OUTPATIENT
Start: 2017-12-01

## 2017-10-06 RX ORDER — LIDOCAINE HYDROCHLORIDE 10 MG/ML
0.5 INJECTION, SOLUTION INFILTRATION; PERINEURAL PRN
Status: CANCELLED | OUTPATIENT
Start: 2018-03-03

## 2017-10-06 RX ORDER — LIDOCAINE HYDROCHLORIDE 10 MG/ML
0.5 INJECTION, SOLUTION INFILTRATION; PERINEURAL PRN
Status: CANCELLED | OUTPATIENT
Start: 2018-02-03

## 2017-10-06 RX ORDER — LIDOCAINE HYDROCHLORIDE 10 MG/ML
0.5 INJECTION, SOLUTION INFILTRATION; PERINEURAL PRN
Status: CANCELLED | OUTPATIENT
Start: 2018-04-14

## 2017-10-06 RX ORDER — LIDOCAINE HYDROCHLORIDE 10 MG/ML
0.5 INJECTION, SOLUTION INFILTRATION; PERINEURAL PRN
Status: CANCELLED | OUTPATIENT
Start: 2017-10-07

## 2017-10-06 RX ORDER — LIDOCAINE HYDROCHLORIDE 10 MG/ML
0.5 INJECTION, SOLUTION INFILTRATION; PERINEURAL PRN
Status: CANCELLED | OUTPATIENT
Start: 2018-07-21

## 2017-10-06 RX ORDER — LIDOCAINE HYDROCHLORIDE 10 MG/ML
0.5 INJECTION, SOLUTION INFILTRATION; PERINEURAL PRN
Status: CANCELLED | OUTPATIENT
Start: 2018-05-14

## 2017-10-06 RX ORDER — LIDOCAINE HYDROCHLORIDE 10 MG/ML
0.5 INJECTION, SOLUTION INFILTRATION; PERINEURAL PRN
Status: CANCELLED | OUTPATIENT
Start: 2017-11-04

## 2017-10-07 ENCOUNTER — OUTPATIENT INFUSION SERVICES (OUTPATIENT)
Dept: ONCOLOGY | Facility: MEDICAL CENTER | Age: 62
End: 2017-10-07
Attending: INTERNAL MEDICINE
Payer: COMMERCIAL

## 2017-10-07 VITALS
HEART RATE: 83 BPM | WEIGHT: 170.86 LBS | RESPIRATION RATE: 18 BRPM | SYSTOLIC BLOOD PRESSURE: 127 MMHG | BODY MASS INDEX: 31.44 KG/M2 | TEMPERATURE: 97 F | OXYGEN SATURATION: 98 % | DIASTOLIC BLOOD PRESSURE: 80 MMHG | HEIGHT: 62 IN

## 2017-10-07 DIAGNOSIS — C18.7 CANCER OF SIGMOID COLON (HCC): ICD-10-CM

## 2017-10-07 PROCEDURE — 96523 IRRIG DRUG DELIVERY DEVICE: CPT

## 2017-10-07 PROCEDURE — A4212 NON CORING NEEDLE OR STYLET: HCPCS

## 2017-10-07 PROCEDURE — 700111 HCHG RX REV CODE 636 W/ 250 OVERRIDE (IP): Performed by: INTERNAL MEDICINE

## 2017-10-07 PROCEDURE — 36593 DECLOT VASCULAR DEVICE: CPT

## 2017-10-07 RX ADMIN — HEPARIN 500 UNITS: 100 SYRINGE at 14:19

## 2017-10-07 RX ADMIN — ALTEPLASE 2 MG: 2.2 INJECTION, POWDER, LYOPHILIZED, FOR SOLUTION INTRAVENOUS at 13:45

## 2017-10-07 RX ADMIN — ALTEPLASE 2 MG: 2.2 INJECTION, POWDER, LYOPHILIZED, FOR SOLUTION INTRAVENOUS at 11:40

## 2017-10-07 ASSESSMENT — PAIN SCALES - GENERAL: PAINLEVEL: NO PAIN

## 2017-10-07 NOTE — PROGRESS NOTES
Pt presented to infusion center for monthly port flush. Given that pt's port did not have blood return at last appt and she did not receive alteplase, asked pt if she would be willing to stay for alteplase if needed and she was very agreeable. Right chest port in place, EMLA removed, declined lidocaine. Port accessed in sterile manner, flushed easily but no blood return despite multiple repositionings. Alteplase instilled per orders, no result after 30, 60 then 90 minutes. Second dose of alteplase instilled per protocol, brisk blood return observed after 30 minutes. Port flushed and heparinized, de-accessed with needle intact, gauze dressing placed. Pt has 4 more appts, confirmed next date and time. Left on foot in great spirits.

## 2017-10-11 ENCOUNTER — APPOINTMENT (OUTPATIENT)
Dept: ONCOLOGY | Facility: MEDICAL CENTER | Age: 62
End: 2017-10-11
Attending: INTERNAL MEDICINE
Payer: MEDICAID

## 2017-10-19 ENCOUNTER — APPOINTMENT (OUTPATIENT)
Dept: RADIOLOGY | Facility: MEDICAL CENTER | Age: 62
End: 2017-10-19
Attending: EMERGENCY MEDICINE
Payer: MEDICAID

## 2017-10-19 ENCOUNTER — APPOINTMENT (OUTPATIENT)
Dept: OTHER | Facility: IMAGING CENTER | Age: 62
End: 2017-10-19

## 2017-10-19 ENCOUNTER — HOSPITAL ENCOUNTER (EMERGENCY)
Facility: MEDICAL CENTER | Age: 62
End: 2017-10-19
Attending: EMERGENCY MEDICINE
Payer: MEDICAID

## 2017-10-19 VITALS
OXYGEN SATURATION: 97 % | SYSTOLIC BLOOD PRESSURE: 110 MMHG | BODY MASS INDEX: 33.42 KG/M2 | HEART RATE: 72 BPM | RESPIRATION RATE: 16 BRPM | WEIGHT: 177 LBS | HEIGHT: 61 IN | TEMPERATURE: 98.6 F | DIASTOLIC BLOOD PRESSURE: 72 MMHG

## 2017-10-19 DIAGNOSIS — S63.501A SPRAIN OF RIGHT WRIST, INITIAL ENCOUNTER: ICD-10-CM

## 2017-10-19 DIAGNOSIS — S80.02XA CONTUSION OF LEFT KNEE, INITIAL ENCOUNTER: ICD-10-CM

## 2017-10-19 PROCEDURE — A9270 NON-COVERED ITEM OR SERVICE: HCPCS | Performed by: EMERGENCY MEDICINE

## 2017-10-19 PROCEDURE — 99284 EMERGENCY DEPT VISIT MOD MDM: CPT

## 2017-10-19 PROCEDURE — 700102 HCHG RX REV CODE 250 W/ 637 OVERRIDE(OP): Performed by: EMERGENCY MEDICINE

## 2017-10-19 PROCEDURE — 73562 X-RAY EXAM OF KNEE 3: CPT | Mod: LT

## 2017-10-19 PROCEDURE — 73110 X-RAY EXAM OF WRIST: CPT | Mod: RT

## 2017-10-19 RX ORDER — HYDROCODONE BITARTRATE AND ACETAMINOPHEN 5; 325 MG/1; MG/1
1 TABLET ORAL ONCE
Status: COMPLETED | OUTPATIENT
Start: 2017-10-19 | End: 2017-10-19

## 2017-10-19 RX ADMIN — HYDROCODONE BITARTRATE AND ACETAMINOPHEN 1 TABLET: 5; 325 TABLET ORAL at 09:48

## 2017-10-19 ASSESSMENT — PAIN SCALES - GENERAL: PAINLEVEL_OUTOF10: 9

## 2017-10-19 NOTE — DISCHARGE INSTRUCTIONS
Contusion  A contusion is a deep bruise. Contusions happen when an injury causes bleeding under the skin. Signs of bruising include pain, puffiness (swelling), and discolored skin. The contusion may turn blue, purple, or yellow.  HOME CARE   · Put ice on the injured area.  ¨ Put ice in a plastic bag.  ¨ Place a towel between your skin and the bag.  ¨ Leave the ice on for 15-20 minutes, 03-04 times a day.  · Only take medicine as told by your doctor.  · Rest the injured area.  · If possible, raise (elevate) the injured area to lessen puffiness.  GET HELP RIGHT AWAY IF:   · You have more bruising or puffiness.  · You have pain that is getting worse.  · Your puffiness or pain is not helped by medicine.  MAKE SURE YOU:   · Understand these instructions.  · Will watch your condition.  · Will get help right away if you are not doing well or get worse.     This information is not intended to replace advice given to you by your health care provider. Make sure you discuss any questions you have with your health care provider.     Document Released: 06/05/2009 Document Revised: 03/11/2013 Document Reviewed: 10/22/2012  [employer] Patient Education ©2016 [employer]    Sprain  A sprain happens when the bands of tissue that connect bones and hold joints together (ligaments) stretch too much or tear.  HOME CARE  · Raise (elevate) the injured area to lessen puffiness (swelling).  · Put ice on the injured area.  · Put ice in a plastic bag.  · Place a towel between your skin and the bag.  · Leave the ice on for 15-20 minutes, 3-4 times a day.  · Do this for the first 24 hours or as told by your doctor.  · Wear any splints, braces, castings, or elastic wraps as told by your child's doctor.  · Eat healthy foods.  · Only take medicine as told by your doctor.  GET HELP RIGHT AWAY IF:   · There is numbness or tingling in the injured limb.  · The toes or fingers become blue or white in the injured limb.  · The sprained limb  is cold to the touch.  · There is a sharp, shooting pain in the injured limb.  · The puffiness is getting worse instead of better.  MAKE SURE YOU:   · Understand these instructions.  · Will watch this condition.  · Will get help right away if you are not doing well or get worse.  Document Released: 06/05/2009 Document Revised: 03/11/2013 Document Reviewed: 11/03/2010  [employer]® Patient Information ©2014 [employer], [employer].

## 2017-10-19 NOTE — ED NOTES
Pt brought back from lobby via wheelchair, pt able to ambulate from wheelchair to gurney with one person assist avoiding bearing weight to L knee.     Pt c/o bilateral wrist pain (R worse than L) and L knee pain s/p mechanical fall yesterday. Pt states she tripped over curb landing onto both hands with arms extended. Pt states recent shot to L knee for chronic pain, states pain was much better until fall.     No obvious deformity noted to extremities. +CMS. +swelling noted to L knee.  Pt a/o x4, speaking in full sentences.

## 2017-10-19 NOTE — ED NOTES
"Chief Complaint   Patient presents with   • Knee Injury     left knee- pt states that knee is a \"bad knee\" and gets steroid shots.    • Arm Pain     both arms took weight of fall   • GLF     pt tripped on sidewalk step.        "

## 2017-10-19 NOTE — ED NOTES
MD at bedside prior to d/c. Pt given d/c instruction and verbalized understanding. No rx's given. Pt taken to lobby via wheelchair. Pt took all belongings from room.

## 2017-10-19 NOTE — ED NOTES
"Chief Complaint   Patient presents with   • Knee Injury     left knee- pt states that knee is a \"bad knee\" and gets steroid shots.    • Arm Pain     both arms took weight of fall   • GLF     pt tripped on sidewalk step.    • Neck Strain       "

## 2017-10-19 NOTE — ED PROVIDER NOTES
"ED Provider Note    ER PROVIDER NOTE    Scribed for Jus Pleitez M.D. by Jus Pleitez. 10/19/2017 at 9:39 AM.    Primary Care Provider: Doroteo Naranjo D.O.  Means of Arrival: pt   History obtained from: family/pt   History limited by: none     CHIEF COMPLAINT  Chief Complaint   Patient presents with   • Knee Injury     left knee- pt states that knee is a \"bad knee\" and gets steroid shots.    • Arm Pain     both arms took weight of fall   • GLF     pt tripped on sidewalk step.    • Neck Strain       HPI  Tim Crump is a 62 y.o. female who presents to the emergency department complaining ofWrist pain. Patient had a mechanical ground-level fall yesterday when she tripped over a curb landing on both her hands and knee. She has had some right-sided wrist pain as well as left-sided knee pain since. Has been able to ambulate. She denies hitting her head or any neck or back pain. No focal weakness numbness or tingling. No pain to her left hand or wrist    Families for translation per patient request    REVIEW OF SYSTEMS  Pertinent positives include wrist pain knee pain. Pertinent negatives include no headache. See HPI for details. All other systems reviewed and are negative.    PAST MEDICAL HISTORY   has a past medical history of Arthritis; Bowel habit changes; Cancer (CMS-[facility]); Diabetes (CMS-HCC); H/O seasonal allergies; High cholesterol; Hypertension; Pain (12-); Psychiatric problem; and Snoring.    SOCIAL HISTORY  Social History   Substance Use Topics   • Smoking status: Never Smoker   • Smokeless tobacco: Never Used   • Alcohol use No       SURGICAL HISTORY   has a past surgical history that includes gyn surgery (2007); gyn surgery; other (1980); other orthopedic surgery (Left, ); and low anterior resection laparoscopic (12/7/2016).    CURRENT MEDICATIONS  Home Medications     Reviewed by Suly Rodríguez R.N. (Registered Nurse) on 10/19/17 at 0934  Med List Status: Partial " "  Medication Last Dose Status   atorvastatin (LIPITOR) 10 MG Tab taking Active   cetirizine (ZYRTEC) 10 MG Tab taking Active   citalopram (CELEXA) 40 MG TABS taking Active   docusate sodium (COLACE) 50 MG Cap taking Active   famotidine (PEPCID) 20 MG Tab prn Active   lidocaine-prilocaine (EMLA) 2.5-2.5 % Cream prn Active   loperamide (IMODIUM) 2 MG Cap not using Active   maalox plus-benadryl-visc lidocaine (MAGIC MOUTHWASH) prn Active   Melatonin 1 MG Cap taking Active   menthol (CEPACOL) 3 MG lozenge prn Active   metformin (GLUCOPHAGE) 500 MG TABS taking Active   montelukast (SINGULAIR) 10 MG Tab taking Active   ondansetron (ZOFRAN) 4 MG Tab tablet prn Active   polyethylene glycol 3350 (MIRALAX) Powder taking Active   prochlorperazine (COMPAZINE) 10 MG Tab prn Active   valacyclovir (VALTREX) 500 MG Tab prn Active                ALLERGIES  No Known Allergies    PHYSICAL EXAM  VITAL SIGNS: /78   Pulse 76   Temp 36.9 °C (98.5 °F) (Temporal)   Resp 14   Ht 1.549 m (5' 1\")   Wt 80.3 kg (177 lb)   SpO2 97%   BMI 33.44 kg/m²   Pulse ox interpretation: I interpret this pulse ox as normal.    Constitutional: Alert.  In no apparent distress.  HENT: Normocephalic, Atraumatic, Bilateral external ears normal. Nose normal.   Eyes: Pupils are equal and reactive. Conjunctiva normal, non-icteric.   Heart: Regular rate and rhythm, no murmurs.    Lungs: Clear to auscultation bilaterally.  Skin: Warm, Dry, No erythema, No rash.   Musculoskeletal: Tenderness over dorsum of right wrist, no deformity or step-off, distal capillary refill less than 2 seconds, distal sensation intact to light touch, tenderness over left medial knee and patella, slight edema, no deformity. Distal capillary refill less than 2 seconds, distal sensation intact to light touch, no significant laxity noted. Otherwise No tenderness or major deformities noted. No edema.  Neurologic: Alert, Grossly non-focal.   Psychiatric: Affect normal, Judgment " normal, Mood normal, Appears appropriate and not intoxicated.     DIAGNOSTIC STUDIES / PROCEDURES        RADIOLOGY  DX-WRIST-COMPLETE 3+ RIGHT   Final Result      No acute fracture identified. Arthritic changes.      DX-KNEE 3 VIEWS LEFT   Final Result      No acute fracture identified.   Moderate medial compartment left knee joint degenerative changes.        The radiologist's interpretation of all radiological studies have been reviewed by me.    COURSE & MEDICAL DECISION MAKING  Nursing notes, VS, PMSFHx reviewed in chart.    9:39 AM - Patient seen and examined at bedside. Patient will be treated withNorco. Ordered for x-ray to evaluate her symptoms.     Decision Making:  This is a 62 y.o. Female Presenting after mechanical fall. X-ray demonstrates no fracture or dislocation.  And pt has no obvious clinical findings to suggest this.  I did discuss the possibility of an occult fracture, as well as a followup and home care as documented in discharge instructions. no e/o compartment sx , no e/o neurovascular compromise,and no other injury noted. Discussed indications for return including new/worse pain, numbness or cool extremity, worsened swelling, and pale color to extremity. Pt understood well.     The patient will return for new or worsening symptoms and is stable at the time of discharge.    The patient is referred to a primary physician for blood pressure management, diabetic screening, and for all other preventative health concerns.    DISPOSITION:  Patient will be discharged home in stable condition.    FOLLOW UP:  Doroteo Naranjo D.O.  [address] [address]  [phone]      As needed      OUTPATIENT MEDICATIONS:  New Prescriptions    No medications on file         FINAL IMPRESSION  1. Contusion of left knee, initial encounter    2. Sprain of right wrist, initial encounter      The note accurately reflects work and decisions made by me.  Jus Pleitez  10/19/2017  11:19 AM

## 2017-10-20 ENCOUNTER — PATIENT OUTREACH (OUTPATIENT)
Dept: HEALTH INFORMATION MANAGEMENT | Facility: OTHER | Age: 62
End: 2017-10-20

## 2017-11-02 ENCOUNTER — APPOINTMENT (OUTPATIENT)
Dept: OTHER | Facility: IMAGING CENTER | Age: 62
End: 2017-11-02

## 2017-11-04 ENCOUNTER — OUTPATIENT INFUSION SERVICES (OUTPATIENT)
Dept: ONCOLOGY | Facility: MEDICAL CENTER | Age: 62
End: 2017-11-04
Attending: INTERNAL MEDICINE
Payer: COMMERCIAL

## 2017-11-04 VITALS
BODY MASS INDEX: 31.73 KG/M2 | HEART RATE: 79 BPM | SYSTOLIC BLOOD PRESSURE: 137 MMHG | HEIGHT: 62 IN | RESPIRATION RATE: 18 BRPM | TEMPERATURE: 97.1 F | DIASTOLIC BLOOD PRESSURE: 76 MMHG | WEIGHT: 172.4 LBS | OXYGEN SATURATION: 97 %

## 2017-11-04 DIAGNOSIS — C18.7 CANCER OF SIGMOID COLON (HCC): ICD-10-CM

## 2017-11-04 PROCEDURE — 96523 IRRIG DRUG DELIVERY DEVICE: CPT

## 2017-11-04 PROCEDURE — 700111 HCHG RX REV CODE 636 W/ 250 OVERRIDE (IP): Performed by: INTERNAL MEDICINE

## 2017-11-04 PROCEDURE — A4212 NON CORING NEEDLE OR STYLET: HCPCS

## 2017-11-04 RX ADMIN — HEPARIN 500 UNITS: 100 SYRINGE at 11:21

## 2017-11-04 ASSESSMENT — PAIN SCALES - GENERAL: PAINLEVEL: NO PAIN

## 2017-11-04 NOTE — PROGRESS NOTES
Returns for port flush.  Feels well except expressing concern over not seeing MD until 6 month trinh.  Emotional support and reassurance given.  Did state if feels needs to see MD sooner, she can change her appt.  This she declines.  Reports did have fall and landed in E.R, still with sore knee and R wrist, but no fractures per pt.  Steady on feet now, but happened at a curb and while turning to look at something.  Port accessed using sterile technique per protocol.  Good blood return.  Saline and hep lock flush before de access.  DC to self care.

## 2017-11-15 ENCOUNTER — APPOINTMENT (OUTPATIENT)
Dept: ONCOLOGY | Facility: MEDICAL CENTER | Age: 62
End: 2017-11-15
Attending: INTERNAL MEDICINE
Payer: MEDICAID

## 2017-11-30 ENCOUNTER — APPOINTMENT (OUTPATIENT)
Dept: OTHER | Facility: IMAGING CENTER | Age: 62
End: 2017-11-30

## 2017-12-01 ENCOUNTER — OUTPATIENT INFUSION SERVICES (OUTPATIENT)
Dept: ONCOLOGY | Facility: MEDICAL CENTER | Age: 62
End: 2017-12-01
Attending: INTERNAL MEDICINE
Payer: COMMERCIAL

## 2017-12-01 VITALS
WEIGHT: 174.6 LBS | DIASTOLIC BLOOD PRESSURE: 78 MMHG | HEIGHT: 62 IN | HEART RATE: 74 BPM | SYSTOLIC BLOOD PRESSURE: 120 MMHG | OXYGEN SATURATION: 98 % | RESPIRATION RATE: 18 BRPM | BODY MASS INDEX: 32.13 KG/M2 | TEMPERATURE: 98.5 F

## 2017-12-01 DIAGNOSIS — C18.7 CANCER OF SIGMOID COLON (HCC): ICD-10-CM

## 2017-12-01 PROCEDURE — A4212 NON CORING NEEDLE OR STYLET: HCPCS

## 2017-12-01 PROCEDURE — 700111 HCHG RX REV CODE 636 W/ 250 OVERRIDE (IP): Performed by: INTERNAL MEDICINE

## 2017-12-01 PROCEDURE — 96523 IRRIG DRUG DELIVERY DEVICE: CPT

## 2017-12-01 RX ADMIN — HEPARIN 500 UNITS: 100 SYRINGE at 10:51

## 2017-12-01 ASSESSMENT — PAIN SCALES - GENERAL: PAINLEVEL: 3=SLIGHT PAIN

## 2017-12-01 NOTE — PROGRESS NOTES
Pt presents to infusion center for monthly port flush. Denies having any labs to draw. Reports feeling well overall. Right chest port in place. EMLA removed, Lidocaine declined. Port accessed in sterile manner, brisk blood return observed, flushed per policy, heparinized and de-accessed with needle intact, gauze dressing placed. Has next appt, left on foot in good spirits.

## 2017-12-07 ENCOUNTER — APPOINTMENT (OUTPATIENT)
Dept: OTHER | Facility: IMAGING CENTER | Age: 62
End: 2017-12-07

## 2017-12-14 ENCOUNTER — APPOINTMENT (OUTPATIENT)
Dept: OTHER | Facility: IMAGING CENTER | Age: 62
End: 2017-12-14

## 2017-12-20 ENCOUNTER — APPOINTMENT (OUTPATIENT)
Dept: ONCOLOGY | Facility: MEDICAL CENTER | Age: 62
End: 2017-12-20
Attending: INTERNAL MEDICINE
Payer: MEDICAID

## 2017-12-21 ENCOUNTER — APPOINTMENT (OUTPATIENT)
Dept: OTHER | Facility: IMAGING CENTER | Age: 62
End: 2017-12-21

## 2017-12-28 ENCOUNTER — TELEPHONE (OUTPATIENT)
Dept: HEMATOLOGY ONCOLOGY | Facility: MEDICAL CENTER | Age: 62
End: 2017-12-28

## 2017-12-29 DIAGNOSIS — C18.7 CANCER OF SIGMOID COLON (HCC): ICD-10-CM

## 2017-12-29 NOTE — TELEPHONE ENCOUNTER
Patient come by the office today she would like for Dr Gomes or Iliana CHUNG to fill out a referral Form for [facility]. Please call patient when this compete. Thank you

## 2018-01-03 ENCOUNTER — TELEPHONE (OUTPATIENT)
Dept: HEMATOLOGY ONCOLOGY | Facility: MEDICAL CENTER | Age: 63
End: 2018-01-03

## 2018-01-03 NOTE — TELEPHONE ENCOUNTER
I called left message regarding the patient request for a a Rehab referral to [facility] [place] we fax the request 01/02/17.Aalso we have the hard copy at the  to  if she would like.

## 2018-01-06 ENCOUNTER — OUTPATIENT INFUSION SERVICES (OUTPATIENT)
Dept: ONCOLOGY | Facility: MEDICAL CENTER | Age: 63
End: 2018-01-06
Attending: INTERNAL MEDICINE
Payer: COMMERCIAL

## 2018-01-06 VITALS
HEIGHT: 62 IN | TEMPERATURE: 97.9 F | SYSTOLIC BLOOD PRESSURE: 113 MMHG | HEART RATE: 76 BPM | WEIGHT: 171.3 LBS | RESPIRATION RATE: 18 BRPM | OXYGEN SATURATION: 96 % | BODY MASS INDEX: 31.52 KG/M2 | DIASTOLIC BLOOD PRESSURE: 77 MMHG

## 2018-01-06 DIAGNOSIS — C18.7 CANCER OF SIGMOID COLON (HCC): ICD-10-CM

## 2018-01-06 PROCEDURE — 700111 HCHG RX REV CODE 636 W/ 250 OVERRIDE (IP)

## 2018-01-06 PROCEDURE — A4212 NON CORING NEEDLE OR STYLET: HCPCS

## 2018-01-06 PROCEDURE — 96523 IRRIG DRUG DELIVERY DEVICE: CPT

## 2018-01-06 RX ADMIN — HEPARIN 500 UNITS: 100 SYRINGE at 11:10

## 2018-01-06 ASSESSMENT — PAIN SCALES - GENERAL: PAINLEVEL: NO PAIN

## 2018-01-06 NOTE — PROGRESS NOTES
Pt here for monthly port flush, son at side. Reports feeling well and that she will be having blood work in February prior to follow up w/ MD. Assessment complete. POC reviewed. EMLA cream to R port site removed. Port accessed in sterile fashion; brisk blood return, flushed per policy, heparinized & de-accessed w/ NCN intact, gauze dressing applied. Future appt confirmed. Pt d/c'd in great spirits.

## 2018-01-18 ENCOUNTER — APPOINTMENT (OUTPATIENT)
Dept: OTHER | Facility: IMAGING CENTER | Age: 63
End: 2018-01-18

## 2018-01-24 ENCOUNTER — APPOINTMENT (OUTPATIENT)
Dept: ONCOLOGY | Facility: MEDICAL CENTER | Age: 63
End: 2018-01-24
Attending: INTERNAL MEDICINE
Payer: MEDICAID

## 2018-01-25 ENCOUNTER — APPOINTMENT (OUTPATIENT)
Dept: OTHER | Facility: IMAGING CENTER | Age: 63
End: 2018-01-25

## 2018-01-31 DIAGNOSIS — C18.7 CANCER OF SIGMOID COLON (HCC): ICD-10-CM

## 2018-02-01 ENCOUNTER — APPOINTMENT (OUTPATIENT)
Dept: OTHER | Facility: IMAGING CENTER | Age: 63
End: 2018-02-01

## 2018-02-03 ENCOUNTER — OUTPATIENT INFUSION SERVICES (OUTPATIENT)
Dept: ONCOLOGY | Facility: MEDICAL CENTER | Age: 63
End: 2018-02-03
Attending: INTERNAL MEDICINE
Payer: COMMERCIAL

## 2018-02-03 VITALS
RESPIRATION RATE: 16 BRPM | HEART RATE: 71 BPM | BODY MASS INDEX: 31.4 KG/M2 | OXYGEN SATURATION: 98 % | HEIGHT: 62 IN | DIASTOLIC BLOOD PRESSURE: 75 MMHG | TEMPERATURE: 97.8 F | WEIGHT: 170.64 LBS | SYSTOLIC BLOOD PRESSURE: 124 MMHG

## 2018-02-03 DIAGNOSIS — C18.7 CANCER OF SIGMOID COLON (HCC): ICD-10-CM

## 2018-02-03 LAB
ALBUMIN SERPL BCP-MCNC: 3.6 G/DL (ref 3.2–4.9)
ALBUMIN/GLOB SERPL: 1.3 G/DL
ALP SERPL-CCNC: 97 U/L (ref 30–99)
ALT SERPL-CCNC: 14 U/L (ref 2–50)
ANION GAP SERPL CALC-SCNC: 7 MMOL/L (ref 0–11.9)
AST SERPL-CCNC: 20 U/L (ref 12–45)
BASOPHILS # BLD AUTO: 1.2 % (ref 0–1.8)
BASOPHILS # BLD: 0.08 K/UL (ref 0–0.12)
BILIRUB SERPL-MCNC: 0.4 MG/DL (ref 0.1–1.5)
BUN SERPL-MCNC: 14 MG/DL (ref 8–22)
CALCIUM SERPL-MCNC: 9 MG/DL (ref 8.5–10.5)
CEA SERPL-MCNC: 0.8 NG/ML (ref 0–3)
CHLORIDE SERPL-SCNC: 107 MMOL/L (ref 96–112)
CO2 SERPL-SCNC: 26 MMOL/L (ref 20–33)
CREAT SERPL-MCNC: 0.76 MG/DL (ref 0.5–1.4)
EOSINOPHIL # BLD AUTO: 0.75 K/UL (ref 0–0.51)
EOSINOPHIL NFR BLD: 11 % (ref 0–6.9)
ERYTHROCYTE [DISTWIDTH] IN BLOOD BY AUTOMATED COUNT: 43.1 FL (ref 35.9–50)
GLOBULIN SER CALC-MCNC: 2.7 G/DL (ref 1.9–3.5)
GLUCOSE SERPL-MCNC: 109 MG/DL (ref 65–99)
HCT VFR BLD AUTO: 41.8 % (ref 37–47)
HGB BLD-MCNC: 14 G/DL (ref 12–16)
IMM GRANULOCYTES # BLD AUTO: 0.02 K/UL (ref 0–0.11)
IMM GRANULOCYTES NFR BLD AUTO: 0.3 % (ref 0–0.9)
LYMPHOCYTES # BLD AUTO: 1.96 K/UL (ref 1–4.8)
LYMPHOCYTES NFR BLD: 28.7 % (ref 22–41)
MCH RBC QN AUTO: 28.7 PG (ref 27–33)
MCHC RBC AUTO-ENTMCNC: 33.5 G/DL (ref 33.6–35)
MCV RBC AUTO: 85.7 FL (ref 81.4–97.8)
MONOCYTES # BLD AUTO: 0.43 K/UL (ref 0–0.85)
MONOCYTES NFR BLD AUTO: 6.3 % (ref 0–13.4)
NEUTROPHILS # BLD AUTO: 3.59 K/UL (ref 2–7.15)
NEUTROPHILS NFR BLD: 52.5 % (ref 44–72)
NRBC # BLD AUTO: 0 K/UL
NRBC BLD-RTO: 0 /100 WBC
PLATELET # BLD AUTO: 316 K/UL (ref 164–446)
PMV BLD AUTO: 8.9 FL (ref 9–12.9)
POTASSIUM SERPL-SCNC: 3.9 MMOL/L (ref 3.6–5.5)
PROT SERPL-MCNC: 6.3 G/DL (ref 6–8.2)
RBC # BLD AUTO: 4.88 M/UL (ref 4.2–5.4)
SODIUM SERPL-SCNC: 140 MMOL/L (ref 135–145)
WBC # BLD AUTO: 6.8 K/UL (ref 4.8–10.8)

## 2018-02-03 PROCEDURE — 700111 HCHG RX REV CODE 636 W/ 250 OVERRIDE (IP): Performed by: INTERNAL MEDICINE

## 2018-02-03 PROCEDURE — 80053 COMPREHEN METABOLIC PANEL: CPT

## 2018-02-03 PROCEDURE — A4212 NON CORING NEEDLE OR STYLET: HCPCS

## 2018-02-03 PROCEDURE — 36591 DRAW BLOOD OFF VENOUS DEVICE: CPT

## 2018-02-03 PROCEDURE — 85025 COMPLETE CBC W/AUTO DIFF WBC: CPT

## 2018-02-03 PROCEDURE — 82378 CARCINOEMBRYONIC ANTIGEN: CPT

## 2018-02-03 RX ORDER — LIDOCAINE HYDROCHLORIDE 10 MG/ML
0.5 INJECTION, SOLUTION INFILTRATION; PERINEURAL PRN
Status: DISCONTINUED | OUTPATIENT
Start: 2018-02-03 | End: 2018-02-03 | Stop reason: HOSPADM

## 2018-02-03 RX ADMIN — HEPARIN 500 UNITS: 100 SYRINGE at 11:24

## 2018-02-03 ASSESSMENT — PAIN SCALES - GENERAL: PAINLEVEL: NO PAIN

## 2018-02-03 NOTE — PROGRESS NOTES
Returns for port flush and lab draw.  Elvin MEDRANO this week.  States just had colonoscopy and though had two polyps, both were benign.  In good spirits.  Port accessed using sterile technique and labs drawn and sent.  Saline and hep lock flush before de access.  DC to care of family.

## 2018-02-05 ENCOUNTER — HOSPITAL ENCOUNTER (OUTPATIENT)
Dept: RADIOLOGY | Facility: MEDICAL CENTER | Age: 63
End: 2018-02-05
Attending: INTERNAL MEDICINE
Payer: COMMERCIAL

## 2018-02-05 DIAGNOSIS — C18.7 CANCER OF SIGMOID COLON (HCC): ICD-10-CM

## 2018-02-05 PROCEDURE — 700117 HCHG RX CONTRAST REV CODE 255: Performed by: INTERNAL MEDICINE

## 2018-02-05 PROCEDURE — 74177 CT ABD & PELVIS W/CONTRAST: CPT

## 2018-02-05 RX ADMIN — IOHEXOL 100 ML: 350 INJECTION, SOLUTION INTRAVENOUS at 10:54

## 2018-02-08 ENCOUNTER — OFFICE VISIT (OUTPATIENT)
Dept: HEMATOLOGY ONCOLOGY | Facility: MEDICAL CENTER | Age: 63
End: 2018-02-08
Payer: MEDICAID

## 2018-02-08 VITALS
HEART RATE: 71 BPM | WEIGHT: 168.43 LBS | BODY MASS INDEX: 31.8 KG/M2 | TEMPERATURE: 97.4 F | DIASTOLIC BLOOD PRESSURE: 72 MMHG | RESPIRATION RATE: 18 BRPM | SYSTOLIC BLOOD PRESSURE: 124 MMHG | OXYGEN SATURATION: 93 % | HEIGHT: 61 IN

## 2018-02-08 DIAGNOSIS — C18.7 CANCER OF SIGMOID COLON (HCC): ICD-10-CM

## 2018-02-08 PROCEDURE — 99213 OFFICE O/P EST LOW 20 MIN: CPT | Performed by: INTERNAL MEDICINE

## 2018-02-08 ASSESSMENT — PAIN SCALES - GENERAL: PAINLEVEL: NO PAIN

## 2018-02-08 NOTE — PROGRESS NOTES
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     History of Presenting Illness:  Tim Crump is a 62-year-old female diagnosed in 5/2016 with colon cancer secondary to positivel occult testing and alteration in her bowel movements. Colonoscopy showed a polyp in the sigmoid colon which was removed. Pathology was positive for poorly differentiated adenocarcinoma with indeterminate margins. In underwent a sigmoidoscopy showed tattooed polyp stalk without any evidence of disease. Imaging showed no evidence of metastatic disease. Her CEA was 1 at diagnosis.      S/p laparoscopic sigmoid resection with low anterior pelvic anastomosis on 12/2016. Pathology showed evidence of local malignancy but she was found to have 1/13 nodes positive and was staged pT1 pN1a and MLH1, MSH2, MSH6 and PMS2 are intact. Staging workup was negative for metastatic disease. She was started on adjuvant chemotherapy with modified FOLFOX in 2/2/17.  Oxaliplatin was held intermittently  due to severe cold sensitivity and some component of reversible peripheral neuropathy. She finished 6 cycles on 7/20/2017 with only single agent 5-FU for the last few cycles.     She is here for further follow-up. She had a restaging CT scan of her abdomen and pelvis done which was unremarkable. . She is doing well with no acute complaints.     Past Medical History:      Past Medical History:   Diagnosis Date   • Arthritis     left knee   • Bowel habit changes     diarrhea   • Cancer (CMS-HCC)     colon   • Diabetes (CMS-HCC)     oral meds   • H/O seasonal allergies    • High cholesterol    • Hypertension    • Pain 12-    left wrist and knee, 3-4/10   • Psychiatric problem     anxiety   • Snoring     no sleep study       Past surgical history:       Past Surgical History:   Procedure Laterality Date   • LOW  ANTERIOR RESECTION LAPAROSCOPIC  2016    Procedure: LOW ANTERIOR RESECTION LAPAROSCOPIC;  Surgeon: Enoch Shaw M.D.;  Location: SURGERY [facility];  Service:    • OTHER ORTHOPEDIC SURGERY Left     left wrist   • GYN SURGERY      hysterectomy   • OTHER      varicose vein stripping rubina   • GYN SURGERY       x 2       Allergies:       Patient has no known allergies.    Medications:         Current Outpatient Prescriptions   Medication Sig Dispense Refill   • Melatonin 1 MG Cap Take  by mouth.     • atorvastatin (LIPITOR) 10 MG Tab Take 5 mg by mouth every evening.     • montelukast (SINGULAIR) 10 MG Tab Take 10 mg by mouth every bedtime. Indications: Hayfever     • cetirizine (ZYRTEC) 10 MG Tab Take 10 mg by mouth every morning. Indications: Hayfever     • citalopram (CELEXA) 40 MG TABS Take 40 mg by mouth every bedtime.     • maalox plus-benadryl-visc lidocaine (MAGIC MOUTHWASH) Take 5 mL by mouth every 6 hours as needed. 1:1:1 ratio 250 mL 3   • polyethylene glycol 3350 (MIRALAX) Powder Take 17 g by mouth every day.     • docusate sodium (COLACE) 50 MG Cap Take 50 mg by mouth 2 times a day.     • famotidine (PEPCID) 20 MG Tab Take 1 Tab by mouth 2 times a day. 60 Tab 2   • menthol (CEPACOL) 3 MG lozenge Take 1 Lozenge by mouth as needed.     • ondansetron (ZOFRAN) 4 MG Tab tablet Take 1 Tab by mouth every four hours as needed for Nausea/Vomiting. 30 Tab 3   • prochlorperazine (COMPAZINE) 10 MG Tab Take 1 Tab by mouth every 6 hours as needed. 30 Tab 3   • lidocaine-prilocaine (EMLA) 2.5-2.5 % Cream Apply to port 1 hour prior to access of port and cover with plastic wrap. 1 Tube 3   • loperamide (IMODIUM) 2 MG Cap Give loperamide 4 mg orally first dose, then 2 mg orally with every loose bowel movement.  Contact physician if maximum of 16 mg/24 hours is exceeded. 30 Cap 3   • valacyclovir (VALTREX) 500 MG Tab Take 500 mg by mouth 2 times a day. 3 day course     • metformin  "(GLUCOPHAGE) 500 MG TABS Take 500 mg by mouth 2 times a day, with meals.       No current facility-administered medications for this visit.          Social History:     Social History     Social History   • Marital status:      Spouse name: N/A   • Number of children: N/A   • Years of education: N/A     Occupational History   • Not on file.     Social History Main Topics   • Smoking status: Never Smoker   • Smokeless tobacco: Never Used   • Alcohol use No   • Drug use: No   • Sexual activity: Not on file     Other Topics Concern   • Not on file     Social History Narrative   • No narrative on file       Family History:      Family History   Problem Relation Age of Onset   • Cancer Paternal Aunt        Review of Systems:  All other review of systems are negative except what was mentioned above in the HPI.    Constitutional: Negative for fever, chills, weight loss and malaise/fatigue.    HEENT: No new auditory or visual complaints. No sore throat and neck pain.     Respiratory: Negative for cough, sputum production, shortness of breath and wheezing.    Cardiovascular: Negative for chest pain, palpitations, orthopnea and leg swelling.    Gastrointestinal: Negative for heartburn, nausea, vomiting and abdominal pain.    Genitourinary: Negative for dysuria, hematuria    Musculoskeletal: No new arthralgias or myalgias   Skin: Negative for rash and itching.    Neurological: Negative for focal weakness and headaches.    Endo/Heme/Allergies: No abnormal bleed/bruise.    Psychiatric/Behavioral: No new depression/anxiety.    Physical Exam:  Vitals: /72   Pulse 71   Temp 36.3 °C (97.4 °F)   Resp 18   Ht 1.549 m (5' 1\")   Wt 76.4 kg (168 lb 6.9 oz)   SpO2 93%   BMI 31.82 kg/m²     General: Not in acute distress, alert and oriented x 3  HEENT: No pallor, icterus. Oropharynx clear.   Neck: Supple, no palpable masses.  Lymph nodes: No palpable cervical, supraclavicular, axillary or inguinal lymphadenopathy.  "   CVS: regular rate and rhythm, no rubs or gallops  RESP: Clear to auscultate bilaterally, no wheezing or crackles.   ABD: Soft, non tender, non distended, positive bowel sounds, no palpable organomegaly  EXT: No edema or cyanosis  CNS: Alert and oriented x3, No focal deficits.  Skin- No rash      Labs:   [facility] on 02/03/2018   Component Date Value Ref Range Status   • WBC 02/03/2018 6.8  4.8 - 10.8 K/uL Final   • RBC 02/03/2018 4.88  4.20 - 5.40 M/uL Final   • Hemoglobin 02/03/2018 14.0  12.0 - 16.0 g/dL Final   • Hematocrit 02/03/2018 41.8  37.0 - 47.0 % Final   • MCV 02/03/2018 85.7  81.4 - 97.8 fL Final   • MCH 02/03/2018 28.7  27.0 - 33.0 pg Final   • MCHC 02/03/2018 33.5* 33.6 - 35.0 g/dL Final   • RDW 02/03/2018 43.1  35.9 - 50.0 fL Final   • Platelet Count 02/03/2018 316  164 - 446 K/uL Final   • MPV 02/03/2018 8.9* 9.0 - 12.9 fL Final   • Neutrophils-Polys 02/03/2018 52.50  44.00 - 72.00 % Final   • Lymphocytes 02/03/2018 28.70  22.00 - 41.00 % Final   • Monocytes 02/03/2018 6.30  0.00 - 13.40 % Final   • Eosinophils 02/03/2018 11.00* 0.00 - 6.90 % Final   • Basophils 02/03/2018 1.20  0.00 - 1.80 % Final   • Immature Granulocytes 02/03/2018 0.30  0.00 - 0.90 % Final   • Nucleated RBC 02/03/2018 0.00  /100 WBC Final   • Neutrophils (Absolute) 02/03/2018 3.59  2.00 - 7.15 K/uL Final   • Lymphs (Absolute) 02/03/2018 1.96  1.00 - 4.80 K/uL Final   • Monos (Absolute) 02/03/2018 0.43  0.00 - 0.85 K/uL Final   • Eos (Absolute) 02/03/2018 0.75* 0.00 - 0.51 K/uL Final   • Baso (Absolute) 02/03/2018 0.08  0.00 - 0.12 K/uL Final   • Immature Granulocytes (abs) 02/03/2018 0.02  0.00 - 0.11 K/uL Final   • NRBC (Absolute) 02/03/2018 0.00  K/uL Final   • Sodium 02/03/2018 140  135 - 145 mmol/L Final   • Potassium 02/03/2018 3.9  3.6 - 5.5 mmol/L Final   • Chloride 02/03/2018 107  96 - 112 mmol/L Final   • Co2 02/03/2018 26  20 - 33 mmol/L Final   • Anion Gap 02/03/2018 7.0  0.0 - 11.9 Final   •  Glucose 02/03/2018 109* 65 - 99 mg/dL Final   • Bun 02/03/2018 14  8 - 22 mg/dL Final   • Creatinine 02/03/2018 0.76  0.50 - 1.40 mg/dL Final   • Calcium 02/03/2018 9.0  8.5 - 10.5 mg/dL Final   • AST(SGOT) 02/03/2018 20  12 - 45 U/L Final   • ALT(SGPT) 02/03/2018 14  2 - 50 U/L Final   • Alkaline Phosphatase 02/03/2018 97  30 - 99 U/L Final   • Total Bilirubin 02/03/2018 0.4  0.1 - 1.5 mg/dL Final   • Albumin 02/03/2018 3.6  3.2 - 4.9 g/dL Final   • Total Protein 02/03/2018 6.3  6.0 - 8.2 g/dL Final   • Globulin 02/03/2018 2.7  1.9 - 3.5 g/dL Final   • A-G Ratio 02/03/2018 1.3  g/dL Final   • Carcinoembryonic Antigen 02/03/2018 0.8  0.0 - 3.0 ng/mL Final    Comment: Effective September 17, 2013 the quantitative determination  of Carcinoembryonic Antigen (CEA) will now be performed at  [facility].  The Access CEA paramagnetic  particle chemiluminescent immunoassay is used.  Results  obtained with different test methods or kits cannot be used interchangeably.  Measurement of CEA has been shown to be  clinically relevant in the management of patients with  colorectal, breast, lung, prostatic, pancreatic, and ovarian  carcinomas.  Smokers may have slightly elevated levels of CEA.     • GFR If  02/03/2018 >60  >60 mL/min/1.73 m 2 Final   • GFR If Non  02/03/2018 >60  >60 mL/min/1.73 m 2 Final     -CT abdomen, pelvisNo interval change. Postoperative findings in the lower abdomen and pelvis are stable. No new mass or adenopathy are identified.      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 She has started adjuvant chemotherapy with FOLFOX. Oxaliplatin was on hold intermittently due to severe cold sensitivity/neuropathy . She finished treatment in July 2017. Reviewed the CT images with the patient, which shows no prolapse. She recently had a colonoscopy  and will obtain the pathology results from this. We will continue six-month CT scans until the year 2020, after which we will switch to yearly CT scans.      She agreed and verbalized her agreement and understanding with the current plan.  I answered all questions and concerns she has at this time         Please note that this dictation was created using voice recognition software. I have made every reasonable attempt to correct obvious errors, but I expect that there are errors of grammar and possibly content that I did not discover before finalizing the note.      SIGNATURES:  Sonu Gomes    CC:  Doroteo Naranjo D.O.  No ref. provider found

## 2018-02-22 ENCOUNTER — APPOINTMENT (OUTPATIENT)
Dept: OTHER | Facility: IMAGING CENTER | Age: 63
End: 2018-02-22

## 2018-02-28 ENCOUNTER — APPOINTMENT (OUTPATIENT)
Dept: ONCOLOGY | Facility: MEDICAL CENTER | Age: 63
End: 2018-02-28
Attending: INTERNAL MEDICINE
Payer: MEDICAID

## 2018-03-03 ENCOUNTER — OUTPATIENT INFUSION SERVICES (OUTPATIENT)
Dept: ONCOLOGY | Facility: MEDICAL CENTER | Age: 63
End: 2018-03-03
Attending: INTERNAL MEDICINE
Payer: COMMERCIAL

## 2018-03-03 VITALS
HEART RATE: 75 BPM | OXYGEN SATURATION: 97 % | DIASTOLIC BLOOD PRESSURE: 64 MMHG | TEMPERATURE: 97.6 F | WEIGHT: 167.99 LBS | SYSTOLIC BLOOD PRESSURE: 120 MMHG | RESPIRATION RATE: 16 BRPM | BODY MASS INDEX: 30.91 KG/M2 | HEIGHT: 62 IN

## 2018-03-03 DIAGNOSIS — C18.7 CANCER OF SIGMOID COLON (HCC): ICD-10-CM

## 2018-03-03 PROCEDURE — 306780 HCHG STAT FOR TRANSFUSION PER CASE

## 2018-03-03 PROCEDURE — 700111 HCHG RX REV CODE 636 W/ 250 OVERRIDE (IP): Performed by: INTERNAL MEDICINE

## 2018-03-03 PROCEDURE — A4212 NON CORING NEEDLE OR STYLET: HCPCS

## 2018-03-03 PROCEDURE — 700111 HCHG RX REV CODE 636 W/ 250 OVERRIDE (IP)

## 2018-03-03 PROCEDURE — 96523 IRRIG DRUG DELIVERY DEVICE: CPT

## 2018-03-03 RX ADMIN — ALTEPLASE 2 MG: 2.2 INJECTION, POWDER, LYOPHILIZED, FOR SOLUTION INTRAVENOUS at 11:58

## 2018-03-03 RX ADMIN — HEPARIN 500 UNITS: 100 SYRINGE at 11:35

## 2018-03-03 ASSESSMENT — PAIN SCALES - GENERAL: PAINLEVEL: NO PAIN

## 2018-03-03 NOTE — PROGRESS NOTES
Pt is here for her scheduled port flush. Voices no concerns. Son present and very active helping his mother. No blood return - alteplase ordered. Blood return restored in 60min. Port heparin locked per protocol. Discharged home to self care. Next appointment confirmed.

## 2018-03-22 ENCOUNTER — APPOINTMENT (OUTPATIENT)
Dept: OTHER | Facility: IMAGING CENTER | Age: 63
End: 2018-03-22

## 2018-04-14 ENCOUNTER — OUTPATIENT INFUSION SERVICES (OUTPATIENT)
Dept: ONCOLOGY | Facility: MEDICAL CENTER | Age: 63
End: 2018-04-14
Attending: INTERNAL MEDICINE
Payer: COMMERCIAL

## 2018-04-14 VITALS
TEMPERATURE: 99 F | HEART RATE: 65 BPM | OXYGEN SATURATION: 96 % | SYSTOLIC BLOOD PRESSURE: 121 MMHG | WEIGHT: 162.26 LBS | RESPIRATION RATE: 18 BRPM | BODY MASS INDEX: 29.86 KG/M2 | HEIGHT: 62 IN | DIASTOLIC BLOOD PRESSURE: 74 MMHG

## 2018-04-14 DIAGNOSIS — C18.7 CANCER OF SIGMOID COLON (HCC): ICD-10-CM

## 2018-04-14 PROCEDURE — 700111 HCHG RX REV CODE 636 W/ 250 OVERRIDE (IP)

## 2018-04-14 PROCEDURE — 96523 IRRIG DRUG DELIVERY DEVICE: CPT

## 2018-04-14 PROCEDURE — A4212 NON CORING NEEDLE OR STYLET: HCPCS

## 2018-04-14 RX ORDER — LIDOCAINE HYDROCHLORIDE 10 MG/ML
INJECTION, SOLUTION EPIDURAL; INFILTRATION; INTRACAUDAL; PERINEURAL
Status: COMPLETED
Start: 2018-04-14 | End: 2018-04-14

## 2018-04-14 RX ADMIN — HEPARIN 500 UNITS: 100 SYRINGE at 17:35

## 2018-04-14 RX ADMIN — LIDOCAINE HYDROCHLORIDE: 10 INJECTION, SOLUTION EPIDURAL; INFILTRATION; INTRACAUDAL; PERINEURAL at 17:30

## 2018-04-14 ASSESSMENT — PAIN SCALES - GENERAL: PAINLEVEL: NO PAIN

## 2018-04-15 NOTE — PROGRESS NOTES
Patient arrived to clinic for monthly port flush.  Port accessed using sterile technique, blood return noted.  Port flushed, heparin instilled, and de-accessed per protocol.  Gauze/tape dressing applied.  Confirmed next month's appointment and pt ambulated out of clinic in no apparent distress.

## 2018-04-19 ENCOUNTER — APPOINTMENT (OUTPATIENT)
Dept: OTHER | Facility: IMAGING CENTER | Age: 63
End: 2018-04-19

## 2018-05-14 ENCOUNTER — OUTPATIENT INFUSION SERVICES (OUTPATIENT)
Dept: ONCOLOGY | Facility: MEDICAL CENTER | Age: 63
End: 2018-05-14
Attending: INTERNAL MEDICINE
Payer: COMMERCIAL

## 2018-05-14 VITALS
HEIGHT: 62 IN | HEART RATE: 84 BPM | SYSTOLIC BLOOD PRESSURE: 113 MMHG | OXYGEN SATURATION: 95 % | DIASTOLIC BLOOD PRESSURE: 58 MMHG | RESPIRATION RATE: 18 BRPM | TEMPERATURE: 97.2 F | WEIGHT: 161.38 LBS | BODY MASS INDEX: 29.7 KG/M2

## 2018-05-14 PROCEDURE — 96523 IRRIG DRUG DELIVERY DEVICE: CPT

## 2018-05-14 PROCEDURE — 700111 HCHG RX REV CODE 636 W/ 250 OVERRIDE (IP)

## 2018-05-14 PROCEDURE — A4212 NON CORING NEEDLE OR STYLET: HCPCS

## 2018-05-14 RX ADMIN — SODIUM CHLORIDE, PRESERVATIVE FREE 500 UNITS: 5 INJECTION INTRAVENOUS at 16:01

## 2018-05-14 ASSESSMENT — PAIN SCALES - GENERAL: PAINLEVEL_OUTOF10: 0

## 2018-05-14 NOTE — PROGRESS NOTES
Pt is here for her monthly port flush. Port flushed per protocol. Pt voices no concerns. In good spirits. Discharged home to self care. Returns in a month.

## 2018-05-31 ENCOUNTER — APPOINTMENT (OUTPATIENT)
Dept: OTHER | Facility: IMAGING CENTER | Age: 63
End: 2018-05-31

## 2018-06-16 ENCOUNTER — OUTPATIENT INFUSION SERVICES (OUTPATIENT)
Dept: ONCOLOGY | Facility: MEDICAL CENTER | Age: 63
End: 2018-06-16
Attending: INTERNAL MEDICINE
Payer: COMMERCIAL

## 2018-06-16 VITALS
BODY MASS INDEX: 30.1 KG/M2 | WEIGHT: 163.58 LBS | HEART RATE: 92 BPM | SYSTOLIC BLOOD PRESSURE: 108 MMHG | HEIGHT: 62 IN | RESPIRATION RATE: 18 BRPM | OXYGEN SATURATION: 100 % | DIASTOLIC BLOOD PRESSURE: 70 MMHG | TEMPERATURE: 98.2 F

## 2018-06-16 DIAGNOSIS — C18.7 CANCER OF SIGMOID COLON (HCC): ICD-10-CM

## 2018-06-16 PROCEDURE — 700111 HCHG RX REV CODE 636 W/ 250 OVERRIDE (IP)

## 2018-06-16 PROCEDURE — A4212 NON CORING NEEDLE OR STYLET: HCPCS

## 2018-06-16 PROCEDURE — 96523 IRRIG DRUG DELIVERY DEVICE: CPT

## 2018-06-16 RX ADMIN — HEPARIN 500 UNITS: 100 SYRINGE at 14:01

## 2018-06-16 ASSESSMENT — PAIN SCALES - GENERAL: PAINLEVEL_OUTOF10: 0

## 2018-06-16 NOTE — PROGRESS NOTES
Patient arrived ambulatory to the [facility] for monthly port flush. Reviewed vital signs, labs, and physician order. Patient denies S&S of infection, or bleeding. Pt arrives with Emla cream applied to right chest port. Port access with sterile technique, visualized brisk blood return. Port flushed per protocol, needle de-accessed with tariq needle tip intact, gauze and tape dressing placed.Confirmed upcoming appointment date and time with patient. Patient left the [facility] ambulatory in no sign of distress.

## 2018-07-12 ENCOUNTER — APPOINTMENT (OUTPATIENT)
Dept: OTHER | Facility: IMAGING CENTER | Age: 63
End: 2018-07-12

## 2018-07-21 ENCOUNTER — OUTPATIENT INFUSION SERVICES (OUTPATIENT)
Dept: ONCOLOGY | Facility: MEDICAL CENTER | Age: 63
End: 2018-07-21
Attending: INTERNAL MEDICINE
Payer: COMMERCIAL

## 2018-07-21 VITALS
HEART RATE: 80 BPM | SYSTOLIC BLOOD PRESSURE: 125 MMHG | RESPIRATION RATE: 16 BRPM | WEIGHT: 163.58 LBS | DIASTOLIC BLOOD PRESSURE: 58 MMHG | TEMPERATURE: 99 F | BODY MASS INDEX: 30.1 KG/M2 | OXYGEN SATURATION: 95 % | HEIGHT: 62 IN

## 2018-07-21 DIAGNOSIS — C18.7 CANCER OF SIGMOID COLON (HCC): ICD-10-CM

## 2018-07-21 PROCEDURE — 700111 HCHG RX REV CODE 636 W/ 250 OVERRIDE (IP)

## 2018-07-21 PROCEDURE — 96523 IRRIG DRUG DELIVERY DEVICE: CPT

## 2018-07-21 PROCEDURE — A4212 NON CORING NEEDLE OR STYLET: HCPCS

## 2018-07-21 RX ADMIN — HEPARIN 500 UNITS: 100 SYRINGE at 11:36

## 2018-07-21 ASSESSMENT — PAIN SCALES - GENERAL: PAINLEVEL_OUTOF10: 5

## 2018-07-21 NOTE — PROGRESS NOTES
Patient in for monthly port flush visit, son at chair side. No concerns voiced at this time. Patient does not have orders for any labs to be drawn; son states he will contact Dr. Gomes's office about pre-appointment blood work. Right chest wall port flushed per [facility] protocol; blood return noted after patient assumed lying position. Heparin instilled and needle removed. Appointment confirmed for 8/18/18 at 1130; discharged ambulatory, in no apparent distress.

## 2018-08-04 ENCOUNTER — HOSPITAL ENCOUNTER (OUTPATIENT)
Dept: LAB | Facility: MEDICAL CENTER | Age: 63
End: 2018-08-04
Attending: INTERNAL MEDICINE
Payer: COMMERCIAL

## 2018-08-04 DIAGNOSIS — C18.7 CANCER OF SIGMOID COLON (HCC): ICD-10-CM

## 2018-08-04 LAB
ALBUMIN SERPL BCP-MCNC: 4.3 G/DL (ref 3.2–4.9)
ALBUMIN/GLOB SERPL: 1.7 G/DL
ALP SERPL-CCNC: 96 U/L (ref 30–99)
ALT SERPL-CCNC: 16 U/L (ref 2–50)
ANION GAP SERPL CALC-SCNC: 7 MMOL/L (ref 0–11.9)
AST SERPL-CCNC: 19 U/L (ref 12–45)
BASOPHILS # BLD AUTO: 1.3 % (ref 0–1.8)
BASOPHILS # BLD: 0.1 K/UL (ref 0–0.12)
BILIRUB SERPL-MCNC: 0.5 MG/DL (ref 0.1–1.5)
BUN SERPL-MCNC: 20 MG/DL (ref 8–22)
CALCIUM SERPL-MCNC: 9.6 MG/DL (ref 8.5–10.5)
CEA SERPL-MCNC: 1.2 NG/ML (ref 0–3)
CHLORIDE SERPL-SCNC: 106 MMOL/L (ref 96–112)
CO2 SERPL-SCNC: 28 MMOL/L (ref 20–33)
CREAT SERPL-MCNC: 0.94 MG/DL (ref 0.5–1.4)
EOSINOPHIL # BLD AUTO: 0.71 K/UL (ref 0–0.51)
EOSINOPHIL NFR BLD: 9 % (ref 0–6.9)
ERYTHROCYTE [DISTWIDTH] IN BLOOD BY AUTOMATED COUNT: 45 FL (ref 35.9–50)
GLOBULIN SER CALC-MCNC: 2.5 G/DL (ref 1.9–3.5)
GLUCOSE SERPL-MCNC: 110 MG/DL (ref 65–99)
HCT VFR BLD AUTO: 46.3 % (ref 37–47)
HGB BLD-MCNC: 15.3 G/DL (ref 12–16)
IMM GRANULOCYTES # BLD AUTO: 0.03 K/UL (ref 0–0.11)
IMM GRANULOCYTES NFR BLD AUTO: 0.4 % (ref 0–0.9)
LYMPHOCYTES # BLD AUTO: 2.09 K/UL (ref 1–4.8)
LYMPHOCYTES NFR BLD: 26.6 % (ref 22–41)
MCH RBC QN AUTO: 28.4 PG (ref 27–33)
MCHC RBC AUTO-ENTMCNC: 33 G/DL (ref 33.6–35)
MCV RBC AUTO: 86.1 FL (ref 81.4–97.8)
MONOCYTES # BLD AUTO: 0.47 K/UL (ref 0–0.85)
MONOCYTES NFR BLD AUTO: 6 % (ref 0–13.4)
NEUTROPHILS # BLD AUTO: 4.45 K/UL (ref 2–7.15)
NEUTROPHILS NFR BLD: 56.7 % (ref 44–72)
NRBC # BLD AUTO: 0 K/UL
NRBC BLD-RTO: 0 /100 WBC
PLATELET # BLD AUTO: 348 K/UL (ref 164–446)
PMV BLD AUTO: 9.7 FL (ref 9–12.9)
POTASSIUM SERPL-SCNC: 4.6 MMOL/L (ref 3.6–5.5)
PROT SERPL-MCNC: 6.8 G/DL (ref 6–8.2)
RBC # BLD AUTO: 5.38 M/UL (ref 4.2–5.4)
SODIUM SERPL-SCNC: 141 MMOL/L (ref 135–145)
WBC # BLD AUTO: 7.9 K/UL (ref 4.8–10.8)

## 2018-08-04 PROCEDURE — 80053 COMPREHEN METABOLIC PANEL: CPT

## 2018-08-04 PROCEDURE — 82378 CARCINOEMBRYONIC ANTIGEN: CPT

## 2018-08-04 PROCEDURE — 85025 COMPLETE CBC W/AUTO DIFF WBC: CPT

## 2018-08-04 PROCEDURE — 36415 COLL VENOUS BLD VENIPUNCTURE: CPT

## 2018-08-06 ENCOUNTER — HOSPITAL ENCOUNTER (OUTPATIENT)
Dept: RADIOLOGY | Facility: MEDICAL CENTER | Age: 63
End: 2018-08-06
Attending: INTERNAL MEDICINE
Payer: COMMERCIAL

## 2018-08-06 DIAGNOSIS — C18.7 CANCER OF SIGMOID COLON (HCC): ICD-10-CM

## 2018-08-06 PROCEDURE — 700117 HCHG RX CONTRAST REV CODE 255: Performed by: INTERNAL MEDICINE

## 2018-08-06 PROCEDURE — 74177 CT ABD & PELVIS W/CONTRAST: CPT

## 2018-08-06 RX ADMIN — IOHEXOL 50 ML: 240 INJECTION, SOLUTION INTRATHECAL; INTRAVASCULAR; INTRAVENOUS; ORAL at 09:57

## 2018-08-06 RX ADMIN — IOHEXOL 100 ML: 350 INJECTION, SOLUTION INTRAVENOUS at 10:14

## 2018-08-09 ENCOUNTER — OFFICE VISIT (OUTPATIENT)
Dept: HEMATOLOGY ONCOLOGY | Facility: MEDICAL CENTER | Age: 63
End: 2018-08-09
Payer: MEDICAID

## 2018-08-09 VITALS
HEIGHT: 61 IN | DIASTOLIC BLOOD PRESSURE: 86 MMHG | TEMPERATURE: 98 F | WEIGHT: 163.91 LBS | RESPIRATION RATE: 16 BRPM | OXYGEN SATURATION: 92 % | BODY MASS INDEX: 30.95 KG/M2 | HEART RATE: 65 BPM | SYSTOLIC BLOOD PRESSURE: 118 MMHG

## 2018-08-09 DIAGNOSIS — C18.7 CANCER OF SIGMOID COLON (HCC): ICD-10-CM

## 2018-08-09 PROCEDURE — 99214 OFFICE O/P EST MOD 30 MIN: CPT | Performed by: INTERNAL MEDICINE

## 2018-08-09 ASSESSMENT — PAIN SCALES - GENERAL: PAINLEVEL: 7=MODERATE-SEVERE PAIN

## 2018-08-09 NOTE — PROGRESS NOTES
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 Colonoscopy showed a polyp in the sigmoid colon which was removed. Pathology was positive for poorly differentiated adenocarcinoma with indeterminate margins. In underwent a sigmoidoscopy showed tattooed polyp stalk without any evidence of disease. Imaging showed no evidence of metastatic disease. Her CEA was 1 at diagnosis.      S/p laparoscopic sigmoid resection with low anterior pelvic anastomosis on 12/2016. Pathology showed evidence of local malignancy but she was found to have 1/13 nodes positive and was staged pT1 pN1a and MLH1, MSH2, MSH6 and PMS2 are intact. Staging workup was negative for metastatic disease. She was started on adjuvant chemotherapy with modified FOLFOX in 2/2/17.  Oxaliplatin was held intermittently  due to severe cold sensitivity and some component of reversible peripheral neuropathy. She finished 6 cycles on 7/20/2017 with only single agent 5-FU for the last few cycles.   Interim history -  8/6/18-CT abdomen, pelvis-  Distal colon resection and anastomosis. No recurrent mass identified.  Tiny hypodensity left lobe of the liver unchanged.  No evidence of bowel obstruction. No free fluid.  . She is otherwise grossly asymptomatic. She has periodic periumbilical discomfort with no obvious triggers. Reports no alteration of the bowels. No hematochezia, melena    Past Medical History:      Past Medical History:   Diagnosis Date   • Arthritis     left knee   • Bowel habit changes     diarrhea   • Cancer (CMS-HCC) (HCC)     colon   • Diabetes (CMS-HCC) (HCC)     oral meds   • H/O seasonal allergies    • High  cholesterol    • Hypertension    • Pain 2016    left wrist and knee, 3-4/10   • Psychiatric problem     anxiety   • Snoring     no sleep study       Past surgical history:       Past Surgical History:   Procedure Laterality Date   • LOW ANTERIOR RESECTION LAPAROSCOPIC  2016    Procedure: LOW ANTERIOR RESECTION LAPAROSCOPIC;  Surgeon: Enoch Shaw M.D.;  Location: SURGERY [facility];  Service:    • OTHER ORTHOPEDIC SURGERY Left     left wrist   • GYN SURGERY      hysterectomy   • OTHER      varicose vein stripping rubina   • GYN SURGERY       x 2       Allergies:       Patient has no known allergies.    Medications:         Current Outpatient Prescriptions   Medication Sig Dispense Refill   • Multiple Vitamins-Minerals (MULTIVITAMIN ADULT PO) Take  by mouth.     • COLLAGEN PO Take  by mouth.     • Melatonin 1 MG Cap Take  by mouth.     • lidocaine-prilocaine (EMLA) 2.5-2.5 % Cream Apply to port 1 hour prior to access of port and cover with plastic wrap. 1 Tube 3   • valacyclovir (VALTREX) 500 MG Tab Take 500 mg by mouth 2 times a day. 3 day course     • atorvastatin (LIPITOR) 10 MG Tab Take 5 mg by mouth every evening.     • montelukast (SINGULAIR) 10 MG Tab Take 10 mg by mouth every bedtime. Indications: Hayfever     • cetirizine (ZYRTEC) 10 MG Tab Take 10 mg by mouth every morning. Indications: Hayfever     • metformin (GLUCOPHAGE) 500 MG TABS Take 500 mg by mouth 2 times a day, with meals.     • citalopram (CELEXA) 40 MG TABS Take 40 mg by mouth every bedtime.     • Misc Natural Products (GLUCOS-CHONDROIT-MSM COMPLEX PO) Take  by mouth.     • RESVERATROL PO Take  by mouth.     • maalox plus-benadryl-visc lidocaine (MAGIC MOUTHWASH) Take 5 mL by mouth every 6 hours as needed. 1:1:1 ratio 250 mL 3   • polyethylene glycol 3350 (MIRALAX) Powder Take 17 g by mouth every day.     • docusate sodium (COLACE) 50 MG Cap Take 50 mg by mouth 2 times a day.     • menthol (CEPACOL) 3 MG  lozenge Take 1 Lozenge by mouth as needed.     • ondansetron (ZOFRAN) 4 MG Tab tablet Take 1 Tab by mouth every four hours as needed for Nausea/Vomiting. 30 Tab 3   • prochlorperazine (COMPAZINE) 10 MG Tab Take 1 Tab by mouth every 6 hours as needed. 30 Tab 3   • loperamide (IMODIUM) 2 MG Cap Give loperamide 4 mg orally first dose, then 2 mg orally with every loose bowel movement.  Contact physician if maximum of 16 mg/24 hours is exceeded. 30 Cap 3     No current facility-administered medications for this visit.          Social History:     Social History     Social History   • Marital status:      Spouse name: N/A   • Number of children: N/A   • Years of education: N/A     Occupational History   • Not on file.     Social History Main Topics   • Smoking status: Never Smoker   • Smokeless tobacco: Never Used   • Alcohol use No   • Drug use: No   • Sexual activity: Not on file     Other Topics Concern   • Not on file     Social History Narrative   • No narrative on file       Family History:      Family History   Problem Relation Age of Onset   • Cancer Paternal Aunt        Review of Systems:  All other review of systems are negative except what was mentioned above in the HPI.    Constitutional: Negative for fever, chills, weight loss and malaise/fatigue.    HEENT: No new auditory or visual complaints. No sore throat and neck pain.     Respiratory: Negative for cough, sputum production, shortness of breath and wheezing.    Cardiovascular: Negative for chest pain, palpitations, orthopnea and leg swelling.    Gastrointestinal: Negative for heartburn, nausea, vomiting and abdominal pain.    Genitourinary: Negative for dysuria, hematuria    Musculoskeletal: No new arthralgias or myalgias   Skin: Negative for rash and itching.    Neurological: Negative for focal weakness and headaches.    Endo/Heme/Allergies: No abnormal bleed/bruise.    Psychiatric/Behavioral: No new depression/anxiety.    Physical Exam:  Vitals:  "/86   Pulse 65   Temp 36.7 °C (98 °F)   Resp 16   Ht 1.549 m (5' 1\")   Wt 74.3 kg (163 lb 14.6 oz)   SpO2 92%   BMI 30.97 kg/m²     General: Not in acute distress, alert and oriented x 3  HEENT: No pallor, icterus. Oropharynx clear.   Neck: Supple, no palpable masses.  Lymph nodes: No palpable cervical, supraclavicular, axillary or inguinal lymphadenopathy.    CVS: regular rate and rhythm, no rubs or gallops  RESP: Clear to auscultate bilaterally, no wheezing or crackles.   ABD: Soft, non tender, non distended, positive bowel sounds, no palpable organomegaly  EXT: No edema or cyanosis  CNS: Alert and oriented x3, No focal deficits.  Skin- No rash      Labs:   Hospital Outpatient Visit on 08/04/2018   Component Date Value Ref Range Status   • WBC 08/04/2018 7.9  4.8 - 10.8 K/uL Final   • RBC 08/04/2018 5.38  4.20 - 5.40 M/uL Final   • Hemoglobin 08/04/2018 15.3  12.0 - 16.0 g/dL Final   • Hematocrit 08/04/2018 46.3  37.0 - 47.0 % Final   • MCV 08/04/2018 86.1  81.4 - 97.8 fL Final   • MCH 08/04/2018 28.4  27.0 - 33.0 pg Final   • MCHC 08/04/2018 33.0* 33.6 - 35.0 g/dL Final   • RDW 08/04/2018 45.0  35.9 - 50.0 fL Final   • Platelet Count 08/04/2018 348  164 - 446 K/uL Final   • MPV 08/04/2018 9.7  9.0 - 12.9 fL Final   • Neutrophils-Polys 08/04/2018 56.70  44.00 - 72.00 % Final   • Lymphocytes 08/04/2018 26.60  22.00 - 41.00 % Final   • Monocytes 08/04/2018 6.00  0.00 - 13.40 % Final   • Eosinophils 08/04/2018 9.00* 0.00 - 6.90 % Final   • Basophils 08/04/2018 1.30  0.00 - 1.80 % Final   • Immature Granulocytes 08/04/2018 0.40  0.00 - 0.90 % Final   • Nucleated RBC 08/04/2018 0.00  /100 WBC Final   • Neutrophils (Absolute) 08/04/2018 4.45  2.00 - 7.15 K/uL Final    Includes immature neutrophils, if present.   • Lymphs (Absolute) 08/04/2018 2.09  1.00 - 4.80 K/uL Final   • Monos (Absolute) 08/04/2018 0.47  0.00 - 0.85 K/uL Final   • Eos (Absolute) 08/04/2018 0.71* 0.00 - 0.51 K/uL Final   • Baso " (Absolute) 08/04/2018 0.10  0.00 - 0.12 K/uL Final   • Immature Granulocytes (abs) 08/04/2018 0.03  0.00 - 0.11 K/uL Final   • NRBC (Absolute) 08/04/2018 0.00  K/uL Final   • Sodium 08/04/2018 141  135 - 145 mmol/L Final   • Potassium 08/04/2018 4.6  3.6 - 5.5 mmol/L Final   • Chloride 08/04/2018 106  96 - 112 mmol/L Final   • Co2 08/04/2018 28  20 - 33 mmol/L Final   • Anion Gap 08/04/2018 7.0  0.0 - 11.9 Final   • Glucose 08/04/2018 110* 65 - 99 mg/dL Final   • Bun 08/04/2018 20  8 - 22 mg/dL Final   • Creatinine 08/04/2018 0.94  0.50 - 1.40 mg/dL Final   • Calcium 08/04/2018 9.6  8.5 - 10.5 mg/dL Final   • AST(SGOT) 08/04/2018 19  12 - 45 U/L Final   • ALT(SGPT) 08/04/2018 16  2 - 50 U/L Final   • Alkaline Phosphatase 08/04/2018 96  30 - 99 U/L Final   • Total Bilirubin 08/04/2018 0.5  0.1 - 1.5 mg/dL Final   • Albumin 08/04/2018 4.3  3.2 - 4.9 g/dL Final   • Total Protein 08/04/2018 6.8  6.0 - 8.2 g/dL Final   • Globulin 08/04/2018 2.5  1.9 - 3.5 g/dL Final   • A-G Ratio 08/04/2018 1.7  g/dL Final   • Carcinoembryonic Antigen 08/04/2018 1.2  0.0 - 3.0 ng/mL Final    Comment: Effective September 17, 2013 the quantitative determination  of Carcinoembryonic Antigen (CEA) will now be performed at  [facility].  The Access CEA paramagnetic  particle chemiluminescent immunoassay is used.  Results  obtained with different test methods or kits cannot be used interchangeably.  Measurement of CEA has been shown to be  clinically relevant in the management of patients with  colorectal, breast, lung, prostatic, pancreatic, and ovarian  carcinomas.  Smokers may have slightly elevated levels of CEA.     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  She has started adjuvant chemotherapy with FOLFOX. Oxaliplatin was on hold intermittently due to severe cold sensitivity/neuropathy . She finished treatment in July 2017. Reviewed the CT images with the patient, which shows no evidence of relapse. . She reportedly had a normal colonoscopy in 2017. We will continue six-month CT scans until the year 2020, after which we will switch to yearly CT scans.  Abdominal discomfort-unclear etiology. Possibly postoperative. She will discuss this with her surgeon. Reassured her that the recent CT did not reveal any obvious etiology.    Discussed various cancer preventive strategy including physical leg for size, weight loss, increased use of notes and baby aspirin.      She agreed and verbalized  agreement and understanding with the current plan.  I answered all questions and concerns at this time         Please note that this dictation was created using voice recognition software. I have made every reasonable attempt to correct obvious errors, but I expect that there are errors of grammar and possibly content that I did not discover before finalizing the note.      SIGNATURES:  Sonu Gomes    CC:  Doroteo Naranjo D.O.  No ref. provider found

## 2018-08-17 RX ORDER — LIDOCAINE HYDROCHLORIDE 10 MG/ML
0.5 INJECTION, SOLUTION INFILTRATION; PERINEURAL PRN
Status: CANCELLED | OUTPATIENT
Start: 2018-08-25

## 2018-08-18 ENCOUNTER — OUTPATIENT INFUSION SERVICES (OUTPATIENT)
Dept: ONCOLOGY | Facility: MEDICAL CENTER | Age: 63
End: 2018-08-18
Attending: INTERNAL MEDICINE
Payer: COMMERCIAL

## 2018-08-18 VITALS
HEART RATE: 82 BPM | TEMPERATURE: 98.1 F | RESPIRATION RATE: 18 BRPM | BODY MASS INDEX: 30.02 KG/M2 | HEIGHT: 62 IN | SYSTOLIC BLOOD PRESSURE: 135 MMHG | OXYGEN SATURATION: 100 % | DIASTOLIC BLOOD PRESSURE: 63 MMHG | WEIGHT: 163.14 LBS

## 2018-08-18 DIAGNOSIS — C18.7 CANCER OF SIGMOID COLON (HCC): ICD-10-CM

## 2018-08-18 PROCEDURE — 700111 HCHG RX REV CODE 636 W/ 250 OVERRIDE (IP)

## 2018-08-18 PROCEDURE — A4212 NON CORING NEEDLE OR STYLET: HCPCS

## 2018-08-18 PROCEDURE — 96523 IRRIG DRUG DELIVERY DEVICE: CPT

## 2018-08-18 RX ADMIN — HEPARIN 500 UNITS: 100 SYRINGE at 11:50

## 2018-08-18 ASSESSMENT — PAIN SCALES - GENERAL: PAINLEVEL_OUTOF10: 0

## 2018-08-18 NOTE — PROGRESS NOTES
"Pt ambulated into department for her monthly port flush. RN offered phone interpretor, Pt declined. Pt told nurse that she followed-up with Dr. Gomes in the last few weeks, and stated her \"scans\" looked good. Pt declined having any acute symptoms today. EMLA cream removed. Port accessed in a sterile manner, had brisk blood return, flushed per [facility] policy, de-accessed, needle intact, insertion site covered with sterile gauze and paper tape. Pt's appointment next month on 9/15/18 at 14:30 confirmed with Pt prior to leaving, left department by self appearing in good spirits and NAD.  "

## 2018-09-14 RX ORDER — 0.9 % SODIUM CHLORIDE 0.9 %
20 VIAL (ML) INJECTION PRN
Status: CANCELLED | OUTPATIENT
Start: 2018-09-15

## 2018-09-15 ENCOUNTER — OUTPATIENT INFUSION SERVICES (OUTPATIENT)
Dept: ONCOLOGY | Facility: MEDICAL CENTER | Age: 63
End: 2018-09-15
Attending: INTERNAL MEDICINE
Payer: COMMERCIAL

## 2018-09-15 VITALS
DIASTOLIC BLOOD PRESSURE: 70 MMHG | RESPIRATION RATE: 18 BRPM | HEIGHT: 61 IN | TEMPERATURE: 97.9 F | OXYGEN SATURATION: 95 % | WEIGHT: 167.11 LBS | HEART RATE: 71 BPM | BODY MASS INDEX: 31.55 KG/M2 | SYSTOLIC BLOOD PRESSURE: 108 MMHG

## 2018-09-15 DIAGNOSIS — C18.7 CANCER OF SIGMOID COLON (HCC): ICD-10-CM

## 2018-09-15 PROCEDURE — 96523 IRRIG DRUG DELIVERY DEVICE: CPT

## 2018-09-15 PROCEDURE — 700111 HCHG RX REV CODE 636 W/ 250 OVERRIDE (IP)

## 2018-09-15 PROCEDURE — A4212 NON CORING NEEDLE OR STYLET: HCPCS

## 2018-09-15 RX ADMIN — HEPARIN: 100 SYRINGE at 14:36

## 2018-09-15 ASSESSMENT — PAIN SCALES - GENERAL: PAINLEVEL_OUTOF10: 6

## 2018-09-15 NOTE — PROGRESS NOTES
Patient arrived ambulatory to the [facility] for monthly port flush. Reviewed vital signs, labs, and physician order. Patient denies S&S of infection, or bleeding. New vitamins added and medication list updated. Pt arrives with Emla cream applied to right chest port, port accessed with sterile technique, visualized brisk blood return. Port flushed per protocol, tariq needle removed and intact, gauze and tape dressing placed. Confirmed upcoming appointment date and time with patient. Patient left the [facility] ambulatory in no sign of distress.

## 2018-10-11 RX ORDER — 0.9 % SODIUM CHLORIDE 0.9 %
20 VIAL (ML) INJECTION PRN
Status: CANCELLED | OUTPATIENT
Start: 2018-10-13

## 2018-10-13 ENCOUNTER — OUTPATIENT INFUSION SERVICES (OUTPATIENT)
Dept: ONCOLOGY | Facility: MEDICAL CENTER | Age: 63
End: 2018-10-13
Attending: INTERNAL MEDICINE
Payer: COMMERCIAL

## 2018-10-13 VITALS
RESPIRATION RATE: 18 BRPM | HEIGHT: 61 IN | DIASTOLIC BLOOD PRESSURE: 77 MMHG | OXYGEN SATURATION: 96 % | BODY MASS INDEX: 31.68 KG/M2 | HEART RATE: 69 BPM | TEMPERATURE: 98.2 F | WEIGHT: 167.77 LBS | SYSTOLIC BLOOD PRESSURE: 122 MMHG

## 2018-10-13 DIAGNOSIS — C18.7 CANCER OF SIGMOID COLON (HCC): ICD-10-CM

## 2018-10-13 PROCEDURE — 96523 IRRIG DRUG DELIVERY DEVICE: CPT

## 2018-10-13 PROCEDURE — A4212 NON CORING NEEDLE OR STYLET: HCPCS

## 2018-10-13 PROCEDURE — 700111 HCHG RX REV CODE 636 W/ 250 OVERRIDE (IP)

## 2018-10-13 RX ADMIN — HEPARIN 500 UNITS: 100 SYRINGE at 14:31

## 2018-10-13 ASSESSMENT — PAIN SCALES - GENERAL: PAINLEVEL_OUTOF10: 0

## 2018-10-13 NOTE — PROGRESS NOTES
"Patient arrived ambulatory to the [facility] for monthly port flush. Reviewed vital signs,  and physician order. Patient denies S&S of infection, or bleeding. Pt reports falling this week while cleaning a house, \"didn't injure myself\", encouraged pt to monitor for S&S of injury or pain and seek medical attention if noted, pt verbalized understanding. Pt arrives with Emla cream applied to right chest port, port accessed with sterile technique, visualized brisk blood return. Port flushed per protocol, catheter removed with tip intact, gauze and tape dressing placed. Confirmed upcoming appointment date and time with patient. Patient left the [facility] ambulatory in no sign of distress.   "

## 2018-11-15 RX ORDER — 0.9 % SODIUM CHLORIDE 0.9 %
20 VIAL (ML) INJECTION PRN
Status: CANCELLED | OUTPATIENT
Start: 2018-11-17

## 2018-11-17 ENCOUNTER — OUTPATIENT INFUSION SERVICES (OUTPATIENT)
Dept: ONCOLOGY | Facility: MEDICAL CENTER | Age: 63
End: 2018-11-17
Attending: INTERNAL MEDICINE
Payer: COMMERCIAL

## 2018-11-17 VITALS
TEMPERATURE: 98.3 F | WEIGHT: 168.87 LBS | HEART RATE: 79 BPM | BODY MASS INDEX: 31.88 KG/M2 | SYSTOLIC BLOOD PRESSURE: 112 MMHG | HEIGHT: 61 IN | RESPIRATION RATE: 18 BRPM | DIASTOLIC BLOOD PRESSURE: 65 MMHG

## 2018-11-17 DIAGNOSIS — C18.7 CANCER OF SIGMOID COLON (HCC): ICD-10-CM

## 2018-11-17 PROCEDURE — A4212 NON CORING NEEDLE OR STYLET: HCPCS

## 2018-11-17 PROCEDURE — 96523 IRRIG DRUG DELIVERY DEVICE: CPT

## 2018-11-17 PROCEDURE — 700111 HCHG RX REV CODE 636 W/ 250 OVERRIDE (IP)

## 2018-11-17 RX ADMIN — HEPARIN 500 UNITS: 100 SYRINGE at 14:23

## 2018-11-17 ASSESSMENT — PAIN SCALES - GENERAL: PAINLEVEL_OUTOF10: 0

## 2018-11-17 NOTE — PROGRESS NOTES
Patient arrived ambulatory to the [facility] for monthly port flush. Reviewed vital signs, and physician order. Patient denies S&S of infection, or bleeding. Pt arrives with Emla cream applied to right chest port. Port accessed with sterile technique, visualized brisk blood return. Port de-accessed with tariq needle intact, gauze and tape dressing placed. Confirmed upcoming appointment date and time with patient. Patient left the [facility] ambulatory in no sign of distress.

## 2018-12-13 RX ORDER — 0.9 % SODIUM CHLORIDE 0.9 %
20 VIAL (ML) INJECTION PRN
Status: CANCELLED | OUTPATIENT
Start: 2019-02-02

## 2018-12-15 ENCOUNTER — OUTPATIENT INFUSION SERVICES (OUTPATIENT)
Dept: ONCOLOGY | Facility: MEDICAL CENTER | Age: 63
End: 2018-12-15
Attending: INTERNAL MEDICINE
Payer: COMMERCIAL

## 2018-12-15 VITALS
RESPIRATION RATE: 18 BRPM | WEIGHT: 169.75 LBS | BODY MASS INDEX: 32.05 KG/M2 | HEART RATE: 79 BPM | OXYGEN SATURATION: 98 % | TEMPERATURE: 97.9 F | SYSTOLIC BLOOD PRESSURE: 131 MMHG | HEIGHT: 61 IN | DIASTOLIC BLOOD PRESSURE: 70 MMHG

## 2018-12-15 PROCEDURE — A4212 NON CORING NEEDLE OR STYLET: HCPCS

## 2018-12-15 PROCEDURE — 700111 HCHG RX REV CODE 636 W/ 250 OVERRIDE (IP)

## 2018-12-15 PROCEDURE — 96523 IRRIG DRUG DELIVERY DEVICE: CPT

## 2018-12-15 RX ADMIN — HEPARIN 500 UNITS: 100 SYRINGE at 14:44

## 2018-12-15 ASSESSMENT — PAIN SCALES - GENERAL: PAINLEVEL_OUTOF10: 0

## 2018-12-15 NOTE — PROGRESS NOTES
Pt arrived to [facility], ambulatory, for port flush. Pt voices no complaints. Port accessed in sterile manner, positive blood return noted. Port accessed in sterile manner, positive blood return noted. Port flushed and heparin locked per policy, port de-accessed. Pt left [facility] with no s/sx of distress. Follow up appointment confirmed.

## 2019-02-02 ENCOUNTER — OUTPATIENT INFUSION SERVICES (OUTPATIENT)
Dept: ONCOLOGY | Facility: MEDICAL CENTER | Age: 64
End: 2019-02-02
Attending: INTERNAL MEDICINE
Payer: COMMERCIAL

## 2019-02-02 VITALS
OXYGEN SATURATION: 98 % | DIASTOLIC BLOOD PRESSURE: 65 MMHG | TEMPERATURE: 97.3 F | HEIGHT: 62 IN | WEIGHT: 172.4 LBS | BODY MASS INDEX: 31.73 KG/M2 | RESPIRATION RATE: 18 BRPM | SYSTOLIC BLOOD PRESSURE: 117 MMHG | HEART RATE: 72 BPM

## 2019-02-02 DIAGNOSIS — C18.7 CANCER OF SIGMOID COLON (HCC): ICD-10-CM

## 2019-02-02 LAB
ALBUMIN SERPL BCP-MCNC: 3.8 G/DL (ref 3.2–4.9)
ALBUMIN/GLOB SERPL: 1.5 G/DL
ALP SERPL-CCNC: 107 U/L (ref 30–99)
ALT SERPL-CCNC: 14 U/L (ref 2–50)
ANION GAP SERPL CALC-SCNC: 7 MMOL/L (ref 0–11.9)
AST SERPL-CCNC: 22 U/L (ref 12–45)
BASOPHILS # BLD AUTO: 1 % (ref 0–1.8)
BASOPHILS # BLD: 0.1 K/UL (ref 0–0.12)
BILIRUB SERPL-MCNC: 0.2 MG/DL (ref 0.1–1.5)
BUN SERPL-MCNC: 24 MG/DL (ref 8–22)
CALCIUM SERPL-MCNC: 9.2 MG/DL (ref 8.5–10.5)
CEA SERPL-MCNC: 0.8 NG/ML (ref 0–3)
CHLORIDE SERPL-SCNC: 106 MMOL/L (ref 96–112)
CO2 SERPL-SCNC: 26 MMOL/L (ref 20–33)
CREAT SERPL-MCNC: 0.8 MG/DL (ref 0.5–1.4)
EOSINOPHIL # BLD AUTO: 0.97 K/UL (ref 0–0.51)
EOSINOPHIL NFR BLD: 10.1 % (ref 0–6.9)
ERYTHROCYTE [DISTWIDTH] IN BLOOD BY AUTOMATED COUNT: 45.3 FL (ref 35.9–50)
GLOBULIN SER CALC-MCNC: 2.6 G/DL (ref 1.9–3.5)
GLUCOSE SERPL-MCNC: 143 MG/DL (ref 65–99)
HCT VFR BLD AUTO: 43.8 % (ref 37–47)
HGB BLD-MCNC: 14.2 G/DL (ref 12–16)
IMM GRANULOCYTES # BLD AUTO: 0.04 K/UL (ref 0–0.11)
IMM GRANULOCYTES NFR BLD AUTO: 0.4 % (ref 0–0.9)
LYMPHOCYTES # BLD AUTO: 2.55 K/UL (ref 1–4.8)
LYMPHOCYTES NFR BLD: 26.4 % (ref 22–41)
MCH RBC QN AUTO: 28.8 PG (ref 27–33)
MCHC RBC AUTO-ENTMCNC: 32.4 G/DL (ref 33.6–35)
MCV RBC AUTO: 88.8 FL (ref 81.4–97.8)
MONOCYTES # BLD AUTO: 0.66 K/UL (ref 0–0.85)
MONOCYTES NFR BLD AUTO: 6.8 % (ref 0–13.4)
NEUTROPHILS # BLD AUTO: 5.33 K/UL (ref 2–7.15)
NEUTROPHILS NFR BLD: 55.3 % (ref 44–72)
NRBC # BLD AUTO: 0 K/UL
NRBC BLD-RTO: 0 /100 WBC
PLATELET # BLD AUTO: 346 K/UL (ref 164–446)
PMV BLD AUTO: 9.1 FL (ref 9–12.9)
POTASSIUM SERPL-SCNC: 4.1 MMOL/L (ref 3.6–5.5)
PROT SERPL-MCNC: 6.4 G/DL (ref 6–8.2)
RBC # BLD AUTO: 4.93 M/UL (ref 4.2–5.4)
SODIUM SERPL-SCNC: 139 MMOL/L (ref 135–145)
WBC # BLD AUTO: 9.7 K/UL (ref 4.8–10.8)

## 2019-02-02 PROCEDURE — 85025 COMPLETE CBC W/AUTO DIFF WBC: CPT

## 2019-02-02 PROCEDURE — A4212 NON CORING NEEDLE OR STYLET: HCPCS

## 2019-02-02 PROCEDURE — 80053 COMPREHEN METABOLIC PANEL: CPT

## 2019-02-02 PROCEDURE — 82378 CARCINOEMBRYONIC ANTIGEN: CPT

## 2019-02-02 PROCEDURE — 700111 HCHG RX REV CODE 636 W/ 250 OVERRIDE (IP)

## 2019-02-02 PROCEDURE — 36591 DRAW BLOOD OFF VENOUS DEVICE: CPT

## 2019-02-02 RX ADMIN — SODIUM CHLORIDE, PRESERVATIVE FREE 500 UNITS: 5 INJECTION INTRAVENOUS at 16:05

## 2019-02-03 NOTE — PROGRESS NOTES
Patient arrived ambulatory to the [facility] for monthly port flush with labs. Reviewed vital signs, labs, and physician order. Patient denies S&S of infection, or bleeding. Pt arrives with Emla cream applied to right chest port. Port accessed with sterile technique, visualized brisk blood return, labs collected per MD order. Port flushed with sterile technique, tariq needle removed with tip intact, gauze and tape dressing placed. Confirmed upcoming appointment date and time with patient. Patient left the [facility] ambulatory in no sign of distress.

## 2019-02-09 ENCOUNTER — HOSPITAL ENCOUNTER (OUTPATIENT)
Dept: RADIOLOGY | Facility: MEDICAL CENTER | Age: 64
End: 2019-02-09
Attending: INTERNAL MEDICINE
Payer: COMMERCIAL

## 2019-02-09 DIAGNOSIS — C18.7 CANCER OF SIGMOID COLON (HCC): ICD-10-CM

## 2019-02-09 PROCEDURE — 74177 CT ABD & PELVIS W/CONTRAST: CPT

## 2019-02-09 PROCEDURE — 700117 HCHG RX CONTRAST REV CODE 255: Performed by: INTERNAL MEDICINE

## 2019-02-09 PROCEDURE — 700111 HCHG RX REV CODE 636 W/ 250 OVERRIDE (IP)

## 2019-02-09 RX ADMIN — HEPARIN 500 UNITS: 100 SYRINGE at 11:15

## 2019-02-09 RX ADMIN — IOHEXOL 50 ML: 240 INJECTION, SOLUTION INTRATHECAL; INTRAVASCULAR; INTRAVENOUS; ORAL at 11:00

## 2019-02-09 RX ADMIN — IOHEXOL 100 ML: 350 INJECTION, SOLUTION INTRAVENOUS at 11:00

## 2019-02-09 NOTE — PROGRESS NOTES
Right upper chest port accessed and de-accessed per protocol for CT scan. Excellent blood return and flushed easily.

## 2019-02-14 ENCOUNTER — OFFICE VISIT (OUTPATIENT)
Dept: HEMATOLOGY ONCOLOGY | Facility: MEDICAL CENTER | Age: 64
End: 2019-02-14
Payer: MEDICAID

## 2019-02-14 ENCOUNTER — HOSPITAL ENCOUNTER (OUTPATIENT)
Facility: MEDICAL CENTER | Age: 64
End: 2019-02-14
Attending: INTERNAL MEDICINE | Admitting: INTERNAL MEDICINE
Payer: MEDICAID

## 2019-02-14 VITALS
HEART RATE: 74 BPM | OXYGEN SATURATION: 94 % | RESPIRATION RATE: 14 BRPM | HEIGHT: 62 IN | BODY MASS INDEX: 31.28 KG/M2 | SYSTOLIC BLOOD PRESSURE: 128 MMHG | WEIGHT: 169.97 LBS | TEMPERATURE: 97.8 F | DIASTOLIC BLOOD PRESSURE: 60 MMHG

## 2019-02-14 DIAGNOSIS — C18.7 CANCER OF SIGMOID COLON (HCC): ICD-10-CM

## 2019-02-14 PROCEDURE — 99213 OFFICE O/P EST LOW 20 MIN: CPT | Performed by: INTERNAL MEDICINE

## 2019-02-14 ASSESSMENT — PAIN SCALES - GENERAL: PAINLEVEL: NO PAIN

## 2019-02-14 NOTE — PROGRESS NOTES
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 Colonoscopy showed a polyp in the sigmoid colon which was removed. Pathology was positive for poorly differentiated adenocarcinoma with indeterminate margins. In underwent a sigmoidoscopy showed tattooed polyp stalk without any evidence of disease. Imaging showed no evidence of metastatic disease. Her CEA was 1 at diagnosis.      S/p laparoscopic sigmoid resection with low anterior pelvic anastomosis on 12/2016. Pathology showed evidence of local malignancy but she was found to have 1/13 nodes positive and was staged pT1 pN1a and MLH1, MSH2, MSH6 and PMS2 are intact. Staging workup was negative for metastatic disease. She was started on adjuvant chemotherapy with modified FOLFOX in 2/2/17.  Oxaliplatin was held intermittently  due to severe cold sensitivity and some component of reversible peripheral neuropathy. She finished 6 cycles on 7/20/2017 with only single agent 5-FU for the last few cycles.    Interim history  -8/6/18-CT abdomen, pelvis  Distal colon resection and anastomosis. No recurrent mass identified.  Tiny hypodensity left lobe of the liver unchanged.  No evidence of bowel obstruction. No free fluid.  She is otherwise grossly asymptomatic. She has periodic periumbilical discomfort with no obvious triggers. Reports no alteration of the bowels. No hematochezia, melena  -2/14/2019 - Today the patient is doing well, she is having a post viral cough that is improving.  Intermitent abdominal pain continues and she notices that it sometimes is provoked with abdominal flexing.  No change in bowel habits.  No hematochezia/melena.  CEA 2/2/2019, improved, 0.8.  CT abdomen, pelvis on 2/9/2019 shows post surgical  "changes to sigmoid colon, no lymphadenopathy, hepatic hypodensities on previous studies are \"less well-seen.\"  The patient still has a left subclavian port, and is receiving intermittent flushing and it is still functional.  The patient wants to wait another 6 months for possible removal.    Past Medical History:      Past Medical History:   Diagnosis Date   • Arthritis     left knee   • Bowel habit changes     diarrhea   • Cancer (CMS-HCC) (HCC)     colon   • Diabetes (CMS-HCC) (HCC)     oral meds   • H/O seasonal allergies    • High cholesterol    • Hypertension    • Pain 2016    left wrist and knee, 3-4/10   • Psychiatric problem     anxiety   • Snoring     no sleep study       Past surgical history:       Past Surgical History:   Procedure Laterality Date   • LOW ANTERIOR RESECTION LAPAROSCOPIC  2016    Procedure: LOW ANTERIOR RESECTION LAPAROSCOPIC;  Surgeon: Enoch Shaw M.D.;  Location: SURGERY [facility];  Service:    • OTHER ORTHOPEDIC SURGERY Left     left wrist   • GYN SURGERY      hysterectomy   • OTHER      varicose vein stripping rubina   • GYN SURGERY       x 2       Allergies:       Patient has no known allergies.    Medications:         Current Outpatient Prescriptions   Medication Sig Dispense Refill   • Polyethylene Glycol 3350 (MIRALAX PO) Take  by mouth.     • Misc Natural Products (GLUCOS-CHONDROIT-MSM COMPLEX PO) Take  by mouth.     • Multiple Vitamins-Minerals (MULTIVITAMIN ADULT PO) Take  by mouth.     • COLLAGEN PO Take  by mouth.     • RESVERATROL PO Take  by mouth.     • valacyclovir (VALTREX) 500 MG Tab Take 500 mg by mouth 2 times a day. 3 day course     • atorvastatin (LIPITOR) 10 MG Tab Take 5 mg by mouth every evening.     • montelukast (SINGULAIR) 10 MG Tab Take 10 mg by mouth every bedtime. Indications: Hayfever     • cetirizine (ZYRTEC) 10 MG Tab Take 10 mg by mouth every morning. Indications: Hayfever     • metformin (GLUCOPHAGE) 500 MG TABS " Take 500 mg by mouth 2 times a day, with meals.     • citalopram (CELEXA) 40 MG TABS Take 40 mg by mouth every bedtime.     • Simethicone (GAS RELIEF PO) Take  by mouth.     • maalox plus-benadryl-visc lidocaine (MAGIC MOUTHWASH) Take 5 mL by mouth every 6 hours as needed. 1:1:1 ratio (Patient not taking: Reported on 2/14/2019) 250 mL 3   • docusate sodium (COLACE) 50 MG Cap Take 50 mg by mouth 2 times a day.     • menthol (CEPACOL) 3 MG lozenge Take 1 Lozenge by mouth as needed.     • Melatonin 1 MG Cap Take  by mouth.     • ondansetron (ZOFRAN) 4 MG Tab tablet Take 1 Tab by mouth every four hours as needed for Nausea/Vomiting. (Patient not taking: Reported on 2/14/2019) 30 Tab 3   • prochlorperazine (COMPAZINE) 10 MG Tab Take 1 Tab by mouth every 6 hours as needed. (Patient not taking: Reported on 2/14/2019) 30 Tab 3   • lidocaine-prilocaine (EMLA) 2.5-2.5 % Cream Apply to port 1 hour prior to access of port and cover with plastic wrap. 1 Tube 3     No current facility-administered medications for this visit.          Social History:     Social History     Social History   • Marital status:      Spouse name: N/A   • Number of children: N/A   • Years of education: N/A     Occupational History   • Not on file.     Social History Main Topics   • Smoking status: Never Smoker   • Smokeless tobacco: Never Used   • Alcohol use No   • Drug use: No   • Sexual activity: Not on file     Other Topics Concern   • Not on file     Social History Narrative   • No narrative on file       Family History:      Family History   Problem Relation Age of Onset   • Cancer Paternal Aunt        Review of Systems:  All other review of systems are negative except what was mentioned above in the HPI.    Constitutional: Negative for fever, chills, weight loss and malaise/fatigue.    HEENT: No new auditory or visual complaints. No sore throat and neck pain.     Respiratory: Positive for cough, improving.  Negative for sputum production,  "shortness of breath and wheezing.    Cardiovascular: Negative for chest pain, palpitations, orthopnea and leg swelling.    Gastrointestinal: Positive for intermittent abdominal pain.  Negative for heartburn, nausea, vomiting.    Genitourinary: Negative for dysuria, hematuria    Musculoskeletal: No new arthralgias or myalgias   Skin: Negative for rash and itching.    Neurological: Negative for focal weakness and headaches.    Endo/Heme/Allergies: No abnormal bleed/bruise.    Psychiatric/Behavioral: No new depression/anxiety.    Physical Exam:  Vitals: /60   Pulse 74   Temp 36.6 °C (97.8 °F)   Resp 14   Ht 1.575 m (5' 2\")   Wt 77.1 kg (169 lb 15.6 oz)   SpO2 94%   BMI 31.09 kg/m²     General: Not in acute distress, alert and oriented x 3  HEENT: No pallor, icterus. Oropharynx clear.   Neck: Supple, no palpable masses.  Lymph nodes: No palpable cervical, supraclavicular, axillary or inguinal lymphadenopathy.    CVS: regular rate and rhythm, no rubs or gallops  RESP: Clear to auscultate bilaterally, no wheezing or crackles.   ABD: No abdominal tenderness.  Soft, non tender, non distended, positive bowel sounds, no palpable organomegaly  EXT: No edema or cyanosis  CNS: Alert and oriented x3, No focal deficits.  Skin- No rash      Labs:   No visits with results within 1 Week(s) from this visit.   Latest known visit with results is:   [facility] on 02/02/2019   Component Date Value Ref Range Status   • WBC 02/02/2019 9.7  4.8 - 10.8 K/uL Final   • RBC 02/02/2019 4.93  4.20 - 5.40 M/uL Final   • Hemoglobin 02/02/2019 14.2  12.0 - 16.0 g/dL Final   • Hematocrit 02/02/2019 43.8  37.0 - 47.0 % Final   • MCV 02/02/2019 88.8  81.4 - 97.8 fL Final   • MCH 02/02/2019 28.8  27.0 - 33.0 pg Final   • MCHC 02/02/2019 32.4* 33.6 - 35.0 g/dL Final   • RDW 02/02/2019 45.3  35.9 - 50.0 fL Final   • Platelet Count 02/02/2019 346  164 - 446 K/uL Final   • MPV 02/02/2019 9.1  9.0 - 12.9 fL Final   • " Neutrophils-Polys 02/02/2019 55.30  44.00 - 72.00 % Final   • Lymphocytes 02/02/2019 26.40  22.00 - 41.00 % Final   • Monocytes 02/02/2019 6.80  0.00 - 13.40 % Final   • Eosinophils 02/02/2019 10.10* 0.00 - 6.90 % Final   • Basophils 02/02/2019 1.00  0.00 - 1.80 % Final   • Immature Granulocytes 02/02/2019 0.40  0.00 - 0.90 % Final   • Nucleated RBC 02/02/2019 0.00  /100 WBC Final   • Neutrophils (Absolute) 02/02/2019 5.33  2.00 - 7.15 K/uL Final    Includes immature neutrophils, if present.   • Lymphs (Absolute) 02/02/2019 2.55  1.00 - 4.80 K/uL Final   • Monos (Absolute) 02/02/2019 0.66  0.00 - 0.85 K/uL Final   • Eos (Absolute) 02/02/2019 0.97* 0.00 - 0.51 K/uL Final   • Baso (Absolute) 02/02/2019 0.10  0.00 - 0.12 K/uL Final   • Immature Granulocytes (abs) 02/02/2019 0.04  0.00 - 0.11 K/uL Final   • NRBC (Absolute) 02/02/2019 0.00  K/uL Final   • Sodium 02/02/2019 139  135 - 145 mmol/L Final   • Potassium 02/02/2019 4.1  3.6 - 5.5 mmol/L Final   • Chloride 02/02/2019 106  96 - 112 mmol/L Final   • Co2 02/02/2019 26  20 - 33 mmol/L Final   • Anion Gap 02/02/2019 7.0  0.0 - 11.9 Final   • Glucose 02/02/2019 143* 65 - 99 mg/dL Final   • Bun 02/02/2019 24* 8 - 22 mg/dL Final   • Creatinine 02/02/2019 0.80  0.50 - 1.40 mg/dL Final   • Calcium 02/02/2019 9.2  8.5 - 10.5 mg/dL Final   • AST(SGOT) 02/02/2019 22  12 - 45 U/L Final   • ALT(SGPT) 02/02/2019 14  2 - 50 U/L Final   • Alkaline Phosphatase 02/02/2019 107* 30 - 99 U/L Final   • Total Bilirubin 02/02/2019 0.2  0.1 - 1.5 mg/dL Final   • Albumin 02/02/2019 3.8  3.2 - 4.9 g/dL Final   • Total Protein 02/02/2019 6.4  6.0 - 8.2 g/dL Final   • Globulin 02/02/2019 2.6  1.9 - 3.5 g/dL Final   • A-G Ratio 02/02/2019 1.5  g/dL Final   • Carcinoembryonic Antigen 02/02/2019 0.8  0.0 - 3.0 ng/mL Final    Comment: Effective September 17, 2013 the quantitative determination  of Carcinoembryonic Antigen (CEA) will now be performed at  [facility].  The Access CEA  paramagnetic  particle chemiluminescent immunoassay is used.  Results  obtained with different test methods or kits cannot be used interchangeably.  Measurement of CEA has been shown to be  clinically relevant in the management of patients with  colorectal, breast, lung, prostatic, pancreatic, and ovarian  carcinomas.  Smokers may have slightly elevated levels of CEA.     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 She has started adjuvant chemotherapy with FOLFOX. Oxaliplatin was on hold intermittently due to severe cold sensitivity/neuropathy . She finished treatment in July 2017. Reviewed the CT images with the patient, which shows no evidence of relapse. She reportedly had a normal colonoscopy in 2017, and was told to follow-up in 3 years. We will continue six-month CT scans until the year 2020, after which we will switch to yearly CT scans. Abdominal discomfort-possibly postoperative. Reassured her that the recent CT did not reveal any obvious etiology.  At this point, her port can be removed, however the patient wishes to wait another 6 months until making that decision.    She agreed and verbalized  agreement and understanding with the current plan.  I answered all questions and concerns at this time         Please note that this dictation was created using voice recognition software. I have made every reasonable attempt to correct obvious errors, but I expect that there are errors of grammar and possibly content that I did not discover before finalizing the note.      SIGNATURES:  Yimi Bond    CC:  Doroteo Naranjo D.O.  No ref. provider found

## 2019-02-20 ENCOUNTER — TELEPHONE (OUTPATIENT)
Dept: HEMATOLOGY ONCOLOGY | Facility: MEDICAL CENTER | Age: 64
End: 2019-02-20

## 2019-02-20 NOTE — TELEPHONE ENCOUNTER
Patient yuliana stopped by our office to inquire about an appointment for Tuesday February 26th. I confirmed the appointment is for her port removal. He informs me the patient wishes to keep the port for now. I reminded him she will require port flushes which he agreed to. She is currently scheduled for a port flush on April 6th. I left a message for Interventional Radiology to cancel the port removal.

## 2019-02-26 ENCOUNTER — APPOINTMENT (OUTPATIENT)
Dept: RADIOLOGY | Facility: MEDICAL CENTER | Age: 64
End: 2019-02-26
Attending: INTERNAL MEDICINE
Payer: MEDICAID

## 2019-03-07 ENCOUNTER — APPOINTMENT (OUTPATIENT)
Dept: OTHER | Facility: IMAGING CENTER | Age: 64
End: 2019-03-07

## 2019-04-04 RX ORDER — 0.9 % SODIUM CHLORIDE 0.9 %
20 VIAL (ML) INJECTION PRN
Status: CANCELLED | OUTPATIENT
Start: 2019-04-06

## 2019-04-06 ENCOUNTER — OUTPATIENT INFUSION SERVICES (OUTPATIENT)
Dept: ONCOLOGY | Facility: MEDICAL CENTER | Age: 64
End: 2019-04-06
Attending: INTERNAL MEDICINE
Payer: COMMERCIAL

## 2019-04-06 VITALS
HEIGHT: 62 IN | SYSTOLIC BLOOD PRESSURE: 123 MMHG | BODY MASS INDEX: 31.56 KG/M2 | DIASTOLIC BLOOD PRESSURE: 58 MMHG | WEIGHT: 171.52 LBS | RESPIRATION RATE: 18 BRPM | TEMPERATURE: 97.8 F | HEART RATE: 67 BPM | OXYGEN SATURATION: 98 %

## 2019-04-06 DIAGNOSIS — C18.7 CANCER OF SIGMOID COLON (HCC): ICD-10-CM

## 2019-04-06 PROCEDURE — 96523 IRRIG DRUG DELIVERY DEVICE: CPT

## 2019-04-06 PROCEDURE — A4212 NON CORING NEEDLE OR STYLET: HCPCS

## 2019-04-06 PROCEDURE — 700111 HCHG RX REV CODE 636 W/ 250 OVERRIDE (IP)

## 2019-04-06 RX ADMIN — HEPARIN 500 UNITS: 100 SYRINGE at 09:44

## 2019-04-06 NOTE — PROGRESS NOTES
Pt presents to infusion center for monthly port flush. Denies having any labs to draw. Reports feeling well overall. Right chest port in place. Lidocaine declined, EMLA removed. Port accessed in sterile manner, brisk blood return observed, flushed per policy, heparinized and de-accessed with needle intact, gauze dressing placed. Has next appt, left on foot in good spirits.

## 2019-05-16 RX ORDER — 0.9 % SODIUM CHLORIDE 0.9 %
20 VIAL (ML) INJECTION PRN
Status: CANCELLED | OUTPATIENT
Start: 2019-05-19

## 2019-05-18 ENCOUNTER — OUTPATIENT INFUSION SERVICES (OUTPATIENT)
Dept: ONCOLOGY | Facility: MEDICAL CENTER | Age: 64
End: 2019-05-18
Attending: INTERNAL MEDICINE
Payer: COMMERCIAL

## 2019-05-18 VITALS
RESPIRATION RATE: 18 BRPM | DIASTOLIC BLOOD PRESSURE: 65 MMHG | BODY MASS INDEX: 31.12 KG/M2 | WEIGHT: 169.09 LBS | HEIGHT: 62 IN | OXYGEN SATURATION: 96 % | TEMPERATURE: 97 F | HEART RATE: 73 BPM | SYSTOLIC BLOOD PRESSURE: 118 MMHG

## 2019-05-18 DIAGNOSIS — C18.7 CANCER OF SIGMOID COLON (HCC): ICD-10-CM

## 2019-05-18 PROCEDURE — A4212 NON CORING NEEDLE OR STYLET: HCPCS

## 2019-05-18 PROCEDURE — 96523 IRRIG DRUG DELIVERY DEVICE: CPT

## 2019-05-18 PROCEDURE — 700111 HCHG RX REV CODE 636 W/ 250 OVERRIDE (IP)

## 2019-05-18 RX ADMIN — Medication 500 UNITS: at 09:45

## 2019-05-18 NOTE — PROGRESS NOTES
Patient arrived to the infusion center with no apparent distress for monthly port flush. EMLA cream over port, denies lidocaine. Port accessed with positive blood return. Flushed with saline and heparin per protocol. De-accessed with tip intact. Patient will make next appointment on Monday. Left infusion center with no apparent distress.

## 2019-06-20 RX ORDER — 0.9 % SODIUM CHLORIDE 0.9 %
20 VIAL (ML) INJECTION PRN
Status: CANCELLED | OUTPATIENT
Start: 2019-08-10

## 2019-06-20 RX ORDER — HEPARIN SODIUM (PORCINE) LOCK FLUSH IV SOLN 100 UNIT/ML 100 UNIT/ML
500 SOLUTION INTRAVENOUS PRN
Status: CANCELLED | OUTPATIENT
Start: 2019-08-10

## 2019-06-20 RX ORDER — HEPARIN SODIUM (PORCINE) LOCK FLUSH IV SOLN 100 UNIT/ML 100 UNIT/ML
500 SOLUTION INTRAVENOUS PRN
Status: CANCELLED | OUTPATIENT
Start: 2019-09-07

## 2019-06-20 RX ORDER — 0.9 % SODIUM CHLORIDE 0.9 %
20 VIAL (ML) INJECTION PRN
Status: CANCELLED | OUTPATIENT
Start: 2019-06-22

## 2019-06-20 RX ORDER — 0.9 % SODIUM CHLORIDE 0.9 %
20 VIAL (ML) INJECTION PRN
Status: CANCELLED | OUTPATIENT
Start: 2019-09-07

## 2019-06-22 ENCOUNTER — OUTPATIENT INFUSION SERVICES (OUTPATIENT)
Dept: ONCOLOGY | Facility: MEDICAL CENTER | Age: 64
End: 2019-06-22
Attending: INTERNAL MEDICINE
Payer: COMMERCIAL

## 2019-06-22 VITALS
RESPIRATION RATE: 18 BRPM | TEMPERATURE: 98.4 F | HEIGHT: 62 IN | WEIGHT: 170.64 LBS | SYSTOLIC BLOOD PRESSURE: 113 MMHG | DIASTOLIC BLOOD PRESSURE: 62 MMHG | OXYGEN SATURATION: 96 % | HEART RATE: 74 BPM | BODY MASS INDEX: 31.4 KG/M2

## 2019-06-22 DIAGNOSIS — C18.7 CANCER OF SIGMOID COLON (HCC): ICD-10-CM

## 2019-06-22 PROCEDURE — 96523 IRRIG DRUG DELIVERY DEVICE: CPT

## 2019-06-22 PROCEDURE — 700111 HCHG RX REV CODE 636 W/ 250 OVERRIDE (IP): Performed by: INTERNAL MEDICINE

## 2019-06-22 PROCEDURE — A4212 NON CORING NEEDLE OR STYLET: HCPCS

## 2019-06-22 RX ORDER — ASCORBIC ACID 500 MG
500 TABLET ORAL PRN
COMMUNITY
End: 2024-02-15

## 2019-06-22 RX ADMIN — HEPARIN 500 UNITS: 100 SYRINGE at 09:42

## 2019-06-22 NOTE — PROGRESS NOTES
Pt to [facility] for routine port flush.  Right chest wall port with Emla cream applied.  Cream removed.  Port accessed in sterile fashion.  Flushed easily, ana briskly.  Flushed with NS and Heparin per protocol. De-accessed. Gauze and tape to port site. Pt left [facility] in NAD, next scheduled appointment time given to patient.

## 2019-07-11 ENCOUNTER — APPOINTMENT (OUTPATIENT)
Dept: OTHER | Facility: IMAGING CENTER | Age: 64
End: 2019-07-11

## 2019-07-25 ENCOUNTER — APPOINTMENT (OUTPATIENT)
Dept: OTHER | Facility: IMAGING CENTER | Age: 64
End: 2019-07-25

## 2019-08-09 ENCOUNTER — HOSPITAL ENCOUNTER (OUTPATIENT)
Dept: LAB | Facility: MEDICAL CENTER | Age: 64
End: 2019-08-09
Attending: INTERNAL MEDICINE
Payer: COMMERCIAL

## 2019-08-09 ENCOUNTER — HOSPITAL ENCOUNTER (OUTPATIENT)
Dept: RADIOLOGY | Facility: MEDICAL CENTER | Age: 64
End: 2019-08-09
Attending: INTERNAL MEDICINE
Payer: COMMERCIAL

## 2019-08-09 DIAGNOSIS — C18.7 CANCER OF SIGMOID COLON (HCC): ICD-10-CM

## 2019-08-09 LAB
ALBUMIN SERPL BCP-MCNC: 3.8 G/DL (ref 3.2–4.9)
ALBUMIN/GLOB SERPL: 1.3 G/DL
ALP SERPL-CCNC: 78 U/L (ref 30–99)
ALT SERPL-CCNC: 16 U/L (ref 2–50)
ANION GAP SERPL CALC-SCNC: 6 MMOL/L (ref 0–11.9)
AST SERPL-CCNC: 20 U/L (ref 12–45)
BILIRUB SERPL-MCNC: 0.5 MG/DL (ref 0.1–1.5)
BUN SERPL-MCNC: 20 MG/DL (ref 8–22)
CALCIUM SERPL-MCNC: 9.1 MG/DL (ref 8.5–10.5)
CHLORIDE SERPL-SCNC: 108 MMOL/L (ref 96–112)
CO2 SERPL-SCNC: 27 MMOL/L (ref 20–33)
CREAT SERPL-MCNC: 0.8 MG/DL (ref 0.5–1.4)
FASTING STATUS PATIENT QL REPORTED: NORMAL
GLOBULIN SER CALC-MCNC: 3 G/DL (ref 1.9–3.5)
GLUCOSE SERPL-MCNC: 117 MG/DL (ref 65–99)
POTASSIUM SERPL-SCNC: 4.3 MMOL/L (ref 3.6–5.5)
PROT SERPL-MCNC: 6.8 G/DL (ref 6–8.2)
SODIUM SERPL-SCNC: 141 MMOL/L (ref 135–145)

## 2019-08-09 PROCEDURE — 80053 COMPREHEN METABOLIC PANEL: CPT

## 2019-08-09 PROCEDURE — 36415 COLL VENOUS BLD VENIPUNCTURE: CPT

## 2019-08-09 PROCEDURE — 74177 CT ABD & PELVIS W/CONTRAST: CPT

## 2019-08-10 ENCOUNTER — OUTPATIENT INFUSION SERVICES (OUTPATIENT)
Dept: ONCOLOGY | Facility: MEDICAL CENTER | Age: 64
End: 2019-08-10
Attending: INTERNAL MEDICINE
Payer: COMMERCIAL

## 2019-08-10 VITALS
SYSTOLIC BLOOD PRESSURE: 118 MMHG | TEMPERATURE: 98 F | BODY MASS INDEX: 31 KG/M2 | HEART RATE: 73 BPM | RESPIRATION RATE: 18 BRPM | HEIGHT: 62 IN | DIASTOLIC BLOOD PRESSURE: 68 MMHG | OXYGEN SATURATION: 96 % | WEIGHT: 168.43 LBS

## 2019-08-10 PROCEDURE — 96523 IRRIG DRUG DELIVERY DEVICE: CPT

## 2019-08-10 PROCEDURE — 700111 HCHG RX REV CODE 636 W/ 250 OVERRIDE (IP)

## 2019-08-10 PROCEDURE — A4212 NON CORING NEEDLE OR STYLET: HCPCS

## 2019-08-10 RX ADMIN — HEPARIN 500 UNITS: 100 SYRINGE at 09:11

## 2019-08-10 NOTE — PROGRESS NOTES
Pt arrived ambulatory to [facility] for monthly port flush. POC discussed with pt and she agrees with POC. Pt with EMLA cream in place PTA to port site. Port accessed in sterile fashion, brisk blood return, flushed with NS then heparin. Port de-accessed, needle intact, gauze dressing applied. Pt discharged to self care, [facility].

## 2019-08-14 ENCOUNTER — OFFICE VISIT (OUTPATIENT)
Dept: HEMATOLOGY ONCOLOGY | Facility: MEDICAL CENTER | Age: 64
End: 2019-08-14

## 2019-08-14 ENCOUNTER — NON-PROVIDER VISIT (OUTPATIENT)
Dept: HEMATOLOGY ONCOLOGY | Facility: MEDICAL CENTER | Age: 64
End: 2019-08-14

## 2019-08-14 ENCOUNTER — TELEPHONE (OUTPATIENT)
Dept: HEMATOLOGY ONCOLOGY | Facility: MEDICAL CENTER | Age: 64
End: 2019-08-14

## 2019-08-14 ENCOUNTER — HOSPITAL ENCOUNTER (OUTPATIENT)
Facility: MEDICAL CENTER | Age: 64
End: 2019-08-14
Attending: NURSE PRACTITIONER
Payer: COMMERCIAL

## 2019-08-14 VITALS
DIASTOLIC BLOOD PRESSURE: 60 MMHG | TEMPERATURE: 97.9 F | BODY MASS INDEX: 30.55 KG/M2 | WEIGHT: 166 LBS | HEIGHT: 62 IN | RESPIRATION RATE: 12 BRPM | HEART RATE: 71 BPM | SYSTOLIC BLOOD PRESSURE: 104 MMHG | OXYGEN SATURATION: 94 %

## 2019-08-14 VITALS
OXYGEN SATURATION: 94 % | DIASTOLIC BLOOD PRESSURE: 60 MMHG | HEIGHT: 63 IN | TEMPERATURE: 97.9 F | HEART RATE: 71 BPM | SYSTOLIC BLOOD PRESSURE: 104 MMHG | BODY MASS INDEX: 29.53 KG/M2 | RESPIRATION RATE: 12 BRPM | WEIGHT: 166.67 LBS

## 2019-08-14 DIAGNOSIS — C18.7 CANCER OF SIGMOID COLON (HCC): ICD-10-CM

## 2019-08-14 DIAGNOSIS — R06.02 SOB (SHORTNESS OF BREATH): ICD-10-CM

## 2019-08-14 LAB
BASOPHILS # BLD AUTO: 1 % (ref 0–1.8)
BASOPHILS # BLD: 0.08 K/UL (ref 0–0.12)
CEA SERPL-MCNC: 1 NG/ML (ref 0–3)
EOSINOPHIL # BLD AUTO: 0.49 K/UL (ref 0–0.51)
EOSINOPHIL NFR BLD: 6.1 % (ref 0–6.9)
ERYTHROCYTE [DISTWIDTH] IN BLOOD BY AUTOMATED COUNT: 45.1 FL (ref 35.9–50)
HCT VFR BLD AUTO: 46.3 % (ref 37–47)
HGB BLD-MCNC: 15.1 G/DL (ref 12–16)
IMM GRANULOCYTES # BLD AUTO: 0.02 K/UL (ref 0–0.11)
IMM GRANULOCYTES NFR BLD AUTO: 0.2 % (ref 0–0.9)
LYMPHOCYTES # BLD AUTO: 2.19 K/UL (ref 1–4.8)
LYMPHOCYTES NFR BLD: 27.1 % (ref 22–41)
MCH RBC QN AUTO: 28.4 PG (ref 27–33)
MCHC RBC AUTO-ENTMCNC: 32.6 G/DL (ref 33.6–35)
MCV RBC AUTO: 87.2 FL (ref 81.4–97.8)
MONOCYTES # BLD AUTO: 0.57 K/UL (ref 0–0.85)
MONOCYTES NFR BLD AUTO: 7.1 % (ref 0–13.4)
NEUTROPHILS # BLD AUTO: 4.72 K/UL (ref 2–7.15)
NEUTROPHILS NFR BLD: 58.5 % (ref 44–72)
NRBC # BLD AUTO: 0 K/UL
NRBC BLD-RTO: 0 /100 WBC
PLATELET # BLD AUTO: 376 K/UL (ref 164–446)
PMV BLD AUTO: 9.1 FL (ref 9–12.9)
RBC # BLD AUTO: 5.31 M/UL (ref 4.2–5.4)
WBC # BLD AUTO: 8.1 K/UL (ref 4.8–10.8)

## 2019-08-14 PROCEDURE — 82378 CARCINOEMBRYONIC ANTIGEN: CPT

## 2019-08-14 PROCEDURE — 36415 COLL VENOUS BLD VENIPUNCTURE: CPT | Performed by: NURSE PRACTITIONER

## 2019-08-14 PROCEDURE — 99214 OFFICE O/P EST MOD 30 MIN: CPT | Performed by: NURSE PRACTITIONER

## 2019-08-14 PROCEDURE — 85025 COMPLETE CBC W/AUTO DIFF WBC: CPT

## 2019-08-14 RX ORDER — SODIUM CHLORIDE 9 MG/ML
INJECTION, SOLUTION INTRAVENOUS CONTINUOUS
Status: CANCELLED | OUTPATIENT
Start: 2019-09-05

## 2019-08-14 ASSESSMENT — ENCOUNTER SYMPTOMS
NAUSEA: 0
WEIGHT LOSS: 0
FEVER: 0
SORE THROAT: 0
HEADACHES: 0
DIZZINESS: 0
MYALGIAS: 0
CHILLS: 0
WHEEZING: 0
ABDOMINAL PAIN: 1
PALPITATIONS: 0
VOMITING: 0
COUGH: 0
SHORTNESS OF BREATH: 1
CONSTIPATION: 0
TINGLING: 0
DIARRHEA: 0

## 2019-08-14 ASSESSMENT — PAIN SCALES - GENERAL
PAINLEVEL: NO PAIN
PAINLEVEL: NO PAIN

## 2019-08-14 NOTE — PROGRESS NOTES
Subjective:      Tim Crump is a 64 y.o. female who presents for Follow-Up for sigmoid cancer.         HPI    PCP: Dr. Naranjo at [facility].    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  She presents accompanied by her son for today's visit.    Patient was initially diagnosed in May 2016 with sigmoid colon cancer secondary to a positive occult testing with changes in bowel habits.  She underwent a colonoscopy which showed a polyp in the sigmoid colon which was positive for poorly differentiated adenocarcinoma with indeterminate margins.  She did proceed with laparoscopic sigmoid resection with low anterior pelvic anastomosis on December 2016.  Pathology did show evidence of local malignancy but was found to have 1/13 nodes positive.  According to Dr. Gomes pathology showed MLH1, MSH2, MSH6 and PMS2 intact.  She did undergo staging work-up and was found to be negative for metastatic disease.  On diagnosis of her sigmoid cancer patient CEA was low at 1.  Patient was started on adjuvant chemotherapy with modified FOLFOX on February 2, 2017.  Patient did have oxaliplatin held intermittently throughout the treatment due to severe cold sensitivity and peripheral neuropathy.  She did finish 12 cycles of modified FOLFOX on July 20, 2017, with the last few cycles with single agent 5-FU only.    Since completion of treatment patient has been on surveillance and observation.  She has been undergoing CT scans every 6 months which have continued to be stable.  Most recent CT scan completed on August 9, 2019 shows a prior partial sigmoid resection but no evidence for recurrence or metastatic tumor was noted.  She did have a nonobstructing left kidney stone.  I personally reviewed the imaging reports in detail and reviewed with the patient and her son today.  They are both very pleased with the  results.    Patient did confirm that she was last seen by her gastroneurologist, Dr[facility].  Her last colonoscopy was January 2018.  Per patient and son she was told that she is to return for a follow-up colonoscopy in 3 years.    Chronically patient has been feeling fairly stable.  She does work full-time as a  and tends to get fatigued after days work.  She sleeps well.  Her appetite is been stable and she denies any weight loss, fevers or chills.  She denies any chest pain or heart palpitations.  She does note increase in shortness of breath but denies any coughing or wheezing.  She states that she notices shortness of breath with increased activity and her son is noticing it as well.  Patient also stated when she was working 1 day a client asked her if she had any cardiac issues because she was having what appeared to be shortness of breath.  Patient also states that she has very mild abdominal cramping at times when she bends over that lasts just a brief second and resolves.  Otherwise she denies any nausea, vomiting, diarrhea or constipation.  She did note a few weeks back one episode of blood on the outside of the stool, no blood in her stool.  Rectal exam today was within normal limits.  She does have continued left knee pain which is chronic.  She denies any dizziness or headaches and states she denies any residual peripheral neuropathy from previous chemotherapy.    No Known Allergies  Current Outpatient Medications on File Prior to Visit   Medication Sig Dispense Refill   • ascorbic acid (ASCORBIC ACID) 500 MG Tab Take 500 mg by mouth every day.     • Misc Natural Products (GLUCOS-CHONDROIT-MSM COMPLEX PO) Take  by mouth.     • Multiple Vitamins-Minerals (MULTIVITAMIN ADULT PO) Take  by mouth.     • COLLAGEN PO Take  by mouth.     • RESVERATROL PO Take  by mouth.     • lidocaine-prilocaine (EMLA) 2.5-2.5 % Cream Apply to port 1 hour prior to access of port and cover with  "plastic wrap. 1 Tube 3   • valacyclovir (VALTREX) 500 MG Tab Take 500 mg by mouth 2 times a day. 3 day course     • atorvastatin (LIPITOR) 10 MG Tab Take 5 mg by mouth every evening.     • montelukast (SINGULAIR) 10 MG Tab Take 10 mg by mouth every bedtime. Indications: Hayfever     • cetirizine (ZYRTEC) 10 MG Tab Take 10 mg by mouth every morning. Indications: Hayfever     • metformin (GLUCOPHAGE) 500 MG TABS Take 500 mg by mouth 2 times a day, with meals.     • citalopram (CELEXA) 40 MG TABS Take 40 mg by mouth every bedtime.     • ondansetron (ZOFRAN) 4 MG Tab tablet Take 1 Tab by mouth every four hours as needed for Nausea/Vomiting. (Patient not taking: Reported on 8/14/2019) 30 Tab 3   • prochlorperazine (COMPAZINE) 10 MG Tab Take 1 Tab by mouth every 6 hours as needed. (Patient not taking: Reported on 8/14/2019) 30 Tab 3     No current facility-administered medications on file prior to visit.        Review of Systems   Constitutional: Positive for malaise/fatigue (at times after working as a ). Negative for chills, fever and weight loss.   HENT: Negative for congestion and sore throat.    Respiratory: Positive for shortness of breath (increasing per patient and son with activity). Negative for cough and wheezing.    Cardiovascular: Negative for chest pain and palpitations.   Gastrointestinal: Positive for abdominal pain (very mild cramping at times when bending over that lasts just a few seconds). Negative for constipation, diarrhea, nausea and vomiting.   Genitourinary: Negative for dysuria.   Musculoskeletal: Positive for joint pain (intermittent left knee pain which is chronic). Negative for myalgias.   Neurological: Negative for dizziness, tingling and headaches.          Objective:     /60 (BP Location: Left arm, Patient Position: Sitting, BP Cuff Size: Adult)   Pulse 71   Temp 36.6 °C (97.9 °F) (Temporal)   Resp 12   Ht 1.59 m (5' 2.6\")   Wt 75.6 kg (166 lb 10.7 oz)   SpO2 94%   " BMI 29.90 kg/m²      Physical Exam   Constitutional: She is oriented to person, place, and time. She appears well-developed and well-nourished. No distress.   HENT:   Head: Normocephalic and atraumatic.   Mouth/Throat: Oropharynx is clear and moist. No oropharyngeal exudate.   Eyes: Pupils are equal, round, and reactive to light. Conjunctivae and EOM are normal. Right eye exhibits no discharge. Left eye exhibits no discharge. No scleral icterus.   Cardiovascular: Normal rate, regular rhythm, normal heart sounds and intact distal pulses. Exam reveals no gallop and no friction rub.   No murmur heard.  Pulmonary/Chest: Effort normal and breath sounds normal. No stridor. No respiratory distress.   Abdominal: Soft. Bowel sounds are normal. She exhibits no distension. There is no tenderness.   Genitourinary:   Genitourinary Comments: Rectal exam completed today which was WNL   Musculoskeletal: Normal range of motion. She exhibits no edema or tenderness.   Neurological: She is alert and oriented to person, place, and time.   Skin: Skin is warm and dry. No rash noted. She is not diaphoretic. No erythema. No pallor.   Psychiatric: She has a normal mood and affect. Her behavior is normal.   Vitals reviewed.      Ct-abdomen-pelvis With    Result Date: 8/9/2019 8/9/2019 10:25 AM HISTORY/REASON FOR EXAM:  colon ca staging. TECHNIQUE/EXAM DESCRIPTION:   CT scan of the abdomen and pelvis with contrast. Contrast-enhanced helical scanning was obtained from the diaphragmatic domes through the pubic symphysis following the bolus administration of nonionic contrast without complication. 100 mL of Omnipaque 350 nonionic contrast was administered without complication. Low dose optimization technique was utilized for this CT exam including automated exposure control and adjustment of the mA and/or kV according to patient size. COMPARISON: 2/9/2019 FINDINGS: CT Abdomen: Calcified nodule at the LEFT lung base. The liver is unremarkable.  The spleen is unremarkable. Adrenal glands are unremarkable. LEFT kidney shows nonobstructing stones measuring 3 mm.  Scarring noted medially. RIGHT kidney is unremarkable. The pancreas is unremarkable. The gallbladder is unremarkable. CT Pelvis: Bladder is unremarkable. Uterus is absent. Ovaries not visualized. Rectal anastomosis noted. Appendix is not visualized.  No pericecal inflammatory changes. No peritoneal fluid or pneumoperitoneum. Abdominal aorta shows minimal atherosclerotic calcifications. Surgical clips present in the LEFT inferior retroperitoneum. No retroperitoneal or pelvic adenopathy. Degenerative disc disease at L5-S1 with grade 2 anterolisthesis.  Chronic L5 pars defects present.     1.  Prior partial sigmoid resection. 2.  No evidence for recurrent or metastatic tumor in this patient with history of colon cancer. 3.  Nonobstructing LEFT kidney stone.    Results for BOYCEKAREN SHEPARD (MRN [id number])    Ref. Range 8/14/2019 10:12   WBC Latest Ref Range: 4.8 - 10.8 K/uL 8.1   RBC Latest Ref Range: 4.20 - 5.40 M/uL 5.31   Hemoglobin Latest Ref Range: 12.0 - 16.0 g/dL 15.1   Hematocrit Latest Ref Range: 37.0 - 47.0 % 46.3   MCV Latest Ref Range: 81.4 - 97.8 fL 87.2   MCH Latest Ref Range: 27.0 - 33.0 pg 28.4   MCHC Latest Ref Range: 33.6 - 35.0 g/dL 32.6 (L)   RDW Latest Ref Range: 35.9 - 50.0 fL 45.1   Platelet Count Latest Ref Range: 164 - 446 K/uL 376   MPV Latest Ref Range: 9.0 - 12.9 fL 9.1   Neutrophils-Polys Latest Ref Range: 44.00 - 72.00 % 58.50   Neutrophils (Absolute) Latest Ref Range: 2.00 - 7.15 K/uL 4.72   Lymphocytes Latest Ref Range: 22.00 - 41.00 % 27.10   Lymphs (Absolute) Latest Ref Range: 1.00 - 4.80 K/uL 2.19   Monocytes Latest Ref Range: 0.00 - 13.40 % 7.10   Monos (Absolute) Latest Ref Range: 0.00 - 0.85 K/uL 0.57   Eosinophils Latest Ref Range: 0.00 - 6.90 % 6.10   Eos (Absolute) Latest Ref Range: 0.00 - 0.51 K/uL 0.49   Basophils Latest Ref Range: 0.00 - 1.80 % 1.00   Marioo  (Absolute) Latest Ref Range: 0.00 - 0.12 K/uL 0.08   Immature Granulocytes Latest Ref Range: 0.00 - 0.90 % 0.20   Immature Granulocytes (abs) Latest Ref Range: 0.00 - 0.11 K/uL 0.02   Nucleated RBC Latest Units: /100 WBC 0.00   NRBC (Absolute) Latest Units: K/uL 0.00     Results for KAREN BOYCE (MRN [id number])    Ref. Range 8/9/2019 09:41   Sodium Latest Ref Range: 135 - 145 mmol/L 141   Potassium Latest Ref Range: 3.6 - 5.5 mmol/L 4.3   Chloride Latest Ref Range: 96 - 112 mmol/L 108   Co2 Latest Ref Range: 20 - 33 mmol/L 27   Anion Gap Latest Ref Range: 0.0 - 11.9  6.0   Glucose Latest Ref Range: 65 - 99 mg/dL 117 (H)   Bun Latest Ref Range: 8 - 22 mg/dL 20   Creatinine Latest Ref Range: 0.50 - 1.40 mg/dL 0.80   GFR If  Latest Ref Range: >60 mL/min/1.73 m 2 >60   GFR If Non  Latest Ref Range: >60 mL/min/1.73 m 2 >60   Calcium Latest Ref Range: 8.5 - 10.5 mg/dL 9.1   AST(SGOT) Latest Ref Range: 12 - 45 U/L 20   ALT(SGPT) Latest Ref Range: 2 - 50 U/L 16   Alkaline Phosphatase Latest Ref Range: 30 - 99 U/L 78   Total Bilirubin Latest Ref Range: 0.1 - 1.5 mg/dL 0.5   Albumin Latest Ref Range: 3.2 - 4.9 g/dL 3.8   Total Protein Latest Ref Range: 6.0 - 8.2 g/dL 6.8   Globulin Latest Ref Range: 1.9 - 3.5 g/dL 3.0   A-G Ratio Latest Units: g/dL 1.3   Fasting Status Unknown Non-Fasting          Assessment/Plan:     1. Cancer of sigmoid colon (HCC)  REFERRAL TO ONCOLOGY NURSE NAVIGATOR This patient has a screening score of (must be 6 or above): 0    CBC WITH DIFFERENTIAL    CEA    CT-CHEST (THORAX) WITH    CBC WITH DIFFERENTIAL    Comp Metabolic Panel    CEA    IR-CVC TUNNEL WITH PORT REMOVAL   2. SOB (shortness of breath)  CT-CHEST (THORAX) WITH     Plan  1.  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  She is status post laparoscopic sigmoid resection followed by adjuvant FOLFOX x12 cycles.  She  currently is in surveillance and most recent CT scan completed on August 9, 2019 shows no evidence of metastatic or recurrent disease.  I discussed the case in detail with Dr. Mcarthur today and recommendation for patient to proceed with yearly CT scans as we are currently at the two-year trinh following the completion of chemotherapy which ended in July 20, 2017.  Patient to continue with CT scans in 1 year but will follow patient every 6 months in the clinic for follow-up visits.  We will also have patient have labs completed every 6 months including a CBC, CMP and CEA.  Patient had completed a CMP lab only last week therefore I have requested a CBC and CEA be completed today.    I was able to review all the labs including CBC, CMP and CEA which do not reveal any concerning findings.  CEA is at 1.  Her CEA was never elevated at diagnosis however we will continue to monitor every 6 months.    2.  I also discussed with Dr. Mcarthur with regards to follow-up regarding colonoscopies.  His last colonoscopy was completed in January 2018 and per patient and son recommendation for follow-up in 3 years.  We have requested records from Dr. Caraballo as well.  Will defer continued management and monitoring of her colonoscopies to Dr. Caraballo.    3.  Patient has been experiencing some increased shortness of breath.  It appears to be significant enough as people around her are noticing her having increased shortness of breath with activity.  She denies any coughing or wheezing.  However based on her previous history I will go ahead and do a work-up with a CT chest for further evaluation and rule out any respiratory concerns.  Should her CT chest come back negative then can consider referral to cardio-oncology based on patient's previous chemotherapy treatment for further evaluation.     4.  Patient has been very anxious with regards to having her port removed.  She did want to wait until the two-year trinh before proceeding with removal.   CT scan of the abdomen and pelvis showed no evidence of recurrent disease or metastatic disease, therefore she is ready to proceed with port removal.  We will go ahead and order that and schedule it for interventional radiology.  She is requesting this to be completed in the next few weeks in early September.    5. Patient to follow-up in the clinic in 6 months or sooner if needed.  She has been given labs to complete a CBC, CMP and CEA prior to that appointment.

## 2019-08-14 NOTE — TELEPHONE ENCOUNTER
I called spoke with the patient regarding her port Removal appointment info.    09/05/19   Check in at 9:30 am  [address] G16  NPO 6 Hours prior   You will need a Diver home  Call pre admit at [phone]     Patient understood , no questions.

## 2019-08-14 NOTE — NON-PROVIDER
Tim Crump is a 64 y.o. female here for a non-provider visit for: Lab Draws  on 8/14/2019 at 10:34 AM    Procedure Performed: Venipuncture     Anatomical site: Right Antecubital Area (AC)    Equipment used: 21g vacutainer    Labs drawn: CBC w/diff and CEA    Ordering Provider: JULIET Evans    Faheem By: Maia Salcido, Med Ass't

## 2019-08-15 ENCOUNTER — HOSPITAL ENCOUNTER (OUTPATIENT)
Dept: RADIOLOGY | Facility: MEDICAL CENTER | Age: 64
End: 2019-08-15
Attending: NURSE PRACTITIONER
Payer: COMMERCIAL

## 2019-08-15 DIAGNOSIS — R06.02 SOB (SHORTNESS OF BREATH): ICD-10-CM

## 2019-08-15 DIAGNOSIS — C18.7 CANCER OF SIGMOID COLON (HCC): ICD-10-CM

## 2019-08-15 PROCEDURE — 700117 HCHG RX CONTRAST REV CODE 255: Performed by: NURSE PRACTITIONER

## 2019-08-15 PROCEDURE — 71260 CT THORAX DX C+: CPT

## 2019-08-15 RX ADMIN — IOHEXOL 75 ML: 350 INJECTION, SOLUTION INTRAVENOUS at 09:22

## 2019-08-19 ENCOUNTER — TELEPHONE (OUTPATIENT)
Dept: HEMATOLOGY ONCOLOGY | Facility: MEDICAL CENTER | Age: 64
End: 2019-08-19

## 2019-08-19 NOTE — TELEPHONE ENCOUNTER
Called pt with test results per JULIET Barrientos, and left message to please call back for test results with contact information.    FOR STAFF:  Please transfer call to RN.    RN to let her know her CT chest looks okay. Tell her [facility] will place referral to cardiology with regards to her SOB and have her be checked out per discussion this with her last week.  (If the CT Chest was okay she knew [facility] was thinking about sending her to cardiology.)   RN to contact Iliana when called so referral can be placed.    Pt returned call and explained the above several times until pt could verbalize understanding.  Answered questions re why there is nothing mentioned about the heart or breasts on the CT report.  Pt verbalized understanding that CT scan was best scan for looking at the lungs for SOB symptoms.  Pt is also requesting that referral be sent to [facility] cardiology as she want to arrange payments through [employer].  Pt stated she would call if questions arise or she does not hear about referral by end of week.

## 2019-08-19 NOTE — TELEPHONE ENCOUNTER
Patient asked if we can call her back she will be at the doctor's office but she said she will try to answer.    We may contact Tim at [phone]

## 2019-08-20 DIAGNOSIS — R06.02 SOB (SHORTNESS OF BREATH): ICD-10-CM

## 2019-08-20 DIAGNOSIS — C18.7 CANCER OF SIGMOID COLON (HCC): ICD-10-CM

## 2019-08-20 NOTE — PROGRESS NOTES
Patient with CT chest showing no evidence of nodule or adenopathy. With increase in SOB now I will go ahead and refer to cardio-oncology as patient s/p chemotherapy and with increase in SOB.       Ct-chest (thorax) With    Result Date: 8/15/2019  8/15/2019 8:48 AM HISTORY/REASON FOR EXAM: Sigmoid colon cancer. TECHNIQUE/EXAM DESCRIPTION: CT scan of the chest with contrast. Thin-section helical images were obtained from the lung apices through the adrenal glands following the bolus administration of 75 mL of Omnipaque 350 nonionic contrast. Low dose optimization technique was utilized for this CT exam including automated exposure control and adjustment of the mA and/or kV according to patient size. COMPARISON: 8/1/2017 FINDINGS: There is a right IJ Port-A-Cath. There is a calcified granuloma within the left lower lobe within the posterior sulcus. No consolidation or pleural effusion. There are no pulmonary nodules seen. There is no evidence of mediastinal or hilar adenopathy. There is no evidence of pericardial effusion. There is fatty change of the liver. There are left renal stones. There is left renal cortical scarring.     1.  No evidence of pulmonary nodule or adenopathy. 2.  Outside granuloma within the left lung base. 3.  Fatty liver. 4.  Left renal calyceal stones. There is also scarring of the left kidney.

## 2019-08-22 ENCOUNTER — TELEPHONE (OUTPATIENT)
Dept: HEMATOLOGY ONCOLOGY | Facility: MEDICAL CENTER | Age: 64
End: 2019-08-22

## 2019-08-22 NOTE — TELEPHONE ENCOUNTER
Did receive confirmation the patient had a colonoscopy on January 15, 2018 with Dr. Caraballo.  Pathology report below.  There was no evidence of adenomatous changes or malignancy.  There is no evidence of high-grade dysplasia or subcallosal invasion.  Other section of pathology report shows no evidence of dysplasia or malignancy.    Colonoscopy report and pathology report are uploaded in patients medical record.  Per patient recommendation for repeat colonoscopy in 3 years that was discussed with patient with Dr. Caraballo.

## 2019-09-03 DIAGNOSIS — Z01.812 PRE-OPERATIVE LABORATORY EXAMINATION: ICD-10-CM

## 2019-09-03 LAB
INR PPP: 0.97 (ref 0.87–1.13)
PROTHROMBIN TIME: 13.1 SEC (ref 12–14.6)

## 2019-09-03 PROCEDURE — 36415 COLL VENOUS BLD VENIPUNCTURE: CPT

## 2019-09-03 PROCEDURE — 85610 PROTHROMBIN TIME: CPT

## 2019-09-05 ENCOUNTER — APPOINTMENT (OUTPATIENT)
Dept: RADIOLOGY | Facility: MEDICAL CENTER | Age: 64
End: 2019-09-05
Attending: NURSE PRACTITIONER
Payer: COMMERCIAL

## 2019-09-05 ENCOUNTER — HOSPITAL ENCOUNTER (OUTPATIENT)
Facility: MEDICAL CENTER | Age: 64
End: 2019-09-05
Attending: INTERNAL MEDICINE | Admitting: INTERNAL MEDICINE
Payer: COMMERCIAL

## 2019-09-05 VITALS
BODY MASS INDEX: 31.53 KG/M2 | WEIGHT: 167 LBS | HEIGHT: 61 IN | OXYGEN SATURATION: 93 % | HEART RATE: 65 BPM | RESPIRATION RATE: 20 BRPM | DIASTOLIC BLOOD PRESSURE: 67 MMHG | SYSTOLIC BLOOD PRESSURE: 118 MMHG

## 2019-09-05 DIAGNOSIS — C18.7 CANCER OF SIGMOID COLON (HCC): ICD-10-CM

## 2019-09-05 PROCEDURE — 36590 REMOVAL TUNNELED CV CATH: CPT

## 2019-09-05 PROCEDURE — 700101 HCHG RX REV CODE 250

## 2019-09-05 RX ORDER — LIDOCAINE HYDROCHLORIDE AND EPINEPHRINE 10; 10 MG/ML; UG/ML
INJECTION, SOLUTION INFILTRATION; PERINEURAL
Status: COMPLETED
Start: 2019-09-05 | End: 2019-09-05

## 2019-09-05 RX ADMIN — LIDOCAINE HYDROCHLORIDE,EPINEPHRINE BITARTRATE: 10; .01 INJECTION, SOLUTION INFILTRATION; PERINEURAL at 10:15

## 2019-09-05 NOTE — PROGRESS NOTES
IR Nursing Note:    Patient consented by Dr. Magallanes, all questions answered. Dr. Magallanes performed Tunneled Port Removal with imaging guidance. No sedation given. The pt appeared to tolerate the procedure well; ETCo2 baseline 30, with consistent waveform during the procedure.  Sutures, dermabond, steri strips, gauze and tegaderm applied to right chest, CDI and soft.  Pt alert and verbally appropriate post procedure, vital signs stable during procedure and transport, see flow sheet for vital signs.  Report given to FAISAL Pruitt.     Port at 21cm

## 2019-09-05 NOTE — PROGRESS NOTES
Discharge instructions reviewed with patient and family, all questions answered. belongings returned.  Patient ambulated out in a stable condition.         Implanted Port Removal  Introduction  Implanted port removal is a procedure to remove the port and catheter (port-a-cath) that is implanted under your skin. The port is a small disc under your skin that can be punctured with a needle. It is connected to a vein in your chest or neck by a small flexible tube (catheter). The port-a-cath is used for treatment through an IV tube and for taking blood samples.  Your health care provider will remove the port-a-cath if:  · You no longer need it for treatment.  · It is not working properly.  · The area around it gets infected.  Tell a health care provider about:  · Any allergies you have.  · All medicines you are taking, including vitamins, herbs, eye drops, creams, and over-the-counter medicines.  · Any problems you or family members have had with anesthetic medicines.  · Any blood disorders you have.  · Any surgeries you have had.  · Any medical conditions you have.  · Whether you are pregnant or may be pregnant.  What are the risks?  Generally, this is a safe procedure. However, problems may occur, including:  · Infection.  · Bleeding.  · Allergic reactions to anesthetic medicines.  · Damage to nerves or blood vessels.  What happens before the procedure?  · You will have:  ¨ A physical exam.  ¨ Blood tests.  ¨ Imaging tests, including a chest X-ray.  · Follow instructions from your health care provider about eating or drinking restrictions.  · Ask your health care provider about:  ¨ Changing or stopping your regular medicines. This is especially important if you are taking diabetes medicines or blood thinners.  ¨ Taking medicines such as aspirin and ibuprofen. These medicines can thin your blood. Do not take these medicines before your procedure if your surgeon instructs you not to.  · Ask your health care provider how  your surgical site will be marked or identified.  · You may be given antibiotic medicine to help prevent infection.  · Plan to have someone take you home after the procedure.  · If you will be going home right after the procedure, plan to have someone stay with you for 24 hours.  What happens during the procedure?  · To reduce your risk of infection:  ¨ Your health care team will wash or sanitize their hands.  ¨ Your skin will be washed with soap.  · You may be given one or more of the following:  ¨ A medicine to help you relax (sedative).  ¨ A medicine to numb the area (local anesthetic).  · A small cut (incision) will be made at the site of your port-a-cath.  · The port-a-cath and the catheter that has been inside your vein will gently be removed.  · The incision will be closed with stitches (sutures), adhesive strips, or skin glue.  · A bandage (dressing) will be placed over the incision.  The procedure may vary among health care providers and hospitals.  What happens after the procedure?  · Your blood pressure, heart rate, breathing rate, and blood oxygen level will be monitored often until the medicines you were given have worn off.  · Do not drive for 24 hours if you received a sedative.  This information is not intended to replace advice given to you by your health care provider. Make sure you discuss any questions you have with your health care provider.  Document Released: 11/28/2016 Document Revised: 05/25/2017 Document Reviewed: 09/21/2016  © 2017 [employer]      Wound Care, Adult  Taking care of your wound properly can help to prevent pain and infection. It can also help your wound to heal more quickly.  How is this treated?  Wound care  · Follow instructions from your health care provider about how to take care of your wound. Make sure you:  ¨ Wash your hands with soap and water before you change the bandage (dressing). If soap and water are not available, use hand .  ¨ Change your dressing as  told by your health care provider.  ¨ Leave stitches (sutures), skin glue, or adhesive strips in place. These skin closures may need to stay in place for 2 weeks or longer. If adhesive strip edges start to loosen and curl up, you may trim the loose edges. Do not remove adhesive strips completely unless your health care provider tells you to do that.  · Check your wound area every day for signs of infection. Check for:  ¨ More redness, swelling, or pain.  ¨ More fluid or blood.  ¨ Warmth.  ¨ Pus or a bad smell.  · Ask your health care provider if you should clean the wound with mild soap and water. Doing this may include:  ¨ Using a clean towel to pat the wound dry after cleaning it. Do not rub or scrub the wound.  ¨ Applying a cream or ointment. Do this only as told by your health care provider.  ¨ Covering the incision with a clean dressing.  · Ask your health care provider when you can leave the wound uncovered.  Medicines   · If you were prescribed an antibiotic medicine, cream, or ointment, take or use the antibiotic as told by your health care provider. Do not stop taking or using the antibiotic even if your condition improves.  · Take over-the-counter and prescription medicines only as told by your health care provider. If you were prescribed pain medicine, take it at least 30 minutes before doing any wound care or as told by your health care provider.  General instructions  · Return to your normal activities as told by your health care provider. Ask your health care provider what activities are safe.  · Do not scratch or pick at the wound.  · Keep all follow-up visits as told by your health care provider. This is important.  · Eat a diet that includes protein, vitamin A, vitamin C, and other nutrient-rich foods. These help the wound heal:  ¨ Protein-rich foods include meat, dairy, beans, nuts, and other sources.  ¨ Vitamin A-rich foods include carrots and dark green, leafy vegetables.  ¨ Vitamin C-rich foods  include citrus, tomatoes, and other fruits and vegetables.  ¨ Nutrient-rich foods have protein, carbohydrates, fat, vitamins, or minerals. Eat a variety of healthy foods including vegetables, fruits, and whole grains.  Contact a health care provider if:  · You received a tetanus shot and you have swelling, severe pain, redness, or bleeding at the injection site.  · Your pain is not controlled with medicine.  · You have more redness, swelling, or pain around the wound.  · You have more fluid or blood coming from the wound.  · Your wound feels warm to the touch.  · You have pus or a bad smell coming from the wound.  · You have a fever or chills.  · You are nauseous or you vomit.  · You are dizzy.  Get help right away if:  · You have a red streak going away from your wound.  · The edges of the wound open up and separate.  · Your wound is bleeding and the bleeding does not stop with gentle pressure.  · You have a rash.  · You faint.  · You have trouble breathing.  This information is not intended to replace advice given to you by your health care provider. Make sure you discuss any questions you have with your health care provider.  Document Released: 09/26/2009 Document Revised: 08/16/2017 Document Reviewed: 07/04/2017  [employer] Interactive Patient Education © 2017 [employer]

## 2019-09-05 NOTE — OR SURGEON
Immediate Post- Operative Note        PostOp Diagnosis: Patient no longer requires port.       Procedure(s): Port removal.       Estimated Blood Loss: Less than 5 ml        Complications: None            9/5/2019    1046 AM     Neri Magallanes

## 2019-09-05 NOTE — DISCHARGE INSTRUCTIONS
Implanted Port Removal  Introduction  Implanted port removal is a procedure to remove the port and catheter (port-a-cath) that is implanted under your skin. The port is a small disc under your skin that can be punctured with a needle. It is connected to a vein in your chest or neck by a small flexible tube (catheter). The port-a-cath is used for treatment through an IV tube and for taking blood samples.  Your health care provider will remove the port-a-cath if:  · You no longer need it for treatment.  · It is not working properly.  · The area around it gets infected.  Tell a health care provider about:  · Any allergies you have.  · All medicines you are taking, including vitamins, herbs, eye drops, creams, and over-the-counter medicines.  · Any problems you or family members have had with anesthetic medicines.  · Any blood disorders you have.  · Any surgeries you have had.  · Any medical conditions you have.  · Whether you are pregnant or may be pregnant.  What are the risks?  Generally, this is a safe procedure. However, problems may occur, including:  · Infection.  · Bleeding.  · Allergic reactions to anesthetic medicines.  · Damage to nerves or blood vessels.  What happens before the procedure?  · You will have:  ¨ A physical exam.  ¨ Blood tests.  ¨ Imaging tests, including a chest X-ray.  · Follow instructions from your health care provider about eating or drinking restrictions.  · Ask your health care provider about:  ¨ Changing or stopping your regular medicines. This is especially important if you are taking diabetes medicines or blood thinners.  ¨ Taking medicines such as aspirin and ibuprofen. These medicines can thin your blood. Do not take these medicines before your procedure if your surgeon instructs you not to.  · Ask your health care provider how your surgical site will be marked or identified.  · You may be given antibiotic medicine to help prevent infection.  · Plan to have someone take you home  after the procedure.  · If you will be going home right after the procedure, plan to have someone stay with you for 24 hours.  What happens during the procedure?  · To reduce your risk of infection:  ¨ Your health care team will wash or sanitize their hands.  ¨ Your skin will be washed with soap.  · You may be given one or more of the following:  ¨ A medicine to help you relax (sedative).  ¨ A medicine to numb the area (local anesthetic).  · A small cut (incision) will be made at the site of your port-a-cath.  · The port-a-cath and the catheter that has been inside your vein will gently be removed.  · The incision will be closed with stitches (sutures), adhesive strips, or skin glue.  · A bandage (dressing) will be placed over the incision.  The procedure may vary among health care providers and hospitals.  What happens after the procedure?  · Your blood pressure, heart rate, breathing rate, and blood oxygen level will be monitored often until the medicines you were given have worn off.  · Do not drive for 24 hours if you received a sedative.  This information is not intended to replace advice given to you by your health care provider. Make sure you discuss any questions you have with your health care provider.  Document Released: 11/28/2016 Document Revised: 05/25/2017 Document Reviewed: 09/21/2016  © 2017 [employer]      Wound Care, Adult  Taking care of your wound properly can help to prevent pain and infection. It can also help your wound to heal more quickly.  How is this treated?  Wound care  · Follow instructions from your health care provider about how to take care of your wound. Make sure you:  ¨ Wash your hands with soap and water before you change the bandage (dressing). If soap and water are not available, use hand .  ¨ Change your dressing as told by your health care provider.  ¨ Leave stitches (sutures), skin glue, or adhesive strips in place. These skin closures may need to stay in place for 2  weeks or longer. If adhesive strip edges start to loosen and curl up, you may trim the loose edges. Do not remove adhesive strips completely unless your health care provider tells you to do that.  · Check your wound area every day for signs of infection. Check for:  ¨ More redness, swelling, or pain.  ¨ More fluid or blood.  ¨ Warmth.  ¨ Pus or a bad smell.  · Ask your health care provider if you should clean the wound with mild soap and water. Doing this may include:  ¨ Using a clean towel to pat the wound dry after cleaning it. Do not rub or scrub the wound.  ¨ Applying a cream or ointment. Do this only as told by your health care provider.  ¨ Covering the incision with a clean dressing.  · Ask your health care provider when you can leave the wound uncovered.  Medicines   · If you were prescribed an antibiotic medicine, cream, or ointment, take or use the antibiotic as told by your health care provider. Do not stop taking or using the antibiotic even if your condition improves.  · Take over-the-counter and prescription medicines only as told by your health care provider. If you were prescribed pain medicine, take it at least 30 minutes before doing any wound care or as told by your health care provider.  General instructions  · Return to your normal activities as told by your health care provider. Ask your health care provider what activities are safe.  · Do not scratch or pick at the wound.  · Keep all follow-up visits as told by your health care provider. This is important.  · Eat a diet that includes protein, vitamin A, vitamin C, and other nutrient-rich foods. These help the wound heal:  ¨ Protein-rich foods include meat, dairy, beans, nuts, and other sources.  ¨ Vitamin A-rich foods include carrots and dark green, leafy vegetables.  ¨ Vitamin C-rich foods include citrus, tomatoes, and other fruits and vegetables.  ¨ Nutrient-rich foods have protein, carbohydrates, fat, vitamins, or minerals. Eat a variety of  healthy foods including vegetables, fruits, and whole grains.  Contact a health care provider if:  · You received a tetanus shot and you have swelling, severe pain, redness, or bleeding at the injection site.  · Your pain is not controlled with medicine.  · You have more redness, swelling, or pain around the wound.  · You have more fluid or blood coming from the wound.  · Your wound feels warm to the touch.  · You have pus or a bad smell coming from the wound.  · You have a fever or chills.  · You are nauseous or you vomit.  · You are dizzy.  Get help right away if:  · You have a red streak going away from your wound.  · The edges of the wound open up and separate.  · Your wound is bleeding and the bleeding does not stop with gentle pressure.  · You have a rash.  · You faint.  · You have trouble breathing.  This information is not intended to replace advice given to you by your health care provider. Make sure you discuss any questions you have with your health care provider.  Document Released: 09/26/2009 Document Revised: 08/16/2017 Document Reviewed: 07/04/2017  [employer] Interactive Patient Education © 2017 [employer]

## 2019-10-29 ENCOUNTER — OFFICE VISIT (OUTPATIENT)
Dept: CARDIOLOGY | Facility: MEDICAL CENTER | Age: 64
End: 2019-10-29
Payer: MEDICAID

## 2019-10-29 VITALS
WEIGHT: 167.6 LBS | HEIGHT: 62 IN | DIASTOLIC BLOOD PRESSURE: 62 MMHG | HEART RATE: 80 BPM | BODY MASS INDEX: 30.84 KG/M2 | OXYGEN SATURATION: 94 % | SYSTOLIC BLOOD PRESSURE: 102 MMHG

## 2019-10-29 DIAGNOSIS — C18.7 CANCER OF SIGMOID COLON (HCC): ICD-10-CM

## 2019-10-29 DIAGNOSIS — R73.03 PRE-DIABETES: ICD-10-CM

## 2019-10-29 DIAGNOSIS — R06.02 SOB (SHORTNESS OF BREATH): ICD-10-CM

## 2019-10-29 DIAGNOSIS — E66.9 OBESITY, UNSPECIFIED CLASSIFICATION, UNSPECIFIED OBESITY TYPE, UNSPECIFIED WHETHER SERIOUS COMORBIDITY PRESENT: ICD-10-CM

## 2019-10-29 DIAGNOSIS — E78.2 MIXED HYPERLIPIDEMIA: ICD-10-CM

## 2019-10-29 PROCEDURE — 99244 OFF/OP CNSLTJ NEW/EST MOD 40: CPT | Performed by: INTERNAL MEDICINE

## 2019-10-29 PROCEDURE — 93000 ELECTROCARDIOGRAM COMPLETE: CPT | Performed by: INTERNAL MEDICINE

## 2019-10-29 NOTE — PROGRESS NOTES
Chief Complaint   Patient presents with   • Shortness of Breath       Subjective:   Tim Crump is a 64 y.o. female who presents today for initial consultation regarding dyspnea on exertion.  She has a history of prediabetes on metformin, mixed hyperlipidemia obesity, anxiety, untreated sleep apnea and colon cancer status post resection without recurrence in 2016.  She notes that she is always been short of breath when she becomes anxious.  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  She was working out regularly until 2 months ago.  She has not gained significant amount of weight.  She has no chest pain or other exertional symptoms aside from dyspnea.  It always resolves with rest in a few minutes.  She does not smoke drink or do drugs and has no direct family history of precocious CAD.    Past Medical History:   Diagnosis Date   • Arthritis     left knee   • Bowel habit changes     constipation   • Cancer (HCC)     colon   • Cancer of sigmoid colon (HCC) 2016   • Cataract     no sx   • Diabetes (HCC)     oral meds   • H/O seasonal allergies    • Hemorrhagic disorder (HCC)     post tooth extraction   • High cholesterol    • Hypertension    • Mixed hyperlipidemia 10/29/2019   • Obesity 10/29/2019   • Pain 2016    left wrist and knee, 3-4/10   • Pre-diabetes 10/29/2019   • Psychiatric problem     anxiety   • Snoring     no sleep study     Past Surgical History:   Procedure Laterality Date   • LOW ANTERIOR RESECTION LAPAROSCOPIC  2016    Procedure: LOW ANTERIOR RESECTION LAPAROSCOPIC;  Surgeon: Enoch Shaw M.D.;  Location: SURGERY [facility];  Service:    • OTHER ORTHOPEDIC SURGERY Left     left wrist   • GYN SURGERY      hysterectomy   • OTHER      varicose vein stripping rubina   • GYN SURGERY       x 2     Family  History   Problem Relation Age of Onset   • Cancer Paternal Aunt      Social History     Socioeconomic History   • Marital status:      Spouse name: Not on file   • Number of children: Not on file   • Years of education: Not on file   • Highest education level: Not on file   Occupational History   • Not on file   Social Needs   • Financial resource strain: Not on file   • Food insecurity:     Worry: Not on file     Inability: Not on file   • Transportation needs:     Medical: Not on file     Non-medical: Not on file   Tobacco Use   • Smoking status: Never Smoker   • Smokeless tobacco: Never Used   Substance and Sexual Activity   • Alcohol use: No     Alcohol/week: 0.0 oz   • Drug use: No   • Sexual activity: Not on file   Lifestyle   • Physical activity:     Days per week: Not on file     Minutes per session: Not on file   • Stress: Not on file   Relationships   • Social connections:     Talks on phone: Not on file     Gets together: Not on file     Attends [employer] service: Not on file     Active member of club or organization: Not on file     Attends meetings of clubs or organizations: Not on file     Relationship status: Not on file   • Intimate partner violence:     Fear of current or ex partner: Not on file     Emotionally abused: Not on file     Physically abused: Not on file     Forced sexual activity: Not on file   Other Topics Concern   • Not on file   Social History Narrative   • Not on file     No Known Allergies  Outpatient Encounter Medications as of 10/29/2019   Medication Sig Dispense Refill   • Cranberry 125 MG Tab Take  by mouth 2 Times a Day.     • ascorbic acid (ASCORBIC ACID) 500 MG Tab Take 500 mg by mouth every day.     • Misc Natural Products (GLUCOS-CHONDROIT-MSM COMPLEX PO) Take  by mouth.     • Multiple Vitamins-Minerals (MULTIVITAMIN ADULT PO) Take  by mouth.     • COLLAGEN PO Take  by mouth.     • RESVERATROL PO Take  by mouth.     • valacyclovir (VALTREX) 500 MG Tab Take 500 mg  "by mouth 2 times a day. 3 day course     • atorvastatin (LIPITOR) 10 MG Tab Take 5 mg by mouth every evening.     • montelukast (SINGULAIR) 10 MG Tab Take 10 mg by mouth every bedtime. Indications: Hayfever     • cetirizine (ZYRTEC) 10 MG Tab Take 10 mg by mouth every morning. Indications: Hayfever     • metformin (GLUCOPHAGE) 500 MG TABS Take 500 mg by mouth 2 times a day, with meals.     • citalopram (CELEXA) 40 MG TABS Take 40 mg by mouth every bedtime.       No facility-administered encounter medications on file as of 10/29/2019.      Review of Systems   All other systems reviewed and are negative.       Objective:   /62 (BP Location: Left arm, Patient Position: Sitting, BP Cuff Size: Adult)   Pulse 80   Ht 1.575 m (5' 2\")   Wt 76 kg (167 lb 9.6 oz)   SpO2 94%   BMI 30.65 kg/m²     Physical Exam   Constitutional: She is oriented to person, place, and time. She appears well-developed. No distress.   Overweight   HENT:   Head: Normocephalic and atraumatic.   Right Ear: External ear normal.   Left Ear: External ear normal.   Mouth/Throat: No oropharyngeal exudate.   Eyes: Pupils are equal, round, and reactive to light. Conjunctivae and EOM are normal. Right eye exhibits no discharge. Left eye exhibits no discharge. No scleral icterus.   Neck: Normal range of motion. Neck supple. No JVD present. No tracheal deviation present. No thyromegaly present.   Cardiovascular: Normal rate, regular rhythm, S1 normal, S2 normal, normal heart sounds and intact distal pulses. PMI is not displaced. Exam reveals no gallop, no S3, no S4 and no friction rub.   No murmur heard.  Pulses:       Carotid pulses are 2+ on the right side, and 2+ on the left side.       Radial pulses are 2+ on the right side, and 2+ on the left side.        Popliteal pulses are 2+ on the right side, and 2+ on the left side.        Dorsalis pedis pulses are 2+ on the right side, and 2+ on the left side.        Posterior tibial pulses are 2+ on the " right side, and 2+ on the left side.   Pulmonary/Chest: Effort normal and breath sounds normal. No respiratory distress. She has no wheezes. She has no rales. She exhibits no tenderness.   Abdominal: Soft. Bowel sounds are normal. She exhibits no distension. There is no tenderness.   Musculoskeletal: Normal range of motion. She exhibits no edema or tenderness.   Neurological: She is alert and oriented to person, place, and time. No cranial nerve deficit (Cranial nerves II through XII grossly intact).   Skin: Skin is warm and dry. No rash noted. She is not diaphoretic. No erythema.   Psychiatric: She has a normal mood and affect. Her behavior is normal. Judgment and thought content normal.   Vitals reviewed.    LABS:  No results found for: CHOLSTRLTOT, LDL, HDL, TRIGLYCERIDE    Lab Results   Component Value Date/Time    WBC 8.1 08/14/2019 10:12 AM    RBC 5.31 08/14/2019 10:12 AM    HEMOGLOBIN 15.1 08/14/2019 10:12 AM    HEMATOCRIT 46.3 08/14/2019 10:12 AM    MCV 87.2 08/14/2019 10:12 AM    NEUTSPOLYS 58.50 08/14/2019 10:12 AM    LYMPHOCYTES 27.10 08/14/2019 10:12 AM    MONOCYTES 7.10 08/14/2019 10:12 AM    EOSINOPHILS 6.10 08/14/2019 10:12 AM    BASOPHILS 1.00 08/14/2019 10:12 AM    ANISOCYTOSIS 1+ 12/28/2006 04:10 AM     Lab Results   Component Value Date/Time    SODIUM 141 08/09/2019 09:41 AM    POTASSIUM 4.3 08/09/2019 09:41 AM    CHLORIDE 108 08/09/2019 09:41 AM    CO2 27 08/09/2019 09:41 AM    GLUCOSE 117 (H) 08/09/2019 09:41 AM    BUN 20 08/09/2019 09:41 AM    CREATININE 0.80 08/09/2019 09:41 AM    CREATININE 0.9 12/19/2006 09:50 AM         Lab Results   Component Value Date/Time    ALKPHOSPHAT 78 08/09/2019 09:41 AM    ASTSGOT 20 08/09/2019 09:41 AM    ALTSGPT 16 08/09/2019 09:41 AM    TBILIRUBIN 0.5 08/09/2019 09:41 AM      No results found for: BNPBTYPENAT   No results found for: TSH  Lab Results   Component Value Date/Time    PROTHROMBTM 13.1 09/03/2019 08:37 AM    INR 0.97 09/03/2019 08:37 AM          EKG  (10/29/2019):  I have personally reviewed the EKG this visit and discussed with the patient.  Sinus rhythm with nonspecific ST changes with significant baseline artifact.      Assessmen with nonspecific ST changes but significant baseline artifact.t:     1. SOB (shortness of breath)  EKG    NM-CARDIAC PET    EC-ECHOCARDIOGRAM COMPLETE W/O CONT   2. Pre-diabetes     3. Mixed hyperlipidemia     4. Obesity, unspecified classification, unspecified obesity type, unspecified whether serious comorbidity present     5. Cancer of sigmoid colon (HCC)         Medical Decision Making:  Today's Assessment / Status / Plan:     Her dyspnea on exertion may be multifactorial.  She does have risk factors for underlying coronary artery disease.  Her EKG is difficult to interpret due to the baseline artifact however does not appear grossly abnormal.  Recommend cardiac evaluation for an underlying cause of her dyspnea.  If normal, other etiologies including her untreated sleep apnea should be entertained.  If abnormal she will be seen back for further discussion.    1.  Pharmacologic PET myocardial perfusion stress test.  She has no insurance and if this is prohibitive with regard to her finances then a exercise MPI would be the next best test.  2.  Echocardiogram    Follow-up after testing

## 2019-10-30 LAB — EKG IMPRESSION: NORMAL

## 2019-11-13 ENCOUNTER — HOSPITAL ENCOUNTER (OUTPATIENT)
Dept: RADIOLOGY | Facility: MEDICAL CENTER | Age: 64
End: 2019-11-13
Attending: INTERNAL MEDICINE
Payer: COMMERCIAL

## 2019-11-13 ENCOUNTER — HOSPITAL ENCOUNTER (OUTPATIENT)
Dept: CARDIOLOGY | Facility: MEDICAL CENTER | Age: 64
End: 2019-11-13
Attending: INTERNAL MEDICINE
Payer: COMMERCIAL

## 2019-11-13 DIAGNOSIS — R06.02 SOB (SHORTNESS OF BREATH): ICD-10-CM

## 2019-11-13 PROCEDURE — 93018 CV STRESS TEST I&R ONLY: CPT | Performed by: INTERNAL MEDICINE

## 2019-11-13 PROCEDURE — 93306 TTE W/DOPPLER COMPLETE: CPT

## 2019-11-13 PROCEDURE — 700111 HCHG RX REV CODE 636 W/ 250 OVERRIDE (IP)

## 2019-11-13 PROCEDURE — 78492 MYOCRD IMG PET MLT RST&STRS: CPT | Mod: 26 | Performed by: INTERNAL MEDICINE

## 2019-11-14 LAB
LV EJECT FRACT  99904: 55
LV EJECT FRACT MOD 2C 99903: 73.2
LV EJECT FRACT MOD 4C 99902: 79.62
LV EJECT FRACT MOD BP 99901: 75.82

## 2019-11-14 PROCEDURE — 93306 TTE W/DOPPLER COMPLETE: CPT | Mod: 26 | Performed by: INTERNAL MEDICINE

## 2019-11-25 ENCOUNTER — TELEPHONE (OUTPATIENT)
Dept: CARDIOLOGY | Facility: MEDICAL CENTER | Age: 64
End: 2019-11-25

## 2019-11-25 DIAGNOSIS — R06.02 SOB (SHORTNESS OF BREATH): ICD-10-CM

## 2019-11-25 DIAGNOSIS — E66.9 OBESITY, UNSPECIFIED CLASSIFICATION, UNSPECIFIED OBESITY TYPE, UNSPECIFIED WHETHER SERIOUS COMORBIDITY PRESENT: ICD-10-CM

## 2019-11-25 DIAGNOSIS — E78.2 MIXED HYPERLIPIDEMIA: ICD-10-CM

## 2019-12-02 RX ORDER — ASPIRIN 81 MG/1
81 TABLET, CHEWABLE ORAL DAILY
Qty: 90 TAB | Refills: 3 | Status: SHIPPED | OUTPATIENT
Start: 2019-12-02

## 2019-12-02 NOTE — TELEPHONE ENCOUNTER
Attempted to call pt again this morning, no answer, LM for her to call back. Called pt's son Artie to discuss recommendations. Provided him with schedulers number [phone] to schedule next available appointment with TW to discuss coronary angiography. Medications ordered, MAR updated. Artie was able to take message for pt regarding medications and education provided. Advised to call us back if they have questions/concerns.

## 2020-01-28 ENCOUNTER — TELEPHONE (OUTPATIENT)
Dept: CARDIOLOGY | Facility: MEDICAL CENTER | Age: 65
End: 2020-01-28

## 2020-01-28 ENCOUNTER — OFFICE VISIT (OUTPATIENT)
Dept: CARDIOLOGY | Facility: MEDICAL CENTER | Age: 65
End: 2020-01-28

## 2020-01-28 VITALS
HEART RATE: 60 BPM | BODY MASS INDEX: 31.32 KG/M2 | HEIGHT: 62 IN | OXYGEN SATURATION: 96 % | SYSTOLIC BLOOD PRESSURE: 108 MMHG | DIASTOLIC BLOOD PRESSURE: 68 MMHG | WEIGHT: 170.2 LBS

## 2020-01-28 DIAGNOSIS — G47.33 OSA (OBSTRUCTIVE SLEEP APNEA): ICD-10-CM

## 2020-01-28 DIAGNOSIS — R94.39 POSITIVE CARDIAC STRESS TEST: ICD-10-CM

## 2020-01-28 DIAGNOSIS — R73.03 PRE-DIABETES: ICD-10-CM

## 2020-01-28 DIAGNOSIS — R06.02 SOB (SHORTNESS OF BREATH): ICD-10-CM

## 2020-01-28 DIAGNOSIS — E78.2 MIXED HYPERLIPIDEMIA: ICD-10-CM

## 2020-01-28 PROCEDURE — 99214 OFFICE O/P EST MOD 30 MIN: CPT | Performed by: INTERNAL MEDICINE

## 2020-01-28 NOTE — PROGRESS NOTES
Chief Complaint   Patient presents with   • Follow-Up     Shortness of Breath       Subjective:   Tim Crump is a 65 y.o. female who presents today for initial follow-up regarding dyspnea on exertion.  She has a history of prediabetes on metformin, mixed hyperlipidemia obesity, anxiety, untreated sleep apnea and colon cancer status post resection without recurrence in 2016.  She notes that she is always been short of breath when she becomes anxious.  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  She was working out regularly until 2 months ago.  She has not gained significant amount of weight.  She has no chest pain or other exertional symptoms aside from dyspnea.  It always resolves with rest in a few minutes.  She does not smoke drink or do drugs and has no direct family history of precocious CAD.    In the interim she had a normal echocardiogram but an abnormal PET MPI showing small area of ischemia without infarction inferior septum.  She notes that after initiating her beta-blocker following the positive stress test and her aspirin that her symptoms have improved mildly but are still present.    Past Medical History:   Diagnosis Date   • Arthritis     left knee   • Bowel habit changes     constipation   • Cancer (HCC)     colon   • Cancer of sigmoid colon (HCC) 12/7/2016   • Cataract     no sx   • Diabetes (HCC)     oral meds   • H/O seasonal allergies    • Hemorrhagic disorder (HCC)     post tooth extraction   • High cholesterol    • Hypertension    • Mixed hyperlipidemia 10/29/2019   • Obesity 10/29/2019   • Pain 12-    left wrist and knee, 3-4/10   • Pre-diabetes 10/29/2019   • Psychiatric problem     anxiety   • Snoring     no sleep study     Past Surgical History:   Procedure Laterality Date   • LOW ANTERIOR RESECTION LAPAROSCOPIC  12/7/2016     Procedure: LOW ANTERIOR RESECTION LAPAROSCOPIC;  Surgeon: Enoch Shaw M.D.;  Location: SURGERY [facility];  Service:    • OTHER ORTHOPEDIC SURGERY Left     left wrist   • GYN SURGERY      hysterectomy   • OTHER      varicose vein stripping rubina   • GYN SURGERY       x 2     Family History   Problem Relation Age of Onset   • Cancer Paternal Aunt      Social History     Socioeconomic History   • Marital status:      Spouse name: Not on file   • Number of children: Not on file   • Years of education: Not on file   • Highest education level: Not on file   Occupational History   • Not on file   Social Needs   • Financial resource strain: Not on file   • Food insecurity:     Worry: Not on file     Inability: Not on file   • Transportation needs:     Medical: Not on file     Non-medical: Not on file   Tobacco Use   • Smoking status: Never Smoker   • Smokeless tobacco: Never Used   Substance and Sexual Activity   • Alcohol use: No     Alcohol/week: 0.0 oz   • Drug use: No   • Sexual activity: Not on file   Lifestyle   • Physical activity:     Days per week: Not on file     Minutes per session: Not on file   • Stress: Not on file   Relationships   • Social connections:     Talks on phone: Not on file     Gets together: Not on file     Attends [employer] service: Not on file     Active member of club or organization: Not on file     Attends meetings of clubs or organizations: Not on file     Relationship status: Not on file   • Intimate partner violence:     Fear of current or ex partner: Not on file     Emotionally abused: Not on file     Physically abused: Not on file     Forced sexual activity: Not on file   Other Topics Concern   • Not on file   Social History Narrative   • Not on file     No Known Allergies  Outpatient Encounter Medications as of 2020   Medication Sig Dispense Refill   • aspirin (ASA) 81 MG Chew Tab chewable tablet Take 1 Tab by mouth every day. 90 Tab 3   •  "metoprolol (LOPRESSOR) 25 MG Tab Take 0.5 Tabs by mouth 2 times a day. Take half a tablet by mouth 2 times a day. 90 Tab 3   • ascorbic acid (ASCORBIC ACID) 500 MG Tab Take 500 mg by mouth every day.     • Misc Natural Products (GLUCOS-CHONDROIT-MSM COMPLEX PO) Take  by mouth.     • Multiple Vitamins-Minerals (MULTIVITAMIN ADULT PO) Take  by mouth.     • COLLAGEN PO Take  by mouth.     • RESVERATROL PO Take  by mouth.     • valacyclovir (VALTREX) 500 MG Tab Take 500 mg by mouth 2 times a day. 3 day course     • atorvastatin (LIPITOR) 10 MG Tab Take 5 mg by mouth every evening.     • montelukast (SINGULAIR) 10 MG Tab Take 10 mg by mouth every bedtime. Indications: Hayfever     • cetirizine (ZYRTEC) 10 MG Tab Take 10 mg by mouth every morning. Indications: Hayfever     • metformin (GLUCOPHAGE) 500 MG TABS Take 500 mg by mouth 2 times a day, with meals.     • citalopram (CELEXA) 40 MG TABS Take 40 mg by mouth every bedtime.     • Cranberry 125 MG Tab Take  by mouth 2 Times a Day.       No facility-administered encounter medications on file as of 1/28/2020.      Review of Systems   All other systems reviewed and are negative.       Objective:   /68 (BP Location: Left arm, Patient Position: Sitting, BP Cuff Size: Adult)   Pulse 60   Ht 1.575 m (5' 2\")   Wt 77.2 kg (170 lb 3.2 oz)   SpO2 96%   BMI 31.13 kg/m²     Physical Exam   Constitutional: She is oriented to person, place, and time. She appears well-developed. No distress.   Overweight   HENT:   Head: Normocephalic and atraumatic.   Right Ear: External ear normal.   Left Ear: External ear normal.   Mouth/Throat: No oropharyngeal exudate.   Eyes: Pupils are equal, round, and reactive to light. Conjunctivae and EOM are normal. Right eye exhibits no discharge. Left eye exhibits no discharge. No scleral icterus.   Neck: Normal range of motion. Neck supple. No JVD present. No tracheal deviation present. No thyromegaly present.   Cardiovascular: Normal rate, " regular rhythm, S1 normal, S2 normal, normal heart sounds and intact distal pulses. PMI is not displaced. Exam reveals no gallop, no S3, no S4 and no friction rub.   No murmur heard.  Pulses:       Carotid pulses are 2+ on the right side and 2+ on the left side.       Radial pulses are 2+ on the right side and 2+ on the left side.        Popliteal pulses are 2+ on the right side and 2+ on the left side.        Dorsalis pedis pulses are 2+ on the right side and 2+ on the left side.        Posterior tibial pulses are 2+ on the right side and 2+ on the left side.   Pulmonary/Chest: Effort normal and breath sounds normal. No respiratory distress. She has no wheezes. She has no rales. She exhibits no tenderness.   Abdominal: Soft. Bowel sounds are normal. She exhibits no distension. There is no tenderness.   Musculoskeletal: Normal range of motion.         General: No tenderness or edema.   Neurological: She is alert and oriented to person, place, and time. No cranial nerve deficit (Cranial nerves II through XII grossly intact).   Skin: Skin is warm and dry. No rash noted. She is not diaphoretic. No erythema.   Psychiatric: She has a normal mood and affect. Her behavior is normal. Judgment and thought content normal.   Vitals reviewed.    LABS:  No results found for: CHOLSTRLTOT, LDL, HDL, TRIGLYCERIDE    Lab Results   Component Value Date/Time    WBC 8.1 08/14/2019 10:12 AM    RBC 5.31 08/14/2019 10:12 AM    HEMOGLOBIN 15.1 08/14/2019 10:12 AM    HEMATOCRIT 46.3 08/14/2019 10:12 AM    MCV 87.2 08/14/2019 10:12 AM    NEUTSPOLYS 58.50 08/14/2019 10:12 AM    LYMPHOCYTES 27.10 08/14/2019 10:12 AM    MONOCYTES 7.10 08/14/2019 10:12 AM    EOSINOPHILS 6.10 08/14/2019 10:12 AM    BASOPHILS 1.00 08/14/2019 10:12 AM    ANISOCYTOSIS 1+ 12/28/2006 04:10 AM     Lab Results   Component Value Date/Time    SODIUM 141 08/09/2019 09:41 AM    POTASSIUM 4.3 08/09/2019 09:41 AM    CHLORIDE 108 08/09/2019 09:41 AM    CO2 27 08/09/2019 09:41  AM    GLUCOSE 117 (H) 08/09/2019 09:41 AM    BUN 20 08/09/2019 09:41 AM    CREATININE 0.80 08/09/2019 09:41 AM    CREATININE 0.9 12/19/2006 09:50 AM         Lab Results   Component Value Date/Time    ALKPHOSPHAT 78 08/09/2019 09:41 AM    ASTSGOT 20 08/09/2019 09:41 AM    ALTSGPT 16 08/09/2019 09:41 AM    TBILIRUBIN 0.5 08/09/2019 09:41 AM      No results found for: BNPBTYPENAT   No results found for: TSH  Lab Results   Component Value Date/Time    PROTHROMBTM 13.1 09/03/2019 08:37 AM    INR 0.97 09/03/2019 08:37 AM      ECHO CONCLUSIONS (11/13/2019):  No prior study is available for comparison.   Normal left ventricular chamber size.  Left ventricular ejection fraction is visually estimated to be 55%.  Normal diastolic function.  Mild tricuspid regurgitation.  Estimated right ventricular systolic pressure  is 30 mmHg.  Normal inferior vena cava size without inspiratory collapse.    STRESS (11/13/2019):  Abnormal left ventricular perfusion with  stress and rest images.    There is POSITIVE evidence of small area of basal septal ischemia.    EKG (10/29/2019):  I have personally reviewed the EKG this visit and discussed with the patient.  Sinus rhythm with nonspecific ST changes with significant baseline artifact.      Assessmen with nonspecific ST changes but significant baseline artifact.t:     1. SOB (shortness of breath)  CL-LEFT HEART CATHETERIZATION WITH POSSIBLE INTERVENTION    CANCELED: OVERNIGHT PULSE OXIMETRY   2. Positive cardiac stress test  CL-LEFT HEART CATHETERIZATION WITH POSSIBLE INTERVENTION   3. Pre-diabetes     4. Mixed hyperlipidemia     5. ISABELA (obstructive sleep apnea)  REFERRAL TO PULMONOLOGY       Medical Decision Making:  Today's Assessment / Status / Plan:     Her dyspnea on exertion may be multifactorial.  There is likely that her abnormal stress test represents an underlying causative factor.  We discussed that it is low risk and therefore appropriate for either medical therapy or an early  invasive strategy.  She is been initiated on medical therapy without much room to uptitrate due to her low heart rates.  This improved her symptoms somewhat but she remains symptomatic.  We discussed the option of coronary angiography with possible PCI with her and her son.  They would like to proceed.    1.  Increase metoprolol to 12.5 mg every morning, 25 mg every afternoon  2.  Continue aspirin  3.  Coronary angiography with possible PCI  4.  Referral to pulmonology for untreated sleep apnea.  She brings in abnormal apnea link testing from 2009 and never followed up with treatment.    I have discussed the risks and benefits of cardiac catheterization as well as the procedure itself, rationale and appropriateness in detail with the patient today. Complications including but not limited to death, stroke, MI, urgent bypass surgery, contrast nephropathy, vascular complications, bleeding and infection were explained to the patient. The potential outcomes associated with the procedure (possible PCI, possible CABG, possible medical Rx only) were also discussed at length. The patient agrees to proceed.      Follow-up after testing

## 2020-01-28 NOTE — TELEPHONE ENCOUNTER
Per patients request, patient is scheduled on 2-28-20 for a C w/poss with Dr. Acevedo. Patient was told to hold metformin day of procedure and 48hrs after. Patient to check in at 7:30 for a 9:30 procedure. H&P was done on 1-28-20 by Dr. Acevedo. Pre admit is scheduled on 2-25-20 at 10:45.

## 2020-01-28 NOTE — TELEPHONE ENCOUNTER
Patient is contacting the financial aid department to see if she can make payments for the procedure scheduled on 2-28-20. If she can't then nurse to call to set on medication therapy.

## 2020-02-06 ENCOUNTER — HOSPITAL ENCOUNTER (OUTPATIENT)
Dept: LAB | Facility: MEDICAL CENTER | Age: 65
End: 2020-02-06
Attending: NURSE PRACTITIONER
Payer: COMMERCIAL

## 2020-02-06 DIAGNOSIS — C18.7 CANCER OF SIGMOID COLON (HCC): ICD-10-CM

## 2020-02-06 LAB
ALBUMIN SERPL BCP-MCNC: 4.2 G/DL (ref 3.2–4.9)
ALBUMIN/GLOB SERPL: 1.6 G/DL
ALP SERPL-CCNC: 83 U/L (ref 30–99)
ALT SERPL-CCNC: 18 U/L (ref 2–50)
ANION GAP SERPL CALC-SCNC: 8 MMOL/L (ref 0–11.9)
AST SERPL-CCNC: 19 U/L (ref 12–45)
BILIRUB SERPL-MCNC: 0.5 MG/DL (ref 0.1–1.5)
BUN SERPL-MCNC: 24 MG/DL (ref 8–22)
CALCIUM SERPL-MCNC: 10 MG/DL (ref 8.5–10.5)
CHLORIDE SERPL-SCNC: 104 MMOL/L (ref 96–112)
CO2 SERPL-SCNC: 30 MMOL/L (ref 20–33)
CREAT SERPL-MCNC: 1.03 MG/DL (ref 0.5–1.4)
GLOBULIN SER CALC-MCNC: 2.7 G/DL (ref 1.9–3.5)
GLUCOSE SERPL-MCNC: 114 MG/DL (ref 65–99)
POTASSIUM SERPL-SCNC: 4.5 MMOL/L (ref 3.6–5.5)
PROT SERPL-MCNC: 6.9 G/DL (ref 6–8.2)
SODIUM SERPL-SCNC: 142 MMOL/L (ref 135–145)

## 2020-02-06 PROCEDURE — 36415 COLL VENOUS BLD VENIPUNCTURE: CPT

## 2020-02-06 PROCEDURE — 80053 COMPREHEN METABOLIC PANEL: CPT

## 2020-02-13 ENCOUNTER — OFFICE VISIT (OUTPATIENT)
Dept: HEMATOLOGY ONCOLOGY | Facility: MEDICAL CENTER | Age: 65
End: 2020-02-13

## 2020-02-13 VITALS
HEART RATE: 65 BPM | BODY MASS INDEX: 31.42 KG/M2 | OXYGEN SATURATION: 97 % | WEIGHT: 170.75 LBS | SYSTOLIC BLOOD PRESSURE: 110 MMHG | HEIGHT: 62 IN | DIASTOLIC BLOOD PRESSURE: 64 MMHG | TEMPERATURE: 98.8 F | RESPIRATION RATE: 16 BRPM

## 2020-02-13 DIAGNOSIS — C18.7 CANCER OF SIGMOID COLON (HCC): ICD-10-CM

## 2020-02-13 PROCEDURE — 99213 OFFICE O/P EST LOW 20 MIN: CPT | Performed by: NURSE PRACTITIONER

## 2020-02-13 ASSESSMENT — ENCOUNTER SYMPTOMS
PALPITATIONS: 0
CHILLS: 0
BLOOD IN STOOL: 1
CONSTIPATION: 0
DIARRHEA: 0
VOMITING: 0
FEVER: 0
COUGH: 0
WEIGHT LOSS: 0
SHORTNESS OF BREATH: 1
HEADACHES: 0
MYALGIAS: 0
NAUSEA: 0
TINGLING: 0
DIZZINESS: 0

## 2020-02-13 ASSESSMENT — PAIN SCALES - GENERAL: PAINLEVEL: NO PAIN

## 2020-02-13 ASSESSMENT — PATIENT HEALTH QUESTIONNAIRE - PHQ9: CLINICAL INTERPRETATION OF PHQ2 SCORE: 0

## 2020-02-13 NOTE — PROGRESS NOTES
Subjective:      Tim Crump is a 65 y.o. female who presents for 6 month follow up for colon cancer.           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  She presents accompanied by her son for today's visit.    Cancer History  Patient was initially diagnosed in May 2016 with sigmoid colon cancer secondary to a positive occult testing with changes in bowel habits.  She underwent a colonoscopy which showed a polyp in the sigmoid colon which was positive for poorly differentiated adenocarcinoma with indeterminate margins.  She did proceed with laparoscopic sigmoid resection with low anterior pelvic anastomosis on December 2016.  Pathology did show evidence of local malignancy but was found to have 1/13 nodes positive.  According to Dr. Gomes pathology showed MLH1, MSH2, MSH6 and PMS2 intact.  She did undergo staging work-up and was found to be negative for metastatic disease.  On diagnosis of her sigmoid cancer patient CEA was low at 1.  Patient was started on adjuvant chemotherapy with modified FOLFOX on February 2, 2017.  Patient did have oxaliplatin held intermittently throughout the treatment due to severe cold sensitivity and peripheral neuropathy.  She did finish 12 cycles of modified FOLFOX on July 20, 2017, with the last few cycles with single agent 5-FU only.     Since completion of treatment patient has been on surveillance and observation.  She has been undergoing CT scans every 6 months which have continued to be stable. Last CT scan completed on August 9, 2019 shows a prior partial sigmoid resection but no evidence for recurrence or metastatic tumor was noted.     It does appear that patient's last colonoscopy was January 2018 with Dr. Caraballo.     Patient was previously complaining of shortness of breath.  CT chest showed no abnormal respiratory issues and therefore she was referred to  cardiology.  She has established with cardiology and after further work-up she was found to have an ischemia without infarction inferior septum.  She was started on a beta-blocker with some mild improvement in her symptoms however plan is to proceed with coronary angiography with possible PCI which is scheduled for February 28, 2020.    Clinically patient appears to be doing well and stable.  She does have fatigue and continued shortness of breath with activity.  She did note a one-time episode of nosebleeds which was new for her.  She also stated she had small amount of blood noted with a bowel movement x2 within the last 1-2 months.  She denies nausea vomiting or diarrhea constipation.  She denies chest pain or heart palpitations.  She voids that difficulty denies any pain.  She did have significant neuropathy with her chemotherapy but that has completely resolved.    Patient did have recent chemistry panel completed on February 6, 2020 which shows no significant abnormal findings in particular within her liver enzymes.  I personally viewed the CMP with the patient and her son today.    No Known Allergies  Current Outpatient Medications on File Prior to Visit   Medication Sig Dispense Refill   • aspirin (ASA) 81 MG Chew Tab chewable tablet Take 1 Tab by mouth every day. 90 Tab 3   • metoprolol (LOPRESSOR) 25 MG Tab Take 0.5 Tabs by mouth 2 times a day. Take half a tablet by mouth 2 times a day. 90 Tab 3   • ascorbic acid (ASCORBIC ACID) 500 MG Tab Take 500 mg by mouth every day.     • Misc Natural Products (GLUCOS-CHONDROIT-MSM COMPLEX PO) Take  by mouth.     • Multiple Vitamins-Minerals (MULTIVITAMIN ADULT PO) Take  by mouth.     • COLLAGEN PO Take  by mouth.     • RESVERATROL PO Take  by mouth.     • valacyclovir (VALTREX) 500 MG Tab Take 500 mg by mouth 2 times a day. 3 day course     • atorvastatin (LIPITOR) 10 MG Tab Take 5 mg by mouth every evening.     • montelukast (SINGULAIR) 10 MG Tab Take 10 mg by mouth  "every bedtime. Indications: Hayfever     • cetirizine (ZYRTEC) 10 MG Tab Take 10 mg by mouth every morning. Indications: Hayfever     • metformin (GLUCOPHAGE) 500 MG TABS Take 500 mg by mouth 2 times a day, with meals.     • citalopram (CELEXA) 40 MG TABS Take 40 mg by mouth every bedtime.     • Cranberry 125 MG Tab Take  by mouth 2 Times a Day.       No current facility-administered medications on file prior to visit.        Review of Systems   Constitutional: Positive for malaise/fatigue. Negative for chills, fever and weight loss.   HENT: Positive for nosebleeds (1 episode recently).    Respiratory: Positive for shortness of breath (with activity). Negative for cough.    Cardiovascular: Negative for chest pain and palpitations.   Gastrointestinal: Positive for blood in stool (noted 2 episodes of blood in the last 1-2 months - very mild). Negative for constipation, diarrhea, nausea and vomiting.   Genitourinary: Negative for dysuria.   Musculoskeletal: Negative for myalgias.   Neurological: Negative for dizziness, tingling (resolved from previous chemo) and headaches.          Objective:     /64 (BP Location: Right arm, Patient Position: Sitting, BP Cuff Size: Adult)   Pulse 65   Temp 37.1 °C (98.8 °F) (Temporal)   Resp 16   Ht 1.575 m (5' 2\")   Wt 77.4 kg (170 lb 11.9 oz)   SpO2 97%   BMI 31.23 kg/m²      Physical Exam  Vitals signs reviewed.   Constitutional:       General: She is not in acute distress.     Appearance: Normal appearance. She is not diaphoretic.   HENT:      Head: Normocephalic.   Cardiovascular:      Rate and Rhythm: Normal rate and regular rhythm.      Pulses: Normal pulses.      Heart sounds: Normal heart sounds.   Pulmonary:      Effort: Pulmonary effort is normal. No respiratory distress.      Breath sounds: Normal breath sounds.   Abdominal:      General: Bowel sounds are normal. There is no distension.      Palpations: Abdomen is soft.      Tenderness: There is no abdominal " tenderness.   Musculoskeletal: Normal range of motion.         General: No swelling or tenderness.   Skin:     General: Skin is warm.   Neurological:      Mental Status: She is alert and oriented to person, place, and time.   Psychiatric:         Mood and Affect: Mood normal.         Behavior: Behavior normal.         Results for KAREN BOYCE (MRN [id number])    Ref. Range 2/6/2020 14:16   Sodium Latest Ref Range: 135 - 145 mmol/L 142   Potassium Latest Ref Range: 3.6 - 5.5 mmol/L 4.5   Chloride Latest Ref Range: 96 - 112 mmol/L 104   Co2 Latest Ref Range: 20 - 33 mmol/L 30   Anion Gap Latest Ref Range: 0.0 - 11.9  8.0   Glucose Latest Ref Range: 65 - 99 mg/dL 114 (H)   Bun Latest Ref Range: 8 - 22 mg/dL 24 (H)   Creatinine Latest Ref Range: 0.50 - 1.40 mg/dL 1.03   GFR If  Latest Ref Range: >60 mL/min/1.73 m 2 >60   GFR If Non  Latest Ref Range: >60 mL/min/1.73 m 2 54 (A)   Calcium Latest Ref Range: 8.5 - 10.5 mg/dL 10.0   AST(SGOT) Latest Ref Range: 12 - 45 U/L 19   ALT(SGPT) Latest Ref Range: 2 - 50 U/L 18   Alkaline Phosphatase Latest Ref Range: 30 - 99 U/L 83   Total Bilirubin Latest Ref Range: 0.1 - 1.5 mg/dL 0.5   Albumin Latest Ref Range: 3.2 - 4.9 g/dL 4.2   Total Protein Latest Ref Range: 6.0 - 8.2 g/dL 6.9   Globulin Latest Ref Range: 1.9 - 3.5 g/dL 2.7   A-G Ratio Latest Units: g/dL 1.6          Assessment/Plan:       1. Cancer of sigmoid colon (HCC)  CBC WITH DIFFERENTIAL    REFERRAL TO GASTROENTEROLOGY     Plan  1. 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  Last CT scan was approximately 6 months ago in August 2019 which showed no evidence of malignancy.  Patient has never had an elevated CEA even at time of diagnosis.  Highest CEA during treatment was 2.4.  She most recently had a CMP completed which shows normal liver enzymes with some mild elevation of her  BUN.  I did discuss the case in detail with Dr. Mcarthur.  At this time there is no need for CEA but would like patient to complete a CBC.  I have asked patient to complete this however she is having some work-up completed prior to her cardiology appointment and asked that she can get that done at the same time.    No need for follow-up CT at this time we will continue to monitor patient closely.  I have placed a referral to gastroenterology to determine whether colonoscopy may be needed.  She has had 2 episodes of noting blood with stool.  Her last colonoscopy was January 2018 with Dr. Caraballo.     2.  At this time will have patient follow-up in the clinic in 6 months or sooner if needed.  I will have patient complete labs prior to that appointment as well.    3.  Patient to follow-up with cardiology as recommended.  She is also being worked up for sleep apnea as well.  Will defer to ordering providers with regards to this work-up.

## 2020-02-25 DIAGNOSIS — Z01.812 PRE-OPERATIVE LABORATORY EXAMINATION: ICD-10-CM

## 2020-02-25 DIAGNOSIS — Z01.810 PRE-OPERATIVE CARDIOVASCULAR EXAMINATION: ICD-10-CM

## 2020-02-25 LAB
ALBUMIN SERPL BCP-MCNC: 4 G/DL (ref 3.2–4.9)
ALBUMIN/GLOB SERPL: 1.4 G/DL
ALP SERPL-CCNC: 77 U/L (ref 30–99)
ALT SERPL-CCNC: 18 U/L (ref 2–50)
ANION GAP SERPL CALC-SCNC: 9 MMOL/L (ref 0–11.9)
APTT PPP: 34.6 SEC (ref 24.7–36)
AST SERPL-CCNC: 23 U/L (ref 12–45)
BASOPHILS # BLD AUTO: 1.2 % (ref 0–1.8)
BASOPHILS # BLD: 0.1 K/UL (ref 0–0.12)
BILIRUB SERPL-MCNC: 0.5 MG/DL (ref 0.1–1.5)
BUN SERPL-MCNC: 20 MG/DL (ref 8–22)
CALCIUM SERPL-MCNC: 9.6 MG/DL (ref 8.5–10.5)
CHLORIDE SERPL-SCNC: 105 MMOL/L (ref 96–112)
CO2 SERPL-SCNC: 26 MMOL/L (ref 20–33)
CREAT SERPL-MCNC: 0.9 MG/DL (ref 0.5–1.4)
EKG IMPRESSION: NORMAL
EOSINOPHIL # BLD AUTO: 0.69 K/UL (ref 0–0.51)
EOSINOPHIL NFR BLD: 8.3 % (ref 0–6.9)
ERYTHROCYTE [DISTWIDTH] IN BLOOD BY AUTOMATED COUNT: 44 FL (ref 35.9–50)
GLOBULIN SER CALC-MCNC: 2.9 G/DL (ref 1.9–3.5)
GLUCOSE SERPL-MCNC: 110 MG/DL (ref 65–99)
HCT VFR BLD AUTO: 45.2 % (ref 37–47)
HGB BLD-MCNC: 15.1 G/DL (ref 12–16)
IMM GRANULOCYTES # BLD AUTO: 0.03 K/UL (ref 0–0.11)
IMM GRANULOCYTES NFR BLD AUTO: 0.4 % (ref 0–0.9)
INR PPP: 0.99 (ref 0.87–1.13)
LYMPHOCYTES # BLD AUTO: 2.24 K/UL (ref 1–4.8)
LYMPHOCYTES NFR BLD: 27.1 % (ref 22–41)
MCH RBC QN AUTO: 29.4 PG (ref 27–33)
MCHC RBC AUTO-ENTMCNC: 33.4 G/DL (ref 33.6–35)
MCV RBC AUTO: 87.9 FL (ref 81.4–97.8)
MONOCYTES # BLD AUTO: 0.58 K/UL (ref 0–0.85)
MONOCYTES NFR BLD AUTO: 7 % (ref 0–13.4)
NEUTROPHILS # BLD AUTO: 4.64 K/UL (ref 2–7.15)
NEUTROPHILS NFR BLD: 56 % (ref 44–72)
NRBC # BLD AUTO: 0 K/UL
NRBC BLD-RTO: 0 /100 WBC
PLATELET # BLD AUTO: 383 K/UL (ref 164–446)
PMV BLD AUTO: 9.1 FL (ref 9–12.9)
POTASSIUM SERPL-SCNC: 4.3 MMOL/L (ref 3.6–5.5)
PROT SERPL-MCNC: 6.9 G/DL (ref 6–8.2)
PROTHROMBIN TIME: 13.3 SEC (ref 12–14.6)
RBC # BLD AUTO: 5.14 M/UL (ref 4.2–5.4)
SODIUM SERPL-SCNC: 140 MMOL/L (ref 135–145)
WBC # BLD AUTO: 8.3 K/UL (ref 4.8–10.8)

## 2020-02-25 PROCEDURE — 85610 PROTHROMBIN TIME: CPT

## 2020-02-25 PROCEDURE — 36415 COLL VENOUS BLD VENIPUNCTURE: CPT

## 2020-02-25 PROCEDURE — 93005 ELECTROCARDIOGRAM TRACING: CPT

## 2020-02-25 PROCEDURE — 93010 ELECTROCARDIOGRAM REPORT: CPT | Performed by: INTERNAL MEDICINE

## 2020-02-25 PROCEDURE — 85730 THROMBOPLASTIN TIME PARTIAL: CPT

## 2020-02-25 PROCEDURE — 85025 COMPLETE CBC W/AUTO DIFF WBC: CPT

## 2020-02-25 PROCEDURE — 80053 COMPREHEN METABOLIC PANEL: CPT

## 2020-02-28 ENCOUNTER — HOSPITAL ENCOUNTER (OUTPATIENT)
Facility: MEDICAL CENTER | Age: 65
End: 2020-02-28
Attending: INTERNAL MEDICINE | Admitting: INTERNAL MEDICINE
Payer: COMMERCIAL

## 2020-02-28 ENCOUNTER — APPOINTMENT (OUTPATIENT)
Dept: CARDIOLOGY | Facility: MEDICAL CENTER | Age: 65
End: 2020-02-28
Attending: INTERNAL MEDICINE
Payer: COMMERCIAL

## 2020-02-28 VITALS
HEIGHT: 62 IN | DIASTOLIC BLOOD PRESSURE: 57 MMHG | RESPIRATION RATE: 19 BRPM | HEART RATE: 56 BPM | WEIGHT: 170.19 LBS | TEMPERATURE: 97.9 F | BODY MASS INDEX: 31.32 KG/M2 | OXYGEN SATURATION: 95 % | SYSTOLIC BLOOD PRESSURE: 111 MMHG

## 2020-02-28 DIAGNOSIS — R06.02 SOB (SHORTNESS OF BREATH): ICD-10-CM

## 2020-02-28 DIAGNOSIS — R94.39 POSITIVE CARDIAC STRESS TEST: ICD-10-CM

## 2020-02-28 PROCEDURE — 700102 HCHG RX REV CODE 250 W/ 637 OVERRIDE(OP)

## 2020-02-28 PROCEDURE — 93458 L HRT ARTERY/VENTRICLE ANGIO: CPT

## 2020-02-28 PROCEDURE — 93458 L HRT ARTERY/VENTRICLE ANGIO: CPT | Mod: 26 | Performed by: INTERNAL MEDICINE

## 2020-02-28 PROCEDURE — 700111 HCHG RX REV CODE 636 W/ 250 OVERRIDE (IP)

## 2020-02-28 PROCEDURE — 700105 HCHG RX REV CODE 258: Performed by: INTERNAL MEDICINE

## 2020-02-28 PROCEDURE — 160002 HCHG RECOVERY MINUTES (STAT)

## 2020-02-28 PROCEDURE — 99152 MOD SED SAME PHYS/QHP 5/>YRS: CPT | Performed by: INTERNAL MEDICINE

## 2020-02-28 PROCEDURE — 700101 HCHG RX REV CODE 250

## 2020-02-28 PROCEDURE — A9270 NON-COVERED ITEM OR SERVICE: HCPCS

## 2020-02-28 PROCEDURE — 700117 HCHG RX CONTRAST REV CODE 255: Performed by: INTERNAL MEDICINE

## 2020-02-28 RX ORDER — SODIUM CHLORIDE 9 MG/ML
INJECTION, SOLUTION INTRAVENOUS CONTINUOUS
Status: DISCONTINUED | OUTPATIENT
Start: 2020-02-28 | End: 2020-02-28 | Stop reason: HOSPADM

## 2020-02-28 RX ORDER — MIDAZOLAM HYDROCHLORIDE 1 MG/ML
INJECTION INTRAMUSCULAR; INTRAVENOUS
Status: COMPLETED
Start: 2020-02-28 | End: 2020-02-28

## 2020-02-28 RX ORDER — HEPARIN SODIUM 200 [USP'U]/100ML
INJECTION, SOLUTION INTRAVENOUS
Status: COMPLETED
Start: 2020-02-28 | End: 2020-02-28

## 2020-02-28 RX ORDER — HEPARIN SODIUM,PORCINE 1000/ML
VIAL (ML) INJECTION
Status: COMPLETED
Start: 2020-02-28 | End: 2020-02-28

## 2020-02-28 RX ORDER — ASPIRIN 81 MG/1
TABLET, CHEWABLE ORAL
Status: COMPLETED
Start: 2020-02-28 | End: 2020-02-28

## 2020-02-28 RX ORDER — LIDOCAINE HYDROCHLORIDE 20 MG/ML
INJECTION, SOLUTION INFILTRATION; PERINEURAL
Status: COMPLETED
Start: 2020-02-28 | End: 2020-02-28

## 2020-02-28 RX ORDER — VERAPAMIL HYDROCHLORIDE 2.5 MG/ML
INJECTION, SOLUTION INTRAVENOUS
Status: COMPLETED
Start: 2020-02-28 | End: 2020-02-28

## 2020-02-28 RX ADMIN — FENTANYL CITRATE 50 MCG: 0.05 INJECTION, SOLUTION INTRAMUSCULAR; INTRAVENOUS at 09:51

## 2020-02-28 RX ADMIN — VERAPAMIL HYDROCHLORIDE 2.5 MG: 2.5 INJECTION, SOLUTION INTRAVENOUS at 09:14

## 2020-02-28 RX ADMIN — MIDAZOLAM HYDROCHLORIDE 1.5 MG: 1 INJECTION, SOLUTION INTRAMUSCULAR; INTRAVENOUS at 09:51

## 2020-02-28 RX ADMIN — NITROGLYCERIN 10 ML: 20 INJECTION INTRAVENOUS at 09:14

## 2020-02-28 RX ADMIN — HEPARIN SODIUM 2000 UNITS: 200 INJECTION, SOLUTION INTRAVENOUS at 09:15

## 2020-02-28 RX ADMIN — SODIUM CHLORIDE: 9 INJECTION, SOLUTION INTRAVENOUS at 08:28

## 2020-02-28 RX ADMIN — HEPARIN SODIUM: 1000 INJECTION, SOLUTION INTRAVENOUS; SUBCUTANEOUS at 09:14

## 2020-02-28 RX ADMIN — ASPIRIN 81 MG 81 MG: 81 TABLET ORAL at 09:15

## 2020-02-28 RX ADMIN — LIDOCAINE HYDROCHLORIDE: 20 INJECTION, SOLUTION INFILTRATION; PERINEURAL at 09:14

## 2020-02-28 RX ADMIN — IOHEXOL 45 ML: 350 INJECTION, SOLUTION INTRAVENOUS at 09:53

## 2020-02-28 ASSESSMENT — ENCOUNTER SYMPTOMS
PND: 0
ORTHOPNEA: 0
CLAUDICATION: 0
PALPITATIONS: 0

## 2020-02-28 NOTE — H&P
Physician H&P    Patient ID:  Tim Crump  [id number]  65 y.o. female  1955    History:  Primary Diagnosis: Positive cardiac stress test plan for left heart cath for further evaluation and treatment.        HPI:    65-year-old female with worsening dyspnea on exertion underwent a normal echocardiogram and abnormal PET MPI showing small area of ischemia presents to undergo elective left heart cath for further evaluation and treatment.      Past Medical History:  has a past medical history of Arthritis, Bowel habit changes, Breath shortness, Cancer ([facility]) (2016), Cancer of sigmoid colon (HCC) (12/7/2016), Cataract, Diabetes (HCC), H/O seasonal allergies, Hemorrhagic disorder (HCC), High cholesterol, Hypertension, Mixed hyperlipidemia (10/29/2019), Obesity (10/29/2019), Pain (12-), Pre-diabetes (10/29/2019), Psychiatric problem, Renal disorder, and Snoring. She also has no past medical history of Breast cancer ([facility]).  Past Surgical History:  has a past surgical history that includes gyn surgery (2007); gyn surgery; other orthopedic surgery (Left, ); low anterior resection laparoscopic (12/7/2016); other (1980); and other (2019).  Past Social History:  reports that she has never smoked. She has never used smokeless tobacco. She reports that she does not drink alcohol or use drugs.  Past Family History:   Family History   Problem Relation Age of Onset   • Cancer Paternal Aunt      Allergies: Patient has no known allergies.    Current Medications:  Prior to Admission medications    Medication Sig Start Date End Date Taking? Authorizing Provider   aspirin (ASA) 81 MG Chew Tab chewable tablet Take 1 Tab by mouth every day. 12/2/19   Christos Acevedo M.D.   metoprolol (LOPRESSOR) 25 MG Tab Take 0.5 Tabs by mouth 2 times a day. Take half a tablet by mouth 2 times a day.  Patient taking differently: Take 12.5 mg by mouth 2 times a day. 0.5 tab AM, 1 tab PM 12/2/19   Christos Acevedo M.D.  "  ascorbic acid (ASCORBIC ACID) 500 MG Tab Take 500 mg by mouth as needed.    Physician Outpatient   Misc Natural Products (GLUCOS-CHONDROIT-MSM COMPLEX PO) Take  by mouth.    Physician Outpatient   Multiple Vitamins-Minerals (MULTIVITAMIN ADULT PO) Take  by mouth.    Physician Outpatient   COLLAGEN PO Take  by mouth.    Physician Outpatient   RESVERATROL PO Take  by mouth.    Physician Outpatient   valacyclovir (VALTREX) 500 MG Tab Take 500 mg by mouth as needed. 3 day course     Physician Outpatient   atorvastatin (LIPITOR) 10 MG Tab Take 5 mg by mouth every evening.    Physician Outpatient   montelukast (SINGULAIR) 10 MG Tab Take 10 mg by mouth every bedtime. Indications: Hayfever    Physician Outpatient   cetirizine (ZYRTEC) 10 MG Tab Take 10 mg by mouth every morning. Indications: Hayfever    Physician Outpatient   metformin (GLUCOPHAGE) 500 MG TABS Take 500 mg by mouth 2 times a day, with meals.    Nn Emergency Md Per Protocol, M.D.   citalopram (CELEXA) 40 MG TABS Take 40 mg by mouth every bedtime.    Nn Emergency Md Per Protocol, M.D.       Review of Systems:  Review of Systems   Cardiovascular: Negative for chest pain, palpitations, orthopnea, claudication, leg swelling and PND.     /76   Pulse (!) 52   Temp 36.7 °C (98 °F) (Temporal)   Resp 16   Ht 1.575 m (5' 2\")   Wt 77.2 kg (170 lb 3.1 oz)   SpO2 99%     Physical Examination:  Physical Exam  Cardiovascular:      Rate and Rhythm: Normal rate and regular rhythm.      Pulses: Normal pulses.   Pulmonary:      Effort: Pulmonary effort is normal.   Abdominal:      General: Abdomen is flat.   Skin:     General: Skin is warm.   Neurological:      General: No focal deficit present.      Mental Status: She is alert.   Psychiatric:         Mood and Affect: Mood normal.         Impression:    Dyspnea on exertion  Abnormal stress test  Untreated sleep apnea  Pre diabetes  Hyperlipidemia   Colon cancer s/p resection 2016    Plan:    Plan for coronary " angiography for further evaluation and treatment.    No contrast allergy       The risks, benefits, and alternatives to coronary angiography with IV sedation were discussed in great detail. Specific risks mentioned include bleeding, infection, kidney damage, allergic reaction, cardiac perforation with possible tamponade requiring pericardiocentesis or possibly open heart surgery. In addition, we discussed that 10% of patients will experience small to moderate bruising at the site of the arterial puncture. Lastly, the risks of heart attack, stroke and death were discussed; the risk of major complications such as heart attack or stroke caused by the angiogram is approximately 1%; the risk of death is approximately 1 in 1000. The patient verbalized understanding of the potential complications and wishes to proceed with this procedure.          DHEERAJ Koo.  2/28/2020

## 2020-02-28 NOTE — PROCEDURES
PREOPERATIVE DIAGNOSIS:  1.  Abnormal stress test  2.  Dyspnea    POSTOPERATIVE DIAGNOSIS:  1.  Normal epicardial coronary arteries  2.  Normal LVEF and LVEDP      PROCEDURE PERFORMED:  Selective coronary angiography of the native vessels  Left heart catheterization  Left ventriculogram    DESCRIPTION OF PROCEDURE:  The risks and benefits of cardiac catheterization as well as the procedure itself, rationale and appropriateness were discussed with the patient today. Complications including but not limited to death, stroke, MI, urgent bypass surgery, contrast nephropathy, vascular complications, bleeding and infection were explained to the patient. The potential outcomes associated with the procedure (possible PCI, possible CABG, possible medical Rx only) were also discussed at length. The patient agreed to proceed.    The patient was transported to the catheterization laboratory in the fasting state. The right radial area and right groin were prepped and draped in the usual fashion. Right radial area was entered with a single through and through puncture under direct ultrasound guidance and a 6F glide sheath was placed. An intra-arterial cocktail of heparin, verapamil and nitroglycerine was administered. Over a wire, a 5F Rosa Maria catheter was passed to the aortic root and used to engage the right and left coronaries without difficulty. Contrast was administered and multiple images obtained. This catheter was then used to cross the aortic valve for LHC and contrast was administered at 8cc/s for 24cc's for left ventriculogram. All catheters and guidewires were removed and a TR band was applied to achieve patent hemostasis. Patient left the cath lab in stable condition.      Moderate sedation directly monitored by me during the case while supervising the administration of the sedation medication by an independent trained RN to assist in the monitoring of the patient's level of consciousness and physiological status. I  the supervising physician was present the entire time from beginning of medication administration until the end of the procedure from 9:33 AM until 9:51 AM. For detailed administration records please see the moderate sedation documentation in the median tab.      FINDINGS:  I. HEMODYNAMICS:    Ao: 128/75 mmHg   LEDP: 8 mmHg   Gradient on LV pullback: No    II. LEFT VENTRICULOGRAM:   LVEF HEREDIA PROJECTION: 55 %     III. CORONARY ANGIOGRAPHY:  Left Main: Moderate caliber bifurcating no CAD  Left Anterior Descending: Moderate caliber tortuous no epicardial CAD.  Left Circumflex: Small nondominant no CAD.  Right Coronary: Moderate to large caliber no epicardial CAD dominant supplying the RPDA and RPL B.    COMPLICATIONS: none apparent    CONCLUSIONS:  1.  Normal epicardial coronary arteries  2.  Normal LVEF and LVEDP    RECOMMENDATIONS:  Consider noncardiac causes for the patient's symptom complex

## 2020-02-28 NOTE — DISCHARGE INSTRUCTIONS
ACTIVITY: Rest and take it easy for the first 24 hours.  A responsible adult is recommended to remain with you during that time.  It is normal to feel sleepy.  We encourage you to not do anything that requires balance, judgment or coordination.    MILD FLU-LIKE SYMPTOMS ARE NORMAL. YOU MAY EXPERIENCE GENERALIZED MUSCLE ACHES, THROAT IRRITATION, HEADACHE AND/OR SOME NAUSEA.    FOR 24 HOURS DO NOT:  Drive, operate machinery or run household appliances.  Drink beer or alcoholic beverages.   Make important decisions or sign legal documents.    SPECIAL INSTRUCTIONS:   Resume home medications as scheduled.   Keep dressing clean and dry for 24 hours.    DIET: To avoid nausea, slowly advance diet as tolerated, avoiding spicy or greasy foods for the first day.  Add more substantial food to your diet according to your physician's instructions.  Babies can be fed formula or breast milk as soon as they are hungry.  INCREASE FLUIDS AND FIBER TO AVOID CONSTIPATION.    SURGICAL DRESSING/BATHING:   OK to remove dressing tomorrow 2/28/2020 and shower.  Leave site open to air after dressing removed.  No lotions or powders over site as it is healing.   Do not submerge dressing in water.   No baths, no hot tubs for 1 week.       FOLLOW-UP APPOINTMENT:  A follow-up appointment should be arranged with your doctor; call to schedule.    You should CALL YOUR PHYSICIAN if you develop:  Fever greater than 101 degrees F.  Pain not relieved by medication, or persistent nausea or vomiting.  Excessive bleeding (blood soaking through dressing) or unexpected drainage from the wound.  Extreme redness or swelling around the incision site, drainage of pus or foul smelling drainage.  Inability to urinate or empty your bladder within 8 hours.  Problems with breathing or chest pain.    You should call 911 if you develop problems with breathing or chest pain.  If you are unable to contact your doctor or surgical center, you should go to the nearest  emergency room or urgent care center.  Physician's telephone #: [phone]    If any questions arise, call your doctor.  If your doctor is not available, please feel free to call the Surgical Center at [phone].  The Center is open Monday through Friday from 7AM to 7PM.  You can also call the HEALTH HOTLINE open 24 hours/day, 7 days/week and speak to a nurse at [phone], or toll free at [phone].    A registered nurse may call you a few days after your surgery to see how you are doing after your procedure.    MEDICATIONS: Resume taking daily medication.  Take prescribed pain medication with food.  If no medication is prescribed, you may take non-aspirin pain medication if needed.  PAIN MEDICATION CAN BE VERY CONSTIPATING.  Take a stool softener or laxative such as senokot, pericolace, or milk of magnesia if needed.      If your physician has prescribed pain medication that includes Acetaminophen (Tylenol), do not take additional Acetaminophen (Tylenol) while taking the prescribed medication.    Depression / Suicide Risk    As you are discharged from this Critical access hospital facility, it is important to learn how to keep safe from harming yourself.    Recognize the warning signs:  · Abrupt changes in personality, positive or negative- including increase in energy   · Giving away possessions  · Change in eating patterns- significant weight changes-  positive or negative  · Change in sleeping patterns- unable to sleep or sleeping all the time   · Unwillingness or inability to communicate  · Depression  · Unusual sadness, discouragement and loneliness  · Talk of wanting to die  · Neglect of personal appearance   · Rebelliousness- reckless behavior  · Withdrawal from people/activities they love  · Confusion- inability to concentrate     If you or a loved one observes any of these behaviors or has concerns about self-harm, here's what you can do:  · Talk about it- your feelings and reasons for harming  yourself  · Remove any means that you might use to hurt yourself (examples: pills, rope, extension cords, firearm)  · Get professional help from the community (Mental Health, Substance Abuse, psychological counseling)  · Do not be alone:Call your Safe Contact- someone whom you trust who will be there for you.  · Call your local CRISIS HOTLINE 489-0912 or 518-338-5286  · Call your local Children's Mobile Crisis Response Team [place] (506) 558-2923 or wwwNetMinder  · Call the toll free National Suicide Prevention Hotlines   · National Suicide Prevention Lifeline 084-675-FNVR (7857)  · National Hope Line Network 800-SUICIDE (365-4240)          POST ANGIOGRAM  General Care Instructions  • Maintain a bandage over the incision site for 24 hours.  It's normal to find a small bruise or dime-sized lump at the insertion site. This should disappear within a few weeks.  • Do not apply lotions or powders to the site.  Do not immerse the catheter insertion site in water (bathtub/swimming) for five days. It is ok to shower 24 hours after the procedure.  • You may resume your normal diet immediately; on the day of your procedure, drink 6-10 glasses of water to help flush the contrast liquid out of your system.  • If the doctor inserted the catheter in through your groin:  o Walking short distances on a flat surface is OK. Limit going up/down stairs for the first 2 days.  o DO NOT do yard work, drive, squat, lift heavy objects, or play sports for 2 days; or until your health care provider tells you it is OK.  • If the doctor inserted the catheter in your arm:  o For 24 hours, DO NOT lift anything heavier than 10 pounds (approximately a gallon of milk). DO NOT do any heavy pushing, pulling, or twisting.    Medications  • If your current medications need to be changed, you will be provided with an updated list of your medications prior to discharge.  • If you take warfarin (Coumadin), resume taking your usual dose the  evening after the procedure.  • DO NOT STOP taking prescribed blood thinning (anti-platelet) medications unless instructed by your cardiologist.  These medications include:  o Aspirin, Clopidogrel (Plavix), Ticagrelor (Brilinta), or Prasugrel (Effient)   • If you take one of the following anticoagulants, RESUME 24 HOURS after your procedure:  o Apixiban (Eliquis), Rivaroxaban (Xarelto), Dabigatran (Pradaxa), Edoxaban (Savaysa)  • If you take metformin (Glucophage), RESUME 48 HOURS after your procedure.    When to call your healthcare provider  Call your cardiologist right away at [phone] if you have any of the following:  ?  Problems/Concerns taking any of your prescribed heart medicines.  ?  The insertion site has increasing pain, swelling, redness, bleeding, or drainage.  ?  Your arm or leg below where the insertion site changes color, is cool, or is numb.  ?  You have chest pain or shortness of breath that does not go away with rest.  ?  Your pulse feels irregular -- very slow (less than 60 beats/minute) or very fast (over 100 beats/minute).  ?  You have dizziness, fainting, or you are very tired.  ?  You are coughing up blood or yellow or green mucus.  ?  You have chills or a fever over 101°F (38.3°C).   ?  If there is bleeding at the catheter insertion site, apply pressure for 10 minutes.  If bleeding persists, call 911, and continue to hold pressure until advanced medical support arrives.        Exercising Safely After Percutaneous Coronary Intervention (PCI)  After percutaneous coronary intervention (PCI), which involves angioplasty and often stenting, it's important to focus on your heart health. Exercise can help strengthen your heart. It can also help you feel good and improve your overall health. Talk with your health care provider or cardiac rehab team member about good options for you.  • Start slowly. Work up to more vigorous exercise as you get stronger. Aim for at least 150 minutes of exercise  each week.  • Include aerobic activities. These make the heart beat faster. They work the heart and lungs, and improve the body's ability to use oxygen. Good choices include walking, swimming, and biking .  Always follow your doctor's recommendation for exercise.   You have been referred to cardiac rehabilitation, which is important for your recovery.  You may contact [facility] at [phone] to learn more and schedule a visit.        Lifestyle Management After Percutaneous Coronary Intervention (PCI)  Percutaneous coronary intervention (PCI)  involves angioplasty and often stenting. This procedure can open arteries and relieve symptoms. But, it doesn't cure coronary artery disease. New blockages can still form. You need to take steps to prevent this by managing risk factors. Doing so will help make your heart and arteries healthier. Your doctor may prescribe cardiac rehabilitation to help with this lifelong process.  Understanding risk factors  Some risk factors for coronary artery disease can be controlled. These include smoking, high blood pressure, cholesterol, diabetes, and obesity. They can be managed with medication, diet, and exercise. Support and counseling can also play a role. The effort will pay off! Managing risk factors can help you be more active, feel better, and reduce the risk of heart attack.    If you smoke, quit!  If your doctor has been urging you to quit smoking, it's for good reasons. Smoking damages your heart, blood vessels, and lungs. The good news is that quitting can halt or even reverse the damage of smoking. To quit now:  • Get medical help. Ask your doctor for advice on stop-smoking programs. Also ask about medications or nicotine replacement therapy products that may help you quit smoking.  • Get support. Join a support group. Ask for help from your family and friends.  • Don't give up. It often takes several tries to succeed in quitting smoking.  • Avoid  secondhand smoke. Ask family and friends not to smoke around you.

## 2020-02-28 NOTE — OR NURSING
1006 Pt arrived to [facility] from cath lab s/p heart cath. Pt A&O x 4. No C/O pain, no N/V at this time. TR band in place, site assessed, CDI. R hand discolored, 1cc removed by cath lab RN. VSS. Family at bedside. Plan for recovery and DC discussed.     1015 Personal belongings returned to pt. Pt tolerating PO fluid intake with no c/o N/V.     1100 Pt resting comfortably at this time.     1130 2cc air removed from TR band. No s/s bleeding noted at this time.     1145 2cc air removed from TR band.     1200 3cc air removed from TR band.     1220 3cc air removed from TR band.     1235 2cc air removed from TR band.     1250 TR band removed. Gauze and tegaderm placed over site. Pt meets discharge criteria. Discharge instructions reviewed with pt and family. Pt verbalizes understanding of all education and plan for F/U. PIV removed. Pt getting dressed.     1325 RN to transport pt off unit.

## 2020-06-01 ENCOUNTER — SLEEP CENTER VISIT (OUTPATIENT)
Dept: SLEEP MEDICINE | Facility: MEDICAL CENTER | Age: 65
End: 2020-06-01

## 2020-06-01 VITALS
RESPIRATION RATE: 14 BRPM | OXYGEN SATURATION: 95 % | SYSTOLIC BLOOD PRESSURE: 122 MMHG | WEIGHT: 172 LBS | HEIGHT: 60 IN | HEART RATE: 69 BPM | BODY MASS INDEX: 33.77 KG/M2 | DIASTOLIC BLOOD PRESSURE: 72 MMHG

## 2020-06-01 DIAGNOSIS — G47.33 OSA (OBSTRUCTIVE SLEEP APNEA): ICD-10-CM

## 2020-06-01 PROCEDURE — 99204 OFFICE O/P NEW MOD 45 MIN: CPT | Performed by: FAMILY MEDICINE

## 2020-06-01 ASSESSMENT — FIBROSIS 4 INDEX: FIB4 SCORE: 0.89

## 2020-06-01 NOTE — PATIENT INSTRUCTIONS
Sleep Apnea  Sleep apnea is a condition in which breathing pauses or becomes shallow during sleep. Episodes of sleep apnea usually last 10 seconds or longer, and they may occur as many as 20 times an hour. Sleep apnea disrupts your sleep and keeps your body from getting the rest that it needs. This condition can increase your risk of certain health problems, including:  · Heart attack.  · Stroke.  · Obesity.  · Diabetes.  · Heart failure.  · Irregular heartbeat.  There are three kinds of sleep apnea:  · Obstructive sleep apnea. This kind is caused by a blocked or collapsed airway.  · Central sleep apnea. This kind happens when the part of the brain that controls breathing does not send the correct signals to the muscles that control breathing.  · Mixed sleep apnea. This is a combination of obstructive and central sleep apnea.  What are the causes?  The most common cause of this condition is a collapsed or blocked airway. An airway can collapse or become blocked if:  · Your throat muscles are abnormally relaxed.  · Your tongue and tonsils are larger than normal.  · You are overweight.  · Your airway is smaller than normal.  What increases the risk?  This condition is more likely to develop in people who:  · Are overweight.  · Smoke.  · Have a smaller than normal airway.  · Are elderly.  · Are male.  · Drink alcohol.  · Take sedatives or tranquilizers.  · Have a family history of sleep apnea.  What are the signs or symptoms?  Symptoms of this condition include:  · Trouble staying asleep.  · Daytime sleepiness and tiredness.  · Irritability.  · Loud snoring.  · Morning headaches.  · Trouble concentrating.  · Forgetfulness.  · Decreased interest in sex.  · Unexplained sleepiness.  · Mood swings.  · Personality changes.  · Feelings of depression.  · Waking up often during the night to urinate.  · Dry mouth.  · Sore throat.  How is this diagnosed?  This condition may be diagnosed with:  · A medical history.  · A  physical exam.  · A series of tests that are done while you are sleeping (sleep study). These tests are usually done in a sleep lab, but they may also be done at home.  How is this treated?  Treatment for this condition aims to restore normal breathing and to ease symptoms during sleep. It may involve managing health issues that can affect breathing, such as high blood pressure or obesity. Treatment may include:  · Sleeping on your side.  · Using a decongestant if you have nasal congestion.  · Avoiding the use of depressants, including alcohol, sedatives, and narcotics.  · Losing weight if you are overweight.  · Making changes to your diet.  · Quitting smoking.  · Using a device to open your airway while you sleep, such as:  ¨ An oral appliance. This is a custom-made mouthpiece that shifts your lower jaw forward.  ¨ A continuous positive airway pressure (CPAP) device. This device delivers oxygen to your airway through a mask.  ¨ A nasal expiratory positive airway pressure (EPAP) device. This device has valves that you put into each nostril.  ¨ A bi-level positive airway pressure (BPAP) device. This device delivers oxygen to your airway through a mask.  · Surgery if other treatments do not work. During surgery, excess tissue is removed to create a wider airway.  It is important to get treatment for sleep apnea. Without treatment, this condition can lead to:  · High blood pressure.  · Coronary artery disease.  · (Men) An inability to achieve or maintain an erection (impotence).  · Reduced thinking abilities.  Follow these instructions at home:  · Make any lifestyle changes that your health care provider recommends.  · Eat a healthy, well-balanced diet.  · Take over-the-counter and prescription medicines only as told by your health care provider.  · Avoid using depressants, including alcohol, sedatives, and narcotics.  · Take steps to lose weight if you are overweight.  · If you were given a device to open your airway  while you sleep, use it only as told by your health care provider.  · Do not use any tobacco products, such as cigarettes, chewing tobacco, and e-cigarettes. If you need help quitting, ask your health care provider.  · Keep all follow-up visits as told by your health care provider. This is important.  Contact a health care provider if:  · The device that you received to open your airway during sleep is uncomfortable or does not seem to be working.  · Your symptoms do not improve.  · Your symptoms get worse.  Get help right away if:  · You develop chest pain.  · You develop shortness of breath.  · You develop discomfort in your back, arms, or stomach.  · You have trouble speaking.  · You have weakness on one side of your body.  · You have drooping in your face.  These symptoms may represent a serious problem that is an emergency. Do not wait to see if the symptoms will go away. Get medical help right away. Call your local emergency services (911 in the [place] Do not drive yourself to the hospital.   This information is not intended to replace advice given to you by your health care provider. Make sure you discuss any questions you have with your health care provider.  Document Released: 12/08/2003 Document Revised: 08/13/2017 Document Reviewed: 09/26/2016  [employer] Interactive Patient Education © 2017 [employer]

## 2020-06-12 ENCOUNTER — HOME STUDY (OUTPATIENT)
Dept: SLEEP MEDICINE | Facility: MEDICAL CENTER | Age: 65
End: 2020-06-12
Attending: FAMILY MEDICINE

## 2020-06-12 DIAGNOSIS — G47.33 OSA (OBSTRUCTIVE SLEEP APNEA): ICD-10-CM

## 2020-06-12 PROCEDURE — 95806 SLEEP STUDY UNATT&RESP EFFT: CPT | Performed by: FAMILY MEDICINE

## 2020-06-16 NOTE — PROCEDURES
DIAGNOSTIC HOME SLEEP TEST (HST) REPORT       PATIENT ID:  NAME:  Tim Crump  MRN:               [id number]  YOB: 1955  DATE OF STUDY: 6/12/2020      Impression:     This study shows evidence of:     1.Severe obstructive sleep apnea with  Respiratory Event Index (FRANCOIS) of 87.9 per hour and worse in supine sleep with FRANCOIS at 109.7. These findings are based on the recording time (flow evaluation time). It is not possible with this device to determine a traditional apnea+hypopnea index (AHI) for total sleep time since EEG channels are not available.     2.Nocturnal hypoxia O2 Sat. luis manuel was 79%, avg O2 was 89 % and baseline O2 at 97 %. O2 sat was below 89% for 155 min of the flow evaluation time. Avg HR 65 BPM.      TECHNICAL DESCRIPTION:  FreeCharge Device used was a type-III home study device. Home sleep study recording included: Airflow recording by nasal pressure transducer; Respiratory Effort by abdominal Respiratory Bands; O2 by finger oximetry. A position sensor and a snore channel was also used.    Scoring Criteria: A modification of the the AASM Manual for the Scoring of Sleep and Associated Events. Obstructive apnea was scored by cessation of airflow for at least 10 seconds with continuing respiratory effort.  Central apnea was scored by cessation of airflow for at least 10 seconds with no effort.  Hypopnea was scored by a 30% or more reduction in airflow for at least 10 seconds accompanied by an arterial oxygen desaturation of 3% or more.  (For Medicare patients, hypopneas were scored by a 30% or more reduction in airflow for at least 10 seconds accompanied by an arterial oxygen saturation of 4% or more, as required by their insurance, CMS.        General sleep summary: . Total recording time is 516 minutes and total flow evaluation time is 509 minutes. The patient spent 302 minutes in the supine position  Respiratory events:    Apneas: 336 (Obstructive apnea index 32.8/hr, Central  apnea index 0.7 /hr, mixed 0 /hour)  Hypopneas: 411    Recommendations:    1. CPAP titration study due to severe ISABELA and nocturnal hypoxia.   2.   In general patients with sleep apnea are advised to avoid alcohol and sedatives and to not operate a motor vehicle while drowsy. In some cases alternative treatment options may prove effective in resolving sleep apnea in these options include upper airway surgery, the use of a dental orthotic or weight loss and positional therapy. Clinical correlation is required.         Joel Art MD

## 2020-06-30 ENCOUNTER — SLEEP CENTER VISIT (OUTPATIENT)
Dept: SLEEP MEDICINE | Facility: MEDICAL CENTER | Age: 65
End: 2020-06-30

## 2020-06-30 VITALS
SYSTOLIC BLOOD PRESSURE: 122 MMHG | BODY MASS INDEX: 31.91 KG/M2 | HEART RATE: 67 BPM | DIASTOLIC BLOOD PRESSURE: 72 MMHG | RESPIRATION RATE: 14 BRPM | OXYGEN SATURATION: 95 % | HEIGHT: 61 IN | WEIGHT: 169 LBS

## 2020-06-30 DIAGNOSIS — G47.33 OSA (OBSTRUCTIVE SLEEP APNEA): ICD-10-CM

## 2020-06-30 PROCEDURE — 99213 OFFICE O/P EST LOW 20 MIN: CPT | Performed by: FAMILY MEDICINE

## 2020-06-30 ASSESSMENT — FIBROSIS 4 INDEX: FIB4 SCORE: 0.89

## 2020-06-30 NOTE — PROGRESS NOTES
[facility] Follow Up Note     Date: 6/30/2020 / Time: 8:27 AM    Patient ID:   Name:             Tim Crump   YOB: 1955  Age:                 65 y.o.  female   MRN:               [id number]      Thank you for requesting a sleep medicine consultation on Tim Crump at the sleep center. She presents today with the chief complaints of ISABELA and SS follow up.     HISTORY OF PRESENT ILLNESS:       Pt is currently not on therapy. She goes to sleep around 11 pm on weekdays and  on the weekends. She gets out of bed at 7:40 am on weekdays and at 10 am on the weekends.  She averages about 8 hrs of sleep on a good night.She falls asleep within 20 minutes Overall,  She doesnot finds her sleep refreshing.  She does take regular naps. The naps are usually  min long.She denies any symptoms to suggest parasomnias such as sleep walking or acting out of dreams. The symptoms of excessive daytime and snoring has continued.     Since her last visit she had a HST which showed Severe obstructive sleep apnea with  Respiratory Event Index (FRANCOIS) of 87.9 per hour and worse in supine sleep with FRANCOIS at 109.7. Nocturnal hypoxia O2 Sat. luis manuel was 79%, avg O2 was 89 % and baseline O2 at 97 %. O2 sat was below 89% for 155 min of the flow evaluation time. Avg HR 65 BPM.          REVIEW OF SYSTEMS:       Constitutional: Denies fevers, Denies weight changes  Eyes: Denies changes in vision, no eye pain  Ears/Nose/Throat/Mouth: Denies nasal congestion or sore throat   Cardiovascular: Denies chest pain or palpitations   Respiratory: Denies shortness of breath , Denies cough  Gastrointestinal/Hepatic: Denies abdominal pain, nausea, vomiting,   Genitourinary: Deniesdysuria or frequency  Musculoskeletal/Rheum: Denies  joint pain and swelling   Skin/Breast: Denies rash,   Neurological: Denies headache, confusion, memory loss or focal weakness/parasthesias  Psychiatric: denies mood disorder   Sleep: +  snoring     Comprehensive review of systems form is reviewed with the patient and is attached in the EMR.     PMH:  has a past medical history of Arthritis, Back pain, Bowel habit changes, Breath shortness, Cancer (HCC) (2016), Cancer of sigmoid colon (HCC) (12/7/2016), Cataract, Colon cancer (HCC), Depression, Diabetes (HCC), H/O seasonal allergies, Hemorrhagic disorder (HCC), High cholesterol, Hypertension, Kidney stone, Mixed hyperlipidemia (10/29/2019), Obesity (10/29/2019), Pain (12-), Peripheral vascular disease (HCC), Pre-diabetes (10/29/2019), Psychiatric problem, Renal disorder, and Snoring. She also has no past medical history of Breast cancer (HCC).  MEDS:   Current Outpatient Medications:   •  aspirin (ASA) 81 MG Chew Tab chewable tablet, Take 1 Tab by mouth every day., Disp: 90 Tab, Rfl: 3  •  ascorbic acid (ASCORBIC ACID) 500 MG Tab, Take 500 mg by mouth as needed., Disp: , Rfl:   •  Misc Natural Products (GLUCOS-CHONDROIT-MSM COMPLEX PO), Take  by mouth., Disp: , Rfl:   •  Multiple Vitamins-Minerals (MULTIVITAMIN ADULT PO), Take  by mouth., Disp: , Rfl:   •  COLLAGEN PO, Take  by mouth., Disp: , Rfl:   •  RESVERATROL PO, Take  by mouth., Disp: , Rfl:   •  valacyclovir (VALTREX) 500 MG Tab, Take 500 mg by mouth as needed. 3 day course , Disp: , Rfl:   •  atorvastatin (LIPITOR) 10 MG Tab, Take 5 mg by mouth every evening., Disp: , Rfl:   •  montelukast (SINGULAIR) 10 MG Tab, Take 10 mg by mouth every bedtime. Indications: Hayfever, Disp: , Rfl:   •  cetirizine (ZYRTEC) 10 MG Tab, Take 10 mg by mouth every morning. Indications: Hayfever, Disp: , Rfl:   •  metformin (GLUCOPHAGE) 500 MG TABS, Take 500 mg by mouth 2 times a day, with meals., Disp: , Rfl:   •  citalopram (CELEXA) 40 MG TABS, Take 40 mg by mouth every bedtime., Disp: , Rfl:   ALLERGIES: No Known Allergies  SURGHX:   Past Surgical History:   Procedure Laterality Date   • OTHER  2019    port removal    • LOW ANTERIOR RESECTION  "LAPAROSCOPIC  2016    Procedure: LOW ANTERIOR RESECTION LAPAROSCOPIC;  Surgeon: Enoch Shaw M.D.;  Location: SURGERY [facility];  Service:    • OTHER ORTHOPEDIC SURGERY Left     left wrist   • GYN SURGERY      hysterectomy   • OTHER      varicose vein stripping rubina   • COLON RESECTION     • GYN SURGERY       x 2   • HYSTERECTOMY LAPAROSCOPY     • PRIMARY C SECTION       SOCHX:  reports that she has never smoked. She has never used smokeless tobacco. She reports that she does not drink alcohol or use drugs..  FH:   Family History   Problem Relation Age of Onset   • Cancer Paternal Aunt    • Sleep Apnea Neg Hx          Physical Exam:  Vitals/ General Appearance:   Weight/BMI: Body mass index is 31.93 kg/m².  /72 (BP Location: Left arm, Patient Position: Sitting, BP Cuff Size: Adult)   Pulse 67   Resp 14   Ht 1.549 m (5' 1\")   Wt 76.7 kg (169 lb)   SpO2 95%   Vitals:    20 0812   BP: 122/72   BP Location: Left arm   Patient Position: Sitting   BP Cuff Size: Adult   Pulse: 67   Resp: 14   SpO2: 95%   Weight: 76.7 kg (169 lb)   Height: 1.549 m (5' 1\")       Pt. is alert and oriented to time, place and person. Cooperative and in no apparent distress.       Constitutional: Alert, no distress, well-groomed.  Skin: No rashes in visible areas.  Eye: Round. Conjunctiva clear, lids normal. No icterus.   ENMT: Lips pink without lesions, good dentition, moist mucous membranes. Phonation normal.  Neck: No masses, no thyromegaly. Moves freely without pain.  -. Lungs auscultation: B/L good air entry, vesicular breath sounds, no adventitious sounds  -. Heart auscultation: 1st and 2nd heart sounds normal, regular rhythm. No appreciable murmur.  - Extremities: no clubbing, no pedal edema.  - NEUROLOGICAL EXAMINATION: On neurological exam, the patient was alert and oriented x3. speech was clear and fluent without dysarthria.    ASSESSMENT AND PLAN     1. Sleep Apnea    The " pathophysiology of sleep anea and the increased risk of cardiovascular morbidity from untreated sleep apnea is discussed in detail with the patient.      She is urged to avoid supine sleep, weight gain and alcoholic beverages since all of these can worsen sleep apnea. She is cautioned against drowsy driving. If She feels sleepy while driving, She must pull over for a break/nap, rather than persist on the road, in the interest of She own safety and that of others on the road.   Plan   - Auto CPAP vs overnight CPAP titration vs dental appliance and surgeries was dicussed in detail. After informed discussion ACPAP 5-15 cm is ordered today. Rx was given to her since she will be buying out of pocket. dream wear nasal was given to try initially   - F/u in 60-90 days to assess the efficiacy of recommended pressure    -SS was reviewed and discussed with the pt   - compliance was reinforced     2. Regarding treatment of other past medical problems and general health maintenance,  She is urged to follow up with PCP.

## 2020-06-30 NOTE — PATIENT INSTRUCTIONS
"Plan  After informed discussion Auto CPAP 5-15 cm is ordered today  - F/u in 60-90 days to assess the efficiacy of recommended pressure.  Once you receive the CPAP, you should use it at minimum 4/hr per night per insurance requirement, however for your own health it should be used through out the night.   _ I have given the nasal pillow mask to start off with but if you are having mask leak or dry mouth, try a full face mask like \"F&P simplus\" or \"Resmed F30\". You can come to ur office for mask fitting but we will not be able to give you the mask.  -SS was reviewed and discussed with the pt    "

## 2020-08-07 ENCOUNTER — HOSPITAL ENCOUNTER (OUTPATIENT)
Dept: LAB | Facility: MEDICAL CENTER | Age: 65
End: 2020-08-07
Attending: NURSE PRACTITIONER
Payer: COMMERCIAL

## 2020-08-07 DIAGNOSIS — C18.7 CANCER OF SIGMOID COLON (HCC): ICD-10-CM

## 2020-08-07 LAB
ALBUMIN SERPL BCP-MCNC: 3.9 G/DL (ref 3.2–4.9)
ALBUMIN SERPL BCP-MCNC: 3.9 G/DL (ref 3.2–4.9)
ALBUMIN/GLOB SERPL: 1.3 G/DL
ALBUMIN/GLOB SERPL: 1.4 G/DL
ALP SERPL-CCNC: 101 U/L (ref 30–99)
ALP SERPL-CCNC: 96 U/L (ref 30–99)
ALT SERPL-CCNC: 16 U/L (ref 2–50)
ALT SERPL-CCNC: 16 U/L (ref 2–50)
ANION GAP SERPL CALC-SCNC: 10 MMOL/L (ref 7–16)
ANION GAP SERPL CALC-SCNC: 9 MMOL/L (ref 7–16)
AST SERPL-CCNC: 18 U/L (ref 12–45)
AST SERPL-CCNC: 19 U/L (ref 12–45)
BASOPHILS # BLD AUTO: 1 % (ref 0–1.8)
BASOPHILS # BLD: 0.07 K/UL (ref 0–0.12)
BILIRUB SERPL-MCNC: 0.4 MG/DL (ref 0.1–1.5)
BILIRUB SERPL-MCNC: 0.4 MG/DL (ref 0.1–1.5)
BUN SERPL-MCNC: 20 MG/DL (ref 8–22)
BUN SERPL-MCNC: 20 MG/DL (ref 8–22)
CALCIUM SERPL-MCNC: 9.3 MG/DL (ref 8.4–10.2)
CALCIUM SERPL-MCNC: 9.4 MG/DL (ref 8.4–10.2)
CHLORIDE SERPL-SCNC: 107 MMOL/L (ref 96–112)
CHLORIDE SERPL-SCNC: 107 MMOL/L (ref 96–112)
CHOLEST SERPL-MCNC: 137 MG/DL (ref 100–199)
CO2 SERPL-SCNC: 25 MMOL/L (ref 20–33)
CO2 SERPL-SCNC: 25 MMOL/L (ref 20–33)
CREAT SERPL-MCNC: 0.7 MG/DL (ref 0.5–1.4)
CREAT SERPL-MCNC: 0.7 MG/DL (ref 0.5–1.4)
EOSINOPHIL # BLD AUTO: 0.6 K/UL (ref 0–0.51)
EOSINOPHIL NFR BLD: 8.4 % (ref 0–6.9)
ERYTHROCYTE [DISTWIDTH] IN BLOOD BY AUTOMATED COUNT: 45.9 FL (ref 35.9–50)
EST. AVERAGE GLUCOSE BLD GHB EST-MCNC: 128 MG/DL
FASTING STATUS PATIENT QL REPORTED: NORMAL
FASTING STATUS PATIENT QL REPORTED: NORMAL
GLOBULIN SER CALC-MCNC: 2.7 G/DL (ref 1.9–3.5)
GLOBULIN SER CALC-MCNC: 2.9 G/DL (ref 1.9–3.5)
GLUCOSE SERPL-MCNC: 125 MG/DL (ref 65–99)
GLUCOSE SERPL-MCNC: 126 MG/DL (ref 65–99)
HBA1C MFR BLD: 6.1 % (ref 0–5.6)
HCT VFR BLD AUTO: 44 % (ref 37–47)
HDLC SERPL-MCNC: 53 MG/DL
HGB BLD-MCNC: 14.1 G/DL (ref 12–16)
IMM GRANULOCYTES # BLD AUTO: 0.02 K/UL (ref 0–0.11)
IMM GRANULOCYTES NFR BLD AUTO: 0.3 % (ref 0–0.9)
LDLC SERPL CALC-MCNC: 70 MG/DL
LYMPHOCYTES # BLD AUTO: 1.73 K/UL (ref 1–4.8)
LYMPHOCYTES NFR BLD: 24.2 % (ref 22–41)
MCH RBC QN AUTO: 28.1 PG (ref 27–33)
MCHC RBC AUTO-ENTMCNC: 32 G/DL (ref 33.6–35)
MCV RBC AUTO: 87.6 FL (ref 81.4–97.8)
MONOCYTES # BLD AUTO: 0.47 K/UL (ref 0–0.85)
MONOCYTES NFR BLD AUTO: 6.6 % (ref 0–13.4)
NEUTROPHILS # BLD AUTO: 4.26 K/UL (ref 2–7.15)
NEUTROPHILS NFR BLD: 59.5 % (ref 44–72)
NRBC # BLD AUTO: 0 K/UL
NRBC BLD-RTO: 0 /100 WBC
PLATELET # BLD AUTO: 388 K/UL (ref 164–446)
PMV BLD AUTO: 8.8 FL (ref 9–12.9)
POTASSIUM SERPL-SCNC: 4.1 MMOL/L (ref 3.6–5.5)
POTASSIUM SERPL-SCNC: 4.2 MMOL/L (ref 3.6–5.5)
PROT SERPL-MCNC: 6.6 G/DL (ref 6–8.2)
PROT SERPL-MCNC: 6.8 G/DL (ref 6–8.2)
RBC # BLD AUTO: 5.02 M/UL (ref 4.2–5.4)
SODIUM SERPL-SCNC: 141 MMOL/L (ref 135–145)
SODIUM SERPL-SCNC: 142 MMOL/L (ref 135–145)
TRIGL SERPL-MCNC: 71 MG/DL (ref 0–149)
TSH SERPL DL<=0.005 MIU/L-ACNC: 1.66 UIU/ML (ref 0.38–5.33)
WBC # BLD AUTO: 7.2 K/UL (ref 4.8–10.8)

## 2020-08-07 PROCEDURE — 85025 COMPLETE CBC W/AUTO DIFF WBC: CPT

## 2020-08-07 PROCEDURE — 36415 COLL VENOUS BLD VENIPUNCTURE: CPT

## 2020-08-07 PROCEDURE — 84443 ASSAY THYROID STIM HORMONE: CPT

## 2020-08-07 PROCEDURE — 80053 COMPREHEN METABOLIC PANEL: CPT | Mod: 91

## 2020-08-07 PROCEDURE — 83036 HEMOGLOBIN GLYCOSYLATED A1C: CPT

## 2020-08-07 PROCEDURE — 80053 COMPREHEN METABOLIC PANEL: CPT

## 2020-08-07 PROCEDURE — 80061 LIPID PANEL: CPT

## 2020-08-07 PROCEDURE — 83721 ASSAY OF BLOOD LIPOPROTEIN: CPT

## 2020-08-13 ENCOUNTER — OFFICE VISIT (OUTPATIENT)
Dept: HEMATOLOGY ONCOLOGY | Facility: MEDICAL CENTER | Age: 65
End: 2020-08-13

## 2020-08-13 VITALS
SYSTOLIC BLOOD PRESSURE: 112 MMHG | HEIGHT: 62 IN | DIASTOLIC BLOOD PRESSURE: 62 MMHG | HEART RATE: 68 BPM | WEIGHT: 171.85 LBS | OXYGEN SATURATION: 96 % | BODY MASS INDEX: 31.62 KG/M2 | TEMPERATURE: 98.5 F | RESPIRATION RATE: 16 BRPM

## 2020-08-13 DIAGNOSIS — C18.7 CANCER OF SIGMOID COLON (HCC): ICD-10-CM

## 2020-08-13 PROCEDURE — 99214 OFFICE O/P EST MOD 30 MIN: CPT | Performed by: NURSE PRACTITIONER

## 2020-08-13 RX ORDER — CHLORAL HYDRATE 500 MG
1000 CAPSULE ORAL
COMMUNITY
End: 2024-02-15

## 2020-08-13 ASSESSMENT — ENCOUNTER SYMPTOMS
CONSTIPATION: 0
DIAPHORESIS: 0
WEIGHT LOSS: 0
FEVER: 0
DIARRHEA: 0
HEADACHES: 0
CHILLS: 0
MYALGIAS: 0
INSOMNIA: 1
DIZZINESS: 0
WHEEZING: 0
COUGH: 0
SHORTNESS OF BREATH: 0
PALPITATIONS: 0
NAUSEA: 0
VOMITING: 0

## 2020-08-13 ASSESSMENT — FIBROSIS 4 INDEX: FIB4 SCORE: 0.77

## 2020-08-13 NOTE — PROGRESS NOTES
Subjective:      Tim Curmp is a 65 y.o. female who presents for 6 month follow up visit for Colon Cancer.        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  She presents accompanied by her son for today's visit.    Cancer History  Patient was initially diagnosed in May 2016 with sigmoid colon cancer secondary to a positive occult testing with changes in bowel habits.  She underwent a colonoscopy which showed a polyp in the sigmoid colon which was positive for poorly differentiated adenocarcinoma with indeterminate margins.  She did proceed with laparoscopic sigmoid resection with low anterior pelvic anastomosis on December 2016.  Pathology did show evidence of local malignancy but was found to have 1/13 nodes positive.  According to Dr. Gomes pathology showed MLH1, MSH2, MSH6 and PMS2 intact.  She did undergo staging work-up and was found to be negative for metastatic disease.  On diagnosis of her sigmoid cancer patient CEA was low at 1.  Patient was started on adjuvant chemotherapy with modified FOLFOX on February 2, 2017.  Patient did have oxaliplatin held intermittently throughout the treatment due to severe cold sensitivity and peripheral neuropathy.  She did finish 12 cycles of modified FOLFOX on July 20, 2017, with the last few cycles with single agent 5-FU only.     Since completion of treatment patient has been on surveillance and observation.  She has been undergoing CT scans every 6 months last CT scan completed on August 9, 2019 which showed a prior partial sigmoid resection but no evidence for recurrence or metastatic tumor was noted.      It does appear that patient's last colonoscopy was January 2018 with Dr. Caraballo.     Interval History  Patient had previously experienced shortness of breath.  CT chest showed no abnormal respiratory issues therefore she was referred to cardiology.   She underwent significant amount of cardiology work-up approximately 6 to 10 months ago.  She did undergo coronary angiography in February 28, 2020, and according to the patient's son, there was no evidence of blockage at that time.  Patient also was found to have sleep apnea after undergoing sleep study examinations and was recently started on CPAP machine which she is continuing to get used to.    However clinically she is doing very well and stable.  She denies any significant fatigue or shortness of breath.  She did have small amount of blood per rectum approximately 6 months ago but stated she has not had any since.  She denies any nausea, vomiting, diarrhea constipation and her weight is stable.  She denies any generalized pain, coughing, wheezing or shortness of breath.  Peripheral neuropathy that she had during treatment has completely resolved at this time.    At last appointment in February 2020 after further discussion with Dr. Mcarthur it was decided that there was no need for any further CT scans.  She did have a CBC and CMP completed today which I did review with her in detail.    No Known Allergies  Current Outpatient Medications on File Prior to Visit   Medication Sig Dispense Refill   • Omega-3 Fatty Acids (FISH OIL) 1000 MG Cap capsule Take 1,000 mg by mouth 3 times a day, with meals.     • aspirin (ASA) 81 MG Chew Tab chewable tablet Take 1 Tab by mouth every day. 90 Tab 3   • ascorbic acid (ASCORBIC ACID) 500 MG Tab Take 500 mg by mouth as needed.     • Misc Natural Products (GLUCOS-CHONDROIT-MSM COMPLEX PO) Take  by mouth.     • Multiple Vitamins-Minerals (MULTIVITAMIN ADULT PO) Take  by mouth.     • COLLAGEN PO Take  by mouth.     • RESVERATROL PO Take  by mouth.     • valacyclovir (VALTREX) 500 MG Tab Take 500 mg by mouth as needed. 3 day course      • atorvastatin (LIPITOR) 10 MG Tab Take 5 mg by mouth every evening.     • montelukast (SINGULAIR) 10 MG Tab Take 10 mg by mouth every bedtime.  "Indications: Hayfever     • cetirizine (ZYRTEC) 10 MG Tab Take 10 mg by mouth every morning. Indications: Hayfever     • metformin (GLUCOPHAGE) 500 MG TABS Take 500 mg by mouth 2 times a day, with meals.     • citalopram (CELEXA) 40 MG TABS Take 40 mg by mouth every bedtime.       No current facility-administered medications on file prior to visit.          Review of Systems   Constitutional: Negative for chills, diaphoresis, fever, malaise/fatigue and weight loss.   Respiratory: Negative for cough, shortness of breath and wheezing.    Cardiovascular: Negative for chest pain and palpitations.   Gastrointestinal: Negative for constipation, diarrhea, nausea and vomiting.   Genitourinary: Negative for dysuria.   Musculoskeletal: Negative for myalgias.   Skin: Negative for itching and rash.   Neurological: Negative for dizziness and headaches.   Psychiatric/Behavioral: The patient has insomnia (recent dx of sleep apnea - on CPAP now).           Objective:     /62 (BP Location: Right arm, Patient Position: Sitting, BP Cuff Size: Adult)   Pulse 68   Temp 36.9 °C (98.5 °F) (Temporal)   Resp 16   Ht 1.583 m (5' 2.32\")   Wt 78 kg (171 lb 13.6 oz)   SpO2 96%   BMI 31.11 kg/m²      Physical Exam  Vitals signs reviewed.   Constitutional:       General: She is not in acute distress.     Appearance: Normal appearance. She is not diaphoretic.   HENT:      Head: Normocephalic and atraumatic.      Mouth/Throat:      Mouth: Mucous membranes are dry.      Pharynx: Oropharynx is clear. No oropharyngeal exudate or posterior oropharyngeal erythema.   Cardiovascular:      Rate and Rhythm: Normal rate and regular rhythm.      Pulses: Normal pulses.      Heart sounds: Normal heart sounds. No murmur. No friction rub. No gallop.    Pulmonary:      Effort: Pulmonary effort is normal. No respiratory distress.      Breath sounds: Normal breath sounds. No wheezing.   Abdominal:      General: Bowel sounds are normal. There is no " distension.      Palpations: Abdomen is soft.      Tenderness: There is no abdominal tenderness.   Musculoskeletal: Normal range of motion.         General: No swelling or tenderness.   Lymphadenopathy:      Head:      Right side of head: No submental, submandibular, tonsillar, preauricular, posterior auricular or occipital adenopathy.      Left side of head: No submental, submandibular, tonsillar, preauricular, posterior auricular or occipital adenopathy.      Cervical: No cervical adenopathy.      Right cervical: No superficial, deep or posterior cervical adenopathy.     Left cervical: No superficial, deep or posterior cervical adenopathy.      Upper Body:      Right upper body: No supraclavicular adenopathy.      Left upper body: No supraclavicular adenopathy.   Skin:     General: Skin is warm and dry.   Neurological:      Mental Status: She is alert and oriented to person, place, and time.   Psychiatric:         Mood and Affect: Mood normal.         Behavior: Behavior normal.       Results for KAREN BOYCE (MRN [id number])    Ref. Range 8/7/2020 09:50   WBC Latest Ref Range: 4.8 - 10.8 K/uL 7.2   RBC Latest Ref Range: 4.20 - 5.40 M/uL 5.02   Hemoglobin Latest Ref Range: 12.0 - 16.0 g/dL 14.1   Hematocrit Latest Ref Range: 37.0 - 47.0 % 44.0   MCV Latest Ref Range: 81.4 - 97.8 fL 87.6   MCH Latest Ref Range: 27.0 - 33.0 pg 28.1   MCHC Latest Ref Range: 33.6 - 35.0 g/dL 32.0 (L)   RDW Latest Ref Range: 35.9 - 50.0 fL 45.9   Platelet Count Latest Ref Range: 164 - 446 K/uL 388   MPV Latest Ref Range: 9.0 - 12.9 fL 8.8 (L)   Neutrophils-Polys Latest Ref Range: 44.00 - 72.00 % 59.50   Neutrophils (Absolute) Latest Ref Range: 2.00 - 7.15 K/uL 4.26   Lymphocytes Latest Ref Range: 22.00 - 41.00 % 24.20   Lymphs (Absolute) Latest Ref Range: 1.00 - 4.80 K/uL 1.73   Monocytes Latest Ref Range: 0.00 - 13.40 % 6.60   Monos (Absolute) Latest Ref Range: 0.00 - 0.85 K/uL 0.47   Eosinophils Latest Ref Range: 0.00 - 6.90 %  8.40 (H)   Eos (Absolute) Latest Ref Range: 0.00 - 0.51 K/uL 0.60 (H)   Basophils Latest Ref Range: 0.00 - 1.80 % 1.00   Baso (Absolute) Latest Ref Range: 0.00 - 0.12 K/uL 0.07   Immature Granulocytes Latest Ref Range: 0.00 - 0.90 % 0.30   Immature Granulocytes (abs) Latest Ref Range: 0.00 - 0.11 K/uL 0.02   Nucleated RBC Latest Units: /100 WBC 0.00   NRBC (Absolute) Latest Units: K/uL 0.00   Sodium Latest Ref Range: 135 - 145 mmol/L 141   Potassium Latest Ref Range: 3.6 - 5.5 mmol/L 4.1   Chloride Latest Ref Range: 96 - 112 mmol/L 107   Co2 Latest Ref Range: 20 - 33 mmol/L 25   Anion Gap Latest Ref Range: 7.0 - 16.0  9.0   Glucose Latest Ref Range: 65 - 99 mg/dL 126 (H)   Bun Latest Ref Range: 8 - 22 mg/dL 20   Creatinine Latest Ref Range: 0.50 - 1.40 mg/dL 0.70   GFR If  Latest Ref Range: >60 mL/min/1.73 m 2 >60   GFR If Non  Latest Ref Range: >60 mL/min/1.73 m 2 >60   Calcium Latest Ref Range: 8.4 - 10.2 mg/dL 9.4   AST(SGOT) Latest Ref Range: 12 - 45 U/L 19   ALT(SGPT) Latest Ref Range: 2 - 50 U/L 16   Alkaline Phosphatase Latest Ref Range: 30 - 99 U/L 96   Total Bilirubin Latest Ref Range: 0.1 - 1.5 mg/dL 0.4   Albumin Latest Ref Range: 3.2 - 4.9 g/dL 3.9   Total Protein Latest Ref Range: 6.0 - 8.2 g/dL 6.8   Globulin Latest Ref Range: 1.9 - 3.5 g/dL 2.9   A-G Ratio Latest Units: g/dL 1.3   Fasting Status Unknown Fasting          Assessment/Plan:        1. Cancer of sigmoid colon (HCC)  CBC WITH DIFFERENTIAL    Comp Metabolic Panel    CT-CHEST,ABDOMEN,PELVIS WITH    CEA    CEA       Plan  1. 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  Last CT in August 2019 which showed no evidence of malignancy.  Patient has never had an elevated CEA even at time of diagnosis.  Highest CEA during treatment was 2.4.  Patient did undergo modified FOLFOX started in February 2017 completed in July  2017 where she was able to complete 12 full cycles, with intermittent holding of oxaliplatin due to side effects.  She has been monitored closely with labs, and she most recently underwent a CBC and CMP which look good at this time.    Recommendations per NCCN guidelines for stage III disease is for continued monitor and examination every 3-6 months for 2 years and then every 6 months after with a CEA every 3-6 months. Patient to continue to follow up in the clinic in 6 months with labs (CBC, CMP, CEA) or sooner if needed     CT chest, abdomen, pelvis recommended per NCCN every 6-12 months for up to 5 years. Last CT was August 2019 which showed no evidence of disease. Clinical she is very stable with no symptoms. Will repeat CT at this time.      Colonoscopy recommended 1 year post surgery, and if normal then again in 3 years, and if normal then repeat in 5 years. Did obtain records from Dr. Caraballo [facility] and her last colonoscopy was in January 2018.  Discussed with patient the recommendation for colonoscopy in 3 years which will be due January 2021.  Patient and son are both aware and plan on scheduling follow-up visit with GI for repeat colonoscopy.      Patient to contact office sooner if needed.

## 2020-08-14 LAB — LDLC SERPL-MCNC: NORMAL MG/DL (ref 0–129)

## 2020-08-19 ENCOUNTER — HOSPITAL ENCOUNTER (OUTPATIENT)
Dept: RADIOLOGY | Facility: MEDICAL CENTER | Age: 65
End: 2020-08-19
Attending: NURSE PRACTITIONER
Payer: COMMERCIAL

## 2020-08-19 ENCOUNTER — HOSPITAL ENCOUNTER (OUTPATIENT)
Dept: LAB | Facility: MEDICAL CENTER | Age: 65
End: 2020-08-19
Attending: NURSE PRACTITIONER
Payer: COMMERCIAL

## 2020-08-19 DIAGNOSIS — C18.7 CANCER OF SIGMOID COLON (HCC): ICD-10-CM

## 2020-08-19 LAB — CEA SERPL-MCNC: 1.1 NG/ML (ref 0–3)

## 2020-08-19 PROCEDURE — 71260 CT THORAX DX C+: CPT | Mod: MG

## 2020-08-19 PROCEDURE — 700117 HCHG RX CONTRAST REV CODE 255: Performed by: NURSE PRACTITIONER

## 2020-08-19 PROCEDURE — 82378 CARCINOEMBRYONIC ANTIGEN: CPT

## 2020-08-19 PROCEDURE — 36415 COLL VENOUS BLD VENIPUNCTURE: CPT

## 2020-08-19 RX ADMIN — IOHEXOL 100 ML: 350 INJECTION, SOLUTION INTRAVENOUS at 10:45

## 2020-08-19 RX ADMIN — IOHEXOL 25 ML: 240 INJECTION, SOLUTION INTRATHECAL; INTRAVASCULAR; INTRAVENOUS; ORAL at 09:55

## 2020-08-20 ENCOUNTER — TELEPHONE (OUTPATIENT)
Dept: HEMATOLOGY ONCOLOGY | Facility: MEDICAL CENTER | Age: 65
End: 2020-08-20

## 2020-08-20 NOTE — TELEPHONE ENCOUNTER
Contacted the patient to let her know her CT scan shows no evidence of metastatic disease in the chest, abdomen or pelvis.  Her CEA level was completed which was at 1.1.  Patient has no evidence of recurrent disease at this time we will continue to monitor per NCCN guidelines and she will follow-up in 6 months or sooner if needed with labs including CBC, CMP and CEA.  Patient was very appreciative of the phone call and be with results.  She is in agreement with the plan.    Please see my note from August 13, 2020 with complete surveillance plan per NCCN guidelines.      Ct-chest,abdomen,pelvis With    Result Date: 8/19/2020 8/19/2020 10:23 AM HISTORY/REASON FOR EXAM:  Follow up sigmoid colon cancer - annual imaging; F/u colon ca. S/p chemo therapy TECHNIQUE/EXAM DESCRIPTION: CT scan of the chest, abdomen and pelvis with contrast. Thin-section helical scanning was obtained with intravenous contrast from the lung apices through the pubic symphysis to include the chest, abdomen and pelvis. 100 mL of Omnipaque 350 nonionic contrast was administered intravenously without complication. Low dose optimization technique was utilized for this CT exam including automated exposure control and adjustment of the mA and/or kV according to patient size. COMPARISON: 8/15/2019. FINDINGS: CT Chest: Lungs: Minimal groundglass opacity in the right lung base, likely atelectasis.. No airspace opacity. Calcified granuloma in the left lower lobe Airway: Patent Pleura: No pleural effusion or pneumothorax. Thoracic aorta and great vessels:  No dissection or aneurysm. Pulmonary arteries: Nonenlarged. No central pulmonary embolus. Heart and pericardium:   No significant coronary artery calcification. No pericardial effusion. Lymph nodes: No enlarged mediastinal or hilar lymph nodes. Chest wall:   Unremarkable. Small hiatal Thoracic spine:  No fracture or malalignment. No compression deformity. CT Abdomen: Liver: Unremarkable. Spleen:  Unremarkable. Pancreas: Unremarkable. Gallbladder: No stones. No cholecystitis. Adrenal glands: normal in size. Kidneys: Nonobstructive left renal calculus. The kidneys enhance symmetrically. The abdominal aorta is normal in caliber. There is no lymphadenopathy. No bowel wall thickening or bowel dilatation. Postsurgical change in the sigmoid colon. CT Pelvis: The uterus is surgically absent. No lymph node enlargement, free fluid, or free air in the abdomen or pelvis. No aggressive osseous lesion. ___________________________________     1. No CT evidence of metastatic disease in the chest, abdomen or pelvis. 2. Nonobstructive left renal calculus. 3. Postsurgical change in the sigmoid colon. No evidence of local recurrence.      Results for KAREN BOYCE (MRN [id number])    Ref. Range 8/4/2018 12:02 2/2/2019 16:13 8/14/2019 10:12 8/7/2020 09:46 8/19/2020 10:39   Carcinoembryonic Antigen Latest Ref Range: 0.0 - 3.0 ng/mL 1.2 0.8 1.0  1.1

## 2021-02-18 ENCOUNTER — HOSPITAL ENCOUNTER (OUTPATIENT)
Dept: LAB | Facility: MEDICAL CENTER | Age: 66
End: 2021-02-18
Attending: NURSE PRACTITIONER
Payer: MEDICAID

## 2021-02-18 DIAGNOSIS — C18.7 CANCER OF SIGMOID COLON (HCC): ICD-10-CM

## 2021-02-18 LAB
ALBUMIN SERPL BCP-MCNC: 4 G/DL (ref 3.2–4.9)
ALBUMIN/GLOB SERPL: 1.4 G/DL
ALP SERPL-CCNC: 99 U/L (ref 30–99)
ALT SERPL-CCNC: 20 U/L (ref 2–50)
ANION GAP SERPL CALC-SCNC: 9 MMOL/L (ref 7–16)
AST SERPL-CCNC: 23 U/L (ref 12–45)
BASOPHILS # BLD AUTO: 1 % (ref 0–1.8)
BASOPHILS # BLD: 0.1 K/UL (ref 0–0.12)
BILIRUB SERPL-MCNC: 0.4 MG/DL (ref 0.1–1.5)
BUN SERPL-MCNC: 21 MG/DL (ref 8–22)
CALCIUM SERPL-MCNC: 9.9 MG/DL (ref 8.5–10.5)
CEA SERPL-MCNC: 1 NG/ML (ref 0–3)
CHLORIDE SERPL-SCNC: 103 MMOL/L (ref 96–112)
CO2 SERPL-SCNC: 26 MMOL/L (ref 20–33)
CREAT SERPL-MCNC: 0.84 MG/DL (ref 0.5–1.4)
EOSINOPHIL # BLD AUTO: 0.56 K/UL (ref 0–0.51)
EOSINOPHIL NFR BLD: 5.9 % (ref 0–6.9)
ERYTHROCYTE [DISTWIDTH] IN BLOOD BY AUTOMATED COUNT: 46.2 FL (ref 35.9–50)
GLOBULIN SER CALC-MCNC: 2.9 G/DL (ref 1.9–3.5)
GLUCOSE SERPL-MCNC: 92 MG/DL (ref 65–99)
HCT VFR BLD AUTO: 45.3 % (ref 37–47)
HGB BLD-MCNC: 14.6 G/DL (ref 12–16)
IMM GRANULOCYTES # BLD AUTO: 0.03 K/UL (ref 0–0.11)
IMM GRANULOCYTES NFR BLD AUTO: 0.3 % (ref 0–0.9)
LYMPHOCYTES # BLD AUTO: 2.52 K/UL (ref 1–4.8)
LYMPHOCYTES NFR BLD: 26.4 % (ref 22–41)
MCH RBC QN AUTO: 28.6 PG (ref 27–33)
MCHC RBC AUTO-ENTMCNC: 32.2 G/DL (ref 33.6–35)
MCV RBC AUTO: 88.6 FL (ref 81.4–97.8)
MONOCYTES # BLD AUTO: 0.72 K/UL (ref 0–0.85)
MONOCYTES NFR BLD AUTO: 7.5 % (ref 0–13.4)
NEUTROPHILS # BLD AUTO: 5.61 K/UL (ref 2–7.15)
NEUTROPHILS NFR BLD: 58.9 % (ref 44–72)
NRBC # BLD AUTO: 0 K/UL
NRBC BLD-RTO: 0 /100 WBC
PLATELET # BLD AUTO: 492 K/UL (ref 164–446)
PMV BLD AUTO: 9.6 FL (ref 9–12.9)
POTASSIUM SERPL-SCNC: 4.7 MMOL/L (ref 3.6–5.5)
PROT SERPL-MCNC: 6.9 G/DL (ref 6–8.2)
RBC # BLD AUTO: 5.11 M/UL (ref 4.2–5.4)
SODIUM SERPL-SCNC: 138 MMOL/L (ref 135–145)
WBC # BLD AUTO: 9.5 K/UL (ref 4.8–10.8)

## 2021-02-18 PROCEDURE — 85025 COMPLETE CBC W/AUTO DIFF WBC: CPT

## 2021-02-18 PROCEDURE — 80053 COMPREHEN METABOLIC PANEL: CPT

## 2021-02-18 PROCEDURE — 36415 COLL VENOUS BLD VENIPUNCTURE: CPT

## 2021-02-18 PROCEDURE — 82378 CARCINOEMBRYONIC ANTIGEN: CPT

## 2021-02-25 ENCOUNTER — OFFICE VISIT (OUTPATIENT)
Dept: HEMATOLOGY ONCOLOGY | Facility: MEDICAL CENTER | Age: 66
End: 2021-02-25

## 2021-02-25 VITALS
HEIGHT: 63 IN | RESPIRATION RATE: 18 BRPM | WEIGHT: 165.57 LBS | BODY MASS INDEX: 29.34 KG/M2 | HEART RATE: 69 BPM | OXYGEN SATURATION: 96 % | TEMPERATURE: 98.4 F | SYSTOLIC BLOOD PRESSURE: 130 MMHG | DIASTOLIC BLOOD PRESSURE: 74 MMHG

## 2021-02-25 DIAGNOSIS — C18.7 CANCER OF SIGMOID COLON (HCC): ICD-10-CM

## 2021-02-25 PROCEDURE — 99214 OFFICE O/P EST MOD 30 MIN: CPT | Performed by: NURSE PRACTITIONER

## 2021-02-25 ASSESSMENT — ENCOUNTER SYMPTOMS
FEVER: 0
TINGLING: 0
MYALGIAS: 0
CHILLS: 0
VOMITING: 0
PALPITATIONS: 0
HEADACHES: 0
NAUSEA: 0
DIARRHEA: 0
DIZZINESS: 0
WEIGHT LOSS: 0
CONSTIPATION: 0
COUGH: 0
SORE THROAT: 0

## 2021-02-25 ASSESSMENT — PATIENT HEALTH QUESTIONNAIRE - PHQ9: CLINICAL INTERPRETATION OF PHQ2 SCORE: 0

## 2021-02-25 ASSESSMENT — PAIN SCALES - GENERAL: PAINLEVEL: NO PAIN

## 2021-02-25 ASSESSMENT — FIBROSIS 4 INDEX: FIB4 SCORE: 0.67

## 2021-02-25 NOTE — PROGRESS NOTES
Subjective:      Tim Delgado is a 66 y.o. female who presents for 6 month follow up for colon cancer.        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  She presents accompanied by her son for today's visit.     Cancer History  Patient was initially diagnosed in May 2016 with sigmoid colon cancer secondary to a positive occult testing with changes in bowel habits.  She underwent a colonoscopy which showed a polyp in the sigmoid colon which was positive for poorly differentiated adenocarcinoma with indeterminate margins.  She did proceed with laparoscopic sigmoid resection with low anterior pelvic anastomosis on December 2016.  Pathology did show evidence of local malignancy but was found to have 1/13 nodes positive.  According to Dr. Gomes pathology showed MLH1, MSH2, MSH6 and PMS2 intact.  She did undergo staging work-up and was found to be negative for metastatic disease.  On diagnosis of her sigmoid cancer patient CEA was low at 1, highest it has been was 2.4.  Patient was started on adjuvant chemotherapy with modified FOLFOX on February 2, 2017.  Patient did have oxaliplatin held intermittently throughout the treatment due to severe cold sensitivity and peripheral neuropathy.  She did finish 12 cycles of modified FOLFOX on July 20, 2017, with the last few cycles with single agent 5-FU only.     Since completion of treatment patient has been on surveillance and observation.  She has been undergoing CT scans, last one 08/19/2020, which showed postsurgical changes in sigmoid colon, and no evidence of local recurrence. No evidence of metastatic disease in chest, abdomen, or pelvis. She does have a nonobstructive renal calculus.      It does appear that patient's last colonoscopy was 01/26/2021 with Dr. Cordova. Pathology report pending. 4 polyps were found and removed. Per patient and son,  recommendation to repeat colonoscopy in 3 years.      Interval History  Patient being seen today for 6-month follow-up visit.  She is doing very well with no significant complaints.  There is no changes in her current clinical status.  As mentioned above she did recently have a colonoscopy and tolerated that well.  She denies any nausea, vomiting, diarrhea or constipation.  She does have intermittent abdominal pain at times but that is relieved with the use of cranberry pills.  She does use a CPAP at night for sleep apnea.  She does have a mild rash in the left side of her cheek from the CPAP machine.  Chronic knee pain.  She did confirm that she has no residual peripheral neuropathy from her previous chemotherapy.  Patient does note some increased gas, with minimal relief with use of Gas-X.    Patient did confirm that she has followed up with gynecologist.    Did have CBC, CMP and CEA.  I did review the labs in detail with patient today.  Patient CEA is 1.0.  There is no concerning findings on labs at this time.    No Known Allergies  Current Outpatient Medications on File Prior to Visit   Medication Sig Dispense Refill   • estrogens, conjugated (PREMARIN) 0.625 MG/GM Cream Insert 0.5 g into the vagina every day.     • Omega-3 Fatty Acids (FISH OIL) 1000 MG Cap capsule Take 1,000 mg by mouth 3 times a day, with meals.     • aspirin (ASA) 81 MG Chew Tab chewable tablet Take 1 Tab by mouth every day. 90 Tab 3   • ascorbic acid (ASCORBIC ACID) 500 MG Tab Take 500 mg by mouth as needed.     • Misc Natural Products (GLUCOS-CHONDROIT-MSM COMPLEX PO) Take  by mouth.     • Multiple Vitamins-Minerals (MULTIVITAMIN ADULT PO) Take  by mouth.     • COLLAGEN PO Take  by mouth.     • RESVERATROL PO Take  by mouth.     • valacyclovir (VALTREX) 500 MG Tab Take 500 mg by mouth as needed. 3 day course      • atorvastatin (LIPITOR) 10 MG Tab Take 5 mg by mouth every evening.     • montelukast (SINGULAIR) 10 MG Tab Take 10 mg by mouth  "every bedtime. Indications: Hayfever     • cetirizine (ZYRTEC) 10 MG Tab Take 10 mg by mouth every morning. Indications: Hayfever     • metformin (GLUCOPHAGE) 500 MG TABS Take 500 mg by mouth 2 times a day, with meals.     • citalopram (CELEXA) 40 MG TABS Take 40 mg by mouth every bedtime.       No current facility-administered medications on file prior to visit.       Review of Systems   Constitutional: Negative for chills, fever, malaise/fatigue and weight loss.   HENT: Negative for congestion and sore throat.    Respiratory: Negative for cough.         CPAP for sleep apnea   Cardiovascular: Negative for chest pain and palpitations.   Gastrointestinal: Negative for constipation, diarrhea, nausea and vomiting.   Genitourinary: Negative for dysuria.   Musculoskeletal: Positive for joint pain (knee pain - chronic). Negative for myalgias.   Skin: Positive for rash (on left cheek from CPAP machine). Negative for itching.   Neurological: Negative for dizziness, tingling (no residual peripheral neuropathy from previous chemo) and headaches.          Objective:     /74   Pulse 69   Temp 36.9 °C (98.4 °F) (Temporal)   Resp 18   Ht 1.588 m (5' 2.5\")   Wt 75.1 kg (165 lb 9.1 oz)   SpO2 96%   BMI 29.80 kg/m²      Physical Exam  Vitals reviewed.   Constitutional:       General: She is not in acute distress.     Appearance: Normal appearance. She is not diaphoretic.   HENT:      Head: Normocephalic and atraumatic.   Eyes:      General: No scleral icterus.        Right eye: No discharge.         Left eye: No discharge.      Extraocular Movements: Extraocular movements intact.      Conjunctiva/sclera: Conjunctivae normal.   Cardiovascular:      Rate and Rhythm: Normal rate and regular rhythm.      Pulses: Normal pulses.      Heart sounds: Normal heart sounds. No murmur. No friction rub. No gallop.    Pulmonary:      Effort: Pulmonary effort is normal. No respiratory distress.      Breath sounds: Normal breath sounds. " No wheezing.   Abdominal:      General: Bowel sounds are normal. There is no distension.      Palpations: Abdomen is soft. There is no mass.      Tenderness: There is no abdominal tenderness.   Musculoskeletal:         General: No swelling or tenderness.   Lymphadenopathy:      Head:      Right side of head: No submental, submandibular, tonsillar, preauricular, posterior auricular or occipital adenopathy.      Left side of head: No submental, submandibular, tonsillar, preauricular, posterior auricular or occipital adenopathy.      Cervical: No cervical adenopathy.      Right cervical: No superficial, deep or posterior cervical adenopathy.     Left cervical: No superficial, deep or posterior cervical adenopathy.      Upper Body:      Right upper body: No supraclavicular adenopathy.      Left upper body: No supraclavicular adenopathy.      Lower Body: No right inguinal adenopathy. No left inguinal adenopathy.   Skin:     General: Skin is warm and dry.      Comments: Very mild erythema noted to the left cheek from CPAP mask   Neurological:      Mental Status: She is alert and oriented to person, place, and time.   Psychiatric:         Mood and Affect: Mood normal.         Behavior: Behavior normal.                 Results for KAREN OSORIO (MRN [id number])    Ref. Range 2/18/2021 14:54   WBC Latest Ref Range: 4.8 - 10.8 K/uL 9.5   RBC Latest Ref Range: 4.20 - 5.40 M/uL 5.11   Hemoglobin Latest Ref Range: 12.0 - 16.0 g/dL 14.6   Hematocrit Latest Ref Range: 37.0 - 47.0 % 45.3   MCV Latest Ref Range: 81.4 - 97.8 fL 88.6   MCH Latest Ref Range: 27.0 - 33.0 pg 28.6   MCHC Latest Ref Range: 33.6 - 35.0 g/dL 32.2 (L)   RDW Latest Ref Range: 35.9 - 50.0 fL 46.2   Platelet Count Latest Ref Range: 164 - 446 K/uL 492 (H)   MPV Latest Ref Range: 9.0 - 12.9 fL 9.6   Neutrophils-Polys Latest Ref Range: 44.00 - 72.00 % 58.90   Neutrophils (Absolute) Latest Ref Range: 2.00 - 7.15 K/uL 5.61   Lymphocytes Latest Ref Range: 22.00  - 41.00 % 26.40   Lymphs (Absolute) Latest Ref Range: 1.00 - 4.80 K/uL 2.52   Monocytes Latest Ref Range: 0.00 - 13.40 % 7.50   Monos (Absolute) Latest Ref Range: 0.00 - 0.85 K/uL 0.72   Eosinophils Latest Ref Range: 0.00 - 6.90 % 5.90   Eos (Absolute) Latest Ref Range: 0.00 - 0.51 K/uL 0.56 (H)   Basophils Latest Ref Range: 0.00 - 1.80 % 1.00   Baso (Absolute) Latest Ref Range: 0.00 - 0.12 K/uL 0.10   Immature Granulocytes Latest Ref Range: 0.00 - 0.90 % 0.30   Immature Granulocytes (abs) Latest Ref Range: 0.00 - 0.11 K/uL 0.03   Nucleated RBC Latest Units: /100 WBC 0.00   NRBC (Absolute) Latest Units: K/uL 0.00   Sodium Latest Ref Range: 135 - 145 mmol/L 138   Potassium Latest Ref Range: 3.6 - 5.5 mmol/L 4.7   Chloride Latest Ref Range: 96 - 112 mmol/L 103   Co2 Latest Ref Range: 20 - 33 mmol/L 26   Anion Gap Latest Ref Range: 7.0 - 16.0  9.0   Glucose Latest Ref Range: 65 - 99 mg/dL 92   Bun Latest Ref Range: 8 - 22 mg/dL 21   Creatinine Latest Ref Range: 0.50 - 1.40 mg/dL 0.84   GFR If  Latest Ref Range: >60 mL/min/1.73 m 2 >60   GFR If Non  Latest Ref Range: >60 mL/min/1.73 m 2 >60   Calcium Latest Ref Range: 8.5 - 10.5 mg/dL 9.9   AST(SGOT) Latest Ref Range: 12 - 45 U/L 23   ALT(SGPT) Latest Ref Range: 2 - 50 U/L 20   Alkaline Phosphatase Latest Ref Range: 30 - 99 U/L 99   Total Bilirubin Latest Ref Range: 0.1 - 1.5 mg/dL 0.4   Albumin Latest Ref Range: 3.2 - 4.9 g/dL 4.0   Total Protein Latest Ref Range: 6.0 - 8.2 g/dL 6.9   Globulin Latest Ref Range: 1.9 - 3.5 g/dL 2.9   A-G Ratio Latest Units: g/dL 1.4   Carcinoembryonic Antigen Latest Ref Range: 0.0 - 3.0 ng/mL 1.0            Assessment/Plan:        1. 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           Recommendations per NCCN guidelines is for continued monitor and examination every 3-6 months for 2 years and then every 6 months after with a CEA every 3-6 months. Patient is currently 4 years from start of chemo. We will continue to see patient for 6 month follow up visit with labs, CBC, CMP, CEA.      CT scans recommended per NCCN every 6-12 months for up to 5 years. As she is currently 4 years from treatment, and clinically very stable. Will proceed with follow up CT in August 2021. Ordered and scheduled.        Colonoscopy recommended 1 year post surgery, and if normal then again in 3 years, and if normal then repeat in 5 years.  Patient did complete colonoscopy on 1/26/2021.  Per son and patient recommendation to repeat colonoscopy again in 3 years.  Will defer to GI specialist with regards to recommendations at this time.  According to report there were 4 benign-appearing polyps. Will request records of pathology.     We will see patient back in 6 months with labs and CT scan, or sooner if needed.        Addendum: Received pathology report from colonoscopy 01/26/2021

## 2021-07-30 NOTE — PROGRESS NOTES
Chemotherapy Verification - SECONDARY RN       Height = 157 cm  Weight = 76.4 kg  BSA = 1.83 m2       Medication: Fluorouracil  Dose: 400 mg/m2  Calculated Dose: 732 mg                             (In mg/m2, AUC, mg/kg)     Medication: Fluorouracil Home Pump (46hr) Dose: 2400 mg/m2  Calculated Dose: 4392 mg over 46 hrs                             (In mg/m2, AUC, mg/kg)    I confirm that this process was performed independently.    No

## 2021-07-31 ENCOUNTER — HOSPITAL ENCOUNTER (OUTPATIENT)
Dept: LAB | Facility: MEDICAL CENTER | Age: 66
End: 2021-07-31
Attending: NURSE PRACTITIONER
Payer: MEDICAID

## 2021-07-31 DIAGNOSIS — C18.7 CANCER OF SIGMOID COLON (HCC): ICD-10-CM

## 2021-07-31 LAB
ALBUMIN SERPL BCP-MCNC: 4.1 G/DL (ref 3.2–4.9)
ALBUMIN/GLOB SERPL: 1.5 G/DL
ALP SERPL-CCNC: 96 U/L (ref 30–99)
ALT SERPL-CCNC: 19 U/L (ref 2–50)
ANION GAP SERPL CALC-SCNC: 11 MMOL/L (ref 7–16)
AST SERPL-CCNC: 22 U/L (ref 12–45)
BASOPHILS # BLD AUTO: 1.2 % (ref 0–1.8)
BASOPHILS # BLD: 0.09 K/UL (ref 0–0.12)
BILIRUB SERPL-MCNC: 0.4 MG/DL (ref 0.1–1.5)
BUN SERPL-MCNC: 24 MG/DL (ref 8–22)
CALCIUM SERPL-MCNC: 9.5 MG/DL (ref 8.5–10.5)
CEA SERPL-MCNC: 1 NG/ML (ref 0–3)
CHLORIDE SERPL-SCNC: 103 MMOL/L (ref 96–112)
CO2 SERPL-SCNC: 25 MMOL/L (ref 20–33)
CREAT SERPL-MCNC: 0.85 MG/DL (ref 0.5–1.4)
EOSINOPHIL # BLD AUTO: 0.52 K/UL (ref 0–0.51)
EOSINOPHIL NFR BLD: 7.2 % (ref 0–6.9)
ERYTHROCYTE [DISTWIDTH] IN BLOOD BY AUTOMATED COUNT: 46.7 FL (ref 35.9–50)
GLOBULIN SER CALC-MCNC: 2.8 G/DL (ref 1.9–3.5)
GLUCOSE SERPL-MCNC: 151 MG/DL (ref 65–99)
HCT VFR BLD AUTO: 45.5 % (ref 37–47)
HGB BLD-MCNC: 14.9 G/DL (ref 12–16)
IMM GRANULOCYTES # BLD AUTO: 0.04 K/UL (ref 0–0.11)
IMM GRANULOCYTES NFR BLD AUTO: 0.6 % (ref 0–0.9)
LYMPHOCYTES # BLD AUTO: 1.65 K/UL (ref 1–4.8)
LYMPHOCYTES NFR BLD: 22.9 % (ref 22–41)
MCH RBC QN AUTO: 29.1 PG (ref 27–33)
MCHC RBC AUTO-ENTMCNC: 32.7 G/DL (ref 33.6–35)
MCV RBC AUTO: 88.9 FL (ref 81.4–97.8)
MONOCYTES # BLD AUTO: 0.42 K/UL (ref 0–0.85)
MONOCYTES NFR BLD AUTO: 5.8 % (ref 0–13.4)
NEUTROPHILS # BLD AUTO: 4.5 K/UL (ref 2–7.15)
NEUTROPHILS NFR BLD: 62.3 % (ref 44–72)
NRBC # BLD AUTO: 0 K/UL
NRBC BLD-RTO: 0 /100 WBC
PLATELET # BLD AUTO: 429 K/UL (ref 164–446)
PMV BLD AUTO: 9.6 FL (ref 9–12.9)
POTASSIUM SERPL-SCNC: 4.5 MMOL/L (ref 3.6–5.5)
PROT SERPL-MCNC: 6.9 G/DL (ref 6–8.2)
RBC # BLD AUTO: 5.12 M/UL (ref 4.2–5.4)
SODIUM SERPL-SCNC: 139 MMOL/L (ref 135–145)
WBC # BLD AUTO: 7.2 K/UL (ref 4.8–10.8)

## 2021-07-31 PROCEDURE — 82378 CARCINOEMBRYONIC ANTIGEN: CPT

## 2021-07-31 PROCEDURE — 36415 COLL VENOUS BLD VENIPUNCTURE: CPT

## 2021-07-31 PROCEDURE — 85025 COMPLETE CBC W/AUTO DIFF WBC: CPT

## 2021-07-31 PROCEDURE — 80053 COMPREHEN METABOLIC PANEL: CPT

## 2021-08-03 ENCOUNTER — HOSPITAL ENCOUNTER (OUTPATIENT)
Dept: RADIOLOGY | Facility: MEDICAL CENTER | Age: 66
End: 2021-08-03
Attending: NURSE PRACTITIONER
Payer: MEDICAID

## 2021-08-03 DIAGNOSIS — C18.7 CANCER OF SIGMOID COLON (HCC): ICD-10-CM

## 2021-08-03 PROCEDURE — 700117 HCHG RX CONTRAST REV CODE 255: Performed by: NURSE PRACTITIONER

## 2021-08-03 PROCEDURE — 71260 CT THORAX DX C+: CPT

## 2021-08-03 RX ADMIN — IOHEXOL 25 ML: 240 INJECTION, SOLUTION INTRATHECAL; INTRAVASCULAR; INTRAVENOUS; ORAL at 10:28

## 2021-08-03 RX ADMIN — IOHEXOL 100 ML: 350 INJECTION, SOLUTION INTRAVENOUS at 10:50

## 2021-08-10 ENCOUNTER — OFFICE VISIT (OUTPATIENT)
Dept: HEMATOLOGY ONCOLOGY | Facility: MEDICAL CENTER | Age: 66
End: 2021-08-10

## 2021-08-10 VITALS
DIASTOLIC BLOOD PRESSURE: 60 MMHG | HEART RATE: 75 BPM | OXYGEN SATURATION: 96 % | TEMPERATURE: 98.3 F | SYSTOLIC BLOOD PRESSURE: 108 MMHG | RESPIRATION RATE: 16 BRPM | WEIGHT: 170.53 LBS | BODY MASS INDEX: 30.21 KG/M2 | HEIGHT: 63 IN

## 2021-08-10 DIAGNOSIS — C18.7 CANCER OF SIGMOID COLON (HCC): ICD-10-CM

## 2021-08-10 PROCEDURE — 99214 OFFICE O/P EST MOD 30 MIN: CPT | Performed by: NURSE PRACTITIONER

## 2021-08-10 ASSESSMENT — ENCOUNTER SYMPTOMS
BLOOD IN STOOL: 0
WEIGHT LOSS: 0
PALPITATIONS: 0
NAUSEA: 0
DIARRHEA: 0
COUGH: 0
TINGLING: 0
SHORTNESS OF BREATH: 0
VOMITING: 0
CHILLS: 0
WHEEZING: 0
CONSTIPATION: 0
HEADACHES: 0
FEVER: 0
DIZZINESS: 0

## 2021-08-10 ASSESSMENT — PAIN SCALES - GENERAL: PAINLEVEL: NO PAIN

## 2021-08-10 ASSESSMENT — FIBROSIS 4 INDEX: FIB4 SCORE: 0.75

## 2021-08-10 NOTE — PROGRESS NOTES
Subjective:      Marii Delgado is a 66 y.o. 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  She presents accompanied by her son for today's visit.     Cancer History  Patient was initially diagnosed in May 2016 with sigmoid colon cancer secondary to a positive occult testing with changes in bowel habits.  She underwent a colonoscopy which showed a polyp in the sigmoid colon which was positive for poorly differentiated adenocarcinoma with indeterminate margins.  She did proceed with laparoscopic sigmoid resection with low anterior pelvic anastomosis on December 2016.  Pathology did show evidence of local malignancy but was found to have 1/13 nodes positive.  According to Dr. Gomes pathology showed MLH1, MSH2, MSH6 and PMS2 intact.  She did undergo staging work-up and was found to be negative for metastatic disease.  On diagnosis of her sigmoid cancer patient CEA was low at 1, highest it has been was 2.4.  Patient was started on adjuvant chemotherapy with modified FOLFOX on February 2, 2017.  Patient did have oxaliplatin held intermittently throughout the treatment due to severe cold sensitivity and peripheral neuropathy.  She did finish 12 cycles of modified FOLFOX on July 20, 2017, with the last few cycles with single agent 5-FU only.     Since completion of treatment patient has been on surveillance and observation.  She has been undergoing CT scans, last one 08/19/2020, which showed postsurgical changes in sigmoid colon, and no evidence of local recurrence. No evidence of metastatic disease in chest, abdomen, or pelvis. She does have a nonobstructive renal calculus.      It does appear that patient's last colonoscopy was 01/26/2021 with Dr. Cordova. 4 polyps were found and removed. Per patient and son, recommendation to repeat  colonoscopy in 3 years (Jan 2024).      Interval History  Patient being seen for 6-month follow-up visit.  She continues to do very well.  She has some mild fatigue but she is a .  She states when she is working she feels well, but at home when she is not doing much activity she tends to become more fatigued at times.  She denies any nausea, vomiting, diarrhea constipation.  She denies any blood in her stool or her urine.  She did note some shortness of breath at times with the use of the mask, but that does resolve with rest.  She does complain of some left knee pain and plans to discuss this further with her PCP.    Patient completed annual CT on 8/3/21 which shows no evidence of local recurrent or metastatic colon cancer.  She does have left lower lobe and left hilar granuloma.  Left renal scarring with a 4 mm left renal nonobstructive calculus.  Postoperative changes noted in the sigmoid colon.  I did personally review the imaging report and reviewed it with the patient and her son today.  Labs also completed noting a CEA within normal limits and stable at 1.0.  Reviewed labs in detail with patient and son today.    No Known Allergies  Current Outpatient Medications on File Prior to Visit   Medication Sig Dispense Refill   • estrogens, conjugated (PREMARIN) 0.625 MG/GM Cream Insert 0.5 g into the vagina every day.     • Omega-3 Fatty Acids (FISH OIL) 1000 MG Cap capsule Take 1,000 mg by mouth 3 times a day, with meals.     • aspirin (ASA) 81 MG Chew Tab chewable tablet Take 1 Tab by mouth every day. 90 Tab 3   • ascorbic acid (ASCORBIC ACID) 500 MG Tab Take 500 mg by mouth as needed.     • Misc Natural Products (GLUCOS-CHONDROIT-MSM COMPLEX PO) Take  by mouth.     • Multiple Vitamins-Minerals (MULTIVITAMIN ADULT PO) Take  by mouth.     • COLLAGEN PO Take  by mouth.     • RESVERATROL PO Take  by mouth.     • valacyclovir (VALTREX) 500 MG Tab Take 500 mg by mouth as needed. 3 day course      •  "atorvastatin (LIPITOR) 10 MG Tab Take 5 mg by mouth every evening.     • montelukast (SINGULAIR) 10 MG Tab Take 10 mg by mouth every bedtime. Indications: Hayfever     • cetirizine (ZYRTEC) 10 MG Tab Take 10 mg by mouth every morning. Indications: Hayfever     • metformin (GLUCOPHAGE) 500 MG TABS Take 500 mg by mouth 2 times a day, with meals.     • citalopram (CELEXA) 40 MG TABS Take 40 mg by mouth every bedtime.       No current facility-administered medications on file prior to visit.         Review of Systems   Constitutional: Positive for malaise/fatigue (fatigue at times but not all the times). Negative for chills, fever and weight loss.   Respiratory: Negative for cough, shortness of breath and wheezing.    Cardiovascular: Negative for chest pain and palpitations.   Gastrointestinal: Negative for blood in stool, constipation, diarrhea, nausea and vomiting.   Genitourinary: Negative for dysuria and hematuria.   Musculoskeletal: Positive for joint pain (left knee pain at times).   Skin: Negative for itching and rash.   Neurological: Negative for dizziness, tingling and headaches.          Objective:     /60 (BP Location: Left arm, Patient Position: Sitting, BP Cuff Size: Adult)   Pulse 75   Temp 36.8 °C (98.3 °F) (Temporal)   Resp 16   Ht 1.59 m (5' 2.6\")   Wt 77.3 kg (170 lb 8.4 oz)   SpO2 96%   BMI 30.60 kg/m²      Physical Exam  Vitals reviewed.   Constitutional:       General: She is not in acute distress.     Appearance: Normal appearance. She is not diaphoretic.   HENT:      Head: Normocephalic and atraumatic.   Cardiovascular:      Rate and Rhythm: Normal rate and regular rhythm.      Pulses: Normal pulses.      Heart sounds: Normal heart sounds. No murmur heard.   No friction rub. No gallop.    Pulmonary:      Effort: Pulmonary effort is normal. No respiratory distress.      Breath sounds: Normal breath sounds. No wheezing.      Comments: Noted some SOB when initially starting appt from " ambulation to office with mask on, but resolved during the appt.   Abdominal:      General: Bowel sounds are normal. There is no distension.      Palpations: Abdomen is soft.      Tenderness: There is no abdominal tenderness.   Musculoskeletal:         General: No swelling or tenderness.   Lymphadenopathy:      Head:      Right side of head: No submental, submandibular, tonsillar, preauricular, posterior auricular or occipital adenopathy.      Left side of head: No submental, submandibular, tonsillar, preauricular, posterior auricular or occipital adenopathy.      Cervical: No cervical adenopathy.      Right cervical: No superficial, deep or posterior cervical adenopathy.     Left cervical: No superficial, deep or posterior cervical adenopathy.      Upper Body:      Right upper body: No supraclavicular adenopathy.      Left upper body: No supraclavicular adenopathy.   Skin:     General: Skin is warm and dry.   Neurological:      Mental Status: She is alert and oriented to person, place, and time.   Psychiatric:         Mood and Affect: Mood normal.         Behavior: Behavior normal.            CT-CHEST,ABDOMEN,PELVIS WITH    Result Date: 8/3/2021  8/3/2021 10:26 AM HISTORY/REASON FOR EXAM:  Colon cancer, stage II/III, monitor; Follow up surveillance for stage IIIA colon cancer Prior surgery and chemotherapy TECHNIQUE/EXAM DESCRIPTION: CT scan of the chest, abdomen and pelvis with contrast. Thin-section helical scanning was obtained with intravenous contrast from the lung apices through the pubic symphysis to include the chest, abdomen and pelvis. 100 mL of Omnipaque 350 nonionic contrast was administered intravenously without complication. Low dose optimization technique was utilized for this CT exam including automated exposure control and adjustment of the mA and/or kV according to patient size. COMPARISON: CT chest abdomen and pelvis 8/19/2020 FINDINGS: CT Chest: Lungs: There is a calcified left lower lobe  pulmonary nodule consistent with granuloma. There is mild linear density within the lingula consistent with atelectasis or scar. There are no pulmonary nodule or mass.. Pleura: No pleural effusion. Mediastinum/Cindy: There are calcified lymph nodes in the left hilum consistent with granuloma. Cardiac: Heart normal in size without pericardial effusion. Vascular: There is aortic atherosclerotic plaque.. Soft tissues: Unremarkable. Bones: No acute or destructive process. CT Abdomen and Pelvis: Liver: Normal. Spleen: Unremarkable. Pancreas: Unremarkable. Gallbladder: No calcified stones. Biliary: Nondilated. Adrenal glands: Normal. Kidneys: The right kidney is normal in appearance The left kidney is small in size consistent with scarring. There is a nonobstructive finding 4 mm left renal calculus.. Bowel: There are sigmoid colon postoperative changes. There is no evidence for local recurrence. Bowel is otherwise normal in appearance.. Lymph nodes: No adenopathy. Vasculature: Unremarkable. Ascites: None. Pelvis: There has been hysterectomy. There is no free fluid or mass within the pelvis.. Musculoskeletal: No acute or destructive process.     1.  No evidence for local recurrent or metastatic colon cancer 2.  Left lower lobe and left hilar granuloma 3.  Left renal scarring. 4 mm left renal nonobstructive calculus 4.  Postoperative changes of the sigmoid colon      Results for DOUGLAS OSORIO (MRN [id number])    Ref. Range 7/31/2021 10:36   WBC Latest Ref Range: 4.8 - 10.8 K/uL 7.2   RBC Latest Ref Range: 4.20 - 5.40 M/uL 5.12   Hemoglobin Latest Ref Range: 12.0 - 16.0 g/dL 14.9   Hematocrit Latest Ref Range: 37.0 - 47.0 % 45.5   MCV Latest Ref Range: 81.4 - 97.8 fL 88.9   MCH Latest Ref Range: 27.0 - 33.0 pg 29.1   MCHC Latest Ref Range: 33.6 - 35.0 g/dL 32.7 (L)   RDW Latest Ref Range: 35.9 - 50.0 fL 46.7   Platelet Count Latest Ref Range: 164 - 446 K/uL 429   MPV Latest Ref Range: 9.0 - 12.9 fL 9.6    Neutrophils-Polys Latest Ref Range: 44.00 - 72.00 % 62.30   Neutrophils (Absolute) Latest Ref Range: 2.00 - 7.15 K/uL 4.50   Lymphocytes Latest Ref Range: 22.00 - 41.00 % 22.90   Lymphs (Absolute) Latest Ref Range: 1.00 - 4.80 K/uL 1.65   Monocytes Latest Ref Range: 0.00 - 13.40 % 5.80   Monos (Absolute) Latest Ref Range: 0.00 - 0.85 K/uL 0.42   Eosinophils Latest Ref Range: 0.00 - 6.90 % 7.20 (H)   Eos (Absolute) Latest Ref Range: 0.00 - 0.51 K/uL 0.52 (H)   Basophils Latest Ref Range: 0.00 - 1.80 % 1.20   Baso (Absolute) Latest Ref Range: 0.00 - 0.12 K/uL 0.09   Immature Granulocytes Latest Ref Range: 0.00 - 0.90 % 0.60   Immature Granulocytes (abs) Latest Ref Range: 0.00 - 0.11 K/uL 0.04   Nucleated RBC Latest Units: /100 WBC 0.00   NRBC (Absolute) Latest Units: K/uL 0.00   Sodium Latest Ref Range: 135 - 145 mmol/L 139   Potassium Latest Ref Range: 3.6 - 5.5 mmol/L 4.5   Chloride Latest Ref Range: 96 - 112 mmol/L 103   Co2 Latest Ref Range: 20 - 33 mmol/L 25   Anion Gap Latest Ref Range: 7.0 - 16.0  11.0   Glucose Latest Ref Range: 65 - 99 mg/dL 151 (H)   Bun Latest Ref Range: 8 - 22 mg/dL 24 (H)   Creatinine Latest Ref Range: 0.50 - 1.40 mg/dL 0.85   GFR If  Latest Ref Range: >60 mL/min/1.73 m 2 >60   GFR If Non  Latest Ref Range: >60 mL/min/1.73 m 2 >60   Calcium Latest Ref Range: 8.5 - 10.5 mg/dL 9.5   AST(SGOT) Latest Ref Range: 12 - 45 U/L 22   ALT(SGPT) Latest Ref Range: 2 - 50 U/L 19   Alkaline Phosphatase Latest Ref Range: 30 - 99 U/L 96   Total Bilirubin Latest Ref Range: 0.1 - 1.5 mg/dL 0.4   Albumin Latest Ref Range: 3.2 - 4.9 g/dL 4.1   Total Protein Latest Ref Range: 6.0 - 8.2 g/dL 6.9   Globulin Latest Ref Range: 1.9 - 3.5 g/dL 2.8   A-G Ratio Latest Units: g/dL 1.5   Carcinoembryonic Antigen Latest Ref Range: 0.0 - 3.0 ng/mL 1.0                  Assessment/Plan:       1. Cancer of sigmoid colon (HCC)  CBC WITH DIFFERENTIAL    Comp Metabolic Panel    CEA        Patient with moderately differentiated adenocarcinoma of the sigmoid colon, 1/13 nodes positive, T1PN1 a M0, stage IIIa currently in surveillance.       Recommendations per NCCN guidelines is for continued monitor and examination every 3-6 months for 2 years and then every 6 months after with a CEA every 3-6 months. Patient is currently 4.5 years from start of chemo (Feb 2017). We will continue to see patient for 6 month follow up visit with labs, CBC, CMP, CEA.      CT scans recommended per NCCN every 6-12 months for up to 5 years. As she is currently 4.5 years from treatment, and clinically very stable. Recent stable CT Aug 2021 completed.        Colonoscopy recommended 1 year post surgery, and if normal then again in 3 years, and if normal then repeat in 5 years.  Patient did complete colonoscopy on 1/26/2021.  Per son and patient recommendation to repeat colonoscopy again in 3 years.  Will defer to GI specialist with regards to recommendations at this time.  According to report there were 4 benign-appearing polyps.      Patient's glucose was elevated 151.  Patient did confirm that she was fasting for this test.  She does take Metformin currently per her PCP.  I highly recommended that patient discuss elevated glucose level with PCP for further evaluation and management.  Both patient and son verbalized understanding.     We will see patient back in 6 months with labs or sooner if needed.

## 2022-02-01 ENCOUNTER — HOSPITAL ENCOUNTER (OUTPATIENT)
Dept: LAB | Facility: MEDICAL CENTER | Age: 67
End: 2022-02-01
Attending: NURSE PRACTITIONER
Payer: MEDICAID

## 2022-02-01 DIAGNOSIS — C18.7 CANCER OF SIGMOID COLON (HCC): ICD-10-CM

## 2022-02-01 LAB
ALBUMIN SERPL BCP-MCNC: 4.2 G/DL (ref 3.2–4.9)
ALBUMIN/GLOB SERPL: 1.4 G/DL
ALP SERPL-CCNC: 111 U/L (ref 30–99)
ALT SERPL-CCNC: 20 U/L (ref 2–50)
ANION GAP SERPL CALC-SCNC: 11 MMOL/L (ref 7–16)
AST SERPL-CCNC: 17 U/L (ref 12–45)
BASOPHILS # BLD AUTO: 1.2 % (ref 0–1.8)
BASOPHILS # BLD: 0.1 K/UL (ref 0–0.12)
BILIRUB SERPL-MCNC: 0.4 MG/DL (ref 0.1–1.5)
BUN SERPL-MCNC: 16 MG/DL (ref 8–22)
CALCIUM SERPL-MCNC: 10 MG/DL (ref 8.5–10.5)
CEA SERPL-MCNC: 0.8 NG/ML (ref 0–3)
CHLORIDE SERPL-SCNC: 104 MMOL/L (ref 96–112)
CO2 SERPL-SCNC: 24 MMOL/L (ref 20–33)
CREAT SERPL-MCNC: 0.77 MG/DL (ref 0.5–1.4)
EOSINOPHIL # BLD AUTO: 0.63 K/UL (ref 0–0.51)
EOSINOPHIL NFR BLD: 7.4 % (ref 0–6.9)
ERYTHROCYTE [DISTWIDTH] IN BLOOD BY AUTOMATED COUNT: 48.6 FL (ref 35.9–50)
GLOBULIN SER CALC-MCNC: 2.9 G/DL (ref 1.9–3.5)
GLUCOSE SERPL-MCNC: 108 MG/DL (ref 65–99)
HCT VFR BLD AUTO: 46.7 % (ref 37–47)
HGB BLD-MCNC: 14.8 G/DL (ref 12–16)
IMM GRANULOCYTES # BLD AUTO: 0.05 K/UL (ref 0–0.11)
IMM GRANULOCYTES NFR BLD AUTO: 0.6 % (ref 0–0.9)
LYMPHOCYTES # BLD AUTO: 1.94 K/UL (ref 1–4.8)
LYMPHOCYTES NFR BLD: 22.8 % (ref 22–41)
MCH RBC QN AUTO: 28.2 PG (ref 27–33)
MCHC RBC AUTO-ENTMCNC: 31.7 G/DL (ref 33.6–35)
MCV RBC AUTO: 89.1 FL (ref 81.4–97.8)
MONOCYTES # BLD AUTO: 0.56 K/UL (ref 0–0.85)
MONOCYTES NFR BLD AUTO: 6.6 % (ref 0–13.4)
NEUTROPHILS # BLD AUTO: 5.22 K/UL (ref 2–7.15)
NEUTROPHILS NFR BLD: 61.4 % (ref 44–72)
NRBC # BLD AUTO: 0 K/UL
NRBC BLD-RTO: 0 /100 WBC
PLATELET # BLD AUTO: 459 K/UL (ref 164–446)
PMV BLD AUTO: 9.6 FL (ref 9–12.9)
POTASSIUM SERPL-SCNC: 4.3 MMOL/L (ref 3.6–5.5)
PROT SERPL-MCNC: 7.1 G/DL (ref 6–8.2)
RBC # BLD AUTO: 5.24 M/UL (ref 4.2–5.4)
SODIUM SERPL-SCNC: 139 MMOL/L (ref 135–145)
WBC # BLD AUTO: 8.5 K/UL (ref 4.8–10.8)

## 2022-02-01 PROCEDURE — 82378 CARCINOEMBRYONIC ANTIGEN: CPT

## 2022-02-01 PROCEDURE — 36415 COLL VENOUS BLD VENIPUNCTURE: CPT

## 2022-02-01 PROCEDURE — 85025 COMPLETE CBC W/AUTO DIFF WBC: CPT

## 2022-02-01 PROCEDURE — 80053 COMPREHEN METABOLIC PANEL: CPT

## 2022-02-10 ENCOUNTER — OFFICE VISIT (OUTPATIENT)
Dept: HEMATOLOGY ONCOLOGY | Facility: MEDICAL CENTER | Age: 67
End: 2022-02-10

## 2022-02-10 VITALS
HEIGHT: 63 IN | RESPIRATION RATE: 16 BRPM | SYSTOLIC BLOOD PRESSURE: 110 MMHG | OXYGEN SATURATION: 94 % | HEART RATE: 70 BPM | DIASTOLIC BLOOD PRESSURE: 68 MMHG | WEIGHT: 170.64 LBS | BODY MASS INDEX: 30.23 KG/M2 | TEMPERATURE: 98.3 F

## 2022-02-10 DIAGNOSIS — C18.7 MALIGNANT NEOPLASM OF SIGMOID COLON (HCC): ICD-10-CM

## 2022-02-10 PROCEDURE — 99213 OFFICE O/P EST LOW 20 MIN: CPT | Performed by: STUDENT IN AN ORGANIZED HEALTH CARE EDUCATION/TRAINING PROGRAM

## 2022-02-10 ASSESSMENT — ENCOUNTER SYMPTOMS
HEADACHES: 0
ABDOMINAL PAIN: 0
COUGH: 0
DIAPHORESIS: 0
CHILLS: 0
HEARTBURN: 0
MYALGIAS: 0
DIZZINESS: 0
DOUBLE VISION: 0
BACK PAIN: 0
BLURRED VISION: 0
BLOOD IN STOOL: 0
SINUS PAIN: 0
WEAKNESS: 0
TINGLING: 0
ORTHOPNEA: 0
SORE THROAT: 0
PALPITATIONS: 0
INSOMNIA: 0
WHEEZING: 0
SPUTUM PRODUCTION: 0
WEIGHT LOSS: 0
DIARRHEA: 0
CONSTIPATION: 0
SHORTNESS OF BREATH: 0
FEVER: 0
NERVOUS/ANXIOUS: 0
BRUISES/BLEEDS EASILY: 0
NAUSEA: 0
VOMITING: 0

## 2022-02-10 ASSESSMENT — FIBROSIS 4 INDEX: FIB4 SCORE: 0.54

## 2022-02-10 ASSESSMENT — PAIN SCALES - GENERAL: PAINLEVEL: NO PAIN

## 2022-02-10 ASSESSMENT — PATIENT HEALTH QUESTIONNAIRE - PHQ9: CLINICAL INTERPRETATION OF PHQ2 SCORE: 0

## 2022-02-10 NOTE — PROGRESS NOTES
Follow Up Note:  Hematology/Oncology      Primary Care:  Doroteo Naranjo D.O.    Diagnosis: Sigmoid colon adenocarcinoma, stage IIIa ([id number] disease)    Chief Complaint: Surveillance visit    Current Treatment: NA    Prior Treatment: FOLFOX x 12 cycles    Oncology History of Presenting Illness:  Marii Delgado is a 67 y.o.  woman who presents to the clinic today for monitoring. She has been doing well and has no complaints. Her history is summarized as follows from JULIET Acosta:    Patient was initially diagnosed in May 2016 with sigmoid colon cancer secondary to a positive occult testing with changes in bowel habits.  She underwent a colonoscopy which showed a polyp in the sigmoid colon which was positive for poorly differentiated adenocarcinoma with indeterminate margins.  She did proceed with laparoscopic sigmoid resection with low anterior pelvic anastomosis on December 2016.  Pathology did show evidence of local malignancy but was found to have 1/13 nodes positive.  According to Dr. Gomes pathology showed MLH1, MSH2, MSH6 and PMS2 intact.  She did undergo staging work-up and was found to be negative for metastatic disease.  On diagnosis of her sigmoid cancer patient CEA was low at 1, highest it has been was 2.4.  Patient was started on adjuvant chemotherapy with modified FOLFOX on February 2, 2017.  Patient did have oxaliplatin held intermittently throughout the treatment due to severe cold sensitivity and peripheral neuropathy.  She did finish 12 cycles of modified FOLFOX on July 20, 2017, with the last few cycles with single agent 5-FU only.     Since completion of treatment patient has been on surveillance and observation.  She has been undergoing CT scans, last one 08/19/2020, which showed postsurgical changes in sigmoid colon, and no evidence of local recurrence. No evidence of metastatic disease in chest, abdomen, or pelvis. She does have a nonobstructive renal calculus.      It  does appear that patient's last colonoscopy was 01/26/2021 with Dr. Cordova. 4 polyps were found and removed. Per patient and son, recommendation to repeat colonoscopy in 3 years (Jan 2024).      Treatment History:   12/16: Laparoscopic sigmoid resection with low anterior pelvic anastomosis  02/02/17 - 07/20/17: FOLFOX x 12 (oxaliplatin dropped for last few cycles)    Interval History:  Patient is here for follow up visit. She feels well and has no complaints today.     Allergies as of 02/10/2022   • (No Known Allergies)         Current Outpatient Medications:   •  estrogens, conjugated (PREMARIN) 0.625 MG/GM Cream, Insert 0.5 g into the vagina every day., Disp: , Rfl:   •  Omega-3 Fatty Acids (FISH OIL) 1000 MG Cap capsule, Take 1,000 mg by mouth 3 times a day, with meals., Disp: , Rfl:   •  aspirin (ASA) 81 MG Chew Tab chewable tablet, Take 1 Tab by mouth every day., Disp: 90 Tab, Rfl: 3  •  Misc Natural Products (GLUCOS-CHONDROIT-MSM COMPLEX PO), Take  by mouth., Disp: , Rfl:   •  Multiple Vitamins-Minerals (MULTIVITAMIN ADULT PO), Take  by mouth., Disp: , Rfl:   •  COLLAGEN PO, Take  by mouth., Disp: , Rfl:   •  RESVERATROL PO, Take  by mouth., Disp: , Rfl:   •  valacyclovir (VALTREX) 500 MG Tab, Take 500 mg by mouth as needed. 3 day course , Disp: , Rfl:   •  atorvastatin (LIPITOR) 10 MG Tab, Take 5 mg by mouth every evening., Disp: , Rfl:   •  montelukast (SINGULAIR) 10 MG Tab, Take 10 mg by mouth every bedtime. Indications: Hayfever, Disp: , Rfl:   •  cetirizine (ZYRTEC) 10 MG Tab, Take 10 mg by mouth every morning. Indications: Hayfever, Disp: , Rfl:   •  metformin (GLUCOPHAGE) 500 MG TABS, Take 500 mg by mouth 2 times a day, with meals., Disp: , Rfl:   •  citalopram (CELEXA) 40 MG TABS, Take 40 mg by mouth every bedtime., Disp: , Rfl:   •  ascorbic acid (ASCORBIC ACID) 500 MG Tab, Take 500 mg by mouth as needed. (Patient not taking: Reported on 8/10/2021), Disp: , Rfl:       Review of Systems:  Review of  "Systems   Constitutional: Negative for chills, diaphoresis, fever, malaise/fatigue and weight loss.   HENT: Negative for hearing loss, nosebleeds, sinus pain and sore throat.    Eyes: Negative for blurred vision and double vision.   Respiratory: Negative for cough, sputum production, shortness of breath and wheezing.    Cardiovascular: Negative for chest pain, palpitations, orthopnea and leg swelling.   Gastrointestinal: Negative for abdominal pain, blood in stool, constipation, diarrhea, heartburn, melena, nausea and vomiting.   Genitourinary: Negative for dysuria, frequency, hematuria and urgency.   Musculoskeletal: Negative for back pain, joint pain and myalgias.   Skin: Negative for rash.   Neurological: Negative for dizziness, tingling, weakness and headaches.   Endo/Heme/Allergies: Does not bruise/bleed easily.   Psychiatric/Behavioral: The patient is not nervous/anxious and does not have insomnia.          Physical Exam:  Vitals:    02/10/22 0804   BP: 110/68   BP Location: Left arm   Patient Position: Sitting   BP Cuff Size: Adult   Pulse: 70   Resp: 16   Temp: 36.8 °C (98.3 °F)   TempSrc: Temporal   SpO2: 94%   Weight: 77.4 kg (170 lb 10.2 oz)   Height: 1.59 m (5' 2.6\")       DESC; KARNOFSKY SCALE WITH ECOG EQUIVALENT: 100, Fully active, able to carry on all pre-disease performed without restriction (ECOG equivalent 0)    DISTRESS LEVEL: no apparent distress    Physical Exam  Vitals and nursing note reviewed.   Constitutional:       General: She is awake. She is not in acute distress.     Appearance: Normal appearance. She is obese. She is not ill-appearing, toxic-appearing or diaphoretic.   HENT:      Head: Normocephalic and atraumatic.      Nose: Nose normal. No congestion.      Mouth/Throat:      Pharynx: Oropharynx is clear. No oropharyngeal exudate or posterior oropharyngeal erythema.   Eyes:      General: No scleral icterus.     Extraocular Movements: Extraocular movements intact.      " Conjunctiva/sclera: Conjunctivae normal.      Pupils: Pupils are equal, round, and reactive to light.   Cardiovascular:      Rate and Rhythm: Normal rate and regular rhythm.      Pulses: Normal pulses.      Heart sounds: Normal heart sounds. No murmur heard.  No friction rub. No gallop.    Pulmonary:      Effort: Pulmonary effort is normal.      Breath sounds: Normal breath sounds. No decreased air movement. No wheezing, rhonchi or rales.   Chest:   Breasts:      Right: No axillary adenopathy.      Left: No axillary adenopathy.       Abdominal:      General: Bowel sounds are normal. There is no distension.      Tenderness: There is no abdominal tenderness.   Musculoskeletal:         General: No deformity. Normal range of motion.      Cervical back: Normal range of motion and neck supple. No tenderness.      Right lower leg: No edema.      Left lower leg: No edema.   Lymphadenopathy:      Cervical: No cervical adenopathy.      Upper Body:      Right upper body: No axillary adenopathy.      Left upper body: No axillary adenopathy.      Lower Body: No right inguinal adenopathy. No left inguinal adenopathy.   Skin:     General: Skin is warm and dry.      Coloration: Skin is not jaundiced.      Findings: No erythema or rash.   Neurological:      General: No focal deficit present.      Mental Status: She is alert and oriented to person, place, and time.      Cranial Nerves: Cranial nerves are intact.      Sensory: Sensation is intact.      Motor: Motor function is intact. No weakness.      Gait: Gait is intact.   Psychiatric:         Attention and Perception: Attention normal.         Mood and Affect: Mood normal.         Behavior: Behavior normal. Behavior is cooperative.         Thought Content: Thought content normal.         Judgment: Judgment normal.           Labs:  No visits with results within 7 Day(s) from this visit.   Latest known visit with results is:   Hospital Outpatient Visit on 02/01/2022   Component Date  Value Ref Range Status   • Carcinoembryonic Antigen 02/01/2022 0.8  0.0 - 3.0 ng/mL Final    Comment: Performed using Roche jamey e immunoassay analyzer. CEA values determined on  patient samples by different testing procedures cannot be directly compared  with one another and could be the cause of erroneous medical  interpretations. If there is a change in the CEA assay procedure used while  monitoring therapy, then new baselines may need to be established when  comparing previous results.     • Sodium 02/01/2022 139  135 - 145 mmol/L Final   • Potassium 02/01/2022 4.3  3.6 - 5.5 mmol/L Final   • Chloride 02/01/2022 104  96 - 112 mmol/L Final   • Co2 02/01/2022 24  20 - 33 mmol/L Final   • Anion Gap 02/01/2022 11.0  7.0 - 16.0 Final   • Glucose 02/01/2022 108* 65 - 99 mg/dL Final   • Bun 02/01/2022 16  8 - 22 mg/dL Final   • Creatinine 02/01/2022 0.77  0.50 - 1.40 mg/dL Final   • Calcium 02/01/2022 10.0  8.5 - 10.5 mg/dL Final   • AST(SGOT) 02/01/2022 17  12 - 45 U/L Final   • ALT(SGPT) 02/01/2022 20  2 - 50 U/L Final   • Alkaline Phosphatase 02/01/2022 111* 30 - 99 U/L Final   • Total Bilirubin 02/01/2022 0.4  0.1 - 1.5 mg/dL Final   • Albumin 02/01/2022 4.2  3.2 - 4.9 g/dL Final   • Total Protein 02/01/2022 7.1  6.0 - 8.2 g/dL Final   • Globulin 02/01/2022 2.9  1.9 - 3.5 g/dL Final   • A-G Ratio 02/01/2022 1.4  g/dL Final   • WBC 02/01/2022 8.5  4.8 - 10.8 K/uL Final   • RBC 02/01/2022 5.24  4.20 - 5.40 M/uL Final   • Hemoglobin 02/01/2022 14.8  12.0 - 16.0 g/dL Final   • Hematocrit 02/01/2022 46.7  37.0 - 47.0 % Final   • MCV 02/01/2022 89.1  81.4 - 97.8 fL Final   • MCH 02/01/2022 28.2  27.0 - 33.0 pg Final   • MCHC 02/01/2022 31.7* 33.6 - 35.0 g/dL Final   • RDW 02/01/2022 48.6  35.9 - 50.0 fL Final   • Platelet Count 02/01/2022 459* 164 - 446 K/uL Final   • MPV 02/01/2022 9.6  9.0 - 12.9 fL Final   • Neutrophils-Polys 02/01/2022 61.40  44.00 - 72.00 % Final   • Lymphocytes 02/01/2022 22.80  22.00 - 41.00 %  Final   • Monocytes 02/01/2022 6.60  0.00 - 13.40 % Final   • Eosinophils 02/01/2022 7.40* 0.00 - 6.90 % Final   • Basophils 02/01/2022 1.20  0.00 - 1.80 % Final   • Immature Granulocytes 02/01/2022 0.60  0.00 - 0.90 % Final   • Nucleated RBC 02/01/2022 0.00  /100 WBC Final   • Neutrophils (Absolute) 02/01/2022 5.22  2.00 - 7.15 K/uL Final    Includes immature neutrophils, if present.   • Lymphs (Absolute) 02/01/2022 1.94  1.00 - 4.80 K/uL Final   • Monos (Absolute) 02/01/2022 0.56  0.00 - 0.85 K/uL Final   • Eos (Absolute) 02/01/2022 0.63* 0.00 - 0.51 K/uL Final   • Baso (Absolute) 02/01/2022 0.10  0.00 - 0.12 K/uL Final   • Immature Granulocytes (abs) 02/01/2022 0.05  0.00 - 0.11 K/uL Final   • NRBC (Absolute) 02/01/2022 0.00  K/uL Final   • GFR If  02/01/2022 >60  >60 mL/min/1.73 m 2 Final   • GFR If Non  02/01/2022 >60  >60 mL/min/1.73 m 2 Final       Imaging:     All listed images below have been independently reviewed by me. I agree with the findings as summarized below:    CT c/a/p 08/03/21:    IMPRESSION:     1.  No evidence for local recurrent or metastatic colon cancer     2.  Left lower lobe and left hilar granuloma     3.  Left renal scarring. 4 mm left renal nonobstructive calculus     4.  Postoperative changes of the sigmoid colon    Pathology:    FINAL DIAGNOSIS:     A. Sigmoid colon:          Sigmoid colon demonstrates the previous tattooed polypectomy           site.  There is a large circumscribed focus of tattoo ink           within the submucosa with associated macrophages.          There is overlying benign colonic mucosa.          No residual polyp or malignancy is seen.          The previous tattooed polypectomy site is well free of the           proximal and distal resection margins.  Benign mesenteric           resection margin negative for malignancy.          One of thirteen lymph nodes (1/13) positive for metastatic           adenocarcinoma.          Benign  anastomotic ring negative for malignancy.          See synoptic report and comment below.     Synoptic Report for Primary Carcinomas of Colon/Rectum:          -Specimen: Sigmoid colon.        -Procedure: Laparoscopic sigmoid resection with low anterior         pelvic anastomosis.        -Specimen Length: 13 cm.        -Tumor Site: Sigmoid colon.        -Tumor Location: Previous polypectomy site is located above         peritoneal reflection.        -Tumor Size: No residual tumor present.  Previous sigmoid polyp         demonstrated a 1.1 cm invasive moderately differentiated         adenocarcinoma.        -Macroscopic Tumor Perforation: Not identified.        -Macroscopic Intactness of Mesorectum: Not applicable.        -Histologic Type: No residual tumor present.  Previous sigmoid         polyp demonstrated an invasive moderately differentiated         adenocarcinoma arising in an adenoma.        -Histologic Grade: Moderately differentiated (previous sigmoid         polyp).        -Microscopic Tumor Extension: No evidence of primary tumor.        -Margins:         Proximal Margin: Uninvolved by invasive adenocarcinoma.         Distal Margin: Uninvolved by invasive adenocarcinoma.         Circumferential (Radial) Margin: Not applicable.         Mesenteric Margin: Uninvolved by invasive adenocarcinoma.         Other Margins: Not applicable.         Distance from closest margin: The previous tattooed polypectomy          site is 5.5 cm from the open resection margin, 8.5 cm from the          stapled resection margin and 8.2 cm from the closest mesenteric          stapled margin.        -Treatment Effect: No prior treatment.        -Lymph-Vascular Invasion: Not identified.        -Perineural Invasion: Not identified.        -Tumor Deposits: Not identified.        -Type of Polyp in Which Invasive Carcinoma Arose: Adenoma (per         previous sigmoid polyp pathology report).        -Pathologic Staging:         Primary  Tumor: pT0 (No evidence of primary tumor).         Regional Lymph Nodes: pN1a           Number of lymph nodes examined: 13.           Number of lymph nodes involved: 1.         Distant Metastasis: Not applicable.        -Additional Pathologic Findings: None identified.        -Ancillary Studies: Immunohistochemistry for mismatch repair         proteins was performed on the previous sigmoid colon polyp         ([id number]).  MLH1, MSH2, MSH6 and PMS2 are intact (normal).         See separate [employer] report.     Comment: The slide demonstrating the previous polypectomy site and the   slide demonstrating the positive lymph node are reviewed with Dr. Zimmer with agreement on the interpretation.     Assessment & Plan:  1. Cancer of sigmoid colon (HCC)  CBC WITH DIFFERENTIAL    Comp Metabolic Panel    CEA    CT-CHEST,ABDOMEN,PELVIS WITH       This is a 67 year old  woman with adenocarcinoma of the sigmoid colon, s/p resection and adjuvant chemotherapy with FOLFOX. She is under active surveillance now.     Current Diagnosis and Staging: Adenocarcinoma of the sigmoid colon, [id number] stage IIIa disease    Update: Patient continues to do well. She is 4.5 years out and has no problems at this time. Continue active surveillance, if CT scans and exam reveal no findings at 5 years, she can be discharged from the clinic.     Treatment Plan: Active surveillance    Treatment Citation: NCCN    Plan of Care:    1. Primary Therapy: NA  2. Supportive Therapy: NA  3. Labs: CBC with diff, CMP, CEA in 6 months  4. Imaging: CT c/a/p in 6 months  5. Treatment Planning: NA  6. Consultations: NA  7. Code Status: Full  8. Miscellaneous: Continue cancer screenings (mammograms, pap smears, colonoscopies)  9. Return for Follow Up: 6 months    Any questions and concerns raised by the patient were answered to the best of my ability. Thank you for allowing me to participate in the care for this patient. Please feel free to  contact me for any questions or concerns.       Total time spent on chart review, clinic encounter, and documentation: 24 minutes.

## 2022-04-21 ENCOUNTER — TELEPHONE (OUTPATIENT)
Dept: MEDICAL GROUP | Facility: CLINIC | Age: 67
End: 2022-04-21
Payer: MEDICAID

## 2022-04-21 NOTE — TELEPHONE ENCOUNTER
Pt came in asking about Immigration visits. She received a note from them with an appointment but needs to have an immigration physical. I had notified her that both Dr. Morataya and Dr. Aly did not have any at the time until the end of May. She has asked me to ask both providers if there is any way she can be seen within this or next week I notified her that I would ask but I recommended her to reach out to other immigration physicians to see if they can get her in. She understood but would really  Like for either Dr. Morataya or Dr. Aly to do the physical instead. Please advice if this can happen or not. Thank you.

## 2022-04-22 NOTE — TELEPHONE ENCOUNTER
I can see her at 3:30 on 4/27. We are just opening that afternoon up.  That needs to be my last patient of the day.  thanks

## 2022-04-25 NOTE — TELEPHONE ENCOUNTER
I have called pt and she will come In on Wednesday at 3:30. I have asked [place] to add pt to schedule. Thank you Dr. Aly.

## 2022-04-27 ENCOUNTER — OFFICE VISIT (OUTPATIENT)
Dept: MEDICAL GROUP | Facility: CLINIC | Age: 67
End: 2022-04-27

## 2022-04-27 VITALS
OXYGEN SATURATION: 97 % | HEIGHT: 62 IN | RESPIRATION RATE: 16 BRPM | BODY MASS INDEX: 31.83 KG/M2 | WEIGHT: 173 LBS | HEART RATE: 78 BPM | TEMPERATURE: 98 F | DIASTOLIC BLOOD PRESSURE: 76 MMHG | SYSTOLIC BLOOD PRESSURE: 130 MMHG

## 2022-04-27 DIAGNOSIS — Z23 ENCOUNTER FOR IMMUNIZATION: ICD-10-CM

## 2022-04-27 PROCEDURE — 90472 IMMUNIZATION ADMIN EACH ADD: CPT | Performed by: FAMILY MEDICINE

## 2022-04-27 PROCEDURE — 99212 OFFICE O/P EST SF 10 MIN: CPT | Mod: 25 | Performed by: FAMILY MEDICINE

## 2022-04-27 PROCEDURE — 90471 IMMUNIZATION ADMIN: CPT | Performed by: FAMILY MEDICINE

## 2022-04-27 PROCEDURE — 90707 MMR VACCINE SC: CPT | Performed by: FAMILY MEDICINE

## 2022-04-27 PROCEDURE — 90715 TDAP VACCINE 7 YRS/> IM: CPT | Performed by: FAMILY MEDICINE

## 2022-04-27 ASSESSMENT — FIBROSIS 4 INDEX: FIB4 SCORE: 0.54

## 2022-04-27 NOTE — PROGRESS NOTES
Here today for immigration exam and paperwork.    On Metformin for DM and HRT.   5 year post dx colon cancer.  Denies psychiatric hx, recent STI (2 years) or TB exposure.    Exam:    Gen: Well developed, well nourished in no acute distress.   Skin: Pink, warm, and dry  HEENT: conjunctiva non-injected, sclera non-icteric. EOMs intact.   Nasal mucosa without edema nor erythema.   Pinna normal.    Oral mucous membranes pink and moist with no lesions.  Neck: Supple, trachea midline. No adenopathy or masses in the neck or supraclavicular regions.  Lungs: Effort is normal. Clear to auscultation bilaterally with good excursion.  CV: regular rate and rhythm, no murmurs  Abdomen: soft, nontender, + BS. No HSM.  No CVAT  Ext: no edema, color normal, vascularity normal, temperature normal  Alert and oriented Eye contact is good, speech goal directed, affect calm    A/P  Immigration exam  Ordered appropriate labs (Quant TB, RPR, GC)  Immunizations: Tdap, MMR, check varicella titer

## 2022-08-04 ENCOUNTER — HOSPITAL ENCOUNTER (OUTPATIENT)
Dept: LAB | Facility: MEDICAL CENTER | Age: 67
End: 2022-08-04
Attending: STUDENT IN AN ORGANIZED HEALTH CARE EDUCATION/TRAINING PROGRAM

## 2022-08-04 DIAGNOSIS — C18.7 MALIGNANT NEOPLASM OF SIGMOID COLON (HCC): ICD-10-CM

## 2022-08-04 LAB
ALBUMIN SERPL BCP-MCNC: 4.2 G/DL (ref 3.2–4.9)
ALBUMIN/GLOB SERPL: 1.6 G/DL
ALP SERPL-CCNC: 92 U/L (ref 30–99)
ALT SERPL-CCNC: 18 U/L (ref 2–50)
ANION GAP SERPL CALC-SCNC: 10 MMOL/L (ref 7–16)
AST SERPL-CCNC: 20 U/L (ref 12–45)
BASOPHILS # BLD AUTO: 1.3 % (ref 0–1.8)
BASOPHILS # BLD: 0.1 K/UL (ref 0–0.12)
BILIRUB SERPL-MCNC: 0.4 MG/DL (ref 0.1–1.5)
BUN SERPL-MCNC: 23 MG/DL (ref 8–22)
CALCIUM SERPL-MCNC: 9.4 MG/DL (ref 8.5–10.5)
CEA SERPL-MCNC: 0.9 NG/ML (ref 0–3)
CHLORIDE SERPL-SCNC: 104 MMOL/L (ref 96–112)
CO2 SERPL-SCNC: 26 MMOL/L (ref 20–33)
CREAT SERPL-MCNC: 0.85 MG/DL (ref 0.5–1.4)
EOSINOPHIL # BLD AUTO: 0.57 K/UL (ref 0–0.51)
EOSINOPHIL NFR BLD: 7.4 % (ref 0–6.9)
ERYTHROCYTE [DISTWIDTH] IN BLOOD BY AUTOMATED COUNT: 46.5 FL (ref 35.9–50)
GFR SERPLBLD CREATININE-BSD FMLA CKD-EPI: 76 ML/MIN/1.73 M 2
GLOBULIN SER CALC-MCNC: 2.7 G/DL (ref 1.9–3.5)
GLUCOSE SERPL-MCNC: 122 MG/DL (ref 65–99)
HCT VFR BLD AUTO: 43.7 % (ref 37–47)
HGB BLD-MCNC: 14.5 G/DL (ref 12–16)
IMM GRANULOCYTES # BLD AUTO: 0.05 K/UL (ref 0–0.11)
IMM GRANULOCYTES NFR BLD AUTO: 0.6 % (ref 0–0.9)
LYMPHOCYTES # BLD AUTO: 1.77 K/UL (ref 1–4.8)
LYMPHOCYTES NFR BLD: 22.9 % (ref 22–41)
MCH RBC QN AUTO: 28.5 PG (ref 27–33)
MCHC RBC AUTO-ENTMCNC: 33.2 G/DL (ref 33.6–35)
MCV RBC AUTO: 85.9 FL (ref 81.4–97.8)
MONOCYTES # BLD AUTO: 0.58 K/UL (ref 0–0.85)
MONOCYTES NFR BLD AUTO: 7.5 % (ref 0–13.4)
NEUTROPHILS # BLD AUTO: 4.66 K/UL (ref 2–7.15)
NEUTROPHILS NFR BLD: 60.3 % (ref 44–72)
NRBC # BLD AUTO: 0 K/UL
NRBC BLD-RTO: 0 /100 WBC
PLATELET # BLD AUTO: 503 K/UL (ref 164–446)
PMV BLD AUTO: 9.7 FL (ref 9–12.9)
POTASSIUM SERPL-SCNC: 4.3 MMOL/L (ref 3.6–5.5)
PROT SERPL-MCNC: 6.9 G/DL (ref 6–8.2)
RBC # BLD AUTO: 5.09 M/UL (ref 4.2–5.4)
SODIUM SERPL-SCNC: 140 MMOL/L (ref 135–145)
WBC # BLD AUTO: 7.7 K/UL (ref 4.8–10.8)

## 2022-08-04 PROCEDURE — 82378 CARCINOEMBRYONIC ANTIGEN: CPT

## 2022-08-04 PROCEDURE — 85025 COMPLETE CBC W/AUTO DIFF WBC: CPT

## 2022-08-04 PROCEDURE — 36415 COLL VENOUS BLD VENIPUNCTURE: CPT

## 2022-08-04 PROCEDURE — 80053 COMPREHEN METABOLIC PANEL: CPT

## 2022-08-09 ENCOUNTER — HOSPITAL ENCOUNTER (OUTPATIENT)
Dept: RADIOLOGY | Facility: MEDICAL CENTER | Age: 67
End: 2022-08-09
Attending: STUDENT IN AN ORGANIZED HEALTH CARE EDUCATION/TRAINING PROGRAM

## 2022-08-09 DIAGNOSIS — C18.7 MALIGNANT NEOPLASM OF SIGMOID COLON (HCC): ICD-10-CM

## 2022-08-09 PROCEDURE — 71260 CT THORAX DX C+: CPT

## 2022-08-09 PROCEDURE — 700117 HCHG RX CONTRAST REV CODE 255: Performed by: STUDENT IN AN ORGANIZED HEALTH CARE EDUCATION/TRAINING PROGRAM

## 2022-08-09 RX ADMIN — IOHEXOL 100 ML: 350 INJECTION, SOLUTION INTRAVENOUS at 10:59

## 2022-08-09 RX ADMIN — IOHEXOL 25 ML: 240 INJECTION, SOLUTION INTRATHECAL; INTRAVASCULAR; INTRAVENOUS; ORAL at 11:00

## 2022-08-11 ENCOUNTER — HOSPITAL ENCOUNTER (OUTPATIENT)
Dept: HEMATOLOGY ONCOLOGY | Facility: MEDICAL CENTER | Age: 67
End: 2022-08-11

## 2022-08-11 VITALS
HEIGHT: 62 IN | OXYGEN SATURATION: 96 % | DIASTOLIC BLOOD PRESSURE: 68 MMHG | HEART RATE: 62 BPM | WEIGHT: 171.8 LBS | TEMPERATURE: 96.9 F | SYSTOLIC BLOOD PRESSURE: 108 MMHG | BODY MASS INDEX: 31.62 KG/M2 | RESPIRATION RATE: 16 BRPM

## 2022-08-11 DIAGNOSIS — C18.7 MALIGNANT NEOPLASM OF SIGMOID COLON (HCC): ICD-10-CM

## 2022-08-11 PROCEDURE — 99212 OFFICE O/P EST SF 10 MIN: CPT | Performed by: STUDENT IN AN ORGANIZED HEALTH CARE EDUCATION/TRAINING PROGRAM

## 2022-08-11 PROCEDURE — 99213 OFFICE O/P EST LOW 20 MIN: CPT | Performed by: STUDENT IN AN ORGANIZED HEALTH CARE EDUCATION/TRAINING PROGRAM

## 2022-08-11 ASSESSMENT — ENCOUNTER SYMPTOMS
VOMITING: 0
ORTHOPNEA: 0
SORE THROAT: 0
BACK PAIN: 0
SHORTNESS OF BREATH: 0
COUGH: 0
DIZZINESS: 0
INSOMNIA: 0
SINUS PAIN: 0
FEVER: 0
DIAPHORESIS: 0
HEARTBURN: 0
CHILLS: 0
DIARRHEA: 0
BRUISES/BLEEDS EASILY: 0
NAUSEA: 0
SPUTUM PRODUCTION: 0
BLURRED VISION: 0
CONSTIPATION: 0
NERVOUS/ANXIOUS: 0
TINGLING: 0
ABDOMINAL PAIN: 0
HEADACHES: 0
DOUBLE VISION: 0
WEAKNESS: 0
MYALGIAS: 0
BLOOD IN STOOL: 0
WHEEZING: 0
PALPITATIONS: 0
WEIGHT LOSS: 0

## 2022-08-11 ASSESSMENT — FIBROSIS 4 INDEX: FIB4 SCORE: 0.61

## 2022-08-11 ASSESSMENT — PAIN SCALES - GENERAL: PAINLEVEL: 6=MODERATE PAIN

## 2022-08-11 NOTE — PROGRESS NOTES
Follow Up Note:  Hematology/Oncology      Primary Care:  Doroteo Naranjo D.O.    Diagnosis: Sigmoid colon adenocarcinoma, stage IIIa ([id number] disease)    Chief Complaint: Surveillance visit    Current Treatment: NA    Prior Treatment: FOLFOX x 12 cycles    Oncology History of Presenting Illness:  Marii Delgado is a 67 y.o.  woman who presents to the clinic today for monitoring. She has been doing well and has no complaints. Her history is summarized as follows from JULIET Acosta:    Patient was initially diagnosed in May 2016 with sigmoid colon cancer secondary to a positive occult testing with changes in bowel habits.  She underwent a colonoscopy which showed a polyp in the sigmoid colon which was positive for poorly differentiated adenocarcinoma with indeterminate margins.  She did proceed with laparoscopic sigmoid resection with low anterior pelvic anastomosis on December 2016.  Pathology did show evidence of local malignancy but was found to have 1/13 nodes positive.  According to Dr. Gomes pathology showed MLH1, MSH2, MSH6 and PMS2 intact.  She did undergo staging work-up and was found to be negative for metastatic disease.  On diagnosis of her sigmoid cancer patient CEA was low at 1, highest it has been was 2.4.  Patient was started on adjuvant chemotherapy with modified FOLFOX on February 2, 2017.  Patient did have oxaliplatin held intermittently throughout the treatment due to severe cold sensitivity and peripheral neuropathy.  She did finish 12 cycles of modified FOLFOX on July 20, 2017, with the last few cycles with single agent 5-FU only.     Since completion of treatment patient has been on surveillance and observation.  She has been undergoing CT scans, last one 08/19/2020, which showed postsurgical changes in sigmoid colon, and no evidence of local recurrence. No evidence of metastatic disease in chest, abdomen, or pelvis. She does have a nonobstructive renal calculus.      It  does appear that patient's last colonoscopy was 01/26/2021 with Dr. Cordova. 4 polyps were found and removed. Per patient and son, recommendation to repeat colonoscopy in 3 years (Jan 2024).      Treatment History:   12/16: Laparoscopic sigmoid resection with low anterior pelvic anastomosis  02/02/17 - 07/20/17: FOLFOX x 12 (oxaliplatin dropped for last few cycles)    Interval History:  Patient is here for follow up visit. She feels well and has no complaints today.     Allergies as of 08/11/2022    (No Known Allergies)         Current Outpatient Medications:     ASHWAGANDHA PO, Take 500 mg by mouth every day., Disp: , Rfl:     Omega-3 Fatty Acids (FISH OIL) 1000 MG Cap capsule, Take 1,000 mg by mouth 3 times a day, with meals., Disp: , Rfl:     aspirin (ASA) 81 MG Chew Tab chewable tablet, Take 1 Tab by mouth every day., Disp: 90 Tab, Rfl: 3    Misc Natural Products (GLUCOS-CHONDROIT-MSM COMPLEX PO), Take  by mouth., Disp: , Rfl:     Multiple Vitamins-Minerals (MULTIVITAMIN ADULT PO), Take  by mouth., Disp: , Rfl:     COLLAGEN PO, Take  by mouth., Disp: , Rfl:     valacyclovir (VALTREX) 500 MG Tab, Take 500 mg by mouth as needed. 3 day course , Disp: , Rfl:     atorvastatin (LIPITOR) 10 MG Tab, Take 5 mg by mouth every evening., Disp: , Rfl:     montelukast (SINGULAIR) 10 MG Tab, Take 10 mg by mouth every bedtime. Indications: Hayfever, Disp: , Rfl:     cetirizine (ZYRTEC) 10 MG Tab, Take 10 mg by mouth every morning. Indications: Hayfever, Disp: , Rfl:     metformin (GLUCOPHAGE) 500 MG TABS, Take 500 mg by mouth 2 times a day, with meals., Disp: , Rfl:     citalopram (CELEXA) 40 MG TABS, Take 40 mg by mouth every bedtime., Disp: , Rfl:     estrogens, conjugated (PREMARIN) 0.625 MG/GM Cream, Insert 0.5 g into the vagina every day. (Patient not taking: Reported on 8/11/2022), Disp: , Rfl:     ascorbic acid (ASCORBIC ACID) 500 MG Tab, Take 500 mg by mouth as needed. (Patient not taking: No sig reported), Disp: , Rfl:  "    RESVERATROL PO, Take  by mouth. (Patient not taking: Reported on 8/11/2022), Disp: , Rfl:       Review of Systems:  Review of Systems   Constitutional:  Negative for chills, diaphoresis, fever, malaise/fatigue and weight loss.   HENT:  Negative for hearing loss, nosebleeds, sinus pain and sore throat.    Eyes:  Negative for blurred vision and double vision.   Respiratory:  Negative for cough, sputum production, shortness of breath and wheezing.    Cardiovascular:  Negative for chest pain, palpitations, orthopnea and leg swelling.   Gastrointestinal:  Negative for abdominal pain, blood in stool, constipation, diarrhea, heartburn, melena, nausea and vomiting.   Genitourinary:  Negative for dysuria, frequency, hematuria and urgency.   Musculoskeletal:  Negative for back pain, joint pain and myalgias.   Skin:  Negative for rash.   Neurological:  Negative for dizziness, tingling, weakness and headaches.   Endo/Heme/Allergies:  Does not bruise/bleed easily.   Psychiatric/Behavioral:  The patient is not nervous/anxious and does not have insomnia.        Physical Exam:  Vitals:    08/11/22 0902   BP: 108/68   BP Location: Left arm   Patient Position: Sitting   BP Cuff Size: Adult   Pulse: 62   Resp: 16   Temp: 36.1 °C (96.9 °F)   TempSrc: Temporal   SpO2: 96%   Weight: 77.9 kg (171 lb 12.8 oz)   Height: 1.575 m (5' 2.01\")       DESC; KARNOFSKY SCALE WITH ECOG EQUIVALENT: 100, Fully active, able to carry on all pre-disease performed without restriction (ECOG equivalent 0)    DISTRESS LEVEL: no apparent distress    Physical Exam  Vitals and nursing note reviewed.   Constitutional:       General: She is awake. She is not in acute distress.     Appearance: Normal appearance. She is obese. She is not ill-appearing, toxic-appearing or diaphoretic.   HENT:      Head: Normocephalic and atraumatic.      Nose: Nose normal. No congestion.      Mouth/Throat:      Pharynx: Oropharynx is clear. No oropharyngeal exudate or posterior " oropharyngeal erythema.   Eyes:      General: No scleral icterus.     Extraocular Movements: Extraocular movements intact.      Conjunctiva/sclera: Conjunctivae normal.      Pupils: Pupils are equal, round, and reactive to light.   Cardiovascular:      Rate and Rhythm: Normal rate and regular rhythm.      Pulses: Normal pulses.      Heart sounds: Normal heart sounds. No murmur heard.    No friction rub. No gallop.   Pulmonary:      Effort: Pulmonary effort is normal.      Breath sounds: Normal breath sounds. No decreased air movement. No wheezing, rhonchi or rales.   Abdominal:      General: Bowel sounds are normal. There is no distension.      Tenderness: There is no abdominal tenderness.   Musculoskeletal:         General: No deformity. Normal range of motion.      Cervical back: Normal range of motion and neck supple. No tenderness.      Right lower leg: No edema.      Left lower leg: No edema.   Lymphadenopathy:      Cervical: No cervical adenopathy.      Upper Body:      Right upper body: No axillary adenopathy.      Left upper body: No axillary adenopathy.      Lower Body: No right inguinal adenopathy. No left inguinal adenopathy.   Skin:     General: Skin is warm and dry.      Coloration: Skin is not jaundiced.      Findings: No erythema or rash.   Neurological:      General: No focal deficit present.      Mental Status: She is alert and oriented to person, place, and time.      Sensory: Sensation is intact.      Motor: Motor function is intact. No weakness.      Gait: Gait is intact.   Psychiatric:         Attention and Perception: Attention normal.         Mood and Affect: Mood normal.         Behavior: Behavior normal. Behavior is cooperative.         Thought Content: Thought content normal.         Judgment: Judgment normal.         Labs:  No visits with results within 7 Day(s) from this visit.   Latest known visit with results is:   Hospital Outpatient Visit on 08/04/2022   Component Date Value Ref Range  Status    WBC 08/04/2022 7.7  4.8 - 10.8 K/uL Final    RBC 08/04/2022 5.09  4.20 - 5.40 M/uL Final    Hemoglobin 08/04/2022 14.5  12.0 - 16.0 g/dL Final    Hematocrit 08/04/2022 43.7  37.0 - 47.0 % Final    MCV 08/04/2022 85.9  81.4 - 97.8 fL Final    MCH 08/04/2022 28.5  27.0 - 33.0 pg Final    MCHC 08/04/2022 33.2 (A) 33.6 - 35.0 g/dL Final    RDW 08/04/2022 46.5  35.9 - 50.0 fL Final    Platelet Count 08/04/2022 503 (A) 164 - 446 K/uL Final    MPV 08/04/2022 9.7  9.0 - 12.9 fL Final    Neutrophils-Polys 08/04/2022 60.30  44.00 - 72.00 % Final    Lymphocytes 08/04/2022 22.90  22.00 - 41.00 % Final    Monocytes 08/04/2022 7.50  0.00 - 13.40 % Final    Eosinophils 08/04/2022 7.40 (A) 0.00 - 6.90 % Final    Basophils 08/04/2022 1.30  0.00 - 1.80 % Final    Immature Granulocytes 08/04/2022 0.60  0.00 - 0.90 % Final    Nucleated RBC 08/04/2022 0.00  /100 WBC Final    Neutrophils (Absolute) 08/04/2022 4.66  2.00 - 7.15 K/uL Final    Includes immature neutrophils, if present.    Lymphs (Absolute) 08/04/2022 1.77  1.00 - 4.80 K/uL Final    Monos (Absolute) 08/04/2022 0.58  0.00 - 0.85 K/uL Final    Eos (Absolute) 08/04/2022 0.57 (A) 0.00 - 0.51 K/uL Final    Baso (Absolute) 08/04/2022 0.10  0.00 - 0.12 K/uL Final    Immature Granulocytes (abs) 08/04/2022 0.05  0.00 - 0.11 K/uL Final    NRBC (Absolute) 08/04/2022 0.00  K/uL Final    Sodium 08/04/2022 140  135 - 145 mmol/L Final    Potassium 08/04/2022 4.3  3.6 - 5.5 mmol/L Final    Chloride 08/04/2022 104  96 - 112 mmol/L Final    Co2 08/04/2022 26  20 - 33 mmol/L Final    Anion Gap 08/04/2022 10.0  7.0 - 16.0 Final    Glucose 08/04/2022 122 (A) 65 - 99 mg/dL Final    Bun 08/04/2022 23 (A) 8 - 22 mg/dL Final    Creatinine 08/04/2022 0.85  0.50 - 1.40 mg/dL Final    Calcium 08/04/2022 9.4  8.5 - 10.5 mg/dL Final    AST(SGOT) 08/04/2022 20  12 - 45 U/L Final    ALT(SGPT) 08/04/2022 18  2 - 50 U/L Final    Alkaline Phosphatase 08/04/2022 92  30 - 99 U/L Final    Total  Bilirubin 08/04/2022 0.4  0.1 - 1.5 mg/dL Final    Albumin 08/04/2022 4.2  3.2 - 4.9 g/dL Final    Total Protein 08/04/2022 6.9  6.0 - 8.2 g/dL Final    Globulin 08/04/2022 2.7  1.9 - 3.5 g/dL Final    A-G Ratio 08/04/2022 1.6  g/dL Final    Carcinoembryonic Antigen 08/04/2022 0.9  0.0 - 3.0 ng/mL Final    Comment: Performed using Roche jamey e immunoassay analyzer. CEA values determined on  patient samples by different testing procedures cannot be directly compared  with one another and could be the cause of erroneous medical  interpretations. If there is a change in the CEA assay procedure used while  monitoring therapy, then new baselines may need to be established when  comparing previous results.      GFR (CKD-EPI) 08/04/2022 76  >60 mL/min/1.73 m 2 Final    Comment: Effective 3/15/2022, estimated Glomerular Filtration Rate  is calculated using race neutral CKD-EPI 2021 equation  per NKF-ASN recommendations.         Imaging:     All listed images below have been independently reviewed by me. I agree with the findings as summarized below:    CT c/a/p 08/09/22:    IMPRESSION:     1.  No CT evidence of metastatic disease in the chest abdomen or pelvis.     2.  4 mm calcification left kidney again noted. No hydronephrosis bilaterally.     3.  Sigmoid anastomosis noted. No recurrent mass identified.     4.  No adenopathy identified.    CT c/a/p 08/03/21:    IMPRESSION:     1.  No evidence for local recurrent or metastatic colon cancer     2.  Left lower lobe and left hilar granuloma     3.  Left renal scarring. 4 mm left renal nonobstructive calculus     4.  Postoperative changes of the sigmoid colon    Pathology:    FINAL DIAGNOSIS:     A. Sigmoid colon:          Sigmoid colon demonstrates the previous tattooed polypectomy           site.  There is a large circumscribed focus of tattoo ink           within the submucosa with associated macrophages.          There is overlying benign colonic mucosa.          No  residual polyp or malignancy is seen.          The previous tattooed polypectomy site is well free of the           proximal and distal resection margins.  Benign mesenteric           resection margin negative for malignancy.          One of thirteen lymph nodes (1/13) positive for metastatic           adenocarcinoma.          Benign anastomotic ring negative for malignancy.          See synoptic report and comment below.     Synoptic Report for Primary Carcinomas of Colon/Rectum:          -Specimen: Sigmoid colon.        -Procedure: Laparoscopic sigmoid resection with low anterior         pelvic anastomosis.        -Specimen Length: 13 cm.        -Tumor Site: Sigmoid colon.        -Tumor Location: Previous polypectomy site is located above         peritoneal reflection.        -Tumor Size: No residual tumor present.  Previous sigmoid polyp         demonstrated a 1.1 cm invasive moderately differentiated         adenocarcinoma.        -Macroscopic Tumor Perforation: Not identified.        -Macroscopic Intactness of Mesorectum: Not applicable.        -Histologic Type: No residual tumor present.  Previous sigmoid         polyp demonstrated an invasive moderately differentiated         adenocarcinoma arising in an adenoma.        -Histologic Grade: Moderately differentiated (previous sigmoid         polyp).        -Microscopic Tumor Extension: No evidence of primary tumor.        -Margins:         Proximal Margin: Uninvolved by invasive adenocarcinoma.         Distal Margin: Uninvolved by invasive adenocarcinoma.         Circumferential (Radial) Margin: Not applicable.         Mesenteric Margin: Uninvolved by invasive adenocarcinoma.         Other Margins: Not applicable.         Distance from closest margin: The previous tattooed polypectomy          site is 5.5 cm from the open resection margin, 8.5 cm from the          stapled resection margin and 8.2 cm from the closest mesenteric          stapled margin.         -Treatment Effect: No prior treatment.        -Lymph-Vascular Invasion: Not identified.        -Perineural Invasion: Not identified.        -Tumor Deposits: Not identified.        -Type of Polyp in Which Invasive Carcinoma Arose: Adenoma (per         previous sigmoid polyp pathology report).        -Pathologic Staging:         Primary Tumor: pT0 (No evidence of primary tumor).         Regional Lymph Nodes: pN1a           Number of lymph nodes examined: 13.           Number of lymph nodes involved: 1.         Distant Metastasis: Not applicable.        -Additional Pathologic Findings: None identified.        -Ancillary Studies: Immunohistochemistry for mismatch repair         proteins was performed on the previous sigmoid colon polyp         ([id number]).  MLH1, MSH2, MSH6 and PMS2 are intact (normal).         See separate [employer] report.     Comment: The slide demonstrating the previous polypectomy site and the   slide demonstrating the positive lymph node are reviewed with Dr. Zimmer with agreement on the interpretation.     Assessment & Plan:  1. Cancer of sigmoid colon (HCC)            This is a 67 year old  woman with adenocarcinoma of the sigmoid colon, s/p resection and adjuvant chemotherapy with FOLFOX. She is under active surveillance now.     Current Diagnosis and Staging: Adenocarcinoma of the sigmoid colon, [id number] stage IIIa disease    Update: Patient has no signs of disease recurrence. She is at 5 years now, and therefore her chances of getting a recurrence of this disease are less than her chances of developing a new colon cancer. Therefore, she just needs colonoscopies now and no further active surveillance is required. She will be seen only as needed. She is very happy to hear this.     Treatment Plan: Observation    Treatment Citation: NCCN    Plan of Care:    Primary Therapy: NA  Supportive Therapy: NA  Labs: NA (recommend annual follow ups with primary)  Imaging:  NA  Treatment Planning: Patient's surveillance program completed. Advised patient about balanced diet, exercise, and continued screening with colonoscopy. Patient discharged from the clinic.   Consultations: NA  Code Status: Full  Miscellaneous: Continue cancer screenings (mammograms, pap smears, colonoscopies)  Return for Follow Up: PRN    Any questions and concerns raised by the patient were answered to the best of my ability. Thank you for allowing me to participate in the care for this patient. Please feel free to contact me for any questions or concerns.       Total time spent on chart review, clinic encounter, and documentation: 25 minutes.

## 2022-08-11 NOTE — ADDENDUM NOTE
Encounter addended by: Alesha Lamas, Med Ass't on: 8/11/2022 10:09 AM   Actions taken: Charge Capture section accepted

## 2022-10-07 ENCOUNTER — OFFICE VISIT (OUTPATIENT)
Dept: SLEEP MEDICINE | Facility: MEDICAL CENTER | Age: 67
End: 2022-10-07
Payer: MEDICAID

## 2022-10-07 VITALS
HEIGHT: 62 IN | DIASTOLIC BLOOD PRESSURE: 70 MMHG | WEIGHT: 172 LBS | SYSTOLIC BLOOD PRESSURE: 110 MMHG | OXYGEN SATURATION: 98 % | HEART RATE: 70 BPM | RESPIRATION RATE: 16 BRPM | BODY MASS INDEX: 31.65 KG/M2

## 2022-10-07 DIAGNOSIS — Z23 ENCOUNTER FOR IMMUNIZATION: ICD-10-CM

## 2022-10-07 DIAGNOSIS — G47.33 OSA (OBSTRUCTIVE SLEEP APNEA): ICD-10-CM

## 2022-10-07 PROCEDURE — 90471 IMMUNIZATION ADMIN: CPT | Performed by: NURSE PRACTITIONER

## 2022-10-07 PROCEDURE — 90677 PCV20 VACCINE IM: CPT | Performed by: NURSE PRACTITIONER

## 2022-10-07 PROCEDURE — 99213 OFFICE O/P EST LOW 20 MIN: CPT | Mod: 25 | Performed by: NURSE PRACTITIONER

## 2022-10-07 ASSESSMENT — FIBROSIS 4 INDEX: FIB4 SCORE: 0.61

## 2022-10-07 NOTE — PROGRESS NOTES
Chief Complaint   Patient presents with    Follow-Up    Apnea     ISABELA-last seen 6/30/2020       HPI:      Mrs. Delgado is a 68 y/o female patient who is in today for annual ISABELA f/u, overdue. PMH includes mixed hyperlipidemia, obesity, ISABELA, sigmoid colon cancer in 2017, never smoker, hypertension, PVD, depression.    Patient was set up with a ResMed auto CPAP in July 2020 which she purchased out-of-pocket.  Initial first compliance is not available for download.  Compliance report from 9/7/22-10/6/22 was downloaded and reviewed with the patient which showed autoCPAP 5-15 cmH2O, 100% compliance, 8 hrs 37 min use, AHI of 0.9. She is tolerating the pressure and mask well. She goes to bed at 10 pm and wakes up at 7:40 am. She denies morning headache or snoring.  Patient reports that she has been experiencing some throat irritation and followed up with her primary care physician who on exam noted irritation in the back of her throat.  Patient does have allergies to dust mites.  Patient would like to obtain the pneumonia vaccine today.  Patient is flying to [place] next week for least a month.    Sleep/Card Study History:   HSS from 6/12/20 indicated severe obstructive sleep apnea with  Respiratory Event Index (FRANCOIS) of 87.9 per hour and worse in supine sleep with FRANCOIS at 109.7. Apneas: 336 (Obstructive apnea index 32.8/hr, Central apnea index 0.7 /hr, mixed 0 /hour), Hypopneas: 411. Nocturnal hypoxia O2 Sat. luis manuel was 79%, avg O2 was 89 % and baseline O2 at 97 %. O2 sat was below 89% for 155 min of the flow evaluation time. Avg HR 65 BPM.     Echo from 11/14/19 indicated normal left ventricular chamber size. Left ventricular ejection fraction is visually estimated to be 55%. Normal diastolic function. Mild tricuspid regurgitation. Estimated right ventricular systolic pressure  is 30 mmHg. Normal inferior vena cava size without inspiratory collapse.    ROS:    Constitutional: Denies fevers, Denies weight changes  Eyes: Denies  changes in vision, no eye pain  Ears/Nose/Throat/Mouth: Denies nasal congestion or sore throat   Cardiovascular: Denies chest pain or palpitations   Respiratory: Denies shortness of breath , Denies cough  Gastrointestinal/Hepatic: Denies abdominal pain, nausea, vomiting,   Skin/Breast: Denies rash,   Neurological: Denies headache, confusion,   Psychiatric: denies mood disorder   Sleep: denies snoring       Past Medical History:   Diagnosis Date    Arthritis     left knee    Back pain     Bowel habit changes     constipation    Breath shortness     with exertion     Cancer (HCC) 2016    colon (chemo)    Cancer of sigmoid colon (HCC) 2016    Cataract     no sx    Colon cancer (HCC)     Depression     Diabetes (HCC)     oral meds    H/O seasonal allergies     Hemorrhagic disorder (HCC)     post tooth extraction    High cholesterol     Hypertension     Kidney stone     Mixed hyperlipidemia 10/29/2019    Obesity 10/29/2019    Pain 2016    left wrist and knee, 3-4/10    Peripheral vascular disease (HCC)     Pre-diabetes 10/29/2019    Psychiatric problem     anxiety    Renal disorder     hx of kidney stones     Snoring     first step of sleep study completed, pt awaiting overnight sleep study        Past Surgical History:   Procedure Laterality Date    OTHER      port removal     LOW ANTERIOR RESECTION LAPAROSCOPIC  2016    Procedure: LOW ANTERIOR RESECTION LAPAROSCOPIC;  Surgeon: Enoch Shaw M.D.;  Location: SURGERY [facility];  Service:     OTHER ORTHOPEDIC SURGERY Left     left wrist    GYN SURGERY      hysterectomy    OTHER      varicose vein stripping rubina    COLON RESECTION      GYN SURGERY       x 2    HYSTERECTOMY LAPAROSCOPY      PRIMARY C SECTION         Family History   Problem Relation Age of Onset    Cancer Paternal Aunt     Sleep Apnea Neg Hx        Social History     Socioeconomic History    Marital status:      Spouse name: Not on file    Number of  children: Not on file    Years of education: Not on file    Highest education level: Not on file   Occupational History    Not on file   Tobacco Use    Smoking status: Never    Smokeless tobacco: Never   Vaping Use    Vaping Use: Never used   Substance and Sexual Activity    Alcohol use: No     Alcohol/week: 0.0 oz    Drug use: No    Sexual activity: Not on file   Other Topics Concern    Not on file   Social History Narrative    Not on file     Social Determinants of Health     Financial Resource Strain: Not on file   Food Insecurity: Not on file   Transportation Needs: Not on file   Physical Activity: Not on file   Stress: Not on file   Social Connections: Not on file   Intimate Partner Violence: Not on file   Housing Stability: Not on file       Allergies as of 10/07/2022    (No Known Allergies)        Vitals:  Vitals:    10/07/22 0849   BP: 110/70   Pulse: 70   Resp: 16   SpO2: 98%       Current medications as of today   Current Outpatient Medications   Medication Sig Dispense Refill    ASHWAGANDHA PO Take 500 mg by mouth every day.      Omega-3 Fatty Acids (FISH OIL) 1000 MG Cap capsule Take 1,000 mg by mouth 3 times a day, with meals.      aspirin (ASA) 81 MG Chew Tab chewable tablet Take 1 Tab by mouth every day. 90 Tab 3    Misc Natural Products (GLUCOS-CHONDROIT-MSM COMPLEX PO) Take  by mouth.      Multiple Vitamins-Minerals (MULTIVITAMIN ADULT PO) Take  by mouth.      COLLAGEN PO Take  by mouth.      RESVERATROL PO Take  by mouth.      valacyclovir (VALTREX) 500 MG Tab Take 500 mg by mouth as needed. 3 day course       atorvastatin (LIPITOR) 10 MG Tab Take 5 mg by mouth every evening.      montelukast (SINGULAIR) 10 MG Tab Take 10 mg by mouth every bedtime. Indications: Hayfever      cetirizine (ZYRTEC) 10 MG Tab Take 10 mg by mouth every morning. Indications: Hayfever      metformin (GLUCOPHAGE) 500 MG TABS Take 500 mg by mouth 2 times a day, with meals.      citalopram (CELEXA) 40 MG TABS Take 40 mg by  mouth every bedtime.      estrogens, conjugated (PREMARIN) 0.625 MG/GM Cream Insert 0.5 g into the vagina every day. (Patient not taking: Reported on 10/7/2022)      ascorbic acid (ASCORBIC ACID) 500 MG Tab Take 500 mg by mouth as needed. (Patient not taking: Reported on 10/7/2022)       No current facility-administered medications for this visit.         Physical Exam: Limited by COVID-19 precautions.  Appearance: Well developed, well nourished, no acute distress  Eyes: PERRL, EOM intact, sclera white, conjunctiva moist  Ears: no lesions or deformities  Hearing: grossly intact  Nose: no lesions or deformities  Respiratory effort: no intercostal retractions or use of accessory muscles  Extremities: no cyanosis or edema  Abdomen: soft  Gait and Station: normal  Digits and nails: no clubbing, cyanosis, petechiae or nodes.  Cranial nerves: grossly intact  Skin: no visible rashes, lesions or ulcers noted  Orientation: Oriented to time, person and place  Mood and affect: mood and affect appropriate, normal interaction with examiner  Judgement: Intact    Assessment:  1. ISABELA (obstructive sleep apnea)  DME Mask and Supplies    DME Other      2. BMI 31.0-31.9,adult  Patient identified as having weight management issue.  Appropriate orders and counseling given.      3. Encounter for immunization  Pneumococcal Conjugate Vaccine 20-Valent (19 yrs+)            Plan  Discussed the cardiovascular and neuropsychiatric risks of untreated ISABELA; including but not limited to: HTN, DM, MI, ASCVD, CVA, CHF, traffic accidents.     1. Initial first compliance is not available for download.  Compliance report from 9/7/22-10/6/22 was downloaded and reviewed with the patient which showed autoCPAP 5-15 cmH2O, 100% compliance, 8 hrs 37 min use, AHI of 0.9.  Patient is compliant and benefiting from autoCPAP therapy for management of ISABELA. Advised patient to continue to use the autoCPAP every night for more than four hours for optimal health  benefit.    *DME order ([employer]) for mask (MOC) and supplies was placed today. Continue to clean mask and supplies weekly with soap and water, and change supplies per insurance guidelines.     *DME order for mask fit was placed today.  Patient is currently utilizing a medium nasal mask.  Advised patient to also consider adding mouth tape versus Kimberlee Red chinstrap to help decrease mouth leak or switch to a full facemask.  At the current moderate mask leak could be contributing to throat irritation.8    *Sleep hygiene discussed. Recommend keeping a set sleep/wake schedule. Logging enough hours of sleep. Limiting/Avoiding naps. No caffeine after noon and no heavy meals in the evening.     2.  Encouraged a healthy diet and exercise to help with weight loss and management.   If your BMI is 25-29.9 you are overweight. If your BMI is 30 or greater you are obese. To lose weight eat less, move more, or both. Any diet that reduces caloric intake can help with weight loss. Extra weight may reduce your lifespan. Avoid dramatic unsustainable dietary changes that result in the yo-yo effect (down then back up.)  Usually small modifications in diet and exercise are easier to stick with.  3. PCV 20 was administered today.  4. Follow up with appropriate healthcare providers for all other medical problems.  5. F/u in 6 months for ISABELA, sooner if needed.  Patient is traveling to [place] next week.      DRAKE Bone.      This dictation was created using voice recognition software. The accuracy of the dictation is limited to the abilities of the software. I expect there may be some errors of grammar and possibly content.

## 2022-10-07 NOTE — PATIENT INSTRUCTIONS
Consider adding Somnifix mouth tape vs Kimberlee Red Chin strap vs switching to a Full face mask.     Schedule apt with [employer] for mask fitting and consider a FFM to help decrease mouth breathing.

## 2022-12-15 ENCOUNTER — HOSPITAL ENCOUNTER (EMERGENCY)
Facility: MEDICAL CENTER | Age: 67
End: 2022-12-16
Attending: EMERGENCY MEDICINE
Payer: MEDICAID

## 2022-12-15 ENCOUNTER — APPOINTMENT (OUTPATIENT)
Dept: RADIOLOGY | Facility: MEDICAL CENTER | Age: 67
End: 2022-12-15
Attending: EMERGENCY MEDICINE

## 2022-12-15 DIAGNOSIS — R42 DIZZINESS: ICD-10-CM

## 2022-12-15 DIAGNOSIS — R42 VERTIGO: ICD-10-CM

## 2022-12-15 LAB
ALBUMIN SERPL BCP-MCNC: 4.3 G/DL (ref 3.2–4.9)
ALBUMIN/GLOB SERPL: 1.6 G/DL
ALP SERPL-CCNC: 112 U/L (ref 30–99)
ALT SERPL-CCNC: 16 U/L (ref 2–50)
ANION GAP SERPL CALC-SCNC: 10 MMOL/L (ref 7–16)
AST SERPL-CCNC: 20 U/L (ref 12–45)
BASOPHILS # BLD AUTO: 0.9 % (ref 0–1.8)
BASOPHILS # BLD: 0.09 K/UL (ref 0–0.12)
BILIRUB SERPL-MCNC: 0.3 MG/DL (ref 0.1–1.5)
BUN SERPL-MCNC: 26 MG/DL (ref 8–22)
CALCIUM ALBUM COR SERPL-MCNC: 9.9 MG/DL (ref 8.5–10.5)
CALCIUM SERPL-MCNC: 10.1 MG/DL (ref 8.5–10.5)
CHLORIDE SERPL-SCNC: 103 MMOL/L (ref 96–112)
CO2 SERPL-SCNC: 26 MMOL/L (ref 20–33)
CREAT SERPL-MCNC: 0.86 MG/DL (ref 0.5–1.4)
EKG IMPRESSION: NORMAL
EOSINOPHIL # BLD AUTO: 0.49 K/UL (ref 0–0.51)
EOSINOPHIL NFR BLD: 4.7 % (ref 0–6.9)
ERYTHROCYTE [DISTWIDTH] IN BLOOD BY AUTOMATED COUNT: 46.8 FL (ref 35.9–50)
GFR SERPLBLD CREATININE-BSD FMLA CKD-EPI: 75 ML/MIN/1.73 M 2
GLOBULIN SER CALC-MCNC: 2.7 G/DL (ref 1.9–3.5)
GLUCOSE SERPL-MCNC: 151 MG/DL (ref 65–99)
HCT VFR BLD AUTO: 41 % (ref 37–47)
HGB BLD-MCNC: 13.7 G/DL (ref 12–16)
IMM GRANULOCYTES # BLD AUTO: 0.08 K/UL (ref 0–0.11)
IMM GRANULOCYTES NFR BLD AUTO: 0.8 % (ref 0–0.9)
LYMPHOCYTES # BLD AUTO: 1.51 K/UL (ref 1–4.8)
LYMPHOCYTES NFR BLD: 14.4 % (ref 22–41)
MCH RBC QN AUTO: 28.4 PG (ref 27–33)
MCHC RBC AUTO-ENTMCNC: 33.4 G/DL (ref 33.6–35)
MCV RBC AUTO: 85.1 FL (ref 81.4–97.8)
MONOCYTES # BLD AUTO: 0.71 K/UL (ref 0–0.85)
MONOCYTES NFR BLD AUTO: 6.8 % (ref 0–13.4)
NEUTROPHILS # BLD AUTO: 7.58 K/UL (ref 2–7.15)
NEUTROPHILS NFR BLD: 72.4 % (ref 44–72)
NRBC # BLD AUTO: 0 K/UL
NRBC BLD-RTO: 0 /100 WBC
PLATELET # BLD AUTO: 491 K/UL (ref 164–446)
PMV BLD AUTO: 9.3 FL (ref 9–12.9)
POTASSIUM SERPL-SCNC: 4.6 MMOL/L (ref 3.6–5.5)
PROT SERPL-MCNC: 7 G/DL (ref 6–8.2)
RBC # BLD AUTO: 4.82 M/UL (ref 4.2–5.4)
SODIUM SERPL-SCNC: 139 MMOL/L (ref 135–145)
TROPONIN T SERPL-MCNC: 9 NG/L (ref 6–19)
WBC # BLD AUTO: 10.5 K/UL (ref 4.8–10.8)

## 2022-12-15 PROCEDURE — 70498 CT ANGIOGRAPHY NECK: CPT

## 2022-12-15 PROCEDURE — 85025 COMPLETE CBC W/AUTO DIFF WBC: CPT

## 2022-12-15 PROCEDURE — 70496 CT ANGIOGRAPHY HEAD: CPT

## 2022-12-15 PROCEDURE — 71045 X-RAY EXAM CHEST 1 VIEW: CPT

## 2022-12-15 PROCEDURE — 93005 ELECTROCARDIOGRAM TRACING: CPT

## 2022-12-15 PROCEDURE — 99284 EMERGENCY DEPT VISIT MOD MDM: CPT

## 2022-12-15 PROCEDURE — 700117 HCHG RX CONTRAST REV CODE 255: Performed by: EMERGENCY MEDICINE

## 2022-12-15 PROCEDURE — 80053 COMPREHEN METABOLIC PANEL: CPT

## 2022-12-15 PROCEDURE — 93005 ELECTROCARDIOGRAM TRACING: CPT | Performed by: EMERGENCY MEDICINE

## 2022-12-15 PROCEDURE — 84484 ASSAY OF TROPONIN QUANT: CPT

## 2022-12-15 PROCEDURE — 36415 COLL VENOUS BLD VENIPUNCTURE: CPT

## 2022-12-15 PROCEDURE — 70551 MRI BRAIN STEM W/O DYE: CPT

## 2022-12-15 RX ORDER — MECLIZINE HYDROCHLORIDE 25 MG/1
25 TABLET ORAL 3 TIMES DAILY PRN
Qty: 30 TABLET | Refills: 0 | Status: SHIPPED | OUTPATIENT
Start: 2022-12-15 | End: 2024-03-13

## 2022-12-15 RX ORDER — ONDANSETRON 4 MG/1
4 TABLET, ORALLY DISINTEGRATING ORAL EVERY 6 HOURS PRN
Qty: 10 TABLET | Refills: 0 | Status: SHIPPED | OUTPATIENT
Start: 2022-12-15 | End: 2024-03-13

## 2022-12-15 RX ADMIN — IOHEXOL 80 ML: 350 INJECTION, SOLUTION INTRAVENOUS at 19:00

## 2022-12-15 ASSESSMENT — FIBROSIS 4 INDEX: FIB4 SCORE: 0.61

## 2022-12-15 NOTE — ED TRIAGE NOTES
"Chief Complaint   Patient presents with    Dizziness     Pt began feeling dizzy at work and lowered self to ground. +N/V. Pt reports vision \"spinning\". +diaphoretic. Pt reports elevated BP at 145/85. Symptoms have since resolved. -CP.      Pt amb to triage with steady gait. Pt's friend gave pt meclazine 25mg PTA. Family with pt. Pt educated to inform staff of any changes.     /66   Pulse 64   Temp 36.3 °C (97.3 °F) (Temporal)   Resp 18   Ht 1.549 m (5' 1\")   Wt 78.4 kg (172 lb 13.5 oz)   SpO2 96%   BMI 32.66 kg/m²     "

## 2022-12-16 VITALS
OXYGEN SATURATION: 92 % | DIASTOLIC BLOOD PRESSURE: 69 MMHG | RESPIRATION RATE: 18 BRPM | WEIGHT: 172.84 LBS | BODY MASS INDEX: 32.63 KG/M2 | HEART RATE: 63 BPM | HEIGHT: 61 IN | SYSTOLIC BLOOD PRESSURE: 153 MMHG | TEMPERATURE: 97.5 F

## 2022-12-16 NOTE — ED PROVIDER NOTES
"  ER Provider Note    Scribed for TRAMAINE CHILDERS by See Agee. 12/15/2022  4:50 PM    Primary Care Provider: Doroteo Naranjo D.O.  Means of Arrival: Walk-in  History obtained from: Patient    CHIEF COMPLAINT  Chief Complaint   Patient presents with    Dizziness     Pt began feeling dizzy at work and lowered self to ground. +N/V. Pt reports vision \"spinning\". +diaphoretic. Pt reports elevated BP at 145/85. Symptoms have since resolved. -CP.      LIMITATION TO HISTORY   Select: : None    HPI  Marii Olivia Delgado is a 67 y.o. female with a history of diabetes who presents to the ED complaining of dizziness onset this afternoon. Her son states that while she was at work today she began feeling dizzy. She describes the dizziness as a room spinning sensation. She was able to lower herself to the ground at this time. Following this she began feeling nauseas and had an episode of vomiting. Her son notes that upon his arrival her blood pressure was elevated at 145/85 and she had a weak pulse. They did not measure her blood sugar at this time. She denies any loss of consciousness. She notes that this is the fourth time she has had a similar episode. Her first episode was on 10/26/2022 and the episodes have been getting progressively longer in duration. Her first episode was about 10 minutes and the one today lasted approximately one hour. She has not previously been evaluated by a physician for her symptoms. While in the ED she also reports associated symptoms of diaphoresis and intermittent palpitations, as well as chronic lower extremity swelling and cough. She also notes that with previous episodes she has had double vision, but she did not have any today. Additionally, for the past few months she has had intermittent pain in her upper extremities and chest. She denies any fever, chills, sore throat, abdominal pain, diarrhea, changes in speech, or difficulty finding words. She also has a history of anxiety and " colon cancer, she has been in remission for five years. Her diabetes is medication managed. She has no history of asthma, hypertension, or other heart issues. She has previously had a stress test, with no abnormal findings. She denies drinking alcohol or using drugs.       OUTSIDE HISTORIAN(S):  Select: Family Son    EXTERNAL RECORDS REVIEWED  Select: Outpatient labs & studies ECHO and Stress Test    REVIEW OF SYSTEMS  HEENT:  No  sore throat.  EYES: No vision changes.  CARDIAC: Positive for intermittent chest pain and palpitations.   PULMONARY: Positive for cough.   GI: Positive for vomiting. No abdominal pain or diarrhea.    Neuro: Positive for dizziness. No aphasia or loss of consciousness .  Musculoskeletal: Positive for chronic lower extremity swelling.   Endocrine: Positive for diaphoresis. No fevers or chills.     See history of present illness all other systems are negative      PAST MEDICAL HISTORY  Past Medical History:   Diagnosis Date    Arthritis     left knee    Back pain     Bowel habit changes     constipation    Breath shortness     with exertion     Cancer (HCC) 2016    colon (chemo)    Cancer of sigmoid colon (HCC) 12/7/2016    Cataract     no sx    Colon cancer (HCC)     Depression     Diabetes (HCC)     oral meds    H/O seasonal allergies     Hemorrhagic disorder (HCC)     post tooth extraction    High cholesterol     Hypertension     Kidney stone     Mixed hyperlipidemia 10/29/2019    Obesity 10/29/2019    Pain 12-    left wrist and knee, 3-4/10    Peripheral vascular disease (HCC)     Pre-diabetes 10/29/2019    Psychiatric problem     anxiety    Renal disorder     hx of kidney stones     Snoring     first step of sleep study completed, pt awaiting overnight sleep study        SURGICAL HISTORY  Past Surgical History:   Procedure Laterality Date    OTHER  2019    port removal     LOW ANTERIOR RESECTION LAPAROSCOPIC  12/7/2016    Procedure: LOW ANTERIOR RESECTION LAPAROSCOPIC;  Surgeon:  Enoch Shaw M.D.;  Location: SURGERY [facility];  Service:     OTHER ORTHOPEDIC SURGERY Left     left wrist    GYN SURGERY      hysterectomy    OTHER      varicose vein stripping rubina    COLON RESECTION      GYN SURGERY       x 2    HYSTERECTOMY LAPAROSCOPY      PRIMARY C SECTION         FAMILY HISTORY  Family History   Problem Relation Age of Onset    Cancer Paternal Aunt     Sleep Apnea Neg Hx        SOCIAL HISTORY   reports that she has never smoked. She has never used smokeless tobacco. She reports that she does not drink alcohol and does not use drugs.    CURRENT MEDICATIONS  Previous Medications    ASCORBIC ACID (ASCORBIC ACID) 500 MG TAB    Take 500 mg by mouth as needed.    ASHWAGANDHA PO    Take 500 mg by mouth every day.    ASPIRIN (ASA) 81 MG CHEW TAB CHEWABLE TABLET    Take 1 Tab by mouth every day.    ATORVASTATIN (LIPITOR) 10 MG TAB    Take 5 mg by mouth every evening.    CETIRIZINE (ZYRTEC) 10 MG TAB    Take 10 mg by mouth every morning. Indications: Hayfever    CITALOPRAM (CELEXA) 40 MG TABS    Take 40 mg by mouth every bedtime.    COLLAGEN PO    Take  by mouth.    ESTROGENS, CONJUGATED (PREMARIN) 0.625 MG/GM CREAM    Insert 0.5 g into the vagina every day.    METFORMIN (GLUCOPHAGE) 500 MG TABS    Take 500 mg by mouth 2 times a day, with meals.    MISC NATURAL PRODUCTS (GLUCOS-CHONDROIT-MSM COMPLEX PO)    Take  by mouth.    MONTELUKAST (SINGULAIR) 10 MG TAB    Take 10 mg by mouth every bedtime. Indications: Hayfever    MULTIPLE VITAMINS-MINERALS (MULTIVITAMIN ADULT PO)    Take  by mouth.    OMEGA-3 FATTY ACIDS (FISH OIL) 1000 MG CAP CAPSULE    Take 1,000 mg by mouth 3 times a day, with meals.    RESVERATROL PO    Take  by mouth.    VALACYCLOVIR (VALTREX) 500 MG TAB    Take 500 mg by mouth as needed. 3 day course        ALLERGIES  Patient has no known allergies.    PHYSICAL EXAM  Constitutional: Well developed, Well nourished, No acute distress, Non-toxic appearance.  "  HEENT: Normocephalic, Atraumatic,  external ears normal, pharynx pink,  Mucous  Membranes moist, No rhinorrhea or mucosal edema  Eyes: Little bilateral gaze nystagmus, PERRL, EOMI, Conjunctiva normal, No discharge.   Neck: Normal range of motion, No tenderness, Supple, No stridor.   Lymphatic: No lymphadenopathy    Cardiovascular: Regular Rate and Rhythm, No murmurs,  rubs, or gallops.   Thorax & Lungs: Lungs clear to auscultation bilaterally, No respiratory distress, No wheezes, rhales or rhonchi, No chest wall tenderness.   Abdomen: Bowel sounds normal, Soft, non tender, non distended,  No pulsatile masses., no rebound guarding or peritoneal signs.   Skin: Warm, Dry, No erythema, No rash,   Back:  No CVA tenderness,  No spinal tenderness, bony crepitance step offs or instability.   Neurologic: NIH:0,  Alert & oriented x 3, Normal motor function, Normal sensory function, No focal deficits noted. Normal reflexes.  Normal Cranial Nerves.  Extremities: Equal, intact distal pulses, No cyanosis, clubbing or edema,  No tenderness.   Musculoskeletal: Good range of motion in all major joints. No tenderness to palpation or major deformities noted.       VITAL SIGNS:   /66   Pulse 64   Temp 36.3 °C (97.3 °F) (Temporal)   Resp 18   Ht 1.549 m (5' 1\")   Wt 78.4 kg (172 lb 13.5 oz)   SpO2 96%   BMI 32.66 kg/m²       DIAGNOSTIC STUDIES    Labs:   Results for orders placed or performed during the hospital encounter of 12/15/22   CBC WITH DIFFERENTIAL   Result Value Ref Range    WBC 10.5 4.8 - 10.8 K/uL    RBC 4.82 4.20 - 5.40 M/uL    Hemoglobin 13.7 12.0 - 16.0 g/dL    Hematocrit 41.0 37.0 - 47.0 %    MCV 85.1 81.4 - 97.8 fL    MCH 28.4 27.0 - 33.0 pg    MCHC 33.4 (L) 33.6 - 35.0 g/dL    RDW 46.8 35.9 - 50.0 fL    Platelet Count 491 (H) 164 - 446 K/uL    MPV 9.3 9.0 - 12.9 fL    Neutrophils-Polys 72.40 (H) 44.00 - 72.00 %    Lymphocytes 14.40 (L) 22.00 - 41.00 %    Monocytes 6.80 0.00 - 13.40 %    Eosinophils 4.70 " 0.00 - 6.90 %    Basophils 0.90 0.00 - 1.80 %    Immature Granulocytes 0.80 0.00 - 0.90 %    Nucleated RBC 0.00 /100 WBC    Neutrophils (Absolute) 7.58 (H) 2.00 - 7.15 K/uL    Lymphs (Absolute) 1.51 1.00 - 4.80 K/uL    Monos (Absolute) 0.71 0.00 - 0.85 K/uL    Eos (Absolute) 0.49 0.00 - 0.51 K/uL    Baso (Absolute) 0.09 0.00 - 0.12 K/uL    Immature Granulocytes (abs) 0.08 0.00 - 0.11 K/uL    NRBC (Absolute) 0.00 K/uL   Comp Metabolic Panel   Result Value Ref Range    Sodium 139 135 - 145 mmol/L    Potassium 4.6 3.6 - 5.5 mmol/L    Chloride 103 96 - 112 mmol/L    Co2 26 20 - 33 mmol/L    Anion Gap 10.0 7.0 - 16.0    Glucose 151 (H) 65 - 99 mg/dL    Bun 26 (H) 8 - 22 mg/dL    Creatinine 0.86 0.50 - 1.40 mg/dL    Calcium 10.1 8.5 - 10.5 mg/dL    AST(SGOT) 20 12 - 45 U/L    ALT(SGPT) 16 2 - 50 U/L    Alkaline Phosphatase 112 (H) 30 - 99 U/L    Total Bilirubin 0.3 0.1 - 1.5 mg/dL    Albumin 4.3 3.2 - 4.9 g/dL    Total Protein 7.0 6.0 - 8.2 g/dL    Globulin 2.7 1.9 - 3.5 g/dL    A-G Ratio 1.6 g/dL   CORRECTED CALCIUM   Result Value Ref Range    Correct Calcium 9.9 8.5 - 10.5 mg/dL   ESTIMATED GFR   Result Value Ref Range    GFR (CKD-EPI) 75 >60 mL/min/1.73 m 2   TROPONIN   Result Value Ref Range    Troponin T 9 6 - 19 ng/L   EKG   Result Value Ref Range    Report       [facility] Emergency Dept.    Test Date:  2022-12-15  Pt Name:    DOUGLAS OSORIO            Department: ER  MRN:        [id number]                      Room:       [facility]  Gender:     Female                       Technician: [id number]  :        [id number]                   Requested By:ER TRIAGE PROTOCOL  Order #:    [id number]                    Reading MD: TRAMAINE CHILDERS MD    Measurements  Intervals                                Axis  Rate:       63                           P:          61  GA:         190                          QRS:        55  QRSD:       110                          T:          -12  QT:         453  QTc:         464    Interpretive Statements  Sinus rhythm  Left atrial enlargement  Nonspecific T abnormalities, anterior leads  Compared to ECG 2020 11:38:17  Atrial abnormality now present  Sinus bradycardia no longer present  T-wave abnormality still present  Electronically Signed On 12- 19:57:28 PST by TRAMAINE CHILDERS MD        All labs reviewed by me.    EK Lead EKG interpreted by me to show as above.     Radiology:   MR-BRAIN-W/O   Final Result      1.  No acute abnormality.   2.  A few nonspecific T2 hyperintensities in the supratentorial white matter likely representing minimal nonspecific foci of gliosis/chronic ischemia.      DX-CHEST-PORTABLE (1 VIEW)   Final Result      No acute cardiopulmonary disease.      CT-CTA NECK WITH & W/O-POST PROCESSING   Final Result      No focal stenosis, dissection or occlusion of the cervical carotid or vertebral arteries.      CT-CTA HEAD WITH & W/O-POST PROCESS   Final Result      1.  No intracranial aneurysm, focal stenosis or abrupt large vessel cut off.   2.  No acute intracranial abnormality.          The radiologist's interpretation of all radiological studies have been reviewed by me.    COURSE & MEDICAL DECISION MAKING     Nursing notes, vital signs, [id number] reviewed in chart     Differential diagnoses include but not limited to Arrhythmia, TIA, Cardiac ischemia, Vertigo, Brain Mets, Dissection.     Prior records reviewed which indicate the patient last ECHO was in 2019. She had an EF of 55% and normal ventricle size. The patient had a left heart Cath  that showed no significant heart disease.     DISCUSSION OF MANAGEMENT WITH OTHER PHYSICIANS, Q OR APPROPRIATE SOURCE  Select: Consultant Neurology    INDEPENDENT INTERPRETATION OF STUDIES  EKG as above    ESCALATION OF CARE  blood analysis was considered and imaging was considered Lab work, EKG, CT of the head, Cardiac Enzymes.     SOCIAL DETERMINANTS THAT SIGNIFICANTLY AFFECT CARE  Select:  Patient is has not been able to secure and appointment with her primary provider since October,2022.     PRESCRIPTION DRUG CONSIDERED  Select: Meclizine considered if the patient's MRI is negative for stroke to treat her dizziness and Zofran for nausea.    DIAGNOSTICS TESTS CONSIDERED  NIH Stroke Scale 0 and HEART Score 3    PLAN   4:50 PM   Will order DX-Chest, CT-CTA neck with and without post processing, CT-CTA head with and without post processing, Troponin, Corrected calcium, CMP, CBC with differential, and an EKG to evaluate her complaints. Will consider admitting the patient. Given patient's progressively increasing duration of will seriously consider possible Dissection or TIA and will evaluate to r/o.     8:01 PM   Paged Neurology.      8:09 PM   I discussed the patient's case and the above findings with Dr. Capellan (Neurology) who would like the patient to have an MRI, and if it is normal she is stable for discharge.     8:20 PM  Ordered MR-Brain without for further evaluation and updated patient on plan of care. Patient verbalizes understanding and agreement to this plan of care.      11:41 PM the patient's MRI shows some gliosis and chronic changes but no acute stroke.  She has a good heart work-up in the last 2 years with a clean heart cath.  At this time the patient is stable to be discharged home.  I will prescribe her meclizine and Zofran and she is to follow-up with her primary care physician and return for any worsening symptoms.      COURSE  The patient is remained stable in the emergency department that any arrhythmias or further dizziness or changes.    8:20 PM  Patient admitted to ED Observation at this time on 12/15/22 for patient to obtain and MRI for further evaluation. Patient has a family history of cancer with her paternal aunt and sleep apnea, has documented above.    11:43 PM  During admission to ED Observation patient remained asymptomatic. Repeat  exam was stable without dizziness or  neurologic deficit or chest pain.     11:43 PM  Patient was discharged from ED Observation on 12/15/2022 at 11:43pm     DISPOSITION   Patient will be discharged home.    FOLLOW UP:  Doroteo Naranjo D.O.  [address]  [facility] [address]  [place] [address]  [phone]    Call in 1 day  for recheck    OUTPATIENT MEDICATIONS:  New Prescriptions    MECLIZINE (ANTIVERT) 25 MG TAB    Take 1 Tablet by mouth 3 times a day as needed for Dizziness or Vertigo.    ONDANSETRON (ZOFRAN ODT) 4 MG TABLET DISPERSIBLE    Take 1 Tablet by mouth every 6 hours as needed for Nausea/Vomiting.        CONDITION AT DISPOSITION  Improved     FINAL IMPRESSION   1. Dizziness    2. Vertigo           See LUIS (Scribe), am scribing for, and in the presence of, Yolette Calvert M.D..    Electronically signed by: See Agee (Scribe), 12/15/2022    Yolette LUIS M.D. personally performed the services described in this documentation, as scribed by See Agee in my presence, and it is both accurate and complete.        The note accurately reflects work and decisions made by me.  Yolette Calvert M.D.  12/15/2022  11:43 PM

## 2023-04-11 ENCOUNTER — APPOINTMENT (OUTPATIENT)
Dept: SLEEP MEDICINE | Facility: MEDICAL CENTER | Age: 68
End: 2023-04-11
Attending: NURSE PRACTITIONER

## 2023-10-31 ENCOUNTER — HOSPITAL ENCOUNTER (OUTPATIENT)
Dept: LAB | Facility: MEDICAL CENTER | Age: 68
End: 2023-10-31
Attending: HOSPITALIST
Payer: MEDICAID

## 2023-10-31 LAB
ALBUMIN SERPL BCP-MCNC: 4.1 G/DL (ref 3.2–4.9)
ALBUMIN/GLOB SERPL: 1.4 G/DL
ALP SERPL-CCNC: 98 U/L (ref 30–99)
ALT SERPL-CCNC: 19 U/L (ref 2–50)
ANION GAP SERPL CALC-SCNC: 9 MMOL/L (ref 7–16)
APPEARANCE UR: CLEAR
AST SERPL-CCNC: 13 U/L (ref 12–45)
BACTERIA #/AREA URNS HPF: ABNORMAL /HPF
BASOPHILS # BLD AUTO: 1.5 % (ref 0–1.8)
BASOPHILS # BLD: 0.12 K/UL (ref 0–0.12)
BILIRUB SERPL-MCNC: 0.4 MG/DL (ref 0.1–1.5)
BILIRUB UR QL STRIP.AUTO: NEGATIVE
BUN SERPL-MCNC: 18 MG/DL (ref 8–22)
CALCIUM ALBUM COR SERPL-MCNC: 9.4 MG/DL (ref 8.5–10.5)
CALCIUM SERPL-MCNC: 9.5 MG/DL (ref 8.5–10.5)
CHLORIDE SERPL-SCNC: 104 MMOL/L (ref 96–112)
CHOLEST SERPL-MCNC: 137 MG/DL (ref 100–199)
CO2 SERPL-SCNC: 26 MMOL/L (ref 20–33)
COLOR UR: YELLOW
CREAT SERPL-MCNC: 0.81 MG/DL (ref 0.5–1.4)
EOSINOPHIL # BLD AUTO: 0.25 K/UL (ref 0–0.51)
EOSINOPHIL NFR BLD: 3.1 % (ref 0–6.9)
EPI CELLS #/AREA URNS HPF: NEGATIVE /HPF
ERYTHROCYTE [DISTWIDTH] IN BLOOD BY AUTOMATED COUNT: 46.4 FL (ref 35.9–50)
GFR SERPLBLD CREATININE-BSD FMLA CKD-EPI: 80 ML/MIN/1.73 M 2
GLOBULIN SER CALC-MCNC: 2.9 G/DL (ref 1.9–3.5)
GLUCOSE SERPL-MCNC: 136 MG/DL (ref 65–99)
GLUCOSE UR STRIP.AUTO-MCNC: NEGATIVE MG/DL
HCT VFR BLD AUTO: 42.1 % (ref 37–47)
HDLC SERPL-MCNC: 46 MG/DL
HGB BLD-MCNC: 13.5 G/DL (ref 12–16)
HYALINE CASTS #/AREA URNS LPF: ABNORMAL /LPF
IMM GRANULOCYTES # BLD AUTO: 0.05 K/UL (ref 0–0.11)
IMM GRANULOCYTES NFR BLD AUTO: 0.6 % (ref 0–0.9)
KETONES UR STRIP.AUTO-MCNC: NEGATIVE MG/DL
LDLC SERPL CALC-MCNC: 68 MG/DL
LEUKOCYTE ESTERASE UR QL STRIP.AUTO: ABNORMAL
LYMPHOCYTES # BLD AUTO: 2.12 K/UL (ref 1–4.8)
LYMPHOCYTES NFR BLD: 26.4 % (ref 22–41)
MCH RBC QN AUTO: 27.1 PG (ref 27–33)
MCHC RBC AUTO-ENTMCNC: 32.1 G/DL (ref 32.2–35.5)
MCV RBC AUTO: 84.5 FL (ref 81.4–97.8)
MICRO URNS: ABNORMAL
MONOCYTES # BLD AUTO: 0.55 K/UL (ref 0–0.85)
MONOCYTES NFR BLD AUTO: 6.8 % (ref 0–13.4)
NEUTROPHILS # BLD AUTO: 4.94 K/UL (ref 1.82–7.42)
NEUTROPHILS NFR BLD: 61.6 % (ref 44–72)
NITRITE UR QL STRIP.AUTO: NEGATIVE
NRBC # BLD AUTO: 0 K/UL
NRBC BLD-RTO: 0 /100 WBC (ref 0–0.2)
PH UR STRIP.AUTO: 7 [PH] (ref 5–8)
PLATELET # BLD AUTO: 609 K/UL (ref 164–446)
PMV BLD AUTO: 9.2 FL (ref 9–12.9)
POTASSIUM SERPL-SCNC: 4.3 MMOL/L (ref 3.6–5.5)
PROT SERPL-MCNC: 7 G/DL (ref 6–8.2)
PROT UR QL STRIP: NEGATIVE MG/DL
RBC # BLD AUTO: 4.98 M/UL (ref 4.2–5.4)
RBC # URNS HPF: ABNORMAL /HPF
RBC UR QL AUTO: NEGATIVE
SODIUM SERPL-SCNC: 139 MMOL/L (ref 135–145)
SP GR UR STRIP.AUTO: 1.02
TRIGL SERPL-MCNC: 114 MG/DL (ref 0–149)
TSH SERPL DL<=0.005 MIU/L-ACNC: 1.53 UIU/ML (ref 0.38–5.33)
UROBILINOGEN UR STRIP.AUTO-MCNC: 0.2 MG/DL
WBC # BLD AUTO: 8 K/UL (ref 4.8–10.8)
WBC #/AREA URNS HPF: ABNORMAL /HPF

## 2023-10-31 PROCEDURE — 81001 URINALYSIS AUTO W/SCOPE: CPT

## 2023-10-31 PROCEDURE — 80053 COMPREHEN METABOLIC PANEL: CPT

## 2023-10-31 PROCEDURE — 36415 COLL VENOUS BLD VENIPUNCTURE: CPT

## 2023-10-31 PROCEDURE — 84443 ASSAY THYROID STIM HORMONE: CPT

## 2023-10-31 PROCEDURE — 85025 COMPLETE CBC W/AUTO DIFF WBC: CPT

## 2023-10-31 PROCEDURE — 80061 LIPID PANEL: CPT

## 2023-12-20 ENCOUNTER — TELEPHONE (OUTPATIENT)
Dept: HEALTH INFORMATION MANAGEMENT | Facility: OTHER | Age: 68
End: 2023-12-20
Payer: MEDICAID

## 2023-12-20 NOTE — TELEPHONE ENCOUNTER
Mbr informed that she can schedule with [facility] and in network dental providers once she is effective with [facility] 02/01/2024. All was understood.

## 2024-02-05 PROBLEM — R41.3 MEMORY LOSS: Status: ACTIVE | Noted: 2024-02-05

## 2024-02-05 PROBLEM — Z85.038 HISTORY OF COLON CANCER: Status: ACTIVE | Noted: 2024-02-05

## 2024-02-05 PROBLEM — E78.5 DYSLIPIDEMIA: Status: ACTIVE | Noted: 2024-02-05

## 2024-02-05 PROBLEM — R94.30 NONSPECIFIC ABNORMAL FUNCTION STUDY, CARDIOVASCULAR: Status: ACTIVE | Noted: 2024-02-05

## 2024-02-05 PROBLEM — F32.5 MAJOR DEPRESSIVE DISORDER WITH SINGLE EPISODE, IN FULL REMISSION (HCC): Status: ACTIVE | Noted: 2024-02-05

## 2024-02-05 PROBLEM — J30.9 ALLERGIC RHINITIS: Status: ACTIVE | Noted: 2024-02-05

## 2024-02-05 PROBLEM — A60.00 HERPES GENITALIS: Status: ACTIVE | Noted: 2024-02-05

## 2024-02-05 PROBLEM — R73.03 PRE-DIABETES: Status: RESOLVED | Noted: 2019-10-29 | Resolved: 2024-02-05

## 2024-02-05 PROBLEM — E11.9 TYPE 2 DIABETES MELLITUS WITHOUT COMPLICATION (HCC): Status: ACTIVE | Noted: 2024-02-05

## 2024-02-05 PROBLEM — T78.40XA ALLERGY: Status: ACTIVE | Noted: 2024-02-05

## 2024-02-05 PROBLEM — I70.0 AORTIC ATHEROSCLEROSIS (HCC): Status: ACTIVE | Noted: 2024-02-05

## 2024-02-05 PROBLEM — E66.811 OBESITY (BMI 30.0-34.9): Status: ACTIVE | Noted: 2019-10-29

## 2024-02-05 PROBLEM — G47.33 OSA (OBSTRUCTIVE SLEEP APNEA): Status: ACTIVE | Noted: 2024-02-05

## 2024-02-15 ENCOUNTER — OFFICE VISIT (OUTPATIENT)
Dept: MEDICAL GROUP | Facility: MEDICAL CENTER | Age: 69
End: 2024-02-15
Payer: MEDICARE

## 2024-02-15 VITALS
HEART RATE: 71 BPM | BODY MASS INDEX: 33.79 KG/M2 | HEIGHT: 61 IN | OXYGEN SATURATION: 96 % | TEMPERATURE: 98.4 F | RESPIRATION RATE: 16 BRPM | DIASTOLIC BLOOD PRESSURE: 60 MMHG | WEIGHT: 179 LBS | SYSTOLIC BLOOD PRESSURE: 100 MMHG

## 2024-02-15 DIAGNOSIS — I70.0 AORTIC ATHEROSCLEROSIS (HCC): ICD-10-CM

## 2024-02-15 DIAGNOSIS — M25.562 CHRONIC PAIN OF LEFT KNEE: ICD-10-CM

## 2024-02-15 DIAGNOSIS — Z91.09 ALLERGY TO ENVIRONMENTAL FACTORS: ICD-10-CM

## 2024-02-15 DIAGNOSIS — Z12.11 SCREEN FOR COLON CANCER: ICD-10-CM

## 2024-02-15 DIAGNOSIS — G89.29 CHRONIC PAIN OF LEFT KNEE: ICD-10-CM

## 2024-02-15 DIAGNOSIS — E11.9 TYPE 2 DIABETES MELLITUS WITHOUT COMPLICATION, WITHOUT LONG-TERM CURRENT USE OF INSULIN (HCC): ICD-10-CM

## 2024-02-15 DIAGNOSIS — F32.5 MAJOR DEPRESSIVE DISORDER IN FULL REMISSION, UNSPECIFIED WHETHER RECURRENT (HCC): ICD-10-CM

## 2024-02-15 DIAGNOSIS — E78.2 MIXED HYPERLIPIDEMIA: ICD-10-CM

## 2024-02-15 DIAGNOSIS — Z12.31 ENCOUNTER FOR SCREENING MAMMOGRAM FOR MALIGNANT NEOPLASM OF BREAST: ICD-10-CM

## 2024-02-15 DIAGNOSIS — Z85.038 HISTORY OF COLON CANCER: ICD-10-CM

## 2024-02-15 DIAGNOSIS — G47.33 OSA (OBSTRUCTIVE SLEEP APNEA): ICD-10-CM

## 2024-02-15 PROBLEM — E78.5 DYSLIPIDEMIA: Status: RESOLVED | Noted: 2024-02-05 | Resolved: 2024-02-15

## 2024-02-15 LAB
HBA1C MFR BLD: 7.7 % (ref ?–5.8)
POCT INT CON NEG: NEGATIVE
POCT INT CON POS: POSITIVE

## 2024-02-15 PROCEDURE — 83036 HEMOGLOBIN GLYCOSYLATED A1C: CPT | Performed by: FAMILY MEDICINE

## 2024-02-15 PROCEDURE — 3078F DIAST BP <80 MM HG: CPT | Performed by: FAMILY MEDICINE

## 2024-02-15 PROCEDURE — 3074F SYST BP LT 130 MM HG: CPT | Performed by: FAMILY MEDICINE

## 2024-02-15 PROCEDURE — 99204 OFFICE O/P NEW MOD 45 MIN: CPT | Performed by: FAMILY MEDICINE

## 2024-02-15 RX ORDER — MONTELUKAST SODIUM 10 MG/1
10 TABLET ORAL
Qty: 90 TABLET | Refills: 2 | Status: SHIPPED | OUTPATIENT
Start: 2024-02-15

## 2024-02-15 RX ORDER — CITALOPRAM 40 MG/1
40 TABLET ORAL
Qty: 90 TABLET | Refills: 2 | Status: SHIPPED | OUTPATIENT
Start: 2024-02-15

## 2024-02-15 RX ORDER — ATORVASTATIN CALCIUM 10 MG/1
5 TABLET, FILM COATED ORAL NIGHTLY
Qty: 90 TABLET | Refills: 2 | Status: SHIPPED | OUTPATIENT
Start: 2024-02-15

## 2024-02-15 RX ORDER — CETIRIZINE HYDROCHLORIDE 10 MG/1
10 TABLET ORAL EVERY MORNING
Qty: 90 TABLET | Refills: 0 | Status: SHIPPED | OUTPATIENT
Start: 2024-02-15

## 2024-02-15 RX ORDER — YEAST,DRIED (S. CEREVISIAE)
POWDER (GRAM) ORAL
COMMUNITY
End: 2024-02-15

## 2024-02-15 ASSESSMENT — PATIENT HEALTH QUESTIONNAIRE - PHQ9
8. MOVING OR SPEAKING SO SLOWLY THAT OTHER PEOPLE COULD HAVE NOTICED. OR THE OPPOSITE, BEING SO FIGETY OR RESTLESS THAT YOU HAVE BEEN MOVING AROUND A LOT MORE THAN USUAL: NOT AT ALL
SUM OF ALL RESPONSES TO PHQ9 QUESTIONS 1 AND 2: 0
9. THOUGHTS THAT YOU WOULD BE BETTER OFF DEAD, OR OF HURTING YOURSELF: NOT AT ALL
1. LITTLE INTEREST OR PLEASURE IN DOING THINGS: NOT AT ALL
6. FEELING BAD ABOUT YOURSELF - OR THAT YOU ARE A FAILURE OR HAVE LET YOURSELF OR YOUR FAMILY DOWN: NOT AL ALL
2. FEELING DOWN, DEPRESSED, IRRITABLE, OR HOPELESS: NOT AT ALL
7. TROUBLE CONCENTRATING ON THINGS, SUCH AS READING THE NEWSPAPER OR WATCHING TELEVISION: NOT AT ALL
3. TROUBLE FALLING OR STAYING ASLEEP OR SLEEPING TOO MUCH: NOT AT ALL
5. POOR APPETITE OR OVEREATING: NOT AT ALL
4. FEELING TIRED OR HAVING LITTLE ENERGY: NOT AT ALL
SUM OF ALL RESPONSES TO PHQ QUESTIONS 1-9: 0

## 2024-02-15 ASSESSMENT — FIBROSIS 4 INDEX: FIB4 SCORE: 0.33

## 2024-02-15 NOTE — PROGRESS NOTES
CC: New patient: Diabetes, hyperlipidemia, depression, sleep apnea history of colon cancer, aortic atherosclerosis, chronic back pain, environmental allergy    HPI:  Marii Baker presents today to establish new PCP.    Patient came in today with her son to establish new PCP.  She has been active and independent with all ADLs.  Has the following chronic medical issues:    Type 2 diabetes mellitus without complication, without long-term current use of insulin (HCC)  A1c today is 7.7%.  It was 6.7% 3 months ago.  Denies polyuria and polydipsia.  Patient has been on metformin 500 mg twice daily.  Patient is due for monofilament foot exam, retinal exam, microalbuminuria screening.    Mixed hyperlipidemia  She has been tolerating the statin. Denies muscle pain LFTs has been normal atorvastatin 10 mg daily.    Major depressive disorder in full remission, unspecified whether recurrent (HCC)  Mood has been fluctuating but mostly stable.  Denies any suicidal ideation.  Patient has been on citalopram 40 mg daily, no side effects    ISABELA (obstructive sleep apnea)  Patient has been doing fine on CPAP.  Denies morning headache and daytime somnolence.    History of colon cancer  Patient underwent surgical removal of the tumor as well as, radiation therapy and chemotherapy 5 years ago.  She is currently asymptomatic.  However she stated that she is due for colonoscopy.    Aortic atherosclerosis (HCC)  Accidental finding on imaging.  Patient has been on atorvastatin    Chronic pain of left knee  Patient has been having severe osteoarthritis of the left knee, it has been affecting her gait, causing imbalance gait and back pain.  She requested referral to orthopedic surgeon Dr. Abrams.    Allergy to environmental factors  Her symptoms has been has been fluctuating but mostly stable on cetirizine and montelukast    Due for colonoscopy  Due for mammogram    Patient Active Problem List    Diagnosis Date Noted    History of colon cancer  02/05/2024    Aortic atherosclerosis (HCC) 02/05/2024    ISABELA (obstructive sleep apnea) 02/05/2024    BMI 33.0-33.9,adult 02/05/2024    Nonspecific abnormal function study, cardiovascular 02/05/2024    Type 2 diabetes mellitus without complication (HCC) 02/05/2024    Herpes genitalis 02/05/2024    Major depressive disorder in full remission (HCC) 02/05/2024    Memory loss 02/05/2024    Allergic rhinitis 02/05/2024    Mixed hyperlipidemia 10/29/2019    Obesity (BMI 30.0-34.9) 10/29/2019    Fourth degree hemorrhoids 12/07/2016       Current Outpatient Medications   Medication Sig Dispense Refill    Glucos-Chond-MSM-Bor-D3-Hyalur (MOVE FREE JOINT HEALTH ADV + D) Tab Take  by mouth.      Multiple Vitamins-Minerals (CENTRUM SILVER 50+WOMEN) Tab Take  by mouth.      metformin (GLUCOPHAGE) 1000 MG tablet Take 1 Tablet by mouth 2 times a day with meals. 180 Tablet 2    cetirizine (ZYRTEC) 10 MG Tab Take 1 Tablet by mouth every morning. Indications: Hayfever 90 Tablet 0    citalopram (CELEXA) 40 MG Tab Take 1 Tablet by mouth at bedtime. 90 Tablet 2    atorvastatin (LIPITOR) 10 MG Tab Take 0.5 Tablets by mouth every evening. 90 Tablet 2    melatonin 1 mg Tab Take 5 mg by mouth every evening.      meclizine (ANTIVERT) 25 MG Tab Take 1 Tablet by mouth 3 times a day as needed for Dizziness or Vertigo. 30 Tablet 0    ondansetron (ZOFRAN ODT) 4 MG TABLET DISPERSIBLE Take 1 Tablet by mouth every 6 hours as needed for Nausea/Vomiting. 10 Tablet 0    aspirin (ASA) 81 MG Chew Tab chewable tablet Take 1 Tab by mouth every day. 90 Tab 3    Multiple Vitamins-Minerals (MULTIVITAMIN ADULT PO) Take  by mouth.      valacyclovir (VALTREX) 500 MG Tab Take 500 mg by mouth as needed. 3 day course       montelukast (SINGULAIR) 10 MG Tab Take 10 mg by mouth every bedtime. Indications: Hayfever      ASHWAGANDHA PO Take 500 mg by mouth every day. (Patient not taking: Reported on 2/15/2024)      estrogens, conjugated (PREMARIN) 0.625 MG/GM Cream  Insert 0.5 g into the vagina every day. (Patient not taking: Reported on 10/7/2022)      Omega-3 Fatty Acids (FISH OIL) 1000 MG Cap capsule Take 1,000 mg by mouth 3 times a day, with meals. (Patient not taking: Reported on 2/5/2024)      ascorbic acid (ASCORBIC ACID) 500 MG Tab Take 500 mg by mouth as needed. (Patient not taking: Reported on 10/7/2022)      Misc Natural Products (GLUCOS-CHONDROIT-MSM COMPLEX PO) Take  by mouth. (Patient not taking: Reported on 2/15/2024)      COLLAGEN PO Take  by mouth.      RESVERATROL PO Take  by mouth. (Patient not taking: Reported on 2/5/2024)       No current facility-administered medications for this visit.         Allergies as of 02/15/2024    (No Known Allergies)        Social History     Socioeconomic History    Marital status:      Spouse name: Not on file    Number of children: Not on file    Years of education: Not on file    Highest education level: Not on file   Occupational History    Not on file   Tobacco Use    Smoking status: Never    Smokeless tobacco: Never   Vaping Use    Vaping Use: Never used   Substance and Sexual Activity    Alcohol use: No     Alcohol/week: 0.0 oz    Drug use: No    Sexual activity: Not on file   Other Topics Concern    Not on file   Social History Narrative    Not on file     Social Determinants of Health     Financial Resource Strain: Not on file   Food Insecurity: Not on file   Transportation Needs: Not on file   Physical Activity: Not on file   Stress: Not on file   Social Connections: Not on file   Intimate Partner Violence: Not on file   Housing Stability: Not on file       Family History   Problem Relation Age of Onset    Cancer Paternal Aunt     Sleep Apnea Neg Hx        Past Surgical History:   Procedure Laterality Date    OTHER  2019    port removal     LOW ANTERIOR RESECTION LAPAROSCOPIC  12/7/2016    Procedure: LOW ANTERIOR RESECTION LAPAROSCOPIC;  Surgeon: Enoch Shaw M.D.;  Location: SURGERY [facility];  Service:  "    OTHER ORTHOPEDIC SURGERY Left     left wrist    GYN SURGERY      hysterectomy    OTHER      varicose vein stripping rubina    COLON RESECTION      GYN SURGERY       x 2    HYSTERECTOMY LAPAROSCOPY      PRIMARY C SECTION         ROS:  Denies any Headache, Blurred Vision, Confusion Chest pain,  Shortness of breath,  Abdominal pain, Changes of bowel or bladder, Lower ext edema, Fevers, Nights sweats, Weight Changes, Focal weakness or numbness.  All other systems are negative.    /60 (BP Location: Right arm, Patient Position: Sitting, BP Cuff Size: Adult)   Pulse 71   Temp 36.9 °C (98.4 °F) (Temporal)   Resp 16   Ht 1.549 m (5' 1\")   Wt 81.2 kg (179 lb)   SpO2 96%   BMI 33.82 kg/m²     Physical Exam:  Gen:         Alert and oriented, No apparent distress.  HEENT:   Perrla, TM clear,  Oralpharynx no erythema or exudates.  Neck:       No Jugular venous distension, Lymphadenopathy, Thyromegaly, Bruits.  Lungs:     Clear to auscultation bilaterally  CV:          Regular rate and rhythm. No murmurs, rubs or gallops.  Abd:         Soft non tender, non distended. Normal active bowel sounds. No                                        Hepatosplenomegaly, No pulsatile masses.  Ext:          No clubbing, cyanosis, edema.    Monofilament foot exam: Normal sensation bilaterally, no macerations or ulcers, normal peripheral pulses.    Assessment and Plan.   69 y.o. female     1. Type 2 diabetes mellitus without complication, without long-term current use of insulin ([facility])  A1c today is 7.7.  It was 6.73 months ago.  Will increase metformin to 1000 mg twice a day  Monofilament foot exam showed no sign of neuropathy.  Patient is due for retinal exam, requested referral to ophthalmology  Due for microalbuminuria screening, order placed    - POCT  A1C  - Referral to Ophthalmology  - metformin (GLUCOPHAGE) 1000 MG tablet; Take 1 Tablet by mouth 2 times a day with meals.  Dispense: 180 Tablet; Refill: " 2  - CBC WITH DIFFERENTIAL; Future  - Comp Metabolic Panel; Future  - MICROALBUMIN CREAT RATIO URINE; Future  - Lipid Profile; Future  - TSH; Future  - HEMOGLOBIN A1C; Future  - Diabetic Monofilament LE Exam  - Referral to Ophthalmology    2. Mixed hyperlipidemia  She has been tolerating the statin. Denies muscle pain LFTs has been normal  Continue atorvastatin 10 mg daily.    - atorvastatin (LIPITOR) 10 MG Tab; Take 0.5 Tablets by mouth every evening.  Dispense: 90 Tablet; Refill: 2  - Lipid Profile; Future  - TSH; Future    3. Major depressive disorder in full remission, unspecified whether recurrent (HCC)  Mood has been stable.  Continue on citalopram 40 mg daily    - citalopram (CELEXA) 40 MG Tab; Take 1 Tablet by mouth at bedtime.  Dispense: 90 Tablet; Refill: 2    4. ISABELA (obstructive sleep apnea)  Has been doing fine on CPAP.    5. History of colon cancer  Status post surgery, radiation therapy and chemotherapy 5 years ago.  Currently asymptomatic.  Due for colonoscopy, order placed    6. Aortic atherosclerosis (HCC)  Accidental finding on imaging.  Continue on statin    7. Chronic pain of left knee  Patient has been having severe osteoarthritis, requested referral to orthopedic surgeon Dr. Abrams.    - Referral to Orthopedics    8. Screen for colon cancer    - Referral to GI for Colonoscopy    9. Allergy to environmental factors  Has been on cetirizine and montelukast    - cetirizine (ZYRTEC) 10 MG Tab; Take 1 Tablet by mouth every morning. Indications: Hayfever  Dispense: 90 Tablet; Refill: 0    10. Encounter for screening mammogram for malignant neoplasm of breast  Due for mammogram.    - MA-SCREENING MAMMO BILAT W/CAD; Future

## 2024-02-16 ENCOUNTER — HOSPITAL ENCOUNTER (OUTPATIENT)
Dept: RADIOLOGY | Facility: MEDICAL CENTER | Age: 69
End: 2024-02-16
Attending: FAMILY MEDICINE
Payer: MEDICARE

## 2024-02-16 DIAGNOSIS — Z12.31 ENCOUNTER FOR SCREENING MAMMOGRAM FOR MALIGNANT NEOPLASM OF BREAST: ICD-10-CM

## 2024-02-16 PROCEDURE — 77067 SCR MAMMO BI INCL CAD: CPT

## 2024-02-20 ENCOUNTER — HOSPITAL ENCOUNTER (OUTPATIENT)
Dept: RADIOLOGY | Facility: MEDICAL CENTER | Age: 69
End: 2024-02-20
Payer: MEDICARE

## 2024-03-07 PROBLEM — M17.12 DEGENERATIVE ARTHRITIS OF LEFT KNEE: Status: ACTIVE | Noted: 2024-03-07

## 2024-03-14 ENCOUNTER — APPOINTMENT (OUTPATIENT)
Dept: ADMISSIONS | Facility: MEDICAL CENTER | Age: 69
End: 2024-03-14
Attending: ORTHOPAEDIC SURGERY
Payer: MEDICARE

## 2024-03-14 PROBLEM — M17.12 PRIMARY OSTEOARTHRITIS OF LEFT KNEE: Status: ACTIVE | Noted: 2024-03-07

## 2024-03-19 ENCOUNTER — PRE-ADMISSION TESTING (OUTPATIENT)
Dept: ADMISSIONS | Facility: MEDICAL CENTER | Age: 69
End: 2024-03-19
Attending: ORTHOPAEDIC SURGERY
Payer: MEDICARE

## 2024-03-19 DIAGNOSIS — Z01.812 PRE-OPERATIVE LABORATORY EXAMINATION: ICD-10-CM

## 2024-03-19 RX ORDER — UREA 10 %
5 LOTION (ML) TOPICAL NIGHTLY PRN
COMMUNITY

## 2024-03-19 NOTE — PREPROCEDURE INSTRUCTIONS
PreAdmit Telephone Appointment: Reviewed the Preparing for your procedure handout with patient over the phone. Patient instructed per pharmacy guidelines regarding taking, holding or contacting provider for instructions on regularly prescribed medications before surgery. Instructed to take the following medications the day of surgery with a sip of water per pharmacy medication guidelines: Tylenol if needed, Zyrtec  Pt denies issues with anesthesia    Confirmed with patient where to check in morning of surgery. Handouts/Brochure/Video emailed to patient.

## 2024-03-28 ENCOUNTER — HOSPITAL ENCOUNTER (OUTPATIENT)
Dept: RADIOLOGY | Facility: MEDICAL CENTER | Age: 69
End: 2024-03-28
Attending: ORTHOPAEDIC SURGERY
Payer: MEDICARE

## 2024-03-28 DIAGNOSIS — Z01.818 PRE-OP EXAM: ICD-10-CM

## 2024-03-28 PROCEDURE — 73700 CT LOWER EXTREMITY W/O DYE: CPT | Mod: LT

## 2024-03-29 ENCOUNTER — APPOINTMENT (OUTPATIENT)
Dept: ADMISSIONS | Facility: MEDICAL CENTER | Age: 69
End: 2024-03-29
Attending: ORTHOPAEDIC SURGERY
Payer: MEDICARE

## 2024-03-29 DIAGNOSIS — Z01.812 PRE-OPERATIVE LABORATORY EXAMINATION: ICD-10-CM

## 2024-03-29 DIAGNOSIS — Z01.818 PRE-OP EXAM: ICD-10-CM

## 2024-03-29 LAB
ANION GAP SERPL CALC-SCNC: 11 MMOL/L (ref 7–16)
BUN SERPL-MCNC: 20 MG/DL (ref 8–22)
CALCIUM SERPL-MCNC: 9.5 MG/DL (ref 8.4–10.2)
CHLORIDE SERPL-SCNC: 101 MMOL/L (ref 96–112)
CO2 SERPL-SCNC: 25 MMOL/L (ref 20–33)
CREAT SERPL-MCNC: 0.75 MG/DL (ref 0.5–1.4)
ERYTHROCYTE [DISTWIDTH] IN BLOOD BY AUTOMATED COUNT: 45.7 FL (ref 35.9–50)
EST. AVERAGE GLUCOSE BLD GHB EST-MCNC: 157 MG/DL
GFR SERPLBLD CREATININE-BSD FMLA CKD-EPI: 87 ML/MIN/1.73 M 2
GLUCOSE SERPL-MCNC: 145 MG/DL (ref 65–99)
HBA1C MFR BLD: 7.1 % (ref 4–5.6)
HCT VFR BLD AUTO: 41.3 % (ref 37–47)
HGB BLD-MCNC: 13.9 G/DL (ref 12–16)
MCH RBC QN AUTO: 27.8 PG (ref 27–33)
MCHC RBC AUTO-ENTMCNC: 33.7 G/DL (ref 32.2–35.5)
MCV RBC AUTO: 82.6 FL (ref 81.4–97.8)
PLATELET # BLD AUTO: 615 K/UL (ref 164–446)
PMV BLD AUTO: 9.2 FL (ref 9–12.9)
POTASSIUM SERPL-SCNC: 4.3 MMOL/L (ref 3.6–5.5)
RBC # BLD AUTO: 5 M/UL (ref 4.2–5.4)
SCCMEC + MECA PNL NOSE NAA+PROBE: NEGATIVE
SCCMEC + MECA PNL NOSE NAA+PROBE: NEGATIVE
SODIUM SERPL-SCNC: 137 MMOL/L (ref 135–145)
WBC # BLD AUTO: 8.7 K/UL (ref 4.8–10.8)

## 2024-03-29 PROCEDURE — 83036 HEMOGLOBIN GLYCOSYLATED A1C: CPT

## 2024-03-29 PROCEDURE — 36415 COLL VENOUS BLD VENIPUNCTURE: CPT

## 2024-03-29 PROCEDURE — 87641 MR-STAPH DNA AMP PROBE: CPT

## 2024-03-29 PROCEDURE — 87640 STAPH A DNA AMP PROBE: CPT | Mod: XU

## 2024-03-29 PROCEDURE — 85027 COMPLETE CBC AUTOMATED: CPT

## 2024-03-29 PROCEDURE — 80048 BASIC METABOLIC PNL TOTAL CA: CPT

## 2024-03-29 NOTE — DISCHARGE PLANNING
DISCHARGE PLANNING NOTE - TOTAL JOINT    Procedure: Procedure(s):  ROBOTIC LEFT TOTAL KNEE ARTHROPLASTY  Procedure Date: 4/15/2024  Insurance: Payor: [facility] SENIOR CARE PLUS / Plan: [facility] PREFERRED  / Product Type: Medicare Advantage /    Equipment currently available at home?  raised toilet seat and going to [facility] for ice machine.  Steps into the home? 0  Steps within the home? 0  Toilet height? Standard  Type of shower? tub-shower  Home Oxygen? No, but has cpap.  Portable tank?    Oxygen Provider:  Who will be with you during your recovery? son, spouse  Is Outpatient Physical Therapy set up after surgery? No in process  Did you take the Total Joint Class and where? Yes, received NAON book.  Planning same day discharge?Yes     This writer met with pt and educated to preop showers, nasal mrsa swab which was obtained by this writer, and potential for overnight stay. CHG kit given to pt. Home safety checklist sent home with pt. Pt educated to dc criteria. All questions answered and verbalizes understanding of all instructions. No dc needs identified at this time. Anticipate dc to home without barriers.

## 2024-04-11 NOTE — H&P (VIEW-ONLY)
Marii Delgado is a 69 y.o. female  here today for further discussion of their left knee arthritis and left knee pain.  They have tried and failed most all conservative measures and are ready to move forward with surgery in the form of a total knee arthroplasty.  They have obtained the appropriate clearances if necessary.  They have pain on a daily basis, pain at night, pain that affects her activities of daily living.  They have no history of blood clots.  They are ready to move forward with surgery.  We are planning on using Janette triathlon cementless with Yonis robotic implantation as their implants.  They will be having their surgery at [facility].      Past Medical History:   Diagnosis Date    Arthritis     left knee    Back pain     Bowel habit changes     constipation/ occasional diarrhea    Breath shortness     with exertion     Cancer (HCC) 2016    colon (chemo)    Cancer of sigmoid colon (HCC) 12/07/2016    Cataract     no sx    Colon cancer (HCC)     Dental disorder     missing teeth    Depression     Diabetes (HCC)     oral meds    H/O seasonal allergies     Heart burn     occasional    Hemorrhagic disorder (HCC)     post tooth extraction    High cholesterol     Hypertension     3/19/2024 pt reports she does not have hypertension    Kidney stone     Mixed hyperlipidemia 10/29/2019    Obesity 10/29/2019    Pain 12/01/2016    left wrist and knee, 3-4/10    Peripheral vascular disease (HCC)     Pre-diabetes 10/29/2019    Psychiatric problem     anxiety    Renal disorder     hx of kidney stones     Sleep apnea     CPAP    Snoring     first step of sleep study completed, pt awaiting overnight sleep study     Urinary incontinence      Past Surgical History:   Procedure Laterality Date    ANGIOGRAM  2020    pt reports no cardiac issues    OTHER  2019    port removal     LOW ANTERIOR RESECTION LAPAROSCOPIC  12/07/2016    Procedure: LOW ANTERIOR RESECTION LAPAROSCOPIC;  Surgeon: Enoch LOZANO  ASHANTI Shaw;  Location: SURGERY [facility];  Service:     ORIF, WRIST Left 2016    HYSTERECTOMY, VAGINAL      hysterectomy    VEIN STRIPPING      varicose vein stripping    PRIMARY C SECTION      repeat      Social Connections: Socially Integrated (3/17/2024)    Social Connection and Isolation Panel [NHANES]     Frequency of Communication with Friends and Family: Three times a week     Frequency of Social Gatherings with Friends and Family: Three times a week     Attends Nondenominational Services: More than 4 times per year     Active Member of Clubs or Organizations: Yes     Attends Club or Organization Meetings: More than 4 times per year     Marital Status:        Current Outpatient Medications:     Glucosamine-Chondroitin (MOVE FREE PO), Take  by mouth every day.    aspirin effervescent, 1 Tablet, Oral, Q6HRS PRN    NON SPECIFIED, every day. OTC supplement for stress, Aswagandha    melatonin, 5 mg, Oral, HS PRN    Acetaminophen (TYLENOL ARTHRITIS PAIN PO), Take  by mouth as needed.    Cinnamon, Take  by mouth every day.    glucosamine, Take  by mouth every day.    Centrum Silver 50+Women, Take  by mouth.    metformin, 1,000 mg, Oral, BID WITH MEALS    cetirizine, 10 mg, Oral, QAM    citalopram, 40 mg, Oral, QHS    atorvastatin, 5 mg, Oral, Nightly    montelukast, 10 mg, Oral, QHS    aspirin, 81 mg, Oral, DAILY    valACYclovir, 500 mg, Oral, PRN  Other environmental      Past medical history social history surgical history review of systems reviewed and is otherwise noncontributory except for above    Exam    Alert and oriented x3, no apparent distress  Normocephalic atraumatic, trachea midline  Breathing is nonlabored  Abdomen is nonacute  Neurovascularly intact in bilateral lower extremities.  Soft Calf Compartments without signs of DVT.  Right lower extremity: Only minimal pain with forced flexion and patellofemoral grind, good stability varus and valgus stress.  Grossly  neurovascularly intact distally no pain with forced flexion  Left lower extremity: Left knee has a varus deformity, laxity to valgus stress, pain with patellofemoral grind and palpation of her tibiofemoral joint line.  She is able to plantar and dorsiflex her ankles and extend her great toes.  Sensation intact to light touch in an L4-L5 and S1 distribution    Images    All images were reviewed which are appropriate for templating purposes.  They show evidence of joint space narrowing, osteophyte formation, subchondral sclerosis.    Impression    1) Left Knee Pain  2) Left Knee Arthritis     Plan    All the risk benefits and side effects of surgery were discussed including pain, bleeding, infection, injury to surrounding organs vessels, heart attack, stroke, fracture, dislocation, need for further surgery, and possibly death.  We will plan on going forward with a total knee arthroplasty.  We will plan on using Aspirin for DVT prophylaxis.  Patient will get all of their postoperative prescriptions today.  Patient will plan on going home same day.  Patient may be placed in extended recovery to allow for clearance from physical therapy and pain control.   We will plan on using Janette triathlon cementless with Yonis robotic implantation as their surgical implants.  All of their questions were answered and they agreed expressed full understanding.  We will see them on the day of surgery.  They will get a call from our surgical team about start time and arrival time of surgery.

## 2024-04-15 ENCOUNTER — APPOINTMENT (OUTPATIENT)
Dept: RADIOLOGY | Facility: MEDICAL CENTER | Age: 69
End: 2024-04-15
Attending: PHYSICIAN ASSISTANT
Payer: MEDICARE

## 2024-04-15 ENCOUNTER — ANESTHESIA EVENT (OUTPATIENT)
Dept: SURGERY | Facility: MEDICAL CENTER | Age: 69
End: 2024-04-15
Payer: MEDICARE

## 2024-04-15 ENCOUNTER — ANESTHESIA (OUTPATIENT)
Dept: SURGERY | Facility: MEDICAL CENTER | Age: 69
End: 2024-04-15
Payer: MEDICARE

## 2024-04-15 ENCOUNTER — HOSPITAL ENCOUNTER (OUTPATIENT)
Facility: MEDICAL CENTER | Age: 69
End: 2024-04-16
Attending: ORTHOPAEDIC SURGERY | Admitting: ORTHOPAEDIC SURGERY
Payer: MEDICARE

## 2024-04-15 DIAGNOSIS — M17.12 PRIMARY OSTEOARTHRITIS OF LEFT KNEE: ICD-10-CM

## 2024-04-15 LAB — GLUCOSE BLD STRIP.AUTO-MCNC: 136 MG/DL (ref 65–99)

## 2024-04-15 PROCEDURE — 700101 HCHG RX REV CODE 250: Performed by: ANESTHESIOLOGY

## 2024-04-15 PROCEDURE — 770030 HCHG ROOM/CARE - EXTENDED RECOVERY EACH 15 MIN

## 2024-04-15 PROCEDURE — 160048 HCHG OR STATISTICAL LEVEL 1-5: Performed by: ORTHOPAEDIC SURGERY

## 2024-04-15 PROCEDURE — 27447 TOTAL KNEE ARTHROPLASTY: CPT | Mod: ASROC,LT | Performed by: PHYSICIAN ASSISTANT

## 2024-04-15 PROCEDURE — A9270 NON-COVERED ITEM OR SERVICE: HCPCS | Performed by: ANESTHESIOLOGY

## 2024-04-15 PROCEDURE — 700111 HCHG RX REV CODE 636 W/ 250 OVERRIDE (IP): Performed by: ANESTHESIOLOGY

## 2024-04-15 PROCEDURE — 160035 HCHG PACU - 1ST 60 MINS PHASE I: Performed by: ORTHOPAEDIC SURGERY

## 2024-04-15 PROCEDURE — 27447 TOTAL KNEE ARTHROPLASTY: CPT | Mod: LT | Performed by: ORTHOPAEDIC SURGERY

## 2024-04-15 PROCEDURE — 64447 NJX AA&/STRD FEMORAL NRV IMG: CPT | Performed by: ORTHOPAEDIC SURGERY

## 2024-04-15 PROCEDURE — 700101 HCHG RX REV CODE 250: Performed by: PHYSICIAN ASSISTANT

## 2024-04-15 PROCEDURE — 97162 PT EVAL MOD COMPLEX 30 MIN: CPT

## 2024-04-15 PROCEDURE — 700101 HCHG RX REV CODE 250: Performed by: ORTHOPAEDIC SURGERY

## 2024-04-15 PROCEDURE — 700102 HCHG RX REV CODE 250 W/ 637 OVERRIDE(OP): Performed by: ANESTHESIOLOGY

## 2024-04-15 PROCEDURE — 160036 HCHG PACU - EA ADDL 30 MINS PHASE I: Performed by: ORTHOPAEDIC SURGERY

## 2024-04-15 PROCEDURE — C1713 ANCHOR/SCREW BN/BN,TIS/BN: HCPCS | Performed by: ORTHOPAEDIC SURGERY

## 2024-04-15 PROCEDURE — 160031 HCHG SURGERY MINUTES - 1ST 30 MINS LEVEL 5: Performed by: ORTHOPAEDIC SURGERY

## 2024-04-15 PROCEDURE — C1776 JOINT DEVICE (IMPLANTABLE): HCPCS | Performed by: ORTHOPAEDIC SURGERY

## 2024-04-15 PROCEDURE — 160042 HCHG SURGERY MINUTES - EA ADDL 1 MIN LEVEL 5: Performed by: ORTHOPAEDIC SURGERY

## 2024-04-15 PROCEDURE — 700111 HCHG RX REV CODE 636 W/ 250 OVERRIDE (IP): Performed by: PHYSICIAN ASSISTANT

## 2024-04-15 PROCEDURE — 94760 N-INVAS EAR/PLS OXIMETRY 1: CPT

## 2024-04-15 PROCEDURE — 700105 HCHG RX REV CODE 258: Performed by: PHYSICIAN ASSISTANT

## 2024-04-15 PROCEDURE — 97110 THERAPEUTIC EXERCISES: CPT

## 2024-04-15 PROCEDURE — 20985 CPTR-ASST DIR MS PX: CPT | Mod: LT | Performed by: ORTHOPAEDIC SURGERY

## 2024-04-15 PROCEDURE — 73560 X-RAY EXAM OF KNEE 1 OR 2: CPT | Mod: LT

## 2024-04-15 PROCEDURE — 160009 HCHG ANES TIME/MIN: Performed by: ORTHOPAEDIC SURGERY

## 2024-04-15 PROCEDURE — 502000 HCHG MISC OR IMPLANTS RC 0278: Performed by: ORTHOPAEDIC SURGERY

## 2024-04-15 PROCEDURE — 20985 CPTR-ASST DIR MS PX: CPT | Mod: ASROC,LT | Performed by: PHYSICIAN ASSISTANT

## 2024-04-15 PROCEDURE — 700102 HCHG RX REV CODE 250 W/ 637 OVERRIDE(OP): Performed by: PHYSICIAN ASSISTANT

## 2024-04-15 PROCEDURE — 700105 HCHG RX REV CODE 258: Performed by: ORTHOPAEDIC SURGERY

## 2024-04-15 PROCEDURE — 700111 HCHG RX REV CODE 636 W/ 250 OVERRIDE (IP): Performed by: ORTHOPAEDIC SURGERY

## 2024-04-15 PROCEDURE — 502714 HCHG ROBOTIC SURGERY SERVICES: Performed by: ORTHOPAEDIC SURGERY

## 2024-04-15 PROCEDURE — 160002 HCHG RECOVERY MINUTES (STAT): Performed by: ORTHOPAEDIC SURGERY

## 2024-04-15 PROCEDURE — 82962 GLUCOSE BLOOD TEST: CPT

## 2024-04-15 PROCEDURE — 700111 HCHG RX REV CODE 636 W/ 250 OVERRIDE (IP): Mod: JZ | Performed by: ANESTHESIOLOGY

## 2024-04-15 PROCEDURE — 97116 GAIT TRAINING THERAPY: CPT

## 2024-04-15 PROCEDURE — 700105 HCHG RX REV CODE 258: Performed by: ANESTHESIOLOGY

## 2024-04-15 PROCEDURE — A9270 NON-COVERED ITEM OR SERVICE: HCPCS | Performed by: PHYSICIAN ASSISTANT

## 2024-04-15 DEVICE — IMPLANTABLE DEVICE: Type: IMPLANTABLE DEVICE | Site: KNEE | Status: FUNCTIONAL

## 2024-04-15 RX ORDER — TRAMADOL HYDROCHLORIDE 50 MG/1
50 TABLET ORAL EVERY 4 HOURS PRN
Status: DISCONTINUED | OUTPATIENT
Start: 2024-04-15 | End: 2024-04-16 | Stop reason: HOSPADM

## 2024-04-15 RX ORDER — ENEMA 19; 7 G/133ML; G/133ML
1 ENEMA RECTAL
Status: DISCONTINUED | OUTPATIENT
Start: 2024-04-15 | End: 2024-04-16 | Stop reason: HOSPADM

## 2024-04-15 RX ORDER — TAMSULOSIN HYDROCHLORIDE 0.4 MG/1
0.4 CAPSULE ORAL
Status: DISCONTINUED | OUTPATIENT
Start: 2024-04-15 | End: 2024-04-16 | Stop reason: HOSPADM

## 2024-04-15 RX ORDER — TRANEXAMIC ACID 100 MG/ML
INJECTION, SOLUTION INTRAVENOUS PRN
Status: DISCONTINUED | OUTPATIENT
Start: 2024-04-15 | End: 2024-04-15 | Stop reason: SURG

## 2024-04-15 RX ORDER — OMEPRAZOLE 20 MG/1
20 CAPSULE, DELAYED RELEASE ORAL 2 TIMES DAILY PRN
Status: DISCONTINUED | OUTPATIENT
Start: 2024-04-15 | End: 2024-04-16 | Stop reason: HOSPADM

## 2024-04-15 RX ORDER — AMOXICILLIN 250 MG
1 CAPSULE ORAL
Status: DISCONTINUED | OUTPATIENT
Start: 2024-04-15 | End: 2024-04-16 | Stop reason: HOSPADM

## 2024-04-15 RX ORDER — KETOROLAC TROMETHAMINE 15 MG/ML
15 INJECTION, SOLUTION INTRAMUSCULAR; INTRAVENOUS EVERY 6 HOURS
Status: DISCONTINUED | OUTPATIENT
Start: 2024-04-15 | End: 2024-04-16 | Stop reason: HOSPADM

## 2024-04-15 RX ORDER — DIPHENHYDRAMINE HCL 25 MG
25 TABLET ORAL NIGHTLY PRN
Status: DISCONTINUED | OUTPATIENT
Start: 2024-04-16 | End: 2024-04-16 | Stop reason: HOSPADM

## 2024-04-15 RX ORDER — DIPHENHYDRAMINE HCL 25 MG
25 TABLET ORAL EVERY 6 HOURS PRN
Status: DISCONTINUED | OUTPATIENT
Start: 2024-04-15 | End: 2024-04-16 | Stop reason: HOSPADM

## 2024-04-15 RX ORDER — DOCUSATE SODIUM 100 MG/1
100 CAPSULE, LIQUID FILLED ORAL 2 TIMES DAILY
Status: DISCONTINUED | OUTPATIENT
Start: 2024-04-15 | End: 2024-04-16 | Stop reason: HOSPADM

## 2024-04-15 RX ORDER — HYDROMORPHONE HYDROCHLORIDE 1 MG/ML
0.5 INJECTION, SOLUTION INTRAMUSCULAR; INTRAVENOUS; SUBCUTANEOUS
Status: DISCONTINUED | OUTPATIENT
Start: 2024-04-15 | End: 2024-04-16 | Stop reason: HOSPADM

## 2024-04-15 RX ORDER — HYDROMORPHONE HYDROCHLORIDE 1 MG/ML
0.4 INJECTION, SOLUTION INTRAMUSCULAR; INTRAVENOUS; SUBCUTANEOUS
Status: DISCONTINUED | OUTPATIENT
Start: 2024-04-15 | End: 2024-04-15 | Stop reason: HOSPADM

## 2024-04-15 RX ORDER — AMOXICILLIN 250 MG
1 CAPSULE ORAL NIGHTLY
Status: DISCONTINUED | OUTPATIENT
Start: 2024-04-15 | End: 2024-04-16 | Stop reason: HOSPADM

## 2024-04-15 RX ORDER — OXYCODONE HYDROCHLORIDE 10 MG/1
10 TABLET ORAL
Status: DISCONTINUED | OUTPATIENT
Start: 2024-04-15 | End: 2024-04-16 | Stop reason: HOSPADM

## 2024-04-15 RX ORDER — ONDANSETRON 4 MG/1
4 TABLET, ORALLY DISINTEGRATING ORAL EVERY 6 HOURS PRN
Status: DISCONTINUED | OUTPATIENT
Start: 2024-04-15 | End: 2024-04-15

## 2024-04-15 RX ORDER — DEXAMETHASONE SODIUM PHOSPHATE 4 MG/ML
INJECTION, SOLUTION INTRA-ARTICULAR; INTRALESIONAL; INTRAMUSCULAR; INTRAVENOUS; SOFT TISSUE PRN
Status: DISCONTINUED | OUTPATIENT
Start: 2024-04-15 | End: 2024-04-15 | Stop reason: SURG

## 2024-04-15 RX ORDER — HALOPERIDOL 5 MG/ML
1 INJECTION INTRAMUSCULAR
Status: DISCONTINUED | OUTPATIENT
Start: 2024-04-15 | End: 2024-04-15 | Stop reason: HOSPADM

## 2024-04-15 RX ORDER — CETIRIZINE HYDROCHLORIDE 10 MG/1
10 TABLET ORAL EVERY MORNING
Status: DISCONTINUED | OUTPATIENT
Start: 2024-04-15 | End: 2024-04-16 | Stop reason: HOSPADM

## 2024-04-15 RX ORDER — MEPERIDINE HYDROCHLORIDE 25 MG/ML
6.25 INJECTION INTRAMUSCULAR; INTRAVENOUS; SUBCUTANEOUS
Status: DISCONTINUED | OUTPATIENT
Start: 2024-04-15 | End: 2024-04-15 | Stop reason: HOSPADM

## 2024-04-15 RX ORDER — EPHEDRINE SULFATE 50 MG/ML
5 INJECTION, SOLUTION INTRAVENOUS
Status: DISCONTINUED | OUTPATIENT
Start: 2024-04-15 | End: 2024-04-15 | Stop reason: HOSPADM

## 2024-04-15 RX ORDER — EPINEPHRINE 1 MG/ML(1)
AMPUL (ML) INJECTION
Status: DISCONTINUED | OUTPATIENT
Start: 2024-04-15 | End: 2024-04-15 | Stop reason: HOSPADM

## 2024-04-15 RX ORDER — OXYCODONE HCL 5 MG/5 ML
5 SOLUTION, ORAL ORAL
Status: COMPLETED | OUTPATIENT
Start: 2024-04-15 | End: 2024-04-15

## 2024-04-15 RX ORDER — ONDANSETRON 2 MG/ML
4 INJECTION INTRAMUSCULAR; INTRAVENOUS
Status: DISCONTINUED | OUTPATIENT
Start: 2024-04-15 | End: 2024-04-15 | Stop reason: HOSPADM

## 2024-04-15 RX ORDER — OXYCODONE HYDROCHLORIDE 5 MG/1
5 TABLET ORAL
Status: DISCONTINUED | OUTPATIENT
Start: 2024-04-15 | End: 2024-04-16 | Stop reason: HOSPADM

## 2024-04-15 RX ORDER — ROPIVACAINE HYDROCHLORIDE 5 MG/ML
INJECTION, SOLUTION EPIDURAL; INFILTRATION; PERINEURAL
Status: DISCONTINUED | OUTPATIENT
Start: 2024-04-15 | End: 2024-04-15 | Stop reason: HOSPADM

## 2024-04-15 RX ORDER — MONTELUKAST SODIUM 10 MG/1
10 TABLET ORAL
Status: DISCONTINUED | OUTPATIENT
Start: 2024-04-15 | End: 2024-04-16 | Stop reason: HOSPADM

## 2024-04-15 RX ORDER — CITALOPRAM 20 MG/1
40 TABLET ORAL
Status: DISCONTINUED | OUTPATIENT
Start: 2024-04-15 | End: 2024-04-16 | Stop reason: HOSPADM

## 2024-04-15 RX ORDER — TRANEXAMIC ACID 100 MG/ML
1000 INJECTION, SOLUTION INTRAVENOUS ONCE
Status: DISCONTINUED | OUTPATIENT
Start: 2024-04-15 | End: 2024-04-15 | Stop reason: HOSPADM

## 2024-04-15 RX ORDER — DIPHENHYDRAMINE HYDROCHLORIDE 50 MG/ML
25 INJECTION INTRAMUSCULAR; INTRAVENOUS EVERY 6 HOURS PRN
Status: DISCONTINUED | OUTPATIENT
Start: 2024-04-15 | End: 2024-04-16 | Stop reason: HOSPADM

## 2024-04-15 RX ORDER — DEXTROSE MONOHYDRATE 25 G/50ML
25 INJECTION, SOLUTION INTRAVENOUS
Status: DISCONTINUED | OUTPATIENT
Start: 2024-04-15 | End: 2024-04-15 | Stop reason: HOSPADM

## 2024-04-15 RX ORDER — SODIUM CHLORIDE, SODIUM LACTATE, POTASSIUM CHLORIDE, CALCIUM CHLORIDE 600; 310; 30; 20 MG/100ML; MG/100ML; MG/100ML; MG/100ML
INJECTION, SOLUTION INTRAVENOUS CONTINUOUS
Status: DISCONTINUED | OUTPATIENT
Start: 2024-04-15 | End: 2024-04-15 | Stop reason: HOSPADM

## 2024-04-15 RX ORDER — HALOPERIDOL 5 MG/ML
1 INJECTION INTRAMUSCULAR EVERY 6 HOURS PRN
Status: DISCONTINUED | OUTPATIENT
Start: 2024-04-15 | End: 2024-04-16 | Stop reason: HOSPADM

## 2024-04-15 RX ORDER — IBUPROFEN 400 MG/1
800 TABLET ORAL 3 TIMES DAILY PRN
Status: DISCONTINUED | OUTPATIENT
Start: 2024-04-18 | End: 2024-04-16 | Stop reason: HOSPADM

## 2024-04-15 RX ORDER — ASPIRIN 81 MG/1
81 TABLET ORAL 2 TIMES DAILY
Status: DISCONTINUED | OUTPATIENT
Start: 2024-04-15 | End: 2024-04-16 | Stop reason: HOSPADM

## 2024-04-15 RX ORDER — ONDANSETRON 2 MG/ML
INJECTION INTRAMUSCULAR; INTRAVENOUS PRN
Status: DISCONTINUED | OUTPATIENT
Start: 2024-04-15 | End: 2024-04-15 | Stop reason: SURG

## 2024-04-15 RX ORDER — HYDROMORPHONE HYDROCHLORIDE 1 MG/ML
0.2 INJECTION, SOLUTION INTRAMUSCULAR; INTRAVENOUS; SUBCUTANEOUS
Status: DISCONTINUED | OUTPATIENT
Start: 2024-04-15 | End: 2024-04-15 | Stop reason: HOSPADM

## 2024-04-15 RX ORDER — DIPHENHYDRAMINE HYDROCHLORIDE 50 MG/ML
12.5 INJECTION INTRAMUSCULAR; INTRAVENOUS
Status: DISCONTINUED | OUTPATIENT
Start: 2024-04-15 | End: 2024-04-15 | Stop reason: HOSPADM

## 2024-04-15 RX ORDER — ONDANSETRON 2 MG/ML
4 INJECTION INTRAMUSCULAR; INTRAVENOUS EVERY 4 HOURS PRN
Status: DISCONTINUED | OUTPATIENT
Start: 2024-04-15 | End: 2024-04-16 | Stop reason: HOSPADM

## 2024-04-15 RX ORDER — BUPIVACAINE HYDROCHLORIDE 5 MG/ML
INJECTION, SOLUTION EPIDURAL; INTRACAUDAL
Status: ACTIVE
Start: 2024-04-15 | End: 2024-04-15

## 2024-04-15 RX ORDER — CEFAZOLIN SODIUM 1 G/3ML
2 INJECTION, POWDER, FOR SOLUTION INTRAMUSCULAR; INTRAVENOUS ONCE
Status: DISCONTINUED | OUTPATIENT
Start: 2024-04-15 | End: 2024-04-15 | Stop reason: HOSPADM

## 2024-04-15 RX ORDER — DEXAMETHASONE SODIUM PHOSPHATE 4 MG/ML
4 INJECTION, SOLUTION INTRA-ARTICULAR; INTRALESIONAL; INTRAMUSCULAR; INTRAVENOUS; SOFT TISSUE
Status: DISCONTINUED | OUTPATIENT
Start: 2024-04-15 | End: 2024-04-16 | Stop reason: HOSPADM

## 2024-04-15 RX ORDER — ATORVASTATIN CALCIUM 10 MG/1
5 TABLET, FILM COATED ORAL NIGHTLY
Status: DISCONTINUED | OUTPATIENT
Start: 2024-04-15 | End: 2024-04-16 | Stop reason: HOSPADM

## 2024-04-15 RX ORDER — BISACODYL 10 MG
10 SUPPOSITORY, RECTAL RECTAL
Status: DISCONTINUED | OUTPATIENT
Start: 2024-04-15 | End: 2024-04-16 | Stop reason: HOSPADM

## 2024-04-15 RX ORDER — HYDROMORPHONE HYDROCHLORIDE 1 MG/ML
0.1 INJECTION, SOLUTION INTRAMUSCULAR; INTRAVENOUS; SUBCUTANEOUS
Status: DISCONTINUED | OUTPATIENT
Start: 2024-04-15 | End: 2024-04-15 | Stop reason: HOSPADM

## 2024-04-15 RX ORDER — LIDOCAINE HYDROCHLORIDE 20 MG/ML
INJECTION, SOLUTION INFILTRATION; PERINEURAL
Status: COMPLETED
Start: 2024-04-15 | End: 2024-04-15

## 2024-04-15 RX ORDER — SODIUM CHLORIDE, SODIUM LACTATE, POTASSIUM CHLORIDE, CALCIUM CHLORIDE 600; 310; 30; 20 MG/100ML; MG/100ML; MG/100ML; MG/100ML
INJECTION, SOLUTION INTRAVENOUS CONTINUOUS
Status: ACTIVE | OUTPATIENT
Start: 2024-04-15 | End: 2024-04-15

## 2024-04-15 RX ORDER — ACETAMINOPHEN 325 MG/1
650 TABLET ORAL EVERY 6 HOURS PRN
Status: DISCONTINUED | OUTPATIENT
Start: 2024-04-20 | End: 2024-04-16 | Stop reason: HOSPADM

## 2024-04-15 RX ORDER — BUPIVACAINE HYDROCHLORIDE 2.5 MG/ML
INJECTION, SOLUTION EPIDURAL; INFILTRATION; INTRACAUDAL
Status: COMPLETED | OUTPATIENT
Start: 2024-04-15 | End: 2024-04-15

## 2024-04-15 RX ORDER — SCOLOPAMINE TRANSDERMAL SYSTEM 1 MG/1
1 PATCH, EXTENDED RELEASE TRANSDERMAL
Status: DISCONTINUED | OUTPATIENT
Start: 2024-04-15 | End: 2024-04-16 | Stop reason: HOSPADM

## 2024-04-15 RX ORDER — OXYCODONE HCL 5 MG/5 ML
10 SOLUTION, ORAL ORAL
Status: COMPLETED | OUTPATIENT
Start: 2024-04-15 | End: 2024-04-15

## 2024-04-15 RX ORDER — LIDOCAINE HYDROCHLORIDE 20 MG/ML
INJECTION, SOLUTION EPIDURAL; INFILTRATION; INTRACAUDAL; PERINEURAL PRN
Status: DISCONTINUED | OUTPATIENT
Start: 2024-04-15 | End: 2024-04-15 | Stop reason: SURG

## 2024-04-15 RX ORDER — CEFAZOLIN SODIUM 1 G/3ML
INJECTION, POWDER, FOR SOLUTION INTRAMUSCULAR; INTRAVENOUS PRN
Status: DISCONTINUED | OUTPATIENT
Start: 2024-04-15 | End: 2024-04-15 | Stop reason: SURG

## 2024-04-15 RX ORDER — SODIUM CHLORIDE 9 MG/ML
INJECTION, SOLUTION INTRAMUSCULAR; INTRAVENOUS; SUBCUTANEOUS
Status: DISCONTINUED | OUTPATIENT
Start: 2024-04-15 | End: 2024-04-15 | Stop reason: HOSPADM

## 2024-04-15 RX ORDER — ACETAMINOPHEN 325 MG/1
650 TABLET ORAL EVERY 6 HOURS
Status: DISCONTINUED | OUTPATIENT
Start: 2024-04-15 | End: 2024-04-16 | Stop reason: HOSPADM

## 2024-04-15 RX ORDER — DEXTROSE MONOHYDRATE, SODIUM CHLORIDE, AND POTASSIUM CHLORIDE 50; 1.49; 4.5 G/1000ML; G/1000ML; G/1000ML
INJECTION, SOLUTION INTRAVENOUS CONTINUOUS
Status: DISPENSED | OUTPATIENT
Start: 2024-04-15 | End: 2024-04-15

## 2024-04-15 RX ORDER — DEXAMETHASONE SODIUM PHOSPHATE 4 MG/ML
10 INJECTION, SOLUTION INTRA-ARTICULAR; INTRALESIONAL; INTRAMUSCULAR; INTRAVENOUS; SOFT TISSUE ONCE
Status: COMPLETED | OUTPATIENT
Start: 2024-04-16 | End: 2024-04-16

## 2024-04-15 RX ORDER — POLYETHYLENE GLYCOL 3350 17 G/17G
1 POWDER, FOR SOLUTION ORAL 2 TIMES DAILY PRN
Status: DISCONTINUED | OUTPATIENT
Start: 2024-04-15 | End: 2024-04-16 | Stop reason: HOSPADM

## 2024-04-15 RX ORDER — LABETALOL HYDROCHLORIDE 5 MG/ML
5 INJECTION, SOLUTION INTRAVENOUS
Status: DISCONTINUED | OUTPATIENT
Start: 2024-04-15 | End: 2024-04-15 | Stop reason: HOSPADM

## 2024-04-15 RX ORDER — DEXMEDETOMIDINE HYDROCHLORIDE 100 UG/ML
INJECTION, SOLUTION INTRAVENOUS PRN
Status: DISCONTINUED | OUTPATIENT
Start: 2024-04-15 | End: 2024-04-15 | Stop reason: SURG

## 2024-04-15 RX ADMIN — KETOROLAC TROMETHAMINE 15 MG: 15 INJECTION, SOLUTION INTRAMUSCULAR; INTRAVENOUS at 16:46

## 2024-04-15 RX ADMIN — OXYCODONE HYDROCHLORIDE 5 MG: 5 SOLUTION ORAL at 08:25

## 2024-04-15 RX ADMIN — DOCUSATE SODIUM 50MG AND SENNOSIDES 8.6MG 1 TABLET: 8.6; 5 TABLET, FILM COATED ORAL at 21:47

## 2024-04-15 RX ADMIN — MONTELUKAST 10 MG: 10 TABLET, FILM COATED ORAL at 21:47

## 2024-04-15 RX ADMIN — POTASSIUM CHLORIDE, DEXTROSE MONOHYDRATE AND SODIUM CHLORIDE: 150; 5; 450 INJECTION, SOLUTION INTRAVENOUS at 16:20

## 2024-04-15 RX ADMIN — PROPOFOL 60 MG: 10 INJECTION, EMULSION INTRAVENOUS at 07:04

## 2024-04-15 RX ADMIN — DEXAMETHASONE SODIUM PHOSPHATE 8 MG: 4 INJECTION INTRA-ARTICULAR; INTRALESIONAL; INTRAMUSCULAR; INTRAVENOUS; SOFT TISSUE at 07:04

## 2024-04-15 RX ADMIN — SODIUM CHLORIDE, POTASSIUM CHLORIDE, SODIUM LACTATE AND CALCIUM CHLORIDE: 600; 310; 30; 20 INJECTION, SOLUTION INTRAVENOUS at 06:59

## 2024-04-15 RX ADMIN — LIDOCAINE HYDROCHLORIDE 100 MG: 20 INJECTION, SOLUTION EPIDURAL; INFILTRATION; INTRACAUDAL at 07:03

## 2024-04-15 RX ADMIN — METFORMIN HYDROCHLORIDE 1000 MG: 500 TABLET ORAL at 16:45

## 2024-04-15 RX ADMIN — KETOROLAC TROMETHAMINE 15 MG: 15 INJECTION, SOLUTION INTRAMUSCULAR; INTRAVENOUS at 23:19

## 2024-04-15 RX ADMIN — ACETAMINOPHEN 650 MG: 325 TABLET ORAL at 23:19

## 2024-04-15 RX ADMIN — OXYCODONE HYDROCHLORIDE 10 MG: 10 TABLET ORAL at 16:22

## 2024-04-15 RX ADMIN — DEXMEDETOMIDINE HYDROCHLORIDE 20 MCG: 100 INJECTION, SOLUTION INTRAVENOUS at 07:06

## 2024-04-15 RX ADMIN — CETIRIZINE HYDROCHLORIDE 10 MG: 10 TABLET, FILM COATED ORAL at 16:45

## 2024-04-15 RX ADMIN — FENTANYL CITRATE 25 MCG: 50 INJECTION, SOLUTION INTRAMUSCULAR; INTRAVENOUS at 08:38

## 2024-04-15 RX ADMIN — TRANEXAMIC ACID 1000 MG: 100 INJECTION, SOLUTION INTRAVENOUS at 07:03

## 2024-04-15 RX ADMIN — FENTANYL CITRATE 25 MCG: 50 INJECTION, SOLUTION INTRAMUSCULAR; INTRAVENOUS at 08:28

## 2024-04-15 RX ADMIN — FENTANYL CITRATE 50 MCG: 50 INJECTION, SOLUTION INTRAMUSCULAR; INTRAVENOUS at 07:36

## 2024-04-15 RX ADMIN — CEFAZOLIN 2 G: 2 INJECTION, POWDER, FOR SOLUTION INTRAMUSCULAR; INTRAVENOUS at 16:42

## 2024-04-15 RX ADMIN — OXYCODONE HYDROCHLORIDE 10 MG: 10 TABLET ORAL at 21:54

## 2024-04-15 RX ADMIN — FENTANYL CITRATE 25 MCG: 50 INJECTION, SOLUTION INTRAMUSCULAR; INTRAVENOUS at 08:41

## 2024-04-15 RX ADMIN — CEFAZOLIN 2 G: 1 INJECTION, POWDER, FOR SOLUTION INTRAMUSCULAR; INTRAVENOUS at 07:04

## 2024-04-15 RX ADMIN — ACETAMINOPHEN 650 MG: 325 TABLET ORAL at 12:16

## 2024-04-15 RX ADMIN — CITALOPRAM HYDROBROMIDE 40 MG: 20 TABLET ORAL at 21:49

## 2024-04-15 RX ADMIN — ATORVASTATIN CALCIUM 5 MG: 10 TABLET, FILM COATED ORAL at 21:49

## 2024-04-15 RX ADMIN — ASPIRIN 81 MG: 81 TABLET, COATED ORAL at 16:22

## 2024-04-15 RX ADMIN — ONDANSETRON 4 MG: 2 INJECTION INTRAMUSCULAR; INTRAVENOUS at 07:03

## 2024-04-15 RX ADMIN — TRANEXAMIC ACID 1000 MG: 100 INJECTION, SOLUTION INTRAVENOUS at 07:55

## 2024-04-15 RX ADMIN — ACETAMINOPHEN 650 MG: 325 TABLET ORAL at 16:45

## 2024-04-15 RX ADMIN — FENTANYL CITRATE 50 MCG: 50 INJECTION, SOLUTION INTRAMUSCULAR; INTRAVENOUS at 07:12

## 2024-04-15 RX ADMIN — OXYCODONE HYDROCHLORIDE 10 MG: 10 TABLET ORAL at 12:17

## 2024-04-15 RX ADMIN — SODIUM CHLORIDE, POTASSIUM CHLORIDE, SODIUM LACTATE AND CALCIUM CHLORIDE: 600; 310; 30; 20 INJECTION, SOLUTION INTRAVENOUS at 08:56

## 2024-04-15 RX ADMIN — BUPIVACAINE HYDROCHLORIDE 20 ML: 2.5 INJECTION, SOLUTION EPIDURAL; INFILTRATION; INTRACAUDAL at 06:40

## 2024-04-15 ASSESSMENT — COGNITIVE AND FUNCTIONAL STATUS - GENERAL
MOVING TO AND FROM BED TO CHAIR: A LITTLE
DRESSING REGULAR UPPER BODY CLOTHING: A LITTLE
STANDING UP FROM CHAIR USING ARMS: A LITTLE
TOILETING: A LITTLE
CLIMB 3 TO 5 STEPS WITH RAILING: A LOT
MOVING FROM LYING ON BACK TO SITTING ON SIDE OF FLAT BED: A LITTLE
MOVING FROM LYING ON BACK TO SITTING ON SIDE OF FLAT BED: A LITTLE
TURNING FROM BACK TO SIDE WHILE IN FLAT BAD: A LITTLE
MOBILITY SCORE: 17
SUGGESTED CMS G CODE MODIFIER MOBILITY: CK
WALKING IN HOSPITAL ROOM: A LITTLE
MOBILITY SCORE: 17
TURNING FROM BACK TO SIDE WHILE IN FLAT BAD: A LITTLE
SUGGESTED CMS G CODE MODIFIER MOBILITY: CK
WALKING IN HOSPITAL ROOM: A LITTLE
HELP NEEDED FOR BATHING: A LITTLE
MOVING TO AND FROM BED TO CHAIR: A LITTLE
STANDING UP FROM CHAIR USING ARMS: A LITTLE
CLIMB 3 TO 5 STEPS WITH RAILING: A LOT
DRESSING REGULAR LOWER BODY CLOTHING: A LITTLE
DAILY ACTIVITIY SCORE: 20
SUGGESTED CMS G CODE MODIFIER DAILY ACTIVITY: CJ

## 2024-04-15 ASSESSMENT — PAIN SCALES - GENERAL: PAIN_LEVEL: 5

## 2024-04-15 ASSESSMENT — LIFESTYLE VARIABLES
ON A TYPICAL DAY WHEN YOU DRINK ALCOHOL HOW MANY DRINKS DO YOU HAVE: 0
CONSUMPTION TOTAL: NEGATIVE
EVER FELT BAD OR GUILTY ABOUT YOUR DRINKING: NO
TOTAL SCORE: 0
HAVE YOU EVER FELT YOU SHOULD CUT DOWN ON YOUR DRINKING: NO
TOTAL SCORE: 0
HAVE PEOPLE ANNOYED YOU BY CRITICIZING YOUR DRINKING: NO
EVER HAD A DRINK FIRST THING IN THE MORNING TO STEADY YOUR NERVES TO GET RID OF A HANGOVER: NO
TOTAL SCORE: 0
AVERAGE NUMBER OF DAYS PER WEEK YOU HAVE A DRINK CONTAINING ALCOHOL: 0
ALCOHOL_USE: NO
HOW MANY TIMES IN THE PAST YEAR HAVE YOU HAD 5 OR MORE DRINKS IN A DAY: 0

## 2024-04-15 ASSESSMENT — GAIT ASSESSMENTS
GAIT LEVEL OF ASSIST: CONTACT GUARD ASSIST
DEVIATION: ANTALGIC;STEP TO;DECREASED BASE OF SUPPORT;BRADYKINETIC;DECREASED HEEL STRIKE;DECREASED TOE OFF
DISTANCE (FEET): 90
ASSISTIVE DEVICE: FRONT WHEEL WALKER

## 2024-04-15 ASSESSMENT — FIBROSIS 4 INDEX: FIB4 SCORE: 0.32

## 2024-04-15 ASSESSMENT — PAIN DESCRIPTION - PAIN TYPE
TYPE: SURGICAL PAIN

## 2024-04-15 ASSESSMENT — PATIENT HEALTH QUESTIONNAIRE - PHQ9
SUM OF ALL RESPONSES TO PHQ9 QUESTIONS 1 AND 2: 0
2. FEELING DOWN, DEPRESSED, IRRITABLE, OR HOPELESS: NOT AT ALL
1. LITTLE INTEREST OR PLEASURE IN DOING THINGS: NOT AT ALL

## 2024-04-15 NOTE — ANESTHESIA PROCEDURE NOTES
Airway    Date/Time: 4/15/2024 7:04 AM    Performed by: Jo Whitfield M.D.  Authorized by: Jo Whitfield M.D.    Location:  OR  Urgency:  Elective  Indications for Airway Management:  Anesthesia      Spontaneous Ventilation: absent    Preoxygenated: Yes    MILS Maintained Throughout: Yes    Mask Difficulty Assessment:  1 - vent by mask  Final Airway Type:  Supraglottic airway  Final Supraglottic Airway:  Standard LMA    SGA Size:  4  Number of Attempts at Approach:  1        
Peripheral Block    Date/Time: 4/15/2024 6:40 AM    Performed by: Jo Whitfield M.D.  Authorized by: Jo Whitfield M.D.    Patient Location:  Pre-op  Start Time:  4/15/2024 6:40 AM  End Time:  4/15/2024 6:43 AM  Reason for Block: at surgeon's request and post-op pain management ONLY    patient identified, IV checked, site marked, risks and benefits discussed, surgical consent, monitors and equipment checked, pre-op evaluation and timeout performed    Patient Position:  Sitting  Prep: ChloraPrep    Block Region:  Lower Extremity  Lower Extremity - Block Type:  Selective FEMORAL nerve block at the Adductor Canal    Laterality:  Left  Procedures: ultrasound guided  Image captured, interpreted and electronically stored.  Local Infiltration:  Lidocaine  Strength:  2 %  Dose:  4 ml  Block Type:  Single-shot  Needle Length:  100mm  Needle Gauge:  21 G  Needle Localization:  Ultrasound guidance  Ultrasound picture in chart  Injection Assessment:  Negative aspiration for heme, no paresthesia on injection, incremental injection and local visualized surrounding nerve on ultrasound  Evidence of intravascular injection: No        
07-May-2021 16:55

## 2024-04-15 NOTE — THERAPY
Physical Therapy   Initial Evaluation     Patient Name: Marii Delgado  Age:  69 y.o., Sex:  female  Medical Record #: [id number]  Today's Date: 4/15/2024     Precautions  Precautions: Fall Risk;Weight Bearing As Tolerated Left Lower Extremity    Assessment  Patient is 69 y.o. female who was seen s/p left TKA with WBAT orders for left lower extremity. Pt was agreeable to therapy evaluation and presented with attention deficits, and poor gait mechanics with AD use. Pt was provided with education on elevation, icing, positioning, and supine/seated therapeutic exercises. Pt  needed VC in order to perform proper gait mechanics and ambulated with bradykinesia, a step-to pattern, decreased heel strike, decreased livan, and decreased stride length and will need more training in order to reach her PLOF to maximize safety at home. Pt was able to ambulate with CGA and navigate stairs with CGA and needed VC to continue up and down the step safely. Pt was primarily limited by pain tolerance which is hindering balance, increasing fall risk, and demonstrates a necessity for her to remain overnight. Recommended and anticipated OP therapy services with FWW use once PT is safe to be d/c home and once medically clear.     The pt was provided with the following skilled therapy txt: Pt was provided with VC, demo, and facilitation during gait training in order to return demo terminal knee extension and appropriate heel strike/flexion of knee in order to return demo appropriate heel to toe gait mechanics. Pt was provided with VC to go up/down steps with safe mechanics with use of AD. Pt was provided with VC and TC for appropriate quad contraction and mechanics during supine/seated therapeutic exercises. Pt was provided with education on intensity, frequency, and duration of exercises.     Plan    Physical Therapy Initial Treatment Plan   Treatment Plan : Bed Mobility, Gait Training, Neuro Re-Education / Balance, Stair Training,  Therapeutic Activities, Therapeutic Exercise  Treatment Frequency: Daily  Duration: Until Therapy Goals Met    DC Equipment Recommendations: Front-Wheel Walker  Discharge Recommendations: Recommend outpatient physical therapy services to address higher level deficits       Objective       04/15/24 1615   Initial Contact Note    Initial Contact Note Order Received and Verified, Physical Therapy Evaluation in Progress with Full Report to Follow.   Precautions   Precautions Fall Risk;Weight Bearing As Tolerated Left Lower Extremity   Pain 0 - 10 Group   Location Knee   Location Orientation Left   Therapist Pain Assessment Prior to Activity;During Activity;Post Activity;Nurse Notified;10   Prior Living Situation   Prior Services Home-Independent   Housing / Facility 1 Story House   Steps Into Home 1   Steps In Home 0   Rail None   Bathroom Set up Bathtub / Shower Combination;Shower Chair   Equipment Owned Front-Wheel Walker;Tub / Shower Seat   Lives with - Patient's Self Care Capacity Spouse   Prior Level of Functional Mobility   Bed Mobility Independent   Transfer Status Independent   Ambulation Independent   Ambulation Distance Community   Assistive Devices Used None   Stairs Independent   History of Falls   History of Falls No   Cognition    Cognition / Consciousness X   Level of Consciousness Alert   Safety Awareness Impaired   Attention Impaired  (Pt distracted by conversation during gait)   Passive ROM Lower Body   Passive ROM Lower Body Not Tested   Active ROM Lower Body    Active ROM Lower Body  X   Comments Knee flx limited by severe pain   Strength Lower Body   Lower Body Strength  X   Comments Limited by severe pain   Balance Assessment   Sitting Balance (Static) Fair   Sitting Balance (Dynamic) Fair   Standing Balance (Static) Fair -   Standing Balance (Dynamic) Fair -   Weight Shift Sitting Fair   Weight Shift Standing Fair   Bed Mobility    Supine to Sit Standby Assist   Sit to Supine Minimal Assist  "  Scooting Minimal Assist   Rolling Standby Assist   Comments Pt performed bed mobility seated upright in bed and needed Yesy when returning back into bed. Bed needed to be adjusted flat in order to perform scoots sol villalobso.   Gait Analysis   Gait Level Of Assist Contact Guard Assist   Assistive Device Front Wheel Walker   Distance (Feet) 90   # of Times Distance was Traveled 1   Deviation Antalgic;Step To;Decreased Base Of Support;Bradykinetic;Decreased Heel Strike;Decreased Toe Off   # of Stairs Climbed 2   Level of Assist with Stairs Contact Guard Assist   Weight Bearing Status WBAT LLE   Comments Pt is extremely bradykinetic and tended to stretch LS while standing due to back pain. Took a lot of standing breaks to communicate and could not \"walk and talk.\"   Functional Mobility   Sit to Stand Contact Guard Assist   Bed, Chair, Wheelchair Transfer Contact Guard Assist   Transfer Method Stand Step   Mobility Bed mobility upright in bed, sit<>stand, ambulation, stairs   6 Clicks Assessment - How much HELP from from another person do you currently need... (If the patient hasn't done an activity recently, how much help from another person do you think he/she would need if he/she tried?)   Turning from your back to your side while in a flat bed without using bedrails? 3   Moving from lying on your back to sitting on the side of a flat bed without using bedrails? 3   Moving to and from a bed to a chair (including a wheelchair)? 3   Standing up from a chair using your arms (e.g., wheelchair, or bedside chair)? 3   Walking in hospital room? 3   Climbing 3-5 steps with a railing? 2   6 clicks Mobility Score 17   Activity Tolerance   Sitting Edge of Bed 3min   Standing 20min   Edema / Skin Assessment   Edema / Skin  X   Comments Incision on knee   Patient / Family Goals    Patient / Family Goal #1 Home   Short Term Goals    Short Term Goal # 1 Pt will be able to perform all bed mobility with SPV on flat bed in order to " perform bed mobility at home by the end of 6 visits.   Short Term Goal # 2 Pt will be able to perform a sit<>stand with SPV and FWW in order to perform sit<>stands at home by the end of 6 visits.   Short Term Goal # 3 Pt will be able to ambulate 150ft with SPV and FWW and adeqate gait mechanics in order to ambulate household distances by the end of 6 visits.   Short Term Goal # 4 Pt will be able to ascend/descend 1 stair with SPV in order to navigate landing at home by the end of 6 visits.   Education Group   Education Provided Role of Physical Therapist;Gait Training;Stair Training;Use of Assistive Device;Weight Bearing Precautions   Role of Physical Therapist Patient Response Patient;Family;Acceptance;Explanation;Demonstration;Verbal Demonstration   Gait Training Patient Response Patient;Family;Acceptance;Explanation;Demonstration;Action Demonstration   Stair Training Patient Response Patient;Family;Acceptance;Explanation;Action Demonstration;Verbal Demonstration   Use of Assistive Device Patient Response Patient;Family;Acceptance;Explanation;Verbal Demonstration;Action Demonstration   Weight Bearing Precautions Patient Response Patient;Family;Acceptance;Explanation;Verbal Demonstration;Action Demonstration   Physical Therapy Initial Treatment Plan    Treatment Plan  Bed Mobility;Gait Training;Neuro Re-Education / Balance;Stair Training;Therapeutic Activities;Therapeutic Exercise   Treatment Frequency Daily   Duration Until Therapy Goals Met   Problem List    Problems Impaired Bed Mobility;Pain;Impaired Transfers;Impaired Ambulation;Impaired Balance;Decreased Activity Tolerance;Safety Awareness Deficits / Cognition   Anticipated Discharge Equipment and Recommendations   DC Equipment Recommendations Front-Wheel Walker   Discharge Recommendations Recommend outpatient physical therapy services to address higher level deficits   Interdisciplinary Plan of Care Collaboration   IDT Collaboration with  Nursing   Patient  Position at End of Therapy In Bed;Phone within Reach;Tray Table within Reach;Call Light within Reach;Family / Friend in Room   Collaboration Comments Aware of session and recs   Session Information   Date / Session Number  4/15-1 (1/7, 4/21)   Priority 4

## 2024-04-15 NOTE — ANESTHESIA PREPROCEDURE EVALUATION
Case: [id number] Anesthesia Start Date/Time: 04/15/24 0659    Procedure: ROBOTIC LEFT TOTAL KNEE ARTHROPLASTY (Left)    Diagnosis: Primary osteoarthritis of left knee [M17.12]    Pre-op diagnosis: Degenerative arthritis of left knee [M17.12]    Location:  OR  / SURGERY [facility]    Surgeons: Samy Abrams M.D.          69yoF with ISABELA, DMII, HTN, HLD    Hx of colon ca  PVD  GERD    Seasonal allergies  NPO  No AC    Relevant Problems   ANESTHESIA   (positive) ISABELA (obstructive sleep apnea)      CARDIAC   (positive) Aortic atherosclerosis (HCC)   (positive) Fourth degree hemorrhoids      ENDO   (positive) Type 2 diabetes mellitus without complication (HCC)       Physical Exam    Airway   Mallampati: II       Cardiovascular - normal exam     Dental - normal exam           Pulmonary - normal exam     Abdominal - normal exam     Neurological - normal exam                   Anesthesia Plan    ASA 2       Plan - general       Airway plan will be LMA          Induction: intravenous    Postoperative Plan: Postoperative administration of opioids is intended.    Pertinent diagnostic labs and testing reviewed    Informed Consent:    Anesthetic plan and risks discussed with patient.

## 2024-04-15 NOTE — OP REPORT
Date of Surgery:  04/15/24    Pre-operative Diagnosis:  left knee arthritis    Post-operative Diagnosis:  Left  knee arthritis    Procedure:  left knee replacement using robotic haptic arm assistance (Yonis)    Implants used:  A [employer] CR total knee arthroplasty system with the following components  Femur: 3 left  Tibia:  Tritanium  Polyethylene: 10 mm CS  Patella: A32 MB  Fixation: Press-Fit    Surgeon:  Samy Abrams MD    1st Assistant:   Robert Cheema PA-C    Anesthesiologist:   NOEMI Whitfield MD    EBL:  300 cc    Specimen:  None    Findings:  Tricompartmental arthritic changes; 3 degree flexion contracture    Indications for procedure:  This patient is a 69 y.o. female with a long standing history of left knee arthritis. The patient had failed conservative therapy including anti-inflammatory medications, activity modifications, injections, physical therapy and assistive devices. she was indicated for a left total knee replacement. The patient expressed an understanding of the risks of pain, bleeding, infection, dislocation, malalignment, need for further surgery, damage to surrounding structures, neurovascular compromise, blood clots, leg-length discrepancy both apparent and actual, anesthetic complications, and serious medical consequences including but not limited to heart attack, stroke or even death. It was explained that the implants are man-made products and thus subject to possible failure.  The patient expressed an understanding and wished to proceed. Specific risks based on the patient's medical history were addressed. A clearance was obtained by the medical physicians and the patient was taken to the operating room on the day of surgery for the above named procedure. The patient was felt to be an excellent candidate for our robotic assisted protocols, and the appropriate imaging and pre-operative plans were obtained and verified prior to surgery    Description of procedure:  The patient was  met in the pre-operative holding area. The left knee was marked as the appropriate surgical site with indelible ink. They were taken to the operating room where the anesthesia department started a adductor/general for intraoperative and post-operative anesthesia and pain control. The patient was placed on the OR table and a tourniquet was placed high on the operative thigh. All bony prominences were padded appropriately. The operative knee was prepped and draped in a standard sterile surgical fashion. A time out procedure was called to verify the side and site of surgery, the proposed surgical procedure, and the administration of pre-operative antibiotics. After successful completion of the time out procedure, our attention was turned to the operative knee.  The tourniquet was inflated after exsanguination with an Esmarch bandage. The knee was flexed and a straight incision was made just medial to the midline. The capsule of the knee was cleared and a mid-vastus arthrotomy was performed. The patella was subluxated laterally and a medial release was performed to properly visualize the posteromedial aspect of the tibial plateau. The knee was extended, the patellar tendon was protected and the infrapatellar fat pad was excised. A lateral release was performed. The patella was turned on its side and held in place with two penetrating towel clips. A free-hand patellar cut was made, the patella was sized and the appropriate lug holes were drilled. The patella was subluxed, the knee was flexed. The tourniquet was released. Hemostasis was obtained. Each hemijoint was cleared of soft tissue. The ACL was removed. At this point we placed the trackers and satellite arrays for the robotic-assisted arm. The hip center and the ankle center were verified. Multiple checkpoints from the patient's imaging were correlated intraoperatively. We did balance the ligaments in extension and flexion. Once we had our plan, the haptic arm was  brought in. The saw blade and femoral and tibial checkpoints were verified. We made our cuts taking care to protect the soft tissue structures. The bony pieces were removed.   A large Baker's cyst was evacuated, the laminar spreaders were placed and posterior osteophytes were removed.   A trial knee was constructed and with the 10 mm CS polyethylene we noted full extension without recurvatum and greater than 125 degrees of flexion with appropriate patellar tracking. At this point we removed the trial components and thoroughly irrigated the wound. Osteophytes were removed. The tourniquet was reinflated.  The real components were opened in a sterile fashion on the back table and then implanted into place sequentially, first the tibia, then the femur, then the patella.  The knee was again taken through a range of motion. Again we noted full extension and greater than 125 degrees of flexion with appropriate varus/valgus stability and patellar tracking. Therefore the real polyethylene was opened and inserted into the tibial tray. An audible click was heard as it engaged the tibia. Now a dilute betadine solution  was instilled into the knee for 3 minutes before being pulse-lavaged out. The tourniquet was again released and hemostasis was obtained. The knee was flexed, the arthrotomy was closed with #1 Quill, followed by 2-0 Vicryl in the deep dermal layer in a buried interrupted fashion. The skin was closed with 3-0 monocryl in a running subcuticular fashion, and a sterile Stephon/Acticoat dressing was applied. The patient is currently in the operating room awaiting reversal of anesthesia. All sponge, checkpoint and instrument counts were correct at the end of the case.  A front wheel walker will be provided and will be required in the perioperative period to assist with balance, weakness, and pain during ambulation, and to help prevent falls.

## 2024-04-15 NOTE — OR NURSING
0806: To PACU post left total knee arthroplasty w/ block. OPA in place. Strong DP pulse observed on operative extremity. Ice applied to knee.    0813: OPA dc'd, breathing is spontaneous and unlabored.    0825: Medicated for pain.    0854: Pt states pain is currently tolerable. Drinking juice w/o nausea.    0935: Report given to MARIEL Canas RN.    1000: Report rec'd, care resumed.    1017: Pt given I.S. w/ goal of 1750. Able to inhale 1250 w/ good technique.    1034: Pain remains tolerable w/o nausea. Will do bedside mobility assessment.    1044: Pt is unable to mobilize. Will release admit order. Meets criteria for transfer to room. Awaiting room assignment.    1157: To GSU.

## 2024-04-15 NOTE — OR NURSING
0935: Report received from Lalo MALONE. Patient sleeping and rouses to verbal stimuli, respirations even and unlabored.    0950: Sleeping and rouses to verbal stimuli, respirations even and unlabored    1002: No change in status, respirations even and unlabored, report given to Lalo MALONE.

## 2024-04-15 NOTE — ANESTHESIA POSTPROCEDURE EVALUATION
Patient: Marii Sutton Sandy    Procedure Summary       Date: 04/15/24 Room / Location: [place] [id number] / SURGERY [facility]    Anesthesia Start: 0659 Anesthesia Stop: 0810    Procedure: ROBOTIC LEFT TOTAL KNEE ARTHROPLASTY (Left: Knee) Diagnosis:       Primary osteoarthritis of left knee      (Degenerative arthritis of left knee [M17.12])    Surgeons: Samy Abrams M.D. Responsible Provider: Jo Whitfield M.D.    Anesthesia Type: general ASA Status: 2            Final Anesthesia Type: general  Last vitals  BP   Blood Pressure : 137/67    Temp   36.6 °C (97.9 °F)    Pulse   72   Resp   16    SpO2   98 %      Anesthesia Post Evaluation    Patient location during evaluation: PACU  Patient participation: complete - patient participated  Level of consciousness: awake and alert  Pain score: 5    Airway patency: patent  Anesthetic complications: no  Cardiovascular status: adequate and hemodynamically stable  Respiratory status: acceptable  Hydration status: acceptable    PONV: none          No notable events documented.     Nurse Pain Score: 8 (NPRS)

## 2024-04-15 NOTE — ANESTHESIA TIME REPORT
Anesthesia Start and Stop Event Times       Date Time Event    4/15/2024 0635 Ready for Procedure     0659 Anesthesia Start     0810 Anesthesia Stop          Responsible Staff  04/15/24      Name Role Begin End    Jo Whitfield M.D. Anesth 0659 0810          Overtime Reason:  no overtime (within assigned shift)    Comments:

## 2024-04-15 NOTE — DISCHARGE INSTRUCTIONS
If any questions arise, call your provider.  If your provider is not available, please feel free to call the Surgical Center at [phone].    MEDICATIONS: Resume taking daily medication.  Take prescribed pain medication with food.  If no medication is prescribed, you may take non-aspirin pain medication if needed.  PAIN MEDICATION CAN BE VERY CONSTIPATING.  Take a stool softener or laxative such as senokot, pericolace, or milk of magnesia if needed.    Last pain medication (5mg Oxycodone) given at 8:25 am    What to Expect Post Anesthesia    Rest and take it easy for the first 24 hours.  A responsible adult is recommended to remain with you during that time.  It is normal to feel sleepy.  We encourage you to not do anything that requires balance, judgment or coordination.    FOR 24 HOURS DO NOT:  Drive, operate machinery or run household appliances.  Drink beer or alcoholic beverages.  Make important decisions or sign legal documents.    To avoid nausea, slowly advance diet as tolerated, avoiding spicy or greasy foods for the first day.  Add more substantial food to your diet according to your provider's instructions.  Babies can be fed formula or breast milk as soon as they are hungry.  INCREASE FLUIDS AND FIBER TO AVOID CONSTIPATION.    MILD FLU-LIKE SYMPTOMS ARE NORMAL.  YOU MAY EXPERIENCE GENERALIZED MUSCLE ACHES, THROAT IRRITATION, HEADACHE AND/OR SOME NAUSEA.    [facility] Post-Op Instructions:   Activity:   The first week after your knee joint replacement is a time to recover from the surgery. We expect light exercise to keep you active and mobile.    WEIGHT BEARING AS TOLERATED    When you stand or walk, place only as much weight as feels comfortable on your injured leg.  Let pain be your guide. If you feel pain, place less weight on the leg.     ASSISTED DEVICES: You are being discharged with the following special equipment:  Walker    Dressing and Wound Care:    Leave the dressing in place until  follow-up.    Shower / Bathing:    OK to shower, keep dressing in place. Pat dressing dry, do not rub the incision.  Swimming pools, hot tubs, or baths are to be avoided until after your follow-up and then only if cleared by your surgeon.      Apply Ice Pack to Knee:   Apply ice packs to your knee (15 minutes on the knee, 15 minutes off the knee) for the first week, as you feel necessary to help with the pain and swelling.    SWELLING/BRUISING  Swelling is an expected response to surgery. To reduce swelling, elevate your surgical limb above heart level multiple times per day and apply ice (see Ice instructions). If your swelling becomes excessive, is limiting your ability to move, or becomes worrisome please contact your doctor's office.    Bruising often does not appear until after you arrive home and can be quite dramatic-appearing purple, black, or green. Bruising is typically not concerning and will subside without any treatment.     FOLLOW-UP  Make sure that you have an appointment 7-14 days following surgery. Your procedure/rehabilitation will be discussed and physical therapy may be prescribed at that time.        Peripheral Nerve Block Discharge Instructions from Same Day Surgery and Inpatient :    What to Expect - Lower Extremity  The block may cause you to experience numbness and weakness in your thigh and knee or calf and foot on the same side as your surgery  Numbness, tingling and / or weakness are all normal. For some people, this may be an unpleasant sensation  These issues will be resolved when the local anesthetic wears off   You may experience numbness and tingling in your thigh on the same side as your surgery if the block medicine was injected at your groin area  Numbness will make it difficult to walk  You may have problems with balance and walking so be very careful   Follow your surgeon's direction regarding weight bearing on your surgical limb  Be very careful with your numb  "limb  Precautions  The numbness may affect your balance  Be careful when walking or moving around  Your leg may be weak: be very careful putting weight on it  If your surgeon did not specify a time, you should not bear weight for 24 hours  Be sure to ask for help when you need it  It is better to have help than to fall and hurt yourself    Prevent Injury  Protect the limb like a baby  Beware of exposing your limb to extreme heat or cold or trauma  The limb may be injured without you noticing because it is numb  Keep the limb elevated whenever possible  Do not sleep on the limb  Change the position of the limb regularly  Avoid putting pressure on your surgical limb  Pain Control  The initial block on the day of surgery will make your extremity feel \"numb\"  Any consecutive injection including prior to discharge from the hospital will make your extremity feel \"numb\"  You may feel an aching or burning when the local anesthesia starts to wear off  Take pain pills as prescribed by your surgeon  Call your surgeon or anesthesiologist if you do not have adequate pain control    "

## 2024-04-15 NOTE — LETTER
March 19, 2024    Patient Name: Marii Delgado  Surgeon Name: Samy Abrams M.D.  Surgery Facility: [facility] ([address] [facility])  Surgery Date: 4/15/2024    The time of your surgery is not final and may change up to and until the day of your surgery. You will be contacted 24-48 hours prior to your surgery date with your check-in and surgery time.    If you will not be at one of the below numbers please call the surgery scheduler at [phone]  Preferred Phone: [phone]    BEFORE YOUR SURGERY   Pre Registration and/or Lab Work must be done within and no earlier than 28 days prior to your surgery date. Your scheduled facility will contact you regarding all required preregistration and/or lab work. If you have not been contacted within 7 days of your scheduled procedure please call [facility] at [phone] for an appointment as soon as possible.    Pre op Appointment:   Date: 04/11/2024   Time: 08:15 AM   Provider: Robert Cheema PA-C   Location: [address] [place] [id number]    Instructions: Bring a list of all medications you are taking including the dosing and frequency.    - Please  your non-narcotic prescriptions prior to surgery at the pharmacy you provided us. DON'T START them until after your surgery.    DAY OF YOUR SURGERY  Nothing to eat or drink after midnight     Refrain from smoking any substance after midnight prior to surgery. Smoking may interfere with the anesthetic and frequently produces nausea during the recovery period.    Continue taking all lifesaving medications. Including the morning of your surgery with small sip of water.        Please do NOT take on the day of surgery:  Diuretics: examples- furosemide (Lasix), spironolactone, hydrochlorothiazide  ACE-inhibitors: examples- lisinopril, ramipril, enalapril  “ARBs”: examples- losartan, Olmesartan, valsartan    Please arrive at the hospital/surgery  center at the check-in time provided.     An adult will need to bring you and take you home after your surgery.     AFTER YOUR SURGERY  Post op Appointment:   Date: 04/25/2024   Time: 02:15 PM   With: Samy Abrams MD   Location: [facility] [place] [address]    - Therapy- Your first appointment should be 1  week(s) after your surgery. For your convenience we have 4 Physical Therapy locations: [facility], [place], and [facility]. Call our office ASAP to schedule an appointment at [phone] or take the enclosed Therapy Prescription to a facility of your choice.  - No dental work for 3-6 months after your surgery.  - Post Surgery - You will need someone to drive you home  - Post Surgery - You will need someone to stay with you for 24 hours     TIME OFF WORK  FMLA or Disability forms can be faxed directly to: [phone] or you may drop them off at [facility] [place] [address]. Our office charges a $35.00 fee per form. Forms will be completed within 10 business days of drop off and payment received. For the status of your forms you may contact our disability office directly [phone].    MEDICATION INSTRUCTIONS **Please read section completely**  The following medications should be stopped a minimum of 10 days prior to surgery:  All over the counter, Supplements & Herbal medications    Anorectics: Phentermine (Adipex-P, Lomaira and Suprenza), Phentermine-topiramate (Qsymia), Bupropion-naltrexone (Contrave)    **If you are on Bupropion for anxiety/depression, please continue this medication up until the day of surgery.     Opiod Partial Agonists/Opioid Antagonists: Buprenorphine (Suboxone, Belbuca, Butrans, Probuphine Implant, Sublocade), Naltrexone (ReVia, Vivitrol), Naloxone    Amphetamines: Dextroamphetamine/Amphetamine (Adderall, Mydayis), Methylphenidate Hydrochloride (Concerta, Metadate, Methylin, Ritalin)    The following medications should be stopped 5 days prior to  surgery:  Blood Thinners: Any Aspirin, Aspirin products, anti-inflammatories such as ibuprofen and any blood thinners such as Coumadin and Plavix. Please consult your prescribing physician if you are on life saving blood thinners, in regards to when to stop medications prior to surgery.     The following medications should be stopped a minimum of 3 days prior to surgery:  PDE-5 inhibitors: Sildenafil (Viagra), Tadalafil (Cialis), Vardenafil (Levitra), Avanafil (Stendra)    MAO Inhibitors: Rasagiline (Azilect), Selegiline (Eldepryl, Emsam, Selapar), Isocarboxazid (Marplan), Phenelzine (Nardil)

## 2024-04-16 VITALS
RESPIRATION RATE: 17 BRPM | WEIGHT: 176.37 LBS | HEIGHT: 62 IN | DIASTOLIC BLOOD PRESSURE: 65 MMHG | HEART RATE: 80 BPM | OXYGEN SATURATION: 93 % | BODY MASS INDEX: 32.46 KG/M2 | TEMPERATURE: 97.8 F | SYSTOLIC BLOOD PRESSURE: 131 MMHG

## 2024-04-16 LAB
HCT VFR BLD AUTO: 37 % (ref 37–47)
HGB BLD-MCNC: 12.1 G/DL (ref 12–16)

## 2024-04-16 PROCEDURE — A9270 NON-COVERED ITEM OR SERVICE: HCPCS | Performed by: PHYSICIAN ASSISTANT

## 2024-04-16 PROCEDURE — 770030 HCHG ROOM/CARE - EXTENDED RECOVERY EACH 15 MIN

## 2024-04-16 PROCEDURE — 97110 THERAPEUTIC EXERCISES: CPT

## 2024-04-16 PROCEDURE — 700102 HCHG RX REV CODE 250 W/ 637 OVERRIDE(OP): Performed by: PHYSICIAN ASSISTANT

## 2024-04-16 PROCEDURE — 36415 COLL VENOUS BLD VENIPUNCTURE: CPT

## 2024-04-16 PROCEDURE — 97116 GAIT TRAINING THERAPY: CPT

## 2024-04-16 PROCEDURE — 94760 N-INVAS EAR/PLS OXIMETRY 1: CPT

## 2024-04-16 PROCEDURE — 85018 HEMOGLOBIN: CPT

## 2024-04-16 PROCEDURE — 700111 HCHG RX REV CODE 636 W/ 250 OVERRIDE (IP): Performed by: PHYSICIAN ASSISTANT

## 2024-04-16 PROCEDURE — 85014 HEMATOCRIT: CPT

## 2024-04-16 PROCEDURE — 97165 OT EVAL LOW COMPLEX 30 MIN: CPT

## 2024-04-16 PROCEDURE — 97535 SELF CARE MNGMENT TRAINING: CPT

## 2024-04-16 RX ADMIN — ACETAMINOPHEN 650 MG: 325 TABLET ORAL at 12:37

## 2024-04-16 RX ADMIN — TAMSULOSIN HYDROCHLORIDE 0.4 MG: 0.4 CAPSULE ORAL at 07:38

## 2024-04-16 RX ADMIN — DOCUSATE SODIUM 100 MG: 100 CAPSULE, LIQUID FILLED ORAL at 05:21

## 2024-04-16 RX ADMIN — ASPIRIN 81 MG: 81 TABLET, COATED ORAL at 05:21

## 2024-04-16 RX ADMIN — DEXAMETHASONE SODIUM PHOSPHATE 10 MG: 4 INJECTION, SOLUTION INTRAMUSCULAR; INTRAVENOUS at 05:22

## 2024-04-16 RX ADMIN — ACETAMINOPHEN 650 MG: 325 TABLET ORAL at 05:21

## 2024-04-16 RX ADMIN — METFORMIN HYDROCHLORIDE 1000 MG: 500 TABLET ORAL at 07:38

## 2024-04-16 RX ADMIN — KETOROLAC TROMETHAMINE 15 MG: 15 INJECTION, SOLUTION INTRAMUSCULAR; INTRAVENOUS at 05:22

## 2024-04-16 RX ADMIN — CETIRIZINE HYDROCHLORIDE 10 MG: 10 TABLET, FILM COATED ORAL at 05:21

## 2024-04-16 RX ADMIN — OXYCODONE HYDROCHLORIDE 10 MG: 10 TABLET ORAL at 03:51

## 2024-04-16 ASSESSMENT — COGNITIVE AND FUNCTIONAL STATUS - GENERAL
WALKING IN HOSPITAL ROOM: A LITTLE
STANDING UP FROM CHAIR USING ARMS: A LITTLE
CLIMB 3 TO 5 STEPS WITH RAILING: A LITTLE
DAILY ACTIVITIY SCORE: 21
TURNING FROM BACK TO SIDE WHILE IN FLAT BAD: A LITTLE
SUGGESTED CMS G CODE MODIFIER DAILY ACTIVITY: CJ
MOVING TO AND FROM BED TO CHAIR: A LITTLE
SUGGESTED CMS G CODE MODIFIER MOBILITY: CK
TOILETING: A LITTLE
DRESSING REGULAR LOWER BODY CLOTHING: A LITTLE
HELP NEEDED FOR BATHING: A LITTLE
MOVING FROM LYING ON BACK TO SITTING ON SIDE OF FLAT BED: A LITTLE
MOBILITY SCORE: 18

## 2024-04-16 ASSESSMENT — GAIT ASSESSMENTS
GAIT LEVEL OF ASSIST: SUPERVISED
DISTANCE (FEET): 110
ASSISTIVE DEVICE: FRONT WHEEL WALKER
DISTANCE (FEET): 25
DEVIATION: STEP TO;DECREASED BASE OF SUPPORT;BRADYKINETIC

## 2024-04-16 ASSESSMENT — PAIN DESCRIPTION - PAIN TYPE
TYPE: SURGICAL PAIN

## 2024-04-16 ASSESSMENT — ACTIVITIES OF DAILY LIVING (ADL): TOILETING: INDEPENDENT

## 2024-04-16 ASSESSMENT — PATIENT HEALTH QUESTIONNAIRE - PHQ9
1. LITTLE INTEREST OR PLEASURE IN DOING THINGS: NOT AT ALL
SUM OF ALL RESPONSES TO PHQ9 QUESTIONS 1 AND 2: 0
2. FEELING DOWN, DEPRESSED, IRRITABLE, OR HOPELESS: NOT AT ALL

## 2024-04-16 NOTE — DISCHARGE SUMMARY
Marii Delgado was admitted on 4/15/2024 for Degenerative arthritis of left knee [M17.12]  Arthritis of left knee [M17.12]  Patient was diagnosed with severe degenerative arthritis in the left knee and underwent a left total knee arthroplasty by Dr. Samy Abrams on the date of admission. Please see dictated operative note for further information.    Hospital course:     The patient has done well, with no complications.  Patient denies chest pain, calf pain or shortness of breath.   Pain is well-controlled at present.  Patient is ambulating well with the use of an assistive device, and progressing in physical therapy.   See progress notes from this admission for patient's neurovascular status  Narcotic dosages were chosen by taking into account the the patient's previous history of opoid use and to ensure proper pain control after surgery    Discharge date: 4-16-24    Patient is being discharged to home after am physical therapy.     Allergies:  Other environmental       Medication List        CHANGE how you take these medications        Instructions   aspirin 81 MG EC tablet  What changed: Another medication with the same name was removed. Continue taking this medication, and follow the directions you see here.  Commonly known as: Aspirin 81   Take 1 Tablet by mouth 2 times a day for 30 days.  Dose: 81 mg            CONTINUE taking these medications        Instructions   atorvastatin 10 MG Tabs  Commonly known as: Lipitor   Take 0.5 Tablets by mouth every evening.  Dose: 5 mg     Centrum Silver 50+Women Tabs   Take  by mouth.     cetirizine 10 MG Tabs  Commonly known as: ZyrTEC   Take 1 Tablet by mouth every morning. Indications: Hayfever  Dose: 10 mg     Cinnamon 500 MG Caps   Take  by mouth every day.     citalopram 40 MG Tabs  Commonly known as: CeleXA   Take 1 Tablet by mouth at bedtime.  Dose: 40 mg     docusate sodium 100 MG Caps  Commonly known as: Colace   Take 1 Capsule by mouth 2 times a day as  needed for Constipation (2-3 times daily PRN).  Dose: 100 mg     glucosamine 500 MG Caps   Take  by mouth every day.     melatonin 1 mg Tabs   Take 5 mg by mouth at bedtime as needed.  Dose: 5 mg     meloxicam 7.5 MG Tabs  Commonly known as: Mobic   Take 1 Tablet by mouth 2 times a day.  Dose: 7.5 mg     metformin 1000 MG tablet  Commonly known as: Glucophage   Take 1 Tablet by mouth 2 times a day with meals.  Dose: 1,000 mg     montelukast 10 MG Tabs  Commonly known as: Singulair   Take 1 Tablet by mouth at bedtime. Indications: Hayfever  Dose: 10 mg     MOVE FREE PO   Take  by mouth every day.     ondansetron 4 MG Tbdp  Commonly known as: Zofran ODT   Take 1 Tablet by mouth every 6 hours as needed for Nausea/Vomiting.  Dose: 4 mg     oxyCODONE immediate-release 5 MG Tabs  Commonly known as: Roxicodone   Take 1 Tablet by mouth every four hours as needed for Severe Pain (For severe post op pain- MAX 6 DAILY) for up to 7 days.  Dose: 5 mg     traMADol 50 MG Tabs  Commonly known as: Ultram   Take 1 Tablet by mouth every 6 hours as needed for Severe Pain (Alternate with Oxycodone. Not to be taken at the same time.) for up to 10 days.  Dose: 50 mg     TYLENOL ARTHRITIS PAIN PO   Take  by mouth as needed.     valACYclovir 500 MG Tabs  Commonly known as: Valtrex   Take 500 mg by mouth as needed. 3 day course  Dose: 500 mg            STOP taking these medications      aspirin effervescent 325 MG Tbef  Commonly known as: Kelly-Bharathi     NON SPECIFIED              Discharge Instructions:     Patient is instructed to ambulate and weight bear as tolerated with the use of an assistive device, and to continue physical therapy exercises given during this hospital stay. Strict posterior hip precautions are to be observed for patients who underwent a total hip replacement.   Patient is to ice and elevate the surgical leg regularly, with pillows under the ankle, nothing is to be placed under the knee.   Patient was given detailed  wound care instructions, and will leave the Incisional vac or silver dressing on until first post-op visit.   ASA twice daily for DVT prophylaxis.  Patient is to follow up with Dr. Abrams's office in 1-2 weeks.

## 2024-04-16 NOTE — CARE PLAN
The patient is Stable - Low risk of patient condition declining or worsening    Shift Goals  Clinical Goals: Be evaluated by PT/OT, Pain controlled to less than <4  Patient Goals: Be seen by therapy and go home    Progress made toward(s) clinical / shift goals:  Patient seen by PT, and will be seen gain in the morning for a second eval. Patient's pain has been controlled with PRN pain interventions.     Patient is not progressing towards the following goals:

## 2024-04-16 NOTE — PROGRESS NOTES
Received bedside patient report from FAISAL Hernandez. Patient is awake, alert. Patient on room air, respirations unlabored. Dressing and sanjay in place to left knee, scant drainage. Patient resting in bed. Patient educated on call light use for needs. Plan of care discussed with patient. Belongings and call light within reach. Bed at lowest position. Safety precautions in place.

## 2024-04-16 NOTE — THERAPY
Physical Therapy   Daily Treatment     Patient Name: Marii Delgado  Age:  69 y.o., Sex:  female  Medical Record #: [id number]  Today's Date: 4/16/2024     Precautions  Precautions: Fall Risk  Comments: Safety Awareness    Assessment    Today's tx session focused primarily on therapeutic exercises, bed mobility and gait training to which the pt tolerated therapy well today. Pt presented with pain when performing supine TE's and given TC and VC. Pt was given compensatory strategies of contralateral leg usage to assist affected leg during bed mobility and sit<>supine. Pain subsided with ambulation and the pt was  improve ambulation distance with SPV, but still presented as bradykinetic and in a step-to pattern. Pt was on 2L O2 during ambulation and upright mobility and maintained 90%. Given pt progress today, pt is able to return home safely and anticipate outpatient physical therapy services to address higher level deficits.      Plan    Treatment Plan Status: Continue Current Treatment Plan  Type of Treatment: Bed Mobility, Gait Training, Neuro Re-Education / Balance, Stair Training, Therapeutic Activities, Therapeutic Exercise  Treatment Frequency: Daily  Treatment Duration: Until Therapy Goals Met    DC Equipment Recommendations: Front-Wheel Walker  Discharge Recommendations: Recommend outpatient physical therapy services to address higher level deficits      Objective       04/16/24 0850   Precautions   Precautions Fall Risk   Comments Safety Awareness   Vitals   Vitals Comments At start: 111/55, Desat on room air below 90%   Pain 0 - 10 Group   Location Knee   Location Orientation Left   Therapist Pain Assessment Prior to Activity;During Activity;Post Activity;Nurse Notified;8   Cognition    Cognition / Consciousness X   Level of Consciousness Alert   Safety Awareness Impaired   Attention Impaired   Passive ROM Lower Body   Passive ROM Lower Body X   Comments PROM > AROM; limited by pain and swelling    Active ROM Lower Body    Active ROM Lower Body  X   Comments   (Limited by pain and swelling)   Supine Lower Body Exercise   Supine Lower Body Exercises Yes   Comments Heel slides and quad sets prior to ambulation   Standing Lower Body Exercises   Standing Lower Body Exercises Yes   Comments Standing weight shifts   Balance   Sitting Balance (Static) Fair   Sitting Balance (Dynamic) Fair   Standing Balance (Static) Fair   Standing Balance (Dynamic) Fair   Weight Shift Sitting Fair   Weight Shift Standing Fair   Skilled Intervention Verbal Cuing;Facilitation   Comments Pt balance better than yesterday due to decrease in pain.   Bed Mobility    Supine to Sit Minimal Assist   Sit to Supine Supervised  (With compensatory strategy of using uninvolved leg to lift involved leg)   Scooting Supervised   Rolling Supervised   Skilled Intervention Compensatory Strategies;Facilitation;Verbal Cuing;Tactile Cuing   Gait Analysis   Gait Level Of Assist Supervised   Assistive Device Front Wheel Walker   Distance (Feet) 110   # of Times Distance was Traveled 1   Deviation Step To;Decreased Base Of Support;Bradykinetic   # of Stairs Climbed 1   Level of Assist with Stairs Standby Assist   Weight Bearing Status WBAT LLE   Skilled Intervention Verbal Cuing  (For safety awareness to not take hands off of walker)   Functional Mobility   Sit to Stand Supervised   Bed, Chair, Wheelchair Transfer Supervised   Toilet Transfers Supervised   Transfer Method Stand Step   Mobility bed mobility, sit<>stand, ambulation, stairs, stand<>sit   Skilled Intervention Verbal Cuing   6 Clicks Assessment - How much HELP from from another person do you currently need... (If the patient hasn't done an activity recently, how much help from another person do you think he/she would need if he/she tried?)   Turning from your back to your side while in a flat bed without using bedrails? 3   Moving from lying on your back to sitting on the side of a flat bed  without using bedrails? 3   Moving to and from a bed to a chair (including a wheelchair)? 3   Standing up from a chair using your arms (e.g., wheelchair, or bedside chair)? 3   Walking in hospital room? 3   Climbing 3-5 steps with a railing? 3   6 clicks Mobility Score 18   Activity Tolerance   Sitting Edge of Bed 3min   Standing 15min   Patient / Family Goals    Patient / Family Goal #1 Home   Goal #1 Outcome Progressing as expected   Short Term Goals    Short Term Goal # 1 Pt will be able to perform all bed mobility with SPV on flat bed in order to perform bed mobility at home by the end of 6 visits.   Goal Outcome # 1 Goal met  (With compensatory strategies)   Short Term Goal # 2 Pt will be able to perform a sit<>stand with SPV and FWW in order to perform sit<>stands at home by the end of 6 visits.   Goal Outcome # 2 Goal met   Short Term Goal # 3 Pt will be able to ambulate 150ft with SPV and FWW and adeqate gait mechanics in order to ambulate household distances by the end of 6 visits.   Goal Outcome # 3 Progressing as expected  (110ft and SPV)   Short Term Goal # 4 Pt will be able to ascend/descend 1 stair with SPV in order to navigate landing at home by the end of 6 visits.   Goal Outcome # 4 Progressing as expected  (SBA)   Education Group   Education Provided Role of Physical Therapist;Gait Training;Stair Training   Role of Physical Therapist Patient Response Patient;Family;Acceptance;Explanation;Demonstration;Verbal Demonstration   Gait Training Patient Response Patient;Family;Acceptance;Explanation;Demonstration;Action Demonstration   Stair Training Patient Response Patient;Family;Acceptance;Explanation;Action Demonstration;Verbal Demonstration   Physical Therapy Treatment Plan   Physical Therapy Treatment Plan Continue Current Treatment Plan   Reason For Discharge Other - Comments  (Pt will be successful in OP PT upon d/c)   Anticipated Discharge Equipment and Recommendations   DC Equipment  Recommendations Front-Wheel Walker   Discharge Recommendations Recommend outpatient physical therapy services to address higher level deficits   Interdisciplinary Plan of Care Collaboration   IDT Collaboration with  Nursing;Occupational Therapist   Patient Position at End of Therapy In Bed;Bed Alarm On;Call Light within Reach;Tray Table within Reach;Phone within Reach;Other (Comments)  (OT in room)   Collaboration Comments Aware of session and recs   Session Information   Date / Session Number  4/16-2 (2/7, 4/21)   Priority 4

## 2024-04-16 NOTE — PROGRESS NOTES
Pt resting comfortably in bed with no signs of distress noted. Breathing even and unlabored. Patient seen by PT, will be staying the night and seen again in the AM. Patient reports no further needs at this time. Call light within reach. Bed locked in lowest position. Plan of care on going.

## 2024-04-16 NOTE — PROGRESS NOTES
4 Eyes Skin Assessment Completed by FAISAL Hernandez and FAISAL Foley.    Head WDL  Ears WDL  Nose WDL  Mouth WDL  Neck WDL  Breast/Chest WDL  Shoulder Blades WDL  Spine WDL  (R) Arm/Elbow/Hand WDL  (L) Arm/Elbow/Hand WDL  Abdomen WDL  Groin WDL  Scrotum/Coccyx/Buttocks WDL  (R) Leg Swelling  (L) Leg Swelling and Incision (Left total knee - Stephon)  (R) Heel/Foot/Toe WDL  (L) Heel/Foot/Toe Swelling      Devices In Places Blood Pressure Cuff, Pulse Ox, and SCD's      Interventions In Place Gray Ear Foams and Pillows    Possible Skin Injury No    Pictures Uploaded Into Epic N/A  Wound Consult Placed N/A  RN Wound Prevention Protocol Ordered No

## 2024-04-16 NOTE — CARE PLAN
The patient is Stable - Low risk of patient condition declining or worsening    Shift Goals  Clinical Goals: Patient will report pain improvement by 1 point after interventions, ambulate to void  Patient Goals: Rest and pain management    Progress made toward(s) clinical / shift goals:  Patient reports pain improvement after interventions, able to rest and ambulate with front wheel walker. Patient able to ambulate to void.     Patient is not progressing towards the following goals: N/A

## 2024-04-16 NOTE — DISCHARGE PLANNING
Case Management Discharge Planning    Admission Date: 4/15/2024  GMLOS:    ALOS: 0    6-Clicks ADL Score: 21  6-Clicks Mobility Score: 18      Anticipated Discharge Dispo: Discharge Disposition: Discharged to home/self care (01)  Discharge Address: [address]  Discharge Contact Phone Number: [phone]    DME Needed: No    Action(s) Taken: DC Assessment Complete (See below)    LSW received DME order for FWW for pt. Pt reported having her own FWW. No additional needs identified.     Escalations Completed: None    Medically Clear: Yes    Next Steps: LSW to follow for any needs    Barriers to Discharge: None    Is the patient up for discharge tomorrow: No          Care Transition Team Assessment    LSW met with pt at bedside to complete discharge planning assessment.     Pt reported she currently lives in a ground floor apartment with her , William. Pt reported she has two sons as support.   Pt's preferred [facility].   Pt reported being independent prior to admission, uses FWW at baseline. Pt reported her  will be picking her up.     Information Source  Orientation Level: Oriented X4  Information Given By: Patient  Who is responsible for making decisions for patient? : Patient    Readmission Evaluation  Is this a readmission?: No    Elopement Risk  Legal Hold: No  Ambulatory or Self Mobile in Wheelchair: Yes  Disoriented: No  Psychiatric Symptoms: None  History of Wandering: No  Elopement this Admit: No  Vocalizing Wanting to Leave: No  Displays Behaviors, Body Language Wanting to Leave: No-Not at Risk for Elopement  Elopement Risk: Not at Risk for Elopement    Interdisciplinary Discharge Planning  Lives with - Patient's Self Care Capacity: Spouse  Patient or legal guardian wants to designate a caregiver: No  Support Systems: Family Member(s), Spouse / Significant Other  Housing / Facility: 1 Story Apartment / Condo  Prior Services: Home-Independent  Durable  Medical Equipment: Not Applicable    Discharge Preparedness  What is your plan after discharge?: Home with help  What are your discharge supports?: Spouse  Prior Functional Level: Ambulatory, Independent with Activities of Daily Living  Difficulity with ADLs: None  Difficulity with IADLs: None    Functional Assesment  Prior Functional Level: Ambulatory, Independent with Activities of Daily Living    Finances  Financial Barriers to Discharge: No  Prescription Coverage: Yes    Vision / Hearing Impairment  Vision Impairment : Yes  Right Eye Vision: Wears Glasses, Impaired  Left Eye Vision: Wears Glasses, Impaired  Hearing Impairment : No    Advance Directive  Advance Directive?: None    Domestic Abuse  Have you ever been the victim of abuse or violence?: No  Physical Abuse or Sexual Abuse: No  Verbal Abuse or Emotional Abuse: No  Possible Abuse/Neglect Reported to:: Not Applicable    Psychological Assessment  History of Substance Abuse: None  History of Psychiatric Problems: No  Non-compliant with Treatment: No  Newly Diagnosed Illness: No    Discharge Risks or Barriers  Discharge risks or barriers?: No    Anticipated Discharge Information  Discharge Disposition: Discharged to home/self care (01)  Discharge Address: [address] [place] [address]  Discharge Contact Phone Number: [phone]

## 2024-04-16 NOTE — THERAPY
Occupational Therapy   Initial Evaluation     Patient Name: Marii Delgado  Age:  69 y.o., Sex:  female  Medical Record #: [id number]  Today's Date: 4/16/2024     Precautions  Precautions: Fall Risk  Comments: Safety Awareness    Assessment  Patient is 69 y.o. female with a diagnosis of Left TKA.  Additional factors influencing patient status / progress: WBAT LLE.    Pt and  reside in a ground floor apartment in [place], [place].  Family and friends are available to assist as needed.  PLOF Mod I to Indep for ADL's, transfers and ambulation w/out a device.  Therapist reviewed environmental/home safety, fall precautions, joint protection, AE/DME, ADL's and transfers    Plan    Occupational Therapy Initial Treatment Plan   Duration: (P) Evaluation only    DC Equipment Recommendations: (P) Reacher, Tub Transfer Bench  Discharge Recommendations: (P) Anticipate that the patient will have no further occupational therapy needs after discharge from the hospital.     Subjective    Pt was alert and cooperative w/ tx.     Objective       04/16/24 1143    Services   Is patient using  services for this encounter? No   Initial Contact Note    Initial Contact Note Order Received and Verified, Evaluation Only - Patient Does Not Require Further Acute Occupational Therapy at this Time.  However, May Benefit from Post Acute Therapy for Higher Level Functional Deficits.   Prior Living Situation   Prior Services Home-Independent   Housing / Facility 1 Story Apartment / Condo   Steps Into Home 1   Steps In Home 0   Rail None   Bathroom Set up Bathtub / Shower Combination;Shower Curtain;Shower Chair   Equipment Owned Front-Wheel Walker;Tub / Shower Seat;Other (Comments)  (CPAP)   Lives with - Patient's Self Care Capacity Spouse   Comments Pt and  reside in a ground floor apartment in [place], [place].  Family and friends are available to assist as needed.  PLOF Mod I to Indep for ADL's, transfers  and ambulation w/out a device.   Prior Level of ADL Function   Self Feeding Independent   Grooming / Hygiene Independent   Bathing Independent   Dressing Independent   Toileting Independent   Prior Level of IADL Function   Medication Management Independent   Laundry Independent   Kitchen Mobility Independent   Finances Independent   Home Management Independent   Shopping Independent   Prior Level Of Mobility Independent Without Device in Community;Independent With Steps in Community;Independent Without Device in Home;Independent With Steps in Home   Driving / Transportation Driving Independent   History of Falls   History of Falls No   Pain   Intervention Cold Pack;Rest;Repositioned   Pain 0 - 10 Group   Location Knee   Location Orientation Left   Description Aching   Comfort Goal Comfort with Movement;Perform Activity   Therapist Pain Assessment Post Activity Pain Same as Prior to Activity;Nurse Notified   Non Verbal Descriptors   Non Verbal Scale  Calm;Unlabored Breathing   Cognition    Level of Consciousness Alert   Passive ROM Upper Body   Passive ROM Upper Body WDL   Active ROM Upper Body   Active ROM Upper Body  WDL   Dominant Hand Right   Strength Upper Body   Upper Body Strength  WDL   Upper Body Muscle Tone   Upper Body Muscle Tone  WDL   Coordination Upper Body   Coordination WDL   Balance Assessment   Sitting Balance (Static) Fair +   Sitting Balance (Dynamic) Fair   Standing Balance (Static) Fair   Standing Balance (Dynamic) Fair   Weight Shift Sitting Fair   Weight Shift Standing Fair   Comments OOB FWW   Bed Mobility    Supine to Sit Supervised   Sit to Supine Supervised   Scooting Supervised   ADL Assessment   Grooming Modified Independent;Standing   Upper Body Dressing Independent   Lower Body Dressing Supervision   Toileting Supervision   How much help from another person does the patient currently need...   Putting on and taking off regular lower body clothing? 3   Bathing (including washing,  rinsing, and drying)? 3   Toileting, which includes using a toilet, bedpan, or urinal? 3   Putting on and taking off regular upper body clothing? 4   Taking care of personal grooming such as brushing teeth? 4   Eating meals? 4   6 Clicks Daily Activity Score 21   Functional Mobility   Sit to Stand Supervised   Bed, Chair, Wheelchair Transfer Supervised   Toilet Transfers Supervised   Transfer Method Stand Step   Mobility Sup FWW   Distance (Feet) 25   # of Times Distance was Traveled 2   Edema / Skin Assessment   Comments Surgical site/dressing clean, dry and intact.   Education Group   Education Provided Joint Protection;Home Safety;Transfers;Role of Occupational Therapist;Activities of Daily Living;Adaptive Equipment   Role of Occupational Therapist Patient Response Patient;Acceptance;Explanation;Verbal Demonstration   Joint Protection Patient Response Patient;Acceptance;Explanation;Demonstration;Teach Back;Verbal Demonstration;Action Demonstration   Home Safety Patient Response Patient;Acceptance;Explanation;Demonstration;Teach Back;Verbal Demonstration;Action Demonstration   Transfers Patient Response Patient;Acceptance;Explanation;Demonstration;Teach Back;Verbal Demonstration;Action Demonstration   ADL Patient Response Patient;Acceptance;Explanation;Demonstration;Teach Back;Verbal Demonstration;Action Demonstration   Adaptive Equipment Patient Response Patient;Acceptance;Explanation;Demonstration;Teach Back;Action Demonstration;Verbal Demonstration   Occupational Therapy Initial Treatment Plan    Duration Evaluation only   Anticipated Discharge Equipment and Recommendations   DC Equipment Recommendations Reacher;Tub Transfer Bench   Discharge Recommendations Anticipate that the patient will have no further occupational therapy needs after discharge from the hospital

## 2024-05-11 DIAGNOSIS — Z91.09 ALLERGY TO ENVIRONMENTAL FACTORS: ICD-10-CM

## 2024-05-13 RX ORDER — CETIRIZINE HYDROCHLORIDE 10 MG/1
10 TABLET ORAL EVERY MORNING
Qty: 90 TABLET | Refills: 0 | Status: SHIPPED | OUTPATIENT
Start: 2024-05-13

## 2024-05-13 NOTE — TELEPHONE ENCOUNTER
Received request via: Pharmacy    Was the patient seen in the last year in this department? Yes    Does the patient have an active prescription (recently filled or refills available) for medication(s) requested? No    Pharmacy Name: [id number] - [place], [place] - [address] AT [facility]     Does the patient have nursing home Plus and need 100 day supply (blood pressure, diabetes and cholesterol meds only)? Medication is not for cholesterol, blood pressure or diabetes

## 2024-05-14 ENCOUNTER — HOSPITAL ENCOUNTER (OUTPATIENT)
Dept: LAB | Facility: MEDICAL CENTER | Age: 69
End: 2024-05-14
Attending: FAMILY MEDICINE
Payer: MEDICARE

## 2024-05-14 DIAGNOSIS — E11.9 TYPE 2 DIABETES MELLITUS WITHOUT COMPLICATION, WITHOUT LONG-TERM CURRENT USE OF INSULIN (HCC): ICD-10-CM

## 2024-05-14 DIAGNOSIS — E78.2 MIXED HYPERLIPIDEMIA: ICD-10-CM

## 2024-05-14 LAB
ALBUMIN SERPL BCP-MCNC: 4.2 G/DL (ref 3.2–4.9)
ALBUMIN/GLOB SERPL: 1.7 G/DL
ALP SERPL-CCNC: 103 U/L (ref 30–99)
ALT SERPL-CCNC: 15 U/L (ref 2–50)
ANION GAP SERPL CALC-SCNC: 12 MMOL/L (ref 7–16)
AST SERPL-CCNC: 16 U/L (ref 12–45)
BASOPHILS # BLD AUTO: 1.2 % (ref 0–1.8)
BASOPHILS # BLD: 0.08 K/UL (ref 0–0.12)
BILIRUB SERPL-MCNC: 0.4 MG/DL (ref 0.1–1.5)
BUN SERPL-MCNC: 20 MG/DL (ref 8–22)
CALCIUM ALBUM COR SERPL-MCNC: 9.6 MG/DL (ref 8.5–10.5)
CALCIUM SERPL-MCNC: 9.8 MG/DL (ref 8.5–10.5)
CHLORIDE SERPL-SCNC: 103 MMOL/L (ref 96–112)
CHOLEST SERPL-MCNC: 125 MG/DL (ref 100–199)
CO2 SERPL-SCNC: 24 MMOL/L (ref 20–33)
CREAT SERPL-MCNC: 0.7 MG/DL (ref 0.5–1.4)
CREAT UR-MCNC: 80.69 MG/DL
EOSINOPHIL # BLD AUTO: 0.15 K/UL (ref 0–0.51)
EOSINOPHIL NFR BLD: 2.3 % (ref 0–6.9)
ERYTHROCYTE [DISTWIDTH] IN BLOOD BY AUTOMATED COUNT: 49.5 FL (ref 35.9–50)
EST. AVERAGE GLUCOSE BLD GHB EST-MCNC: 189 MG/DL
GFR SERPLBLD CREATININE-BSD FMLA CKD-EPI: 95 ML/MIN/1.73 M 2
GLOBULIN SER CALC-MCNC: 2.5 G/DL (ref 1.9–3.5)
GLUCOSE SERPL-MCNC: 127 MG/DL (ref 65–99)
HBA1C MFR BLD: 8.2 % (ref 4–5.6)
HCT VFR BLD AUTO: 38.7 % (ref 37–47)
HDLC SERPL-MCNC: 45 MG/DL
HGB BLD-MCNC: 12.4 G/DL (ref 12–16)
IMM GRANULOCYTES # BLD AUTO: 0.04 K/UL (ref 0–0.11)
IMM GRANULOCYTES NFR BLD AUTO: 0.6 % (ref 0–0.9)
LDLC SERPL CALC-MCNC: 61 MG/DL
LYMPHOCYTES # BLD AUTO: 1.53 K/UL (ref 1–4.8)
LYMPHOCYTES NFR BLD: 23.9 % (ref 22–41)
MCH RBC QN AUTO: 27.3 PG (ref 27–33)
MCHC RBC AUTO-ENTMCNC: 32 G/DL (ref 32.2–35.5)
MCV RBC AUTO: 85.1 FL (ref 81.4–97.8)
MICROALBUMIN UR-MCNC: <1.2 MG/DL
MICROALBUMIN/CREAT UR: NORMAL MG/G (ref 0–30)
MONOCYTES # BLD AUTO: 0.45 K/UL (ref 0–0.85)
MONOCYTES NFR BLD AUTO: 7 % (ref 0–13.4)
NEUTROPHILS # BLD AUTO: 4.16 K/UL (ref 1.82–7.42)
NEUTROPHILS NFR BLD: 65 % (ref 44–72)
NRBC # BLD AUTO: 0 K/UL
NRBC BLD-RTO: 0 /100 WBC (ref 0–0.2)
PLATELET # BLD AUTO: 593 K/UL (ref 164–446)
PMV BLD AUTO: 9.6 FL (ref 9–12.9)
POTASSIUM SERPL-SCNC: 4.7 MMOL/L (ref 3.6–5.5)
PROT SERPL-MCNC: 6.7 G/DL (ref 6–8.2)
RBC # BLD AUTO: 4.55 M/UL (ref 4.2–5.4)
SODIUM SERPL-SCNC: 139 MMOL/L (ref 135–145)
TRIGL SERPL-MCNC: 95 MG/DL (ref 0–149)
TSH SERPL DL<=0.005 MIU/L-ACNC: 3.55 UIU/ML (ref 0.38–5.33)
WBC # BLD AUTO: 6.4 K/UL (ref 4.8–10.8)

## 2024-05-16 ENCOUNTER — OFFICE VISIT (OUTPATIENT)
Dept: MEDICAL GROUP | Facility: MEDICAL CENTER | Age: 69
End: 2024-05-16
Payer: MEDICARE

## 2024-05-16 VITALS
HEIGHT: 61 IN | BODY MASS INDEX: 32.12 KG/M2 | HEART RATE: 70 BPM | SYSTOLIC BLOOD PRESSURE: 110 MMHG | DIASTOLIC BLOOD PRESSURE: 72 MMHG | OXYGEN SATURATION: 99 % | TEMPERATURE: 97.2 F | WEIGHT: 170.1 LBS

## 2024-05-16 DIAGNOSIS — D75.839 THROMBOCYTOSIS: ICD-10-CM

## 2024-05-16 DIAGNOSIS — E78.2 MIXED HYPERLIPIDEMIA: ICD-10-CM

## 2024-05-16 DIAGNOSIS — A60.00 GENITAL HERPES SIMPLEX, UNSPECIFIED SITE: ICD-10-CM

## 2024-05-16 DIAGNOSIS — E11.9 TYPE 2 DIABETES MELLITUS WITHOUT COMPLICATION, WITHOUT LONG-TERM CURRENT USE OF INSULIN (HCC): ICD-10-CM

## 2024-05-16 DIAGNOSIS — G47.33 OSA (OBSTRUCTIVE SLEEP APNEA): ICD-10-CM

## 2024-05-16 PROCEDURE — 99214 OFFICE O/P EST MOD 30 MIN: CPT | Performed by: FAMILY MEDICINE

## 2024-05-16 PROCEDURE — 3074F SYST BP LT 130 MM HG: CPT | Performed by: FAMILY MEDICINE

## 2024-05-16 PROCEDURE — 3078F DIAST BP <80 MM HG: CPT | Performed by: FAMILY MEDICINE

## 2024-05-16 RX ORDER — VALACYCLOVIR HYDROCHLORIDE 500 MG/1
500 TABLET, FILM COATED ORAL PRN
Qty: 40 TABLET | Status: CANCELLED | OUTPATIENT
Start: 2024-05-16

## 2024-05-16 RX ORDER — VALACYCLOVIR HYDROCHLORIDE 500 MG/1
500 TABLET, FILM COATED ORAL 2 TIMES DAILY
Qty: 14 TABLET | Refills: 0 | Status: SHIPPED | OUTPATIENT
Start: 2024-05-16

## 2024-05-16 ASSESSMENT — FIBROSIS 4 INDEX: FIB4 SCORE: 0.47

## 2024-05-16 NOTE — PROGRESS NOTES
CC: Diabetes, hyperlipidemia, sleep apnea, thrombocytosis, gated habits    HPI:   Marii Baker presents today to discuss the following:    Type 2 diabetes mellitus without complication, without long-term current use of insulin (HCC)  Diabetes has been uncontrolled.  A1c today is 8.2.  Patient stated that she has been very strict on her diet but recently she had left knee replacement which could be the reason for her high blood sugar(lot of stress and pain, the use the level of medications including steroids).  Patient stated that she lost about 7 pound.  She is currently metformin 1000 mg twice a day, no side effects.  Patient scheduled for retinal exam next week.    Mixed hyperlipidemia  She has been tolerating the statin. Denies muscle pain LFTs has been normal, currently on atorvastatin 10 mg daily    ISABELA (obstructive sleep apnea)  Has been on CPAP.  Denies any morning headache and daytime somnolence.  Needs CPAP supplies refilled    Thrombocytosis  Patient has been having chronically high platelet.,  With normal red blood cells and white blood cells.  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  States gets flareups about 2 times a year.  Patient asks for medication refill        Patient Active Problem List    Diagnosis Date Noted    Arthritis of left knee 04/15/2024    Degenerative arthritis of left knee 03/07/2024    Primary osteoarthritis of left knee 03/07/2024    History of colon cancer 02/05/2024    Aortic atherosclerosis (HCC) 02/05/2024    ISABELA (obstructive sleep apnea) 02/05/2024    BMI 33.0-33.9,adult 02/05/2024    Nonspecific abnormal function study, cardiovascular 02/05/2024    Type 2 diabetes mellitus without complication (HCC) 02/05/2024    Herpes genitalis 02/05/2024    Major depressive disorder in full remission (HCC) 02/05/2024     Memory loss 02/05/2024    Allergic rhinitis 02/05/2024    Mixed hyperlipidemia 10/29/2019    Obesity (BMI 30.0-34.9) 10/29/2019    Fourth degree hemorrhoids 12/07/2016       Current Outpatient Medications   Medication Sig Dispense Refill    valACYclovir (VALTREX) 500 MG Tab Take 1 Tablet by mouth 2 times a day. 3 day course 14 Tablet 0    cetirizine (ZYRTEC) 10 MG Tab TAKE 1 TABLET BY MOUTH EVERY MORNING 90 Tablet 0    docusate sodium (COLACE) 100 MG Cap Take 1 Capsule by mouth 2 times a day as needed for Constipation (2-3 times daily PRN). 30 Capsule 0    meloxicam (MOBIC) 7.5 MG Tab Take 1 Tablet by mouth 2 times a day. 60 Tablet 1    ondansetron (ZOFRAN ODT) 4 MG TABLET DISPERSIBLE Take 1 Tablet by mouth every 6 hours as needed for Nausea/Vomiting. 30 Tablet 0    Glucosamine-Chondroitin (MOVE FREE PO) Take  by mouth every day.      melatonin 1 mg Tab Take 5 mg by mouth at bedtime as needed.      Acetaminophen (TYLENOL ARTHRITIS PAIN PO) Take  by mouth as needed.      Cinnamon 500 MG Cap Take  by mouth every day.      glucosamine 500 MG Cap Take  by mouth every day.      Multiple Vitamins-Minerals (CENTRUM SILVER 50+WOMEN) Tab Take  by mouth.      metformin (GLUCOPHAGE) 1000 MG tablet Take 1 Tablet by mouth 2 times a day with meals. 180 Tablet 2    citalopram (CELEXA) 40 MG Tab Take 1 Tablet by mouth at bedtime. 90 Tablet 2    atorvastatin (LIPITOR) 10 MG Tab Take 0.5 Tablets by mouth every evening. 90 Tablet 2    montelukast (SINGULAIR) 10 MG Tab Take 1 Tablet by mouth at bedtime. Indications: Hayfever 90 Tablet 2     No current facility-administered medications for this visit.         Allergies as of 05/16/2024 - Reviewed 05/16/2024   Allergen Reaction Noted    Other environmental Runny Nose and Itching 03/19/2024        ROS: Denies any chest pain, Shortness of breath, Changes bowel or bladder, Lower extremity edema.    Physical Exam:  /72   Pulse 70   Temp 36.2 °C (97.2 °F) (Temporal)   Ht 1.549 m (5'  "1\")   Wt 77.2 kg (170 lb 1.6 oz)   SpO2 99%   BMI 32.14 kg/m²   Gen.: Well-developed, well-nourished, no apparent distress,pleasant and cooperative with the examination  Skin:  Warm and dry with good turgor. No rashes or suspicious lesions in visible areas  HEENT:Sinuses nontender with palpation, TMs clear, nares patent with pink mucosa and clear rhinorrhea,no septal deviation ,polyps or lesions. lips without lesions, oropharynx clear.  Neck: Trachea midline,no masses or adenopathy. No JVD.  Cor: Regular rate and rhythm without murmur, gallop or rub.  Lungs: Respirations unlabored.Clear to auscultation with equal breath sounds bilaterally. No wheezes, rhonchi.  Extremities: No cyanosis, clubbing or edema.  Left knee replacement incision looks fine, no sign of infection.          Assessment and Plan.   69 y.o. female     1. Type 2 diabetes mellitus without complication, without long-term current use of insulin (HCC)  Uncontrolled.  A1c today is 8.2.  Patient stated that she has been very strict on her diet but recently she had left knee replacement which could be the reason for her high blood sugar(lot of stress and pain, the use the level of medications including steroids)  Advised to continue metformin 1000 mg twice a day, will repeat A1c in 3 months.    2. Mixed hyperlipidemia  She has been tolerating the statin. Denies muscle pain LFTs has been normal  Continue atorvastatin 10 mg daily    3. ISABELA (obstructive sleep apnea)  Has been on CPAP.  Denies any morning headache and daytime somnolence.  Needs CPAP supplies refilled    4. Thrombocytosis  Patient has been having chronically high platelet.,  With normal red blood cells and white blood cells.  Asymptomatic    - Referral to Hematology Oncology    5. Genital herpes simplex, unspecified site  Patient with chronic recurrent herpes genitalis for more than 20 years and takes Valacyclovir as needed which provides quick relief.  States gets flareups about 2 times a " year.  Needs medication refill    - valACYclovir (VALTREX) 500 MG Tab; Take 1 Tablet by mouth 2 times a day. 3 day course  Dispense: 14 Tablet; Refill: 0

## 2024-06-16 ENCOUNTER — APPOINTMENT (OUTPATIENT)
Dept: RADIOLOGY | Facility: MEDICAL CENTER | Age: 69
End: 2024-06-16
Attending: EMERGENCY MEDICINE
Payer: MEDICARE

## 2024-06-16 ENCOUNTER — HOSPITAL ENCOUNTER (EMERGENCY)
Facility: MEDICAL CENTER | Age: 69
End: 2024-06-16
Attending: EMERGENCY MEDICINE
Payer: MEDICARE

## 2024-06-16 VITALS
TEMPERATURE: 97.2 F | HEART RATE: 64 BPM | RESPIRATION RATE: 18 BRPM | WEIGHT: 170 LBS | BODY MASS INDEX: 32.1 KG/M2 | DIASTOLIC BLOOD PRESSURE: 69 MMHG | HEIGHT: 61 IN | OXYGEN SATURATION: 96 % | SYSTOLIC BLOOD PRESSURE: 149 MMHG

## 2024-06-16 DIAGNOSIS — M79.605 LEFT LEG PAIN: ICD-10-CM

## 2024-06-16 LAB
ALBUMIN SERPL BCP-MCNC: 4.1 G/DL (ref 3.2–4.9)
ALBUMIN/GLOB SERPL: 1.4 G/DL
ALP SERPL-CCNC: 118 U/L (ref 30–99)
ALT SERPL-CCNC: 17 U/L (ref 2–50)
ANION GAP SERPL CALC-SCNC: 12 MMOL/L (ref 7–16)
APTT PPP: 31.3 SEC (ref 24.7–36)
AST SERPL-CCNC: 20 U/L (ref 12–45)
BASOPHILS # BLD AUTO: 1 % (ref 0–1.8)
BASOPHILS # BLD: 0.09 K/UL (ref 0–0.12)
BILIRUB SERPL-MCNC: 0.4 MG/DL (ref 0.1–1.5)
BUN SERPL-MCNC: 17 MG/DL (ref 8–22)
CALCIUM ALBUM COR SERPL-MCNC: 9.6 MG/DL (ref 8.5–10.5)
CALCIUM SERPL-MCNC: 9.7 MG/DL (ref 8.5–10.5)
CHLORIDE SERPL-SCNC: 104 MMOL/L (ref 96–112)
CO2 SERPL-SCNC: 23 MMOL/L (ref 20–33)
CREAT SERPL-MCNC: 0.64 MG/DL (ref 0.5–1.4)
CRP SERPL HS-MCNC: <0.3 MG/DL (ref 0–0.75)
EOSINOPHIL # BLD AUTO: 0.31 K/UL (ref 0–0.51)
EOSINOPHIL NFR BLD: 3.4 % (ref 0–6.9)
ERYTHROCYTE [DISTWIDTH] IN BLOOD BY AUTOMATED COUNT: 45.2 FL (ref 35.9–50)
ERYTHROCYTE [SEDIMENTATION RATE] IN BLOOD BY WESTERGREN METHOD: 5 MM/HOUR (ref 0–25)
GFR SERPLBLD CREATININE-BSD FMLA CKD-EPI: 97 ML/MIN/1.73 M 2
GLOBULIN SER CALC-MCNC: 3 G/DL (ref 1.9–3.5)
GLUCOSE SERPL-MCNC: 137 MG/DL (ref 65–99)
HCT VFR BLD AUTO: 42 % (ref 37–47)
HGB BLD-MCNC: 13.8 G/DL (ref 12–16)
IMM GRANULOCYTES # BLD AUTO: 0.06 K/UL (ref 0–0.11)
IMM GRANULOCYTES NFR BLD AUTO: 0.7 % (ref 0–0.9)
INR PPP: 1.09 (ref 0.87–1.13)
LYMPHOCYTES # BLD AUTO: 2.06 K/UL (ref 1–4.8)
LYMPHOCYTES NFR BLD: 22.3 % (ref 22–41)
MCH RBC QN AUTO: 27.2 PG (ref 27–33)
MCHC RBC AUTO-ENTMCNC: 32.9 G/DL (ref 32.2–35.5)
MCV RBC AUTO: 82.8 FL (ref 81.4–97.8)
MONOCYTES # BLD AUTO: 0.7 K/UL (ref 0–0.85)
MONOCYTES NFR BLD AUTO: 7.6 % (ref 0–13.4)
NEUTROPHILS # BLD AUTO: 6 K/UL (ref 1.82–7.42)
NEUTROPHILS NFR BLD: 65 % (ref 44–72)
NRBC # BLD AUTO: 0 K/UL
NRBC BLD-RTO: 0 /100 WBC (ref 0–0.2)
PLATELET # BLD AUTO: 597 K/UL (ref 164–446)
PMV BLD AUTO: 9.3 FL (ref 9–12.9)
POTASSIUM SERPL-SCNC: 4.7 MMOL/L (ref 3.6–5.5)
PROT SERPL-MCNC: 7.1 G/DL (ref 6–8.2)
PROTHROMBIN TIME: 14.3 SEC (ref 12–14.6)
RBC # BLD AUTO: 5.07 M/UL (ref 4.2–5.4)
SODIUM SERPL-SCNC: 139 MMOL/L (ref 135–145)
WBC # BLD AUTO: 9.2 K/UL (ref 4.8–10.8)

## 2024-06-16 PROCEDURE — 86140 C-REACTIVE PROTEIN: CPT

## 2024-06-16 PROCEDURE — 93971 EXTREMITY STUDY: CPT | Mod: LT

## 2024-06-16 PROCEDURE — 85730 THROMBOPLASTIN TIME PARTIAL: CPT

## 2024-06-16 PROCEDURE — 96374 THER/PROPH/DIAG INJ IV PUSH: CPT

## 2024-06-16 PROCEDURE — 700111 HCHG RX REV CODE 636 W/ 250 OVERRIDE (IP): Mod: JZ,UD | Performed by: EMERGENCY MEDICINE

## 2024-06-16 PROCEDURE — 85025 COMPLETE CBC W/AUTO DIFF WBC: CPT

## 2024-06-16 PROCEDURE — 80053 COMPREHEN METABOLIC PANEL: CPT

## 2024-06-16 PROCEDURE — 85652 RBC SED RATE AUTOMATED: CPT

## 2024-06-16 PROCEDURE — 36415 COLL VENOUS BLD VENIPUNCTURE: CPT

## 2024-06-16 PROCEDURE — 85610 PROTHROMBIN TIME: CPT

## 2024-06-16 PROCEDURE — 73564 X-RAY EXAM KNEE 4 OR MORE: CPT | Mod: LT

## 2024-06-16 PROCEDURE — 99285 EMERGENCY DEPT VISIT HI MDM: CPT

## 2024-06-16 RX ORDER — KETOROLAC TROMETHAMINE 15 MG/ML
15 INJECTION, SOLUTION INTRAMUSCULAR; INTRAVENOUS ONCE
Status: COMPLETED | OUTPATIENT
Start: 2024-06-16 | End: 2024-06-16

## 2024-06-16 RX ADMIN — KETOROLAC TROMETHAMINE 15 MG: 15 INJECTION, SOLUTION INTRAMUSCULAR; INTRAVENOUS at 12:13

## 2024-06-16 ASSESSMENT — FIBROSIS 4 INDEX: FIB4 SCORE: 0.47

## 2024-06-16 NOTE — ED NOTES
Bedside report received from prior RN. Pt alert. Resp normal/unlabored on RA. Bed side rails up/in low position. Pt updated to POC and all questions answered.     Dx at bedside

## 2024-06-16 NOTE — ED NOTES
Pt discharged, all appropriate hospital equipment removed (IV, monitor, pulse ox, etc.). Pt left unit via walking with partner to vehicle for home. Personal belongings with pt when leaving unit. Pt given discharge instructions prior to leaving ER, including where to  prescriptions and when to follow-up if applicable; verbalizes understanding. Pt informed to return to ED if symptoms worsen/return or altered status develop. Copy of discharge instructions signed and turned into DC basket and copy sent with pt. F/u with ortho surg

## 2024-06-16 NOTE — ED PROVIDER NOTES
ED Provider Note    CHIEF COMPLAINT  Chief Complaint   Patient presents with    Leg Pain     2x days of left leg pain, pt thinks she may have strained her leg. Pt had a recent knee surgery on the left leg in mid April.        EXTERNAL RECORDS REVIEWED  External ED Note Ortho follow up office visit 5/30/24 when the patient was being followed up after her TKA    HPI/ROS  LIMITATION TO HISTORY   Select: : None  OUTSIDE HISTORIAN(S):  None    Mariikyler Delgado is a 69 y.o. female who presents to the emergency department for evaluation of left leg pain.  The patient has a history of total knee arthroplasty with Dr. Abrams On 4/15/2024.  She states that the recovery have been going well.  She is currently going to physical therapy.  She states that 2 days ago she started having pain involving the medial aspect of her thigh and the medial aspect of the knee.  She states that she is having difficulty walking secondary to the pain.  She has also noticed swelling.  She denies any fevers, nausea or vomiting.  She states that she is having diminished range of motion at the knee secondary to discomfort as well.  She denies numbness or tingling.  She denies any recent trauma to the knee.  She denies any history of DVTs.    PAST MEDICAL HISTORY   has a past medical history of Arthritis, Back pain, Bowel habit changes, Breath shortness, Cancer ([facility]) (2016), Cancer of sigmoid colon ([facility]) (12/07/2016), Cataract, Colon cancer ([facility]), Dental disorder, Depression, Diabetes ([facility]), H/O seasonal allergies, Heart burn, Hemorrhagic disorder ([facility]), High cholesterol, Hypertension, Kidney stone, Mixed hyperlipidemia (10/29/2019), Obesity (10/29/2019), Pain (12/01/2016), Peripheral vascular disease ([facility]), Pre-diabetes (10/29/2019), Psychiatric problem, Renal disorder, Sleep apnea, Snoring, and Urinary incontinence.    SURGICAL HISTORY   has a past surgical history that includes hysterectomy, vaginal (2007); orif, wrist (Left, 08/2016); low  "anterior resection laparoscopic (12/07/2016); vein stripping (1980); other (2019); primary c section; angiogram (2020); and arthroplasty, knee, robot-assisted (Left, 4/15/2024).    FAMILY HISTORY  Family History   Problem Relation Age of Onset    Cancer Paternal Aunt     Sleep Apnea Neg Hx        SOCIAL HISTORY  Social History     Tobacco Use    Smoking status: Never     Passive exposure: Never    Smokeless tobacco: Never   Vaping Use    Vaping status: Never Used   Substance and Sexual Activity    Alcohol use: No     Alcohol/week: 0.0 oz    Drug use: No    Sexual activity: Not on file       CURRENT MEDICATIONS  Home Medications    **Home medications have not yet been reviewed for this encounter**       Audit from Redirected Encounters    **Home medications have not yet been reviewed for this encounter**         ALLERGIES  Allergies   Allergen Reactions    Other Environmental Runny Nose and Itching     Dust mites, pollen....       PHYSICAL EXAM  VITAL SIGNS: BP (!) 142/67   Pulse 67   Temp 36.4 °C (97.6 °F) (Temporal)   Resp 18   Ht 1.549 m (5' 1\")   Wt 77.1 kg (170 lb)   SpO2 97%   BMI 32.12 kg/m²   Constitutional: Alert and in no apparent distress.  HENT: Normocephalic atraumatic. Bilateral external ears normal. Nose normal. Mucous membranes are moist.  Eyes: Pupils are equal and reactive. Conjunctiva normal. Non-icteric sclera.   Neck: Normal range of motion without tenderness. Supple.  Cardiovascular: Regular rate and rhythm. No murmurs, gallops or rubs.  Thorax & Lungs: No increased work of breathing. Breath sounds are clear to auscultation bilaterally. No wheezing, rhonchi or rales.  Abdomen: Soft, nontender and nondistended. No hepatosplenomegaly.  Skin: Warm and dry. No rashes are noted.  Back: No bony tenderness, No CVA tenderness.   Extremities: 2+ peripheral pulses. Cap refill is less than 2 seconds. No edema, cyanosis, or clubbing.  Musculoskeletal: Good range of motion in all major joints. No " tenderness to palpation or major deformities noted.  Focused exam of the left lower extremity: There is swelling and tenderness to palpation along the medial aspect of the distal thigh and knee.  Limited flexion at the knee secondary to discomfort.  No calf tenderness or swelling noted.  2+ dorsalis pedis and posterior tibialis pulses.  Sensation is grossly intact.  The patient is able to wiggle her toes.  Neurologic: Alert and appropriate for age. The patient moves all 4 extremities without obvious deficits.    LABS  Results for orders placed or performed during the hospital encounter of 06/16/24   CBC WITH DIFFERENTIAL   Result Value Ref Range    WBC 9.2 4.8 - 10.8 K/uL    RBC 5.07 4.20 - 5.40 M/uL    Hemoglobin 13.8 12.0 - 16.0 g/dL    Hematocrit 42.0 37.0 - 47.0 %    MCV 82.8 81.4 - 97.8 fL    MCH 27.2 27.0 - 33.0 pg    MCHC 32.9 32.2 - 35.5 g/dL    RDW 45.2 35.9 - 50.0 fL    Platelet Count 597 (H) 164 - 446 K/uL    MPV 9.3 9.0 - 12.9 fL    Neutrophils-Polys 65.00 44.00 - 72.00 %    Lymphocytes 22.30 22.00 - 41.00 %    Monocytes 7.60 0.00 - 13.40 %    Eosinophils 3.40 0.00 - 6.90 %    Basophils 1.00 0.00 - 1.80 %    Immature Granulocytes 0.70 0.00 - 0.90 %    Nucleated RBC 0.00 0.00 - 0.20 /100 WBC    Neutrophils (Absolute) 6.00 1.82 - 7.42 K/uL    Lymphs (Absolute) 2.06 1.00 - 4.80 K/uL    Monos (Absolute) 0.70 0.00 - 0.85 K/uL    Eos (Absolute) 0.31 0.00 - 0.51 K/uL    Baso (Absolute) 0.09 0.00 - 0.12 K/uL    Immature Granulocytes (abs) 0.06 0.00 - 0.11 K/uL    NRBC (Absolute) 0.00 K/uL   COMP METABOLIC PANEL   Result Value Ref Range    Sodium 139 135 - 145 mmol/L    Potassium 4.7 3.6 - 5.5 mmol/L    Chloride 104 96 - 112 mmol/L    Co2 23 20 - 33 mmol/L    Anion Gap 12.0 7.0 - 16.0    Glucose 137 (H) 65 - 99 mg/dL    Bun 17 8 - 22 mg/dL    Creatinine 0.64 0.50 - 1.40 mg/dL    Calcium 9.7 8.5 - 10.5 mg/dL    Correct Calcium 9.6 8.5 - 10.5 mg/dL    AST(SGOT) 20 12 - 45 U/L    ALT(SGPT) 17 2 - 50 U/L    Alkaline  Phosphatase 118 (H) 30 - 99 U/L    Total Bilirubin 0.4 0.1 - 1.5 mg/dL    Albumin 4.1 3.2 - 4.9 g/dL    Total Protein 7.1 6.0 - 8.2 g/dL    Globulin 3.0 1.9 - 3.5 g/dL    A-G Ratio 1.4 g/dL   PROTHROMBIN TIME (INR)   Result Value Ref Range    PT 14.3 12.0 - 14.6 sec    INR 1.09 0.87 - 1.13   APTT   Result Value Ref Range    APTT 31.3 24.7 - 36.0 sec   CRP QUANTITIVE (NON-CARDIAC)   Result Value Ref Range    Stat C-Reactive Protein <0.30 0.00 - 0.75 mg/dL   Sed Rate   Result Value Ref Range    Sed Rate Westergren 5 0 - 25 mm/hour   ESTIMATED GFR   Result Value Ref Range    GFR (CKD-EPI) 97 >60 mL/min/1.73 m 2     RADIOLOGY/PROCEDURES   I have independently interpreted the diagnostic imaging associated with this visit and am waiting the final reading from the radiologist.   My preliminary interpretation is as follows: No obvious fracture or subluxation of the knee noted on plain films.    Radiologist interpretation:  US-EXTREMITY VENOUS LOWER UNILAT LEFT         DX-KNEE COMPLETE 4+ LEFT   Final Result      Negative for fracture or malalignment        COURSE & MEDICAL DECISION MAKING    ASSESSMENT, COURSE AND PLAN  Care Narrative: This is a 69-year-old female presenting to the emergency department for evaluation of leg pain.  On initial evaluation, the patient did not appear to be in any acute distress.  Vital signs are reassuring.  Physical exam was notable for tenderness to palpation and swelling of the distal medial thigh and knee.  She had diminished range of motion of the knee secondary to discomfort.  No warmth or erythema of the knee was noted.  She is grossly neurovascularly intact and compartments were soft.  I am less concerned for compartment syndrome.    An IV was established and labs were sent.  The patient's white count, sed rate, and CRP were all normal.  She had no evidence of infection on exam and I have very low clinical suspicion for septic arthritis.  Additionally, she had no evidence of cellulitis  or abscess.    Plain films of the knee were obtained and no evidence of fracture or malalignment.  The arthroplasty appears to be stable.    Doppler was negative for DVT.    The patient was treated with a dose of Toradol here in the ED.  Upon reevaluation, she is resting comfortably.  Repeat vital signs are normal.  I do think she is stable for discharge.  I encouraged her to follow-up with Dr. Abrams and recommended supportive measures at home.  She will follow-up and return to the ED with any worsening signs or symptoms.    The patient appears non-toxic and well hydrated. There are no signs of life threatening or serious infection at this time. The parents / guardian have been instructed to return if the child appears to be getting more seriously ill in any way.    ADDITIONAL PROBLEMS MANAGED  Leg pain    DISPOSITION AND DISCUSSIONS  I have discussed management of the patient with the following physicians and MAXIMUS's: None    Discussion of management with other QHP or appropriate source(s): None     Escalation of care considered, and ultimately not performed:acute inpatient care management, however at this time, the patient is most appropriate for outpatient management    Barriers to care at this time, including but not limited to:  None .     Decision tools and prescription drugs considered including, but not limited to:  None .    FINAL IMPRESSION  1. Left leg pain      PRESCRIPTIONS  New Prescriptions    No medications on file     FOLLOW UP  Samy Abrams M.D.  [address] [facility] [id number] [phone]    Call in 1 day  To schedule a follow up appointment    [facility], Emergency Dept  [address] [facility] [id number] [phone]  Go to   As needed    -DISCHARGE-    Electronically signed by: Cara Fitzpatrick D.O., 6/16/2024 9:48 AM

## 2024-06-16 NOTE — ED TRIAGE NOTES
Marii Delgado  69 y.o. female  Chief Complaint   Patient presents with    Leg Pain     2x days of left leg pain, pt thinks she may have strained her leg. Pt had a recent knee surgery on the left leg in mid April.        Vitals:    06/16/24 0813   BP: 128/74   Pulse: 78   Resp: 16   Temp: 36.4 °C (97.6 °F)   SpO2: 99%       Patient educated on triage process and encouraged to alert staff of any changes in condition.    Pt to triage via wheelchair

## 2024-06-26 ENCOUNTER — APPOINTMENT (OUTPATIENT)
Dept: FAMILY PLANNING/WOMEN'S HEALTH CLINIC | Facility: PHYSICIAN GROUP | Age: 69
End: 2024-06-26
Payer: MEDICARE

## 2024-06-26 DIAGNOSIS — E11.9 TYPE 2 DIABETES MELLITUS WITHOUT COMPLICATION, WITHOUT LONG-TERM CURRENT USE OF INSULIN (HCC): ICD-10-CM

## 2024-06-26 DIAGNOSIS — E11.69 DM TYPE 2 WITH DIABETIC MIXED HYPERLIPIDEMIA (HCC): ICD-10-CM

## 2024-06-26 DIAGNOSIS — I70.0 AORTIC ATHEROSCLEROSIS (HCC): ICD-10-CM

## 2024-06-26 DIAGNOSIS — E78.2 MIXED HYPERLIPIDEMIA: ICD-10-CM

## 2024-06-26 DIAGNOSIS — F32.5 MAJOR DEPRESSIVE DISORDER IN FULL REMISSION, UNSPECIFIED WHETHER RECURRENT (HCC): ICD-10-CM

## 2024-06-26 DIAGNOSIS — E78.2 DM TYPE 2 WITH DIABETIC MIXED HYPERLIPIDEMIA (HCC): ICD-10-CM

## 2024-06-28 ENCOUNTER — HOSPITAL ENCOUNTER (OUTPATIENT)
Dept: HEMATOLOGY ONCOLOGY | Facility: MEDICAL CENTER | Age: 69
End: 2024-06-28
Attending: INTERNAL MEDICINE
Payer: MEDICARE

## 2024-06-28 ENCOUNTER — HOSPITAL ENCOUNTER (OUTPATIENT)
Dept: LAB | Facility: MEDICAL CENTER | Age: 69
End: 2024-06-28
Attending: INTERNAL MEDICINE
Payer: MEDICARE

## 2024-06-28 VITALS
WEIGHT: 169.2 LBS | TEMPERATURE: 97.9 F | OXYGEN SATURATION: 96 % | SYSTOLIC BLOOD PRESSURE: 120 MMHG | BODY MASS INDEX: 31.95 KG/M2 | HEIGHT: 61 IN | HEART RATE: 64 BPM | DIASTOLIC BLOOD PRESSURE: 62 MMHG | RESPIRATION RATE: 18 BRPM

## 2024-06-28 DIAGNOSIS — D75.839 THROMBOCYTOSIS: ICD-10-CM

## 2024-06-28 LAB
FERRITIN SERPL-MCNC: 175 NG/ML (ref 10–291)
IRON SATN MFR SERPL: 18 % (ref 15–55)
IRON SERPL-MCNC: 44 UG/DL (ref 40–170)
LDH SERPL L TO P-CCNC: 270 U/L (ref 107–266)
TIBC SERPL-MCNC: 242 UG/DL (ref 250–450)
UIBC SERPL-MCNC: 198 UG/DL (ref 110–370)

## 2024-06-28 PROCEDURE — 83550 IRON BINDING TEST: CPT

## 2024-06-28 PROCEDURE — 83615 LACTATE (LD) (LDH) ENZYME: CPT

## 2024-06-28 PROCEDURE — 99212 OFFICE O/P EST SF 10 MIN: CPT | Performed by: INTERNAL MEDICINE

## 2024-06-28 PROCEDURE — 81270 JAK2 GENE: CPT

## 2024-06-28 PROCEDURE — 36415 COLL VENOUS BLD VENIPUNCTURE: CPT

## 2024-06-28 PROCEDURE — 82728 ASSAY OF FERRITIN: CPT

## 2024-06-28 PROCEDURE — 83540 ASSAY OF IRON: CPT

## 2024-06-28 ASSESSMENT — PAIN SCALES - GENERAL: PAINLEVEL: NO PAIN

## 2024-06-28 ASSESSMENT — FIBROSIS 4 INDEX: FIB4 SCORE: 0.54

## 2024-06-28 NOTE — ADDENDUM NOTE
Encounter addended by: Roxanne Valerio, Med Ass't on: 6/28/2024 11:38 AM   Actions taken: Charge Capture section accepted

## 2024-06-28 NOTE — PROGRESS NOTES
CONSULT: [facility]      Referring Physician:   Primary Care:  Iva Christian M.D.    Diagnosis: Thrombocytosis    Chief Complaint: High platelet count    History of Presenting Illness:  Marii Delgado is a 69 y.o. female who has a past medical history of diabetes, hypercholesterolemia, osteoarthritis, thrombocytosis, and a history of a colon cancer in 2016.  The patient told me that she had a surgery followed by the chemotherapy at that time.  Apparently, she was found to have a high platelet count since February 18, 2021 when she had a platelet count of 492,000. Her platelet count swathi to 615,000 on March 29, 2024.  She was consulted to me for further management and care for significantly elevated platelet counts    Interval History:  Patient is here for consultation.      Past Medical History:   Diagnosis Date    Arthritis     left knee    Back pain     Bowel habit changes     constipation/ occasional diarrhea    Breath shortness     with exertion     Cancer (HCC) 2016    colon (chemo)    Cancer of sigmoid colon (HCC) 12/07/2016    Cataract     no sx    Colon cancer (HCC)     Dental disorder     missing teeth    Depression     Diabetes (HCC)     oral meds    H/O seasonal allergies     Heart burn     occasional    Hemorrhagic disorder (HCC)     post tooth extraction    High cholesterol     Hypertension     3/19/2024 pt reports she does not have hypertension    Kidney stone     Mixed hyperlipidemia 10/29/2019    Obesity 10/29/2019    Pain 12/01/2016    left wrist and knee, 3-4/10    Peripheral vascular disease (HCC)     Pre-diabetes 10/29/2019    Psychiatric problem     anxiety    Renal disorder     hx of kidney stones     Sleep apnea     CPAP    Snoring     first step of sleep study completed, pt awaiting overnight sleep study     Urinary incontinence        Past Surgical History:   Procedure Laterality Date    ARTHROPLASTY, KNEE, ROBOT-ASSISTED Left 4/15/2024    Procedure:  ROBOTIC LEFT TOTAL KNEE ARTHROPLASTY;  Surgeon: Samy Abrams M.D.;  Location: SURGERY [facility];  Service: Ortho Robotic    ANGIOGRAM      pt reports no cardiac issues    OTHER      port removal     LOW ANTERIOR RESECTION LAPAROSCOPIC  2016    Procedure: LOW ANTERIOR RESECTION LAPAROSCOPIC;  Surgeon: Enoch Shaw M.D.;  Location: SURGERY [facility];  Service:     ORIF, WRIST Left 2016    HYSTERECTOMY, VAGINAL      hysterectomy    VEIN STRIPPING      varicose vein stripping    PRIMARY C SECTION      repeat        Family History   Problem Relation Age of Onset    Cancer Paternal Aunt     Sleep Apnea Neg Hx        OB History   No obstetric history on file.       Allergies as of 2024 - Reviewed 2024   Allergen Reaction Noted    Other environmental Runny Nose and Itching 2024         Current Outpatient Medications:     valACYclovir (VALTREX) 500 MG Tab, Take 1 Tablet by mouth 2 times a day. 3 day course, Disp: 14 Tablet, Rfl: 0    cetirizine (ZYRTEC) 10 MG Tab, TAKE 1 TABLET BY MOUTH EVERY MORNING, Disp: 90 Tablet, Rfl: 0    docusate sodium (COLACE) 100 MG Cap, Take 1 Capsule by mouth 2 times a day as needed for Constipation (2-3 times daily PRN)., Disp: 30 Capsule, Rfl: 0    Glucosamine-Chondroitin (MOVE FREE PO), Take  by mouth every day., Disp: , Rfl:     melatonin 1 mg Tab, Take 5 mg by mouth at bedtime as needed., Disp: , Rfl:     Acetaminophen (TYLENOL ARTHRITIS PAIN PO), Take  by mouth as needed., Disp: , Rfl:     Cinnamon 500 MG Cap, Take  by mouth every day., Disp: , Rfl:     glucosamine 500 MG Cap, Take  by mouth every day., Disp: , Rfl:     Multiple Vitamins-Minerals (CENTRUM SILVER 50+WOMEN) Tab, Take  by mouth., Disp: , Rfl:     metformin (GLUCOPHAGE) 1000 MG tablet, Take 1 Tablet by mouth 2 times a day with meals., Disp: 180 Tablet, Rfl: 2    citalopram (CELEXA) 40 MG Tab, Take 1 Tablet by mouth at bedtime., Disp: 90 Tablet, Rfl: 2     "atorvastatin (LIPITOR) 10 MG Tab, Take 0.5 Tablets by mouth every evening., Disp: 90 Tablet, Rfl: 2    montelukast (SINGULAIR) 10 MG Tab, Take 1 Tablet by mouth at bedtime. Indications: Hayfever, Disp: 90 Tablet, Rfl: 2    Review of Systems:  Patient denies fever, chills, nausea, vomiting, chest pain, sob, blood in stools, black stool, blood in urine. All systems are reviewed See HPI.        Physical Exam:  /62 (BP Location: Left arm, Patient Position: Sitting, BP Cuff Size: Small adult)   Pulse 64   Temp 36.6 °C (97.9 °F) (Temporal)   Resp 18   Ht 1.549 m (5' 1\")   Wt 76.8 kg (169 lb 3.3 oz)   SpO2 96%  Body mass index is 31.97 kg/m².Constitutional:  NAD, well appearing.  HEENT:   CLARENCE EOMI  Cardiovascular: RRR.   No m/r/g. No carotid bruits.       Lungs:   Clear, no wheezing, no rales, no respiratory distress.  Abdomen: Soft, non-tender. + BS, no masses, no suprapubic tenderness, no hepatomegaly.  Extremities:  No pedal edema. Bilateral and radial pulses present.  Skin:  Warm and dry.    Neurologic: Alert & oriented x 3, CN II-XII grossly intact, strength and sensation grossly intact.  No focal deficits noted.  Psychiatric:  Affect normal, mood normal, judgment normal.    Labs:  No visits with results within 7 Day(s) from this visit.   Latest known visit with results is:   Admission on 06/16/2024, Discharged on 06/16/2024   Component Date Value Ref Range Status    WBC 06/16/2024 9.2  4.8 - 10.8 K/uL Final    RBC 06/16/2024 5.07  4.20 - 5.40 M/uL Final    Hemoglobin 06/16/2024 13.8  12.0 - 16.0 g/dL Final    Hematocrit 06/16/2024 42.0  37.0 - 47.0 % Final    MCV 06/16/2024 82.8  81.4 - 97.8 fL Final    MCH 06/16/2024 27.2  27.0 - 33.0 pg Final    MCHC 06/16/2024 32.9  32.2 - 35.5 g/dL Final    RDW 06/16/2024 45.2  35.9 - 50.0 fL Final    Platelet Count 06/16/2024 597 (H)  164 - 446 K/uL Final    MPV 06/16/2024 9.3  9.0 - 12.9 fL Final    Neutrophils-Polys 06/16/2024 65.00  44.00 - 72.00 % Final    " Lymphocytes 06/16/2024 22.30  22.00 - 41.00 % Final    Monocytes 06/16/2024 7.60  0.00 - 13.40 % Final    Eosinophils 06/16/2024 3.40  0.00 - 6.90 % Final    Basophils 06/16/2024 1.00  0.00 - 1.80 % Final    Immature Granulocytes 06/16/2024 0.70  0.00 - 0.90 % Final    Nucleated RBC 06/16/2024 0.00  0.00 - 0.20 /100 WBC Final    Neutrophils (Absolute) 06/16/2024 6.00  1.82 - 7.42 K/uL Final    Includes immature neutrophils, if present.    Lymphs (Absolute) 06/16/2024 2.06  1.00 - 4.80 K/uL Final    Monos (Absolute) 06/16/2024 0.70  0.00 - 0.85 K/uL Final    Eos (Absolute) 06/16/2024 0.31  0.00 - 0.51 K/uL Final    Baso (Absolute) 06/16/2024 0.09  0.00 - 0.12 K/uL Final    Immature Granulocytes (abs) 06/16/2024 0.06  0.00 - 0.11 K/uL Final    NRBC (Absolute) 06/16/2024 0.00  K/uL Final    Sodium 06/16/2024 139  135 - 145 mmol/L Final    Potassium 06/16/2024 4.7  3.6 - 5.5 mmol/L Final    Chloride 06/16/2024 104  96 - 112 mmol/L Final    Co2 06/16/2024 23  20 - 33 mmol/L Final    Anion Gap 06/16/2024 12.0  7.0 - 16.0 Final    Glucose 06/16/2024 137 (H)  65 - 99 mg/dL Final    Bun 06/16/2024 17  8 - 22 mg/dL Final    Creatinine 06/16/2024 0.64  0.50 - 1.40 mg/dL Final    Calcium 06/16/2024 9.7  8.5 - 10.5 mg/dL Final    Correct Calcium 06/16/2024 9.6  8.5 - 10.5 mg/dL Final    AST(SGOT) 06/16/2024 20  12 - 45 U/L Final    ALT(SGPT) 06/16/2024 17  2 - 50 U/L Final    Alkaline Phosphatase 06/16/2024 118 (H)  30 - 99 U/L Final    Total Bilirubin 06/16/2024 0.4  0.1 - 1.5 mg/dL Final    Albumin 06/16/2024 4.1  3.2 - 4.9 g/dL Final    Total Protein 06/16/2024 7.1  6.0 - 8.2 g/dL Final    Globulin 06/16/2024 3.0  1.9 - 3.5 g/dL Final    A-G Ratio 06/16/2024 1.4  g/dL Final    PT 06/16/2024 14.3  12.0 - 14.6 sec Final    INR 06/16/2024 1.09  0.87 - 1.13 Final    Comment: INR - Non-therapeutic Reference Range: 0.87-1.13  INR - Therapeutic Reference Range: 2.0-4.0      APTT 06/16/2024 31.3  24.7 - 36.0 sec Final    Stat  C-Reactive Protein 06/16/2024 <0.30  0.00 - 0.75 mg/dL Final    Sed Rate Michaelergren 06/16/2024 5  0 - 25 mm/hour Final    GFR (CKD-EPI) 06/16/2024 97  >60 mL/min/1.73 m 2 Final    Comment: Estimated Glomerular Filtration Rate is calculated using  race neutral CKD-EPI 2021 equation per NKF-ASN recommendations.         Assessment & Plan:    Thrombocytosis, likely due to essential thrombocytosis  I spent significant amount of time with the patient regarding further management and care.  I explained to her regarding differential diagnosis of thrombocytosis including iron deficiency anemia.  I reviewed her CBC for past 10 years and her CBC showed thrombocytosis only and there is no signs of iron deficiency anemia. Primary differential diagnosis is essential thrombocytosis which can cause stroke, myocardial infarction, OR DVT.  I will go ahead with iron TIBC ferritin to rule out iron deficiency anemia today and I will go ahead with JAK2 mutation test.  If JAK2 mutation is negative, she will have a CA RL are and MPL test.  I can make a diagnosis of ET 90% of the time since ET has at leat 1 gene mutation, 90% of time.  I told her that if she does have ETE, she will be indicated for hydroxyurea.  I will follow her in 3 weeks to make a definitive recommendation at that time.    She understood and agreed with the current plan. I answered all the questions and concerns she has at this time and advised to call me if any questions or concerns in regards to her care.  I also called the patient's son and explained to him in detail.    Thank you for allowing me to participate in his care, I will continue to follow.

## 2024-07-06 LAB
JAK2 P.V617F BLD/T QL: DETECTED
SPECIMEN SOURCE: ABNORMAL

## 2024-07-11 PROBLEM — M17.11 ARTHRITIS OF KNEE, RIGHT: Status: ACTIVE | Noted: 2024-07-11
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
## 2024-07-11 PROBLEM — M25.561 ACUTE PAIN OF RIGHT KNEE: Status: ACTIVE | Noted: 2024-07-11

## 2024-07-18 ENCOUNTER — HOSPITAL ENCOUNTER (OUTPATIENT)
Dept: HEMATOLOGY ONCOLOGY | Facility: MEDICAL CENTER | Age: 69
End: 2024-07-18
Attending: INTERNAL MEDICINE
Payer: MEDICARE

## 2024-07-18 VITALS
TEMPERATURE: 97.9 F | WEIGHT: 170 LBS | HEIGHT: 61 IN | BODY MASS INDEX: 32.1 KG/M2 | OXYGEN SATURATION: 98 % | DIASTOLIC BLOOD PRESSURE: 60 MMHG | SYSTOLIC BLOOD PRESSURE: 108 MMHG | HEART RATE: 72 BPM | RESPIRATION RATE: 14 BRPM

## 2024-07-18 DIAGNOSIS — D75.839 THROMBOCYTOSIS: ICD-10-CM

## 2024-07-18 DIAGNOSIS — D47.3 ESSENTIAL THROMBOCYTHEMIA (HCC): ICD-10-CM

## 2024-07-18 PROCEDURE — 99214 OFFICE O/P EST MOD 30 MIN: CPT | Performed by: INTERNAL MEDICINE

## 2024-07-18 PROCEDURE — 99212 OFFICE O/P EST SF 10 MIN: CPT | Performed by: INTERNAL MEDICINE

## 2024-07-18 RX ORDER — HYDROXYUREA 500 MG/1
1000 CAPSULE ORAL DAILY
Qty: 60 CAPSULE | Refills: 11 | Status: SHIPPED | OUTPATIENT
Start: 2024-07-18

## 2024-07-18 ASSESSMENT — PAIN SCALES - GENERAL: PAINLEVEL: 5=MODERATE PAIN

## 2024-07-18 ASSESSMENT — FIBROSIS 4 INDEX: FIB4 SCORE: 0.54

## 2024-08-06 ENCOUNTER — HOSPITAL ENCOUNTER (OUTPATIENT)
Dept: LAB | Facility: MEDICAL CENTER | Age: 69
End: 2024-08-06
Attending: INTERNAL MEDICINE
Payer: MEDICARE

## 2024-08-06 DIAGNOSIS — D47.3 ESSENTIAL THROMBOCYTHEMIA (HCC): ICD-10-CM

## 2024-08-06 LAB
ALBUMIN SERPL BCP-MCNC: 3.9 G/DL (ref 3.2–4.9)
ALBUMIN/GLOB SERPL: 1.3 G/DL
ALP SERPL-CCNC: 105 U/L (ref 30–99)
ALT SERPL-CCNC: 17 U/L (ref 2–50)
ANION GAP SERPL CALC-SCNC: 14 MMOL/L (ref 7–16)
AST SERPL-CCNC: 14 U/L (ref 12–45)
BASOPHILS # BLD AUTO: 1.2 % (ref 0–1.8)
BASOPHILS # BLD: 0.09 K/UL (ref 0–0.12)
BILIRUB SERPL-MCNC: 0.6 MG/DL (ref 0.1–1.5)
BUN SERPL-MCNC: 23 MG/DL (ref 8–22)
CALCIUM ALBUM COR SERPL-MCNC: 9.6 MG/DL (ref 8.5–10.5)
CALCIUM SERPL-MCNC: 9.5 MG/DL (ref 8.5–10.5)
CHLORIDE SERPL-SCNC: 103 MMOL/L (ref 96–112)
CO2 SERPL-SCNC: 22 MMOL/L (ref 20–33)
CREAT SERPL-MCNC: 0.81 MG/DL (ref 0.5–1.4)
EOSINOPHIL # BLD AUTO: 0.24 K/UL (ref 0–0.51)
EOSINOPHIL NFR BLD: 3.3 % (ref 0–6.9)
ERYTHROCYTE [DISTWIDTH] IN BLOOD BY AUTOMATED COUNT: 49.8 FL (ref 35.9–50)
GFR SERPLBLD CREATININE-BSD FMLA CKD-EPI: 79 ML/MIN/1.73 M 2
GLOBULIN SER CALC-MCNC: 3.1 G/DL (ref 1.9–3.5)
GLUCOSE SERPL-MCNC: 149 MG/DL (ref 65–99)
HCT VFR BLD AUTO: 41.6 % (ref 37–47)
HGB BLD-MCNC: 13.4 G/DL (ref 12–16)
IMM GRANULOCYTES # BLD AUTO: 0.03 K/UL (ref 0–0.11)
IMM GRANULOCYTES NFR BLD AUTO: 0.4 % (ref 0–0.9)
LDH SERPL L TO P-CCNC: 258 U/L (ref 107–266)
LYMPHOCYTES # BLD AUTO: 1.53 K/UL (ref 1–4.8)
LYMPHOCYTES NFR BLD: 21 % (ref 22–41)
MCH RBC QN AUTO: 26.7 PG (ref 27–33)
MCHC RBC AUTO-ENTMCNC: 32.2 G/DL (ref 32.2–35.5)
MCV RBC AUTO: 83 FL (ref 81.4–97.8)
MONOCYTES # BLD AUTO: 0.43 K/UL (ref 0–0.85)
MONOCYTES NFR BLD AUTO: 5.9 % (ref 0–13.4)
NEUTROPHILS # BLD AUTO: 4.98 K/UL (ref 1.82–7.42)
NEUTROPHILS NFR BLD: 68.2 % (ref 44–72)
NRBC # BLD AUTO: 0 K/UL
NRBC BLD-RTO: 0 /100 WBC (ref 0–0.2)
PLATELET # BLD AUTO: 428 K/UL (ref 164–446)
PMV BLD AUTO: 9.4 FL (ref 9–12.9)
POTASSIUM SERPL-SCNC: 4.4 MMOL/L (ref 3.6–5.5)
PROT SERPL-MCNC: 7 G/DL (ref 6–8.2)
RBC # BLD AUTO: 5.01 M/UL (ref 4.2–5.4)
SODIUM SERPL-SCNC: 139 MMOL/L (ref 135–145)
WBC # BLD AUTO: 7.3 K/UL (ref 4.8–10.8)

## 2024-08-06 PROCEDURE — 36415 COLL VENOUS BLD VENIPUNCTURE: CPT

## 2024-08-06 PROCEDURE — 80053 COMPREHEN METABOLIC PANEL: CPT

## 2024-08-06 PROCEDURE — 83615 LACTATE (LD) (LDH) ENZYME: CPT

## 2024-08-06 PROCEDURE — 85025 COMPLETE CBC W/AUTO DIFF WBC: CPT

## 2024-08-08 ENCOUNTER — HOSPITAL ENCOUNTER (OUTPATIENT)
Dept: HEMATOLOGY ONCOLOGY | Facility: MEDICAL CENTER | Age: 69
End: 2024-08-08
Attending: INTERNAL MEDICINE
Payer: MEDICARE

## 2024-08-08 VITALS
RESPIRATION RATE: 12 BRPM | HEART RATE: 81 BPM | BODY MASS INDEX: 32.28 KG/M2 | SYSTOLIC BLOOD PRESSURE: 100 MMHG | WEIGHT: 171 LBS | TEMPERATURE: 98 F | OXYGEN SATURATION: 96 % | DIASTOLIC BLOOD PRESSURE: 54 MMHG | HEIGHT: 61 IN

## 2024-08-08 DIAGNOSIS — D47.3 ESSENTIAL THROMBOCYTOSIS (HCC): ICD-10-CM

## 2024-08-08 DIAGNOSIS — D75.839 THROMBOCYTOSIS: ICD-10-CM

## 2024-08-08 PROCEDURE — 99214 OFFICE O/P EST MOD 30 MIN: CPT | Performed by: INTERNAL MEDICINE

## 2024-08-08 PROCEDURE — 99212 OFFICE O/P EST SF 10 MIN: CPT | Performed by: INTERNAL MEDICINE

## 2024-08-08 ASSESSMENT — FIBROSIS 4 INDEX: FIB4 SCORE: 0.53

## 2024-08-08 ASSESSMENT — PAIN SCALES - GENERAL: PAINLEVEL: NO PAIN

## 2024-08-08 NOTE — PROGRESS NOTES
PROGRESS NOTE : [facility]    8/8/2024    Primary Care:  Iva Christian M.D.    Diagnosis: Thrombocytosis    Chief Complaint: High platelet count    History of Presenting Illness:  Marii Delgado is a 69 y.o. female who has a past medical history of diabetes, hypercholesterolemia, osteoarthritis, thrombocytosis, and a history of a colon cancer in 2016.  The patient told me that she had a surgery followed by the chemotherapy at that time.  Apparently, she was found to have a high platelet count since February 18, 2021 when she had a platelet count of 492,000. Her platelet count swathi to 615,000 on March 29, 2024.  She had a JENNA 2 V617F mutation by PCR on June 16, 2024.  She was a started on hydroxyurea 1000 mg p.o. daily since July 19, 2024. She comes for routine follow-up today    Interval History:       Past Medical History:   Diagnosis Date    Arthritis     left knee    Back pain     Bowel habit changes     constipation/ occasional diarrhea    Breath shortness     with exertion     Cancer (HCC) 2016    colon (chemo)    Cancer of sigmoid colon (HCC) 12/07/2016    Cataract     no sx    Colon cancer (HCC)     Dental disorder     missing teeth    Depression     Diabetes (HCC)     oral meds    H/O seasonal allergies     Heart burn     occasional    Hemorrhagic disorder (HCC)     post tooth extraction    High cholesterol     Hypertension     3/19/2024 pt reports she does not have hypertension    Kidney stone     Mixed hyperlipidemia 10/29/2019    Obesity 10/29/2019    Pain 12/01/2016    left wrist and knee, 3-4/10    Peripheral vascular disease (HCC)     Pre-diabetes 10/29/2019    Psychiatric problem     anxiety    Renal disorder     hx of kidney stones     Sleep apnea     CPAP    Snoring     first step of sleep study completed, pt awaiting overnight sleep study     Urinary incontinence        Past Surgical History:   Procedure Laterality Date    ARTHROPLASTY, KNEE, ROBOT-ASSISTED  Left 4/15/2024    Procedure: ROBOTIC LEFT TOTAL KNEE ARTHROPLASTY;  Surgeon: Samy Abrams M.D.;  Location: SURGERY [facility];  Service: Ortho Robotic    ANGIOGRAM      pt reports no cardiac issues    OTHER      port removal     LOW ANTERIOR RESECTION LAPAROSCOPIC  2016    Procedure: LOW ANTERIOR RESECTION LAPAROSCOPIC;  Surgeon: Enoch Shaw M.D.;  Location: SURGERY [facility];  Service:     ORIF, WRIST Left 2016    HYSTERECTOMY, VAGINAL      hysterectomy    VEIN STRIPPING      varicose vein stripping    PRIMARY C SECTION      repeat        Family History   Problem Relation Age of Onset    Cancer Paternal Aunt     Sleep Apnea Neg Hx        OB History   No obstetric history on file.       Allergies as of 2024 - Reviewed 2024   Allergen Reaction Noted    Other environmental Runny Nose and Itching 2024         Current Outpatient Medications:     hydroxyurea (HYDREA) 500 MG Cap, Take 2 Capsules by mouth every day., Disp: 60 Capsule, Rfl: 11    meloxicam (MOBIC) 7.5 MG Tab, Take 1 Tablet by mouth every day. Do not take other NSAID medications with this medication, Disp: 30 Tablet, Rfl: 1    valACYclovir (VALTREX) 500 MG Tab, Take 1 Tablet by mouth 2 times a day. 3 day course, Disp: 14 Tablet, Rfl: 0    cetirizine (ZYRTEC) 10 MG Tab, TAKE 1 TABLET BY MOUTH EVERY MORNING, Disp: 90 Tablet, Rfl: 0    docusate sodium (COLACE) 100 MG Cap, Take 1 Capsule by mouth 2 times a day as needed for Constipation (2-3 times daily PRN)., Disp: 30 Capsule, Rfl: 0    Glucosamine-Chondroitin (MOVE FREE PO), Take  by mouth every day., Disp: , Rfl:     melatonin 1 mg Tab, Take 5 mg by mouth at bedtime as needed., Disp: , Rfl:     Acetaminophen (TYLENOL ARTHRITIS PAIN PO), Take  by mouth as needed., Disp: , Rfl:     Cinnamon 500 MG Cap, Take  by mouth every day., Disp: , Rfl:     glucosamine 500 MG Cap, Take  by mouth every day., Disp: , Rfl:     Multiple Vitamins-Minerals (CENTRUM  "SILVER 50+WOMEN) Tab, Take  by mouth., Disp: , Rfl:     metformin (GLUCOPHAGE) 1000 MG tablet, Take 1 Tablet by mouth 2 times a day with meals., Disp: 180 Tablet, Rfl: 2    citalopram (CELEXA) 40 MG Tab, Take 1 Tablet by mouth at bedtime., Disp: 90 Tablet, Rfl: 2    atorvastatin (LIPITOR) 10 MG Tab, Take 0.5 Tablets by mouth every evening., Disp: 90 Tablet, Rfl: 2    montelukast (SINGULAIR) 10 MG Tab, Take 1 Tablet by mouth at bedtime. Indications: Hayfever, Disp: 90 Tablet, Rfl: 2    Review of Systems:  Patient denies fever, chills, nausea, vomiting, chest pain, sob, blood in stools, black stool, blood in urine. All systems are reviewed See HPI.        Physical Exam:  /54 (BP Location: Left arm, Patient Position: Sitting, BP Cuff Size: Adult)   Pulse 81   Temp 36.7 °C (98 °F) (Temporal)   Resp 12   Ht 1.549 m (5' 1\")   Wt 77.6 kg (171 lb)   SpO2 96%  Body mass index is 32.31 kg/m².Constitutional:  NAD, well appearing.  HEENT:   CLARENCE EOMI  Cardiovascular: RRR.   No m/r/g. No carotid bruits.       Lungs:   Clear, no wheezing, no rales, no respiratory distress.  Abdomen: Soft, non-tender. + BS, no masses, no suprapubic tenderness, no hepatomegaly.  Extremities:  No pedal edema. Bilateral and radial pulses present.  Skin:  Warm and dry.    Neurologic: Alert & oriented x 3, CN II-XII grossly intact, strength and sensation grossly intact.  No focal deficits noted.  Psychiatric:  Affect normal, mood normal, judgment normal.    Labs:  No visits with results within 7 Day(s) from this visit.   Latest known visit with results is:   Admission on 06/16/2024, Discharged on 06/16/2024   Component Date Value Ref Range Status    WBC 06/16/2024 9.2  4.8 - 10.8 K/uL Final    RBC 06/16/2024 5.07  4.20 - 5.40 M/uL Final    Hemoglobin 06/16/2024 13.8  12.0 - 16.0 g/dL Final    Hematocrit 06/16/2024 42.0  37.0 - 47.0 % Final    MCV 06/16/2024 82.8  81.4 - 97.8 fL Final    MCH 06/16/2024 27.2  27.0 - 33.0 pg Final    MCHC " 06/16/2024 32.9  32.2 - 35.5 g/dL Final    RDW 06/16/2024 45.2  35.9 - 50.0 fL Final    Platelet Count 06/16/2024 597 (H)  164 - 446 K/uL Final    MPV 06/16/2024 9.3  9.0 - 12.9 fL Final    Neutrophils-Polys 06/16/2024 65.00  44.00 - 72.00 % Final    Lymphocytes 06/16/2024 22.30  22.00 - 41.00 % Final    Monocytes 06/16/2024 7.60  0.00 - 13.40 % Final    Eosinophils 06/16/2024 3.40  0.00 - 6.90 % Final    Basophils 06/16/2024 1.00  0.00 - 1.80 % Final    Immature Granulocytes 06/16/2024 0.70  0.00 - 0.90 % Final    Nucleated RBC 06/16/2024 0.00  0.00 - 0.20 /100 WBC Final    Neutrophils (Absolute) 06/16/2024 6.00  1.82 - 7.42 K/uL Final    Includes immature neutrophils, if present.    Lymphs (Absolute) 06/16/2024 2.06  1.00 - 4.80 K/uL Final    Monos (Absolute) 06/16/2024 0.70  0.00 - 0.85 K/uL Final    Eos (Absolute) 06/16/2024 0.31  0.00 - 0.51 K/uL Final    Baso (Absolute) 06/16/2024 0.09  0.00 - 0.12 K/uL Final    Immature Granulocytes (abs) 06/16/2024 0.06  0.00 - 0.11 K/uL Final    NRBC (Absolute) 06/16/2024 0.00  K/uL Final    Sodium 06/16/2024 139  135 - 145 mmol/L Final    Potassium 06/16/2024 4.7  3.6 - 5.5 mmol/L Final    Chloride 06/16/2024 104  96 - 112 mmol/L Final    Co2 06/16/2024 23  20 - 33 mmol/L Final    Anion Gap 06/16/2024 12.0  7.0 - 16.0 Final    Glucose 06/16/2024 137 (H)  65 - 99 mg/dL Final    Bun 06/16/2024 17  8 - 22 mg/dL Final    Creatinine 06/16/2024 0.64  0.50 - 1.40 mg/dL Final    Calcium 06/16/2024 9.7  8.5 - 10.5 mg/dL Final    Correct Calcium 06/16/2024 9.6  8.5 - 10.5 mg/dL Final    AST(SGOT) 06/16/2024 20  12 - 45 U/L Final    ALT(SGPT) 06/16/2024 17  2 - 50 U/L Final    Alkaline Phosphatase 06/16/2024 118 (H)  30 - 99 U/L Final    Total Bilirubin 06/16/2024 0.4  0.1 - 1.5 mg/dL Final    Albumin 06/16/2024 4.1  3.2 - 4.9 g/dL Final    Total Protein 06/16/2024 7.1  6.0 - 8.2 g/dL Final    Globulin 06/16/2024 3.0  1.9 - 3.5 g/dL Final    A-G Ratio 06/16/2024 1.4  g/dL Final    PT  06/16/2024 14.3  12.0 - 14.6 sec Final    INR 06/16/2024 1.09  0.87 - 1.13 Final    Comment: INR - Non-therapeutic Reference Range: 0.87-1.13  INR - Therapeutic Reference Range: 2.0-4.0      APTT 06/16/2024 31.3  24.7 - 36.0 sec Final    Stat C-Reactive Protein 06/16/2024 <0.30  0.00 - 0.75 mg/dL Final    Sed Rate Westergren 06/16/2024 5  0 - 25 mm/hour Final    GFR (CKD-EPI) 06/16/2024 97  >60 mL/min/1.73 m 2 Final    Comment: Estimated Glomerular Filtration Rate is calculated using  race neutral CKD-EPI 2021 equation per NKF-ASN recommendations.         Assessment & Plan:    Essential thrombocytosis on hydroxyurea at 1000 mg p.o. daily  Her JAK2 mutation is positive which is diagnostic of essential thrombocytosis. The essential thrombocytosis can cause a stroke, heart attack, and thrombosis.  She is on baby aspirin 81 mg p.o. daily.  Since she has a JENNA 2 mutation, she is a high risk and  she was placed on hydroxyurea 1000 mg p.o. daily since July 19, 2024. I will try to maintain her platelet count between 200 to 400,000.  Her follow-up CBC done on August 6, 2024 showed platelet count of 420,000.  This is completely acceptable and I will continue hydroxyurea 1000 mg p.o. daily. I explained to her in detail regarding benefit and side effects of hydroxyurea one more time.  I will follow her in 6 weeks with a follow-up CBC for continued care.    She understood and agreed with the current plan. I answered all the questions and concerns she has at this time and advised to call me if any questions or concerns in regards to her care.  I also called the patient's son and explained to him in detail.    Thank you for allowing me to participate in his care, I will continue to follow.

## 2024-08-09 DIAGNOSIS — Z91.09 ALLERGY TO ENVIRONMENTAL FACTORS: ICD-10-CM

## 2024-08-12 RX ORDER — CETIRIZINE HYDROCHLORIDE 10 MG/1
10 TABLET ORAL EVERY MORNING
Qty: 90 TABLET | Refills: 0 | Status: SHIPPED | OUTPATIENT
Start: 2024-08-12

## 2024-08-15 ENCOUNTER — APPOINTMENT (OUTPATIENT)
Dept: MEDICAL GROUP | Facility: MEDICAL CENTER | Age: 69
End: 2024-08-15
Payer: MEDICARE

## 2024-08-15 ENCOUNTER — RESEARCH ENCOUNTER (OUTPATIENT)
Dept: RESEARCH | Facility: MEDICAL CENTER | Age: 69
End: 2024-08-15

## 2024-08-15 NOTE — RESEARCH NOTE
Participant was interested in results from [facility], sent email with info. Noticed she is AGUIAR eligible so sent her consent and message with details.

## 2024-08-22 ENCOUNTER — TELEPHONE (OUTPATIENT)
Dept: HEALTH INFORMATION MANAGEMENT | Facility: OTHER | Age: 69
End: 2024-08-22
Payer: MEDICARE

## 2024-09-05 ENCOUNTER — APPOINTMENT (OUTPATIENT)
Dept: MEDICAL GROUP | Facility: MEDICAL CENTER | Age: 69
End: 2024-09-05
Payer: MEDICARE

## 2024-09-05 VITALS
OXYGEN SATURATION: 96 % | HEIGHT: 61 IN | DIASTOLIC BLOOD PRESSURE: 60 MMHG | BODY MASS INDEX: 32.66 KG/M2 | TEMPERATURE: 98.5 F | SYSTOLIC BLOOD PRESSURE: 110 MMHG | WEIGHT: 173 LBS | HEART RATE: 72 BPM | RESPIRATION RATE: 18 BRPM

## 2024-09-05 DIAGNOSIS — E78.2 MIXED HYPERLIPIDEMIA: ICD-10-CM

## 2024-09-05 DIAGNOSIS — E11.9 TYPE 2 DIABETES MELLITUS WITHOUT COMPLICATION, WITHOUT LONG-TERM CURRENT USE OF INSULIN (HCC): ICD-10-CM

## 2024-09-05 DIAGNOSIS — F32.5 MAJOR DEPRESSIVE DISORDER IN FULL REMISSION, UNSPECIFIED WHETHER RECURRENT (HCC): ICD-10-CM

## 2024-09-05 DIAGNOSIS — D47.3 ESSENTIAL THROMBOCYTOSIS (HCC): ICD-10-CM

## 2024-09-05 PROCEDURE — 3074F SYST BP LT 130 MM HG: CPT | Performed by: FAMILY MEDICINE

## 2024-09-05 PROCEDURE — 99214 OFFICE O/P EST MOD 30 MIN: CPT | Performed by: FAMILY MEDICINE

## 2024-09-05 PROCEDURE — 3078F DIAST BP <80 MM HG: CPT | Performed by: FAMILY MEDICINE

## 2024-09-05 ASSESSMENT — FIBROSIS 4 INDEX: FIB4 SCORE: 0.53

## 2024-09-05 NOTE — LETTER
[facility]  Iva Christian M.D.  [address]  [place] [address]  Fax: [phone]   Authorization for Release/Disclosure of   Protected Health Information   Name: DUOGLAS OSORIO : 1955 SSN: [id number]   Address: [facility] [address] [facility]  [address]  [place] [address] Phone:    There are no phone numbers on file.   I authorize the entity listed below to release/disclose the PHI below to:   [facility]/Iva Christian M.D. and Iva Christian M.D.   Provider or Entity Name:  [facility]   Address   [facility], [address]            [address] [place], [place] [address] Phone:  [phone]      Fax:       [phone]        Reason for request: continuity of care   Information to be released:    [ X ] LAST COLONOSCOPY,  including any PATH REPORT and follow-up  [ X ] LAST FIT/COLOGUARD RESULT [  ] LAST DEXA  [  ] LAST MAMMOGRAM  [  ] LAST PAP  [  ] LAST LABS [  ] RETINA EXAM REPORT  [  ] IMMUNIZATION RECORDS  [  ] Release all info      [  ] Check here and initial the line next to each item to release ALL health information INCLUDING  _____ Care and treatment for drug and / or alcohol abuse  _____ HIV testing, infection status, or AIDS  _____ Genetic Testing    DATES OF SERVICE OR TIME PERIOD TO BE DISCLOSED: _____________  I understand and acknowledge that:  * This Authorization may be revoked at any time by you in writing, except if your health information has already been used or disclosed.  * Your health information that will be used or disclosed as a result of you signing this authorization could be re-disclosed by the recipient. If this occurs, your re-disclosed health information may no longer be protected by State or Federal laws.  * You may refuse to sign this Authorization. Your refusal will not affect your ability to obtain treatment.  * This Authorization becomes effective upon signing and will  on (date) __________.      If no date is  indicated, this Authorization will  one (1) year from the signature date.    Name: Marii Delgdao    Signature:   Date:     2024       PLEASE FAX REQUESTED RECORDS BACK TO: [phone]
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
## 2024-09-11 ENCOUNTER — HOSPITAL ENCOUNTER (OUTPATIENT)
Dept: LAB | Facility: MEDICAL CENTER | Age: 69
End: 2024-09-11
Attending: INTERNAL MEDICINE
Payer: MEDICARE

## 2024-09-11 DIAGNOSIS — D47.3 ESSENTIAL THROMBOCYTOSIS (HCC): ICD-10-CM

## 2024-09-11 LAB
ALBUMIN SERPL BCP-MCNC: 4.2 G/DL (ref 3.2–4.9)
ALBUMIN/GLOB SERPL: 1.4 G/DL
ALP SERPL-CCNC: 104 U/L (ref 30–99)
ALT SERPL-CCNC: 16 U/L (ref 2–50)
ANION GAP SERPL CALC-SCNC: 12 MMOL/L (ref 7–16)
AST SERPL-CCNC: 22 U/L (ref 12–45)
BASOPHILS # BLD AUTO: 0.9 % (ref 0–1.8)
BASOPHILS # BLD: 0.04 K/UL (ref 0–0.12)
BILIRUB SERPL-MCNC: 0.6 MG/DL (ref 0.1–1.5)
BUN SERPL-MCNC: 20 MG/DL (ref 8–22)
CALCIUM ALBUM COR SERPL-MCNC: 10 MG/DL (ref 8.5–10.5)
CALCIUM SERPL-MCNC: 10.2 MG/DL (ref 8.5–10.5)
CHLORIDE SERPL-SCNC: 101 MMOL/L (ref 96–112)
CO2 SERPL-SCNC: 25 MMOL/L (ref 20–33)
CREAT SERPL-MCNC: 0.81 MG/DL (ref 0.5–1.4)
EOSINOPHIL # BLD AUTO: 0.13 K/UL (ref 0–0.51)
EOSINOPHIL NFR BLD: 2.9 % (ref 0–6.9)
ERYTHROCYTE [DISTWIDTH] IN BLOOD BY AUTOMATED COUNT: 57.4 FL (ref 35.9–50)
GFR SERPLBLD CREATININE-BSD FMLA CKD-EPI: 79 ML/MIN/1.73 M 2
GLOBULIN SER CALC-MCNC: 3.1 G/DL (ref 1.9–3.5)
GLUCOSE SERPL-MCNC: 132 MG/DL (ref 65–99)
HCT VFR BLD AUTO: 39.7 % (ref 37–47)
HGB BLD-MCNC: 13.5 G/DL (ref 12–16)
IMM GRANULOCYTES # BLD AUTO: 0.02 K/UL (ref 0–0.11)
IMM GRANULOCYTES NFR BLD AUTO: 0.4 % (ref 0–0.9)
LDH SERPL L TO P-CCNC: 179 U/L (ref 107–266)
LYMPHOCYTES # BLD AUTO: 1.45 K/UL (ref 1–4.8)
LYMPHOCYTES NFR BLD: 31.8 % (ref 22–41)
MCH RBC QN AUTO: 29.2 PG (ref 27–33)
MCHC RBC AUTO-ENTMCNC: 34 G/DL (ref 32.2–35.5)
MCV RBC AUTO: 85.9 FL (ref 81.4–97.8)
MONOCYTES # BLD AUTO: 0.31 K/UL (ref 0–0.85)
MONOCYTES NFR BLD AUTO: 6.8 % (ref 0–13.4)
NEUTROPHILS # BLD AUTO: 2.61 K/UL (ref 1.82–7.42)
NEUTROPHILS NFR BLD: 57.2 % (ref 44–72)
NRBC # BLD AUTO: 0 K/UL
NRBC BLD-RTO: 0 /100 WBC (ref 0–0.2)
PLATELET # BLD AUTO: 209 K/UL (ref 164–446)
PMV BLD AUTO: 9.1 FL (ref 9–12.9)
POTASSIUM SERPL-SCNC: 4.4 MMOL/L (ref 3.6–5.5)
PROT SERPL-MCNC: 7.3 G/DL (ref 6–8.2)
RBC # BLD AUTO: 4.62 M/UL (ref 4.2–5.4)
SODIUM SERPL-SCNC: 138 MMOL/L (ref 135–145)
WBC # BLD AUTO: 4.6 K/UL (ref 4.8–10.8)

## 2024-09-11 PROCEDURE — 85025 COMPLETE CBC W/AUTO DIFF WBC: CPT

## 2024-09-11 PROCEDURE — 36415 COLL VENOUS BLD VENIPUNCTURE: CPT

## 2024-09-11 PROCEDURE — 80053 COMPREHEN METABOLIC PANEL: CPT

## 2024-09-11 PROCEDURE — 83615 LACTATE (LD) (LDH) ENZYME: CPT

## 2024-09-12 ENCOUNTER — HOSPITAL ENCOUNTER (OUTPATIENT)
Dept: HEMATOLOGY ONCOLOGY | Facility: MEDICAL CENTER | Age: 69
End: 2024-09-12
Attending: INTERNAL MEDICINE
Payer: MEDICARE

## 2024-09-12 VITALS
SYSTOLIC BLOOD PRESSURE: 128 MMHG | BODY MASS INDEX: 32.3 KG/M2 | WEIGHT: 171.08 LBS | HEIGHT: 61 IN | DIASTOLIC BLOOD PRESSURE: 70 MMHG | OXYGEN SATURATION: 96 % | HEART RATE: 71 BPM | TEMPERATURE: 97.2 F

## 2024-09-12 DIAGNOSIS — D47.3 ESSENTIAL THROMBOCYTHEMIA (HCC): ICD-10-CM

## 2024-09-12 PROCEDURE — 99213 OFFICE O/P EST LOW 20 MIN: CPT | Performed by: INTERNAL MEDICINE

## 2024-09-12 PROCEDURE — 99212 OFFICE O/P EST SF 10 MIN: CPT | Performed by: INTERNAL MEDICINE

## 2024-09-12 ASSESSMENT — FIBROSIS 4 INDEX: FIB4 SCORE: 1.76

## 2024-09-12 NOTE — PROGRESS NOTES
PROGRESS NOTE : [facility]    9/12/2024    Primary Care:  Iva Christian M.D.    Diagnosis: Essential thrombocytosis    History of Presenting Illness:  Marii Delgado is a 69 y.o. female who has a past medical history of diabetes, hypercholesterolemia, osteoarthritis, thrombocytosis, and a history of a colon cancer in 2016.  The patient told me that she had a surgery followed by the chemotherapy at that time.  Apparently, she was found to have a high platelet count since February 18, 2021 when she had a platelet count of 492,000. Her platelet count swathi to 615,000 on March 29, 2024.  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  She was a started on hydroxyurea 1000 mg p.o. daily since July 19, 2024. She comes for routine follow-up today    Interval History:       Past Medical History:   Diagnosis Date    Arthritis     left knee    Back pain     Bowel habit changes     constipation/ occasional diarrhea    Breath shortness     with exertion     Cancer (HCC) 2016    colon (chemo)    Cancer of sigmoid colon (HCC) 12/07/2016    Cataract     no sx    Colon cancer (HCC)     Dental disorder     missing teeth    Depression     Diabetes (HCC)     oral meds    H/O seasonal allergies     Heart burn     occasional    Hemorrhagic disorder (HCC)     post tooth extraction    High cholesterol     Hypertension     3/19/2024 pt reports she does not have hypertension    Kidney stone     Mixed hyperlipidemia 10/29/2019    Obesity 10/29/2019    Pain 12/01/2016    left wrist and knee, 3-4/10    Peripheral vascular disease (HCC)     Pre-diabetes 10/29/2019    Psychiatric problem     anxiety    Renal disorder     hx of kidney stones     Sleep apnea     CPAP    Snoring     first step of sleep study completed, pt awaiting overnight sleep study     Urinary incontinence        Past Surgical History:   Procedure Laterality Date    ARTHROPLASTY, KNEE, ROBOT-ASSISTED Left 4/15/2024     Procedure: ROBOTIC LEFT TOTAL KNEE ARTHROPLASTY;  Surgeon: Samy Abrams M.D.;  Location: SURGERY [facility];  Service: Ortho Robotic    ANGIOGRAM      pt reports no cardiac issues    OTHER      port removal     LOW ANTERIOR RESECTION LAPAROSCOPIC  2016    Procedure: LOW ANTERIOR RESECTION LAPAROSCOPIC;  Surgeon: Enoch Shaw M.D.;  Location: SURGERY [facility];  Service:     ORIF, WRIST Left 2016    HYSTERECTOMY, VAGINAL      hysterectomy    VEIN STRIPPING      varicose vein stripping    PRIMARY C SECTION      repeat        Family History   Problem Relation Age of Onset    Cancer Paternal Aunt     Sleep Apnea Neg Hx        OB History   No obstetric history on file.       Allergies as of 2024 - Reviewed 2024   Allergen Reaction Noted    Other environmental Runny Nose and Itching 2024         Current Outpatient Medications:     cetirizine (ZYRTEC) 10 MG Tab, TAKE 1 TABLET BY MOUTH EVERY MORNING, Disp: 90 Tablet, Rfl: 0    hydroxyurea (HYDREA) 500 MG Cap, Take 2 Capsules by mouth every day., Disp: 60 Capsule, Rfl: 11    meloxicam (MOBIC) 7.5 MG Tab, Take 1 Tablet by mouth every day. Do not take other NSAID medications with this medication, Disp: 30 Tablet, Rfl: 1    valACYclovir (VALTREX) 500 MG Tab, Take 1 Tablet by mouth 2 times a day. 3 day course, Disp: 14 Tablet, Rfl: 0    docusate sodium (COLACE) 100 MG Cap, Take 1 Capsule by mouth 2 times a day as needed for Constipation (2-3 times daily PRN)., Disp: 30 Capsule, Rfl: 0    Glucosamine-Chondroitin (MOVE FREE PO), Take  by mouth every day., Disp: , Rfl:     melatonin 1 mg Tab, Take 5 mg by mouth at bedtime as needed., Disp: , Rfl:     Acetaminophen (TYLENOL ARTHRITIS PAIN PO), Take  by mouth as needed., Disp: , Rfl:     Cinnamon 500 MG Cap, Take  by mouth every day., Disp: , Rfl:     glucosamine 500 MG Cap, Take  by mouth every day., Disp: , Rfl:     Multiple Vitamins-Minerals (CENTRUM SILVER 50+WOMEN)  "Tab, Take  by mouth., Disp: , Rfl:     metformin (GLUCOPHAGE) 1000 MG tablet, Take 1 Tablet by mouth 2 times a day with meals., Disp: 180 Tablet, Rfl: 2    citalopram (CELEXA) 40 MG Tab, Take 1 Tablet by mouth at bedtime., Disp: 90 Tablet, Rfl: 2    atorvastatin (LIPITOR) 10 MG Tab, Take 0.5 Tablets by mouth every evening., Disp: 90 Tablet, Rfl: 2    montelukast (SINGULAIR) 10 MG Tab, Take 1 Tablet by mouth at bedtime. Indications: Hayfever, Disp: 90 Tablet, Rfl: 2    Review of Systems:  Patient denies fever, chills, nausea, vomiting, chest pain, sob, blood in stools, black stool, blood in urine. All systems are reviewed See HPI.        Physical Exam:  /70 (BP Location: Left arm, Patient Position: Sitting, BP Cuff Size: Adult)   Pulse 71   Temp 36.2 °C (97.2 °F) (Temporal)   Ht 1.549 m (5' 0.98\")   Wt 77.6 kg (171 lb 1.2 oz)   SpO2 96%  Body mass index is 32.34 kg/m².Constitutional:  NAD, well appearing.  HEENT:   CLARENCE EOMI  Cardiovascular: RRR.   No m/r/g. No carotid bruits.       Lungs:   Clear, no wheezing, no rales, no respiratory distress.  Abdomen: Soft, non-tender. + BS, no masses, no suprapubic tenderness, no hepatomegaly.  Extremities:  No pedal edema. Bilateral and radial pulses present.  Skin:  Warm and dry.    Neurologic: Alert & oriented x 3, CN II-XII grossly intact, strength and sensation grossly intact.  No focal deficits noted.  Psychiatric:  Affect normal, mood normal, judgment normal.    Labs:  No visits with results within 7 Day(s) from this visit.   Latest known visit with results is:   Admission on 06/16/2024, Discharged on 06/16/2024   Component Date Value Ref Range Status    WBC 06/16/2024 9.2  4.8 - 10.8 K/uL Final    RBC 06/16/2024 5.07  4.20 - 5.40 M/uL Final    Hemoglobin 06/16/2024 13.8  12.0 - 16.0 g/dL Final    Hematocrit 06/16/2024 42.0  37.0 - 47.0 % Final    MCV 06/16/2024 82.8  81.4 - 97.8 fL Final    MCH 06/16/2024 27.2  27.0 - 33.0 pg Final    MCHC 06/16/2024 32.9  " 32.2 - 35.5 g/dL Final    RDW 06/16/2024 45.2  35.9 - 50.0 fL Final    Platelet Count 06/16/2024 597 (H)  164 - 446 K/uL Final    MPV 06/16/2024 9.3  9.0 - 12.9 fL Final    Neutrophils-Polys 06/16/2024 65.00  44.00 - 72.00 % Final    Lymphocytes 06/16/2024 22.30  22.00 - 41.00 % Final    Monocytes 06/16/2024 7.60  0.00 - 13.40 % Final    Eosinophils 06/16/2024 3.40  0.00 - 6.90 % Final    Basophils 06/16/2024 1.00  0.00 - 1.80 % Final    Immature Granulocytes 06/16/2024 0.70  0.00 - 0.90 % Final    Nucleated RBC 06/16/2024 0.00  0.00 - 0.20 /100 WBC Final    Neutrophils (Absolute) 06/16/2024 6.00  1.82 - 7.42 K/uL Final    Includes immature neutrophils, if present.    Lymphs (Absolute) 06/16/2024 2.06  1.00 - 4.80 K/uL Final    Monos (Absolute) 06/16/2024 0.70  0.00 - 0.85 K/uL Final    Eos (Absolute) 06/16/2024 0.31  0.00 - 0.51 K/uL Final    Baso (Absolute) 06/16/2024 0.09  0.00 - 0.12 K/uL Final    Immature Granulocytes (abs) 06/16/2024 0.06  0.00 - 0.11 K/uL Final    NRBC (Absolute) 06/16/2024 0.00  K/uL Final    Sodium 06/16/2024 139  135 - 145 mmol/L Final    Potassium 06/16/2024 4.7  3.6 - 5.5 mmol/L Final    Chloride 06/16/2024 104  96 - 112 mmol/L Final    Co2 06/16/2024 23  20 - 33 mmol/L Final    Anion Gap 06/16/2024 12.0  7.0 - 16.0 Final    Glucose 06/16/2024 137 (H)  65 - 99 mg/dL Final    Bun 06/16/2024 17  8 - 22 mg/dL Final    Creatinine 06/16/2024 0.64  0.50 - 1.40 mg/dL Final    Calcium 06/16/2024 9.7  8.5 - 10.5 mg/dL Final    Correct Calcium 06/16/2024 9.6  8.5 - 10.5 mg/dL Final    AST(SGOT) 06/16/2024 20  12 - 45 U/L Final    ALT(SGPT) 06/16/2024 17  2 - 50 U/L Final    Alkaline Phosphatase 06/16/2024 118 (H)  30 - 99 U/L Final    Total Bilirubin 06/16/2024 0.4  0.1 - 1.5 mg/dL Final    Albumin 06/16/2024 4.1  3.2 - 4.9 g/dL Final    Total Protein 06/16/2024 7.1  6.0 - 8.2 g/dL Final    Globulin 06/16/2024 3.0  1.9 - 3.5 g/dL Final    A-G Ratio 06/16/2024 1.4  g/dL Final    PT 06/16/2024 14.3   12.0 - 14.6 sec Final    INR 06/16/2024 1.09  0.87 - 1.13 Final    Comment: INR - Non-therapeutic Reference Range: 0.87-1.13  INR - Therapeutic Reference Range: 2.0-4.0      APTT 06/16/2024 31.3  24.7 - 36.0 sec Final    Stat C-Reactive Protein 06/16/2024 <0.30  0.00 - 0.75 mg/dL Final    Sed Rate Westergren 06/16/2024 5  0 - 25 mm/hour Final    GFR (CKD-EPI) 06/16/2024 97  >60 mL/min/1.73 m 2 Final    Comment: Estimated Glomerular Filtration Rate is calculated using  race neutral CKD-EPI 2021 equation per NKF-ASN recommendations.         Assessment & Plan:    Essential thrombocytosis on hydroxyurea at 1000 mg p.o. daily  Her JAK2 mutation is positive which is diagnostic of essential thrombocytosis. The essential thrombocytosis can cause a stroke, heart attack, and thrombosis.  She is on baby aspirin 81 mg p.o. daily.  Since she has a JENNA 2 mutation, she is a high risk and  she was placed on hydroxyurea 1000 mg p.o. daily since July 19, 2024.  I did a follow-up CBC on September 11, 2024 which showed a white blood cell 4.6, hemoglobin 13.5, and a platelet count of 209,000.  Her platelet count is perfect.  I will continue hydroxyurea 1000 mg p.o. daily.  I will follow her in 2 weeks with a follow-up CBC for continuous care      She understood and agreed with the current plan. I answered all the questions and concerns she has at this time and advised to call me if any questions or concerns in regards to her care.  I also called the patient's son and explained to him in detail.    Thank you for allowing me to participate in his care, I will continue to follow.

## 2024-09-12 NOTE — ADDENDUM NOTE
Encounter addended by: Bill Dickerson on: 9/12/2024 1:11 PM   Actions taken: Charge Capture section accepted

## 2024-10-25 PROCEDURE — RXMED WILLOW AMBULATORY MEDICATION CHARGE: Performed by: INTERNAL MEDICINE

## 2024-10-27 ENCOUNTER — PHARMACY VISIT (OUTPATIENT)
Dept: PHARMACY | Facility: MEDICAL CENTER | Age: 69
End: 2024-10-27
Payer: COMMERCIAL

## 2024-11-05 DIAGNOSIS — F32.5 MAJOR DEPRESSIVE DISORDER IN FULL REMISSION, UNSPECIFIED WHETHER RECURRENT (HCC): ICD-10-CM

## 2024-11-05 DIAGNOSIS — Z91.09 ALLERGY TO ENVIRONMENTAL FACTORS: ICD-10-CM

## 2024-11-05 RX ORDER — CETIRIZINE HYDROCHLORIDE 10 MG/1
10 TABLET ORAL EVERY MORNING
Qty: 90 TABLET | Refills: 3 | Status: SHIPPED | OUTPATIENT
Start: 2024-11-05

## 2024-11-05 NOTE — TELEPHONE ENCOUNTER
Received request via: Pharmacy    Was the patient seen in the last year in this department? Yes    Does the patient have an active prescription (recently filled or refills available) for medication(s) requested? No    Pharm[facility]   [id number] - [place] - [address] AT [place] [address] [place] [address]  Phone: [phone] Fax: [phone]       Does the patient have halfway Plus and need 100-day supply? (This applies to ALL medications) Yes, quantity updated to 100 days

## 2024-11-06 ENCOUNTER — OFFICE VISIT (OUTPATIENT)
Dept: MEDICAL GROUP | Facility: MEDICAL CENTER | Age: 69
End: 2024-11-06
Payer: MEDICARE

## 2024-11-06 VITALS
TEMPERATURE: 96.8 F | WEIGHT: 173.94 LBS | SYSTOLIC BLOOD PRESSURE: 104 MMHG | OXYGEN SATURATION: 98 % | DIASTOLIC BLOOD PRESSURE: 66 MMHG | BODY MASS INDEX: 34.15 KG/M2 | HEART RATE: 67 BPM | HEIGHT: 60 IN | RESPIRATION RATE: 17 BRPM

## 2024-11-06 DIAGNOSIS — Z11.59 ENCOUNTER FOR HEPATITIS C SCREENING TEST FOR LOW RISK PATIENT: ICD-10-CM

## 2024-11-06 DIAGNOSIS — E78.2 MIXED HYPERLIPIDEMIA: ICD-10-CM

## 2024-11-06 DIAGNOSIS — E11.69 TYPE 2 DIABETES MELLITUS WITH OTHER SPECIFIED COMPLICATION, WITHOUT LONG-TERM CURRENT USE OF INSULIN (HCC): ICD-10-CM

## 2024-11-06 DIAGNOSIS — N95.2 VAGINAL ATROPHY: ICD-10-CM

## 2024-11-06 DIAGNOSIS — G47.33 OSA (OBSTRUCTIVE SLEEP APNEA): ICD-10-CM

## 2024-11-06 DIAGNOSIS — Z78.0 POST-MENOPAUSAL: ICD-10-CM

## 2024-11-06 DIAGNOSIS — N81.10 VAGINAL WALL PROLAPSE: ICD-10-CM

## 2024-11-06 DIAGNOSIS — E11.9 TYPE 2 DIABETES MELLITUS WITHOUT COMPLICATION, WITHOUT LONG-TERM CURRENT USE OF INSULIN (HCC): ICD-10-CM

## 2024-11-06 PROBLEM — R94.30 NONSPECIFIC ABNORMAL FUNCTION STUDY, CARDIOVASCULAR: Status: RESOLVED | Noted: 2024-02-05 | Resolved: 2024-11-06
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
## 2024-11-06 PROBLEM — M17.12 ARTHRITIS OF LEFT KNEE: Status: RESOLVED | Noted: 2024-04-15 | Resolved: 2024-11-06

## 2024-11-06 PROBLEM — M25.561 ACUTE PAIN OF RIGHT KNEE: Status: RESOLVED | Noted: 2024-07-11 | Resolved: 2024-11-06

## 2024-11-06 PROBLEM — M17.12 DEGENERATIVE ARTHRITIS OF LEFT KNEE: Status: RESOLVED | Noted: 2024-03-07 | Resolved: 2024-11-06

## 2024-11-06 LAB
HBA1C MFR BLD: 6.4 % (ref ?–5.8)
POCT INT CON NEG: NEGATIVE
POCT INT CON POS: POSITIVE

## 2024-11-06 PROCEDURE — 83036 HEMOGLOBIN GLYCOSYLATED A1C: CPT | Performed by: STUDENT IN AN ORGANIZED HEALTH CARE EDUCATION/TRAINING PROGRAM

## 2024-11-06 PROCEDURE — 3078F DIAST BP <80 MM HG: CPT | Performed by: STUDENT IN AN ORGANIZED HEALTH CARE EDUCATION/TRAINING PROGRAM

## 2024-11-06 PROCEDURE — 3074F SYST BP LT 130 MM HG: CPT | Performed by: STUDENT IN AN ORGANIZED HEALTH CARE EDUCATION/TRAINING PROGRAM

## 2024-11-06 PROCEDURE — 99214 OFFICE O/P EST MOD 30 MIN: CPT | Performed by: STUDENT IN AN ORGANIZED HEALTH CARE EDUCATION/TRAINING PROGRAM

## 2024-11-06 RX ORDER — CITALOPRAM HYDROBROMIDE 40 MG/1
40 TABLET ORAL
Qty: 90 TABLET | Refills: 2 | Status: SHIPPED | OUTPATIENT
Start: 2024-11-06

## 2024-11-06 RX ORDER — ESTRADIOL 0.1 MG/G
CREAM VAGINAL
Qty: 42.5 G | Refills: 6 | Status: SHIPPED | OUTPATIENT
Start: 2024-11-06

## 2024-11-06 RX ORDER — MONTELUKAST SODIUM 10 MG/1
10 TABLET ORAL
Qty: 90 TABLET | Refills: 2 | Status: SHIPPED | OUTPATIENT
Start: 2024-11-06

## 2024-11-06 ASSESSMENT — ENCOUNTER SYMPTOMS
WEIGHT LOSS: 0
NAUSEA: 0
VOMITING: 0
CHILLS: 0
PALPITATIONS: 0
WHEEZING: 0
DIZZINESS: 0
FEVER: 0
SHORTNESS OF BREATH: 0
HEADACHES: 0

## 2024-11-06 ASSESSMENT — FIBROSIS 4 INDEX: FIB4 SCORE: 1.76

## 2024-11-06 NOTE — PROGRESS NOTES
Subjective:     CC:     HPI:   Marii Baker presents today with    Former patient of Iva Christian M.D.   PMH of HLD, NIDDM 2, atherosclerosis, ISABELA on cpap, thrombocytosis on hydroxyurea, history of colon cancer s/p partial colectomy 2016 ( last colonoscopy report 2024), AnxietyMDD, allergy, s/p hysterectomy oophrectomy 2006 ( at risk for osteoporosis/ vag prolapse)  Specialist  Hematologist - thrombocytosis   Gastroenterology - [facility] - last colonoscopy 2024- requested         Verbal consent was acquired by the patient to use [employer] ambient listening note generation during this visit Yes   History of Present Illness  The patient presents for evaluation of multiple medical concerns.    She has a history of colon cancer and underwent a partial colectomy in 2016. She is currently under the care of a hematologist, with an upcoming appointment scheduled for next Tuesday. She also has a history of hemorrhoids, which have not been surgically treated. She is under the care of a gastroenterologist, Dr. Espinoza, and had a colonoscopy earlier this year, which showed no abnormalities.    She underwent a hysterectomy in 2006 and has never had a Pap smear. Currently in menopause, she has not been prescribed hormones due to her family history of cancer. She reports occasional pressure and difficulty urinating and has been diagnosed with vaginal atrophy.    She uses a CPAP machine for sleep apnea, which was diagnosed in 2020. She reports feeling generally well but notes a lack of energy for about 3 hours each day. She is unsure if this is due to her medications or stress. She recently started taking fish oil and probiotics. She has been on cholesterol medication for a long time and is unsure if it is beneficial.    She had a cough and cold about 3 weeks ago, tested negative for COVID-19 twice, but still has a lingering cough. She has a history of tonsil stones.    She has a history of cholesterol issues and type 2 diabetes  that is well controlled. Her platelet count is slightly high, and she is on hydroxyurea. She takes montelukast and cetirizine. She also has anxiety and takes acetaminophen, cholesterol medication, antidepressants, antianxiety medications, stool softener, glucosamine, melatonin as needed, meloxicam as needed, and metformin. She had a knee replacement on 04/15/2024.    FAMILY HISTORY  Her grandmother, aunts, uncles, all on the father's side have cancer. Her grandmother had colon cancer. Her uncle had brain tumor. One aunt had breast cancer. Another aunt had dementia. No ovarian cancer in the family.    ALLERGIES  She is allergic to ENVIRONMENT.            Health Maintenance:     ROS:  Review of Systems   Constitutional:  Negative for chills, fever and weight loss.   HENT:  Negative for hearing loss.    Respiratory:  Negative for shortness of breath and wheezing.    Cardiovascular:  Negative for chest pain and palpitations.   Gastrointestinal:  Negative for nausea and vomiting.   Genitourinary:  Negative for frequency and urgency.   Skin:  Negative for rash.   Neurological:  Negative for dizziness and headaches.       Objective:     Exam:  /66 (BP Location: Left arm)   Pulse 67   Temp 36 °C (96.8 °F)   Resp 17   Ht 1.524 m (5')   Wt 78.9 kg (173 lb 15.1 oz)   SpO2 98%   BMI 33.97 kg/m²  Body mass index is 33.97 kg/m².    Physical Exam  Constitutional:       Appearance: Normal appearance.   Cardiovascular:      Rate and Rhythm: Normal rate and regular rhythm.      Heart sounds: No murmur heard.  Pulmonary:      Effort: Pulmonary effort is normal.      Breath sounds: Normal breath sounds. No wheezing.   Musculoskeletal:      Cervical back: Normal range of motion and neck supple.   Lymphadenopathy:      Cervical: No cervical adenopathy.   Neurological:      Mental Status: She is alert.           Labs:     Assessment & Plan:     69 y.o. female with the following -     1. Type 2 diabetes mellitus without  complication, without long-term current use of insulin (HCC)    - Lipid Profile; Future  - TSH WITH REFLEX TO FT4; Future  - HEMOGLOBIN A1C; Future  - Comp Metabolic Panel; Future  - MICROALBUMIN CREAT RATIO URINE; Future    2. Type 2 diabetes mellitus with other specified complication, without long-term current use of insulin (HCC)  - POCT Hemoglobin A1C    3. Post-menopausal    - DS-BONE DENSITY STUDY (DEXA); Future  - Lipid Profile; Future  - TSH WITH REFLEX TO FT4; Future  - HEMOGLOBIN A1C; Future  - Comp Metabolic Panel; Future  - MICROALBUMIN CREAT RATIO URINE; Future  - VITAMIN D,25 HYDROXY (DEFICIENCY); Future    4. Vaginal atrophy    - estradiol (ESTRACE) 0.1 MG/GM vaginal cream; 500 mg to 1 g one to three times per week  Dispense: 42.5 g; Refill: 6    5. ISABELA (obstructive sleep apnea)    - DME Mask and Supplies  - Lipid Profile; Future  - TSH WITH REFLEX TO FT4; Future  - HEMOGLOBIN A1C; Future  - Comp Metabolic Panel; Future  - MICROALBUMIN CREAT RATIO URINE; Future    6. BMI 33.0-33.9,adult    - Lipid Profile; Future  - TSH WITH REFLEX TO FT4; Future  - HEMOGLOBIN A1C; Future  - Comp Metabolic Panel; Future  - MICROALBUMIN CREAT RATIO URINE; Future    7. Mixed hyperlipidemia    - Lipid Profile; Future  - TSH WITH REFLEX TO FT4; Future  - HEMOGLOBIN A1C; Future  - Comp Metabolic Panel; Future  - MICROALBUMIN CREAT RATIO URINE; Future      Assessment & Plan  1. Type 2 diabetes.  Her diabetes is well managed with the current regimen of metformin 1 g twice daily.    2. Postmenopausal status.  She has a history of hysterectomy and oophorectomy performed in her 40s, placing her at risk for osteoporosis. A DEXA scan will be ordered along with a vitamin D level check.    3. Vaginal atrophy.  A prescription for vaginal cream will be sent to her pharmacy.    4. Sleep apnea.  She is currently using a CPAP machine. DME mask and supplies will be sent to her. She should expect a phone call within 2 to 3 weeks. If she  does not receive a call, she should inform me.    5. Elevated weight.  She is advised to maintain an active lifestyle and continue exercising.    6. Elevated cholesterol.  Her cholesterol levels are well managed with the current medication.    7. Hemorrhoids.  She reports ongoing hemorrhoid issues. If she wishes to proceed with surgical intervention, a referral to a colorectal surgeon will be made.    8. Anxiety.  She reports experiencing anxiety. Current medications and management strategies will be continued.    9. Environmental allergies.  She is currently taking montelukast and cetirizine for her environmental allergies.    10. History of colon cancer.  She had a partial colectomy in 2016 and follows up with a gastroenterologist. Her last colonoscopy was this year and was normal.    11. Family history of cancer.  She has a significant family history of various cancers, including colon cancer, brain tumor, and breast cancer.              Return in about 3 months (around 2/6/2025) for Lab review, Med check.    Please note that this dictation was created using voice recognition software. I have made every reasonable attempt to correct obvious errors, but I expect that there are errors of grammar and possibly content that I did not discover before finalizing the note.

## 2024-11-06 NOTE — LETTER
[facility]  Everett Jovel M.D.  [address]  [place] [address]  Fax: [phone]   Authorization for Release/Disclosure of   Protected Health Information   Name: MARII SUTTON DEVON : 1955 SSN: [id number]   Address: [facility] [address]  [place] [address] Phone:    There are no phone numbers on file.   I authorize the entity listed below to release/disclose the PHI below to:   [facility]/Everett Jovel M.D. and Everett Jovel M.D.   Provider or Entity Name:  [facility]   Address   City, State, Zip   Phone:      Fax:     Reason for request: continuity of care   Information to be released:    [ xxx ] LAST COLONOSCOPY,  including any PATH REPORT and follow-up  [  ] LAST FIT/COLOGUARD RESULT [  ] LAST DEXA  [  ] LAST MAMMOGRAM  [  ] LAST PAP  [  ] LAST LABS [  ] RETINA EXAM REPORT  [  ] IMMUNIZATION RECORDS  [  ] Release all info      [  ] Check here and initial the line next to each item to release ALL health information INCLUDING  _____ Care and treatment for drug and / or alcohol abuse  _____ HIV testing, infection status, or AIDS  _____ Genetic Testing    DATES OF SERVICE OR TIME PERIOD TO BE DISCLOSED: _____________  I understand and acknowledge that:  * This Authorization may be revoked at any time by you in writing, except if your health information has already been used or disclosed.  * Your health information that will be used or disclosed as a result of you signing this authorization could be re-disclosed by the recipient. If this occurs, your re-disclosed health information may no longer be protected by State or Federal laws.  * You may refuse to sign this Authorization. Your refusal will not affect your ability to obtain treatment.  * This Authorization becomes effective upon signing and will  on (date) __________.      If no date is indicated, this Authorization will  one (1) year from the signature date.    Name: Marii Sutton Devon  Signature:  Date:   11/6/2024     PLEASE FAX REQUESTED RECORDS BACK TO: [phone]

## 2024-11-11 ENCOUNTER — HOSPITAL ENCOUNTER (OUTPATIENT)
Dept: LAB | Facility: MEDICAL CENTER | Age: 69
End: 2024-11-11
Attending: INTERNAL MEDICINE
Payer: MEDICARE

## 2024-11-11 DIAGNOSIS — D47.3 ESSENTIAL THROMBOCYTHEMIA (HCC): ICD-10-CM

## 2024-11-11 LAB
ALBUMIN SERPL BCP-MCNC: 4.1 G/DL (ref 3.2–4.9)
ALBUMIN/GLOB SERPL: 1.3 G/DL
ALP SERPL-CCNC: 100 U/L (ref 30–99)
ALT SERPL-CCNC: 18 U/L (ref 2–50)
ANION GAP SERPL CALC-SCNC: 11 MMOL/L (ref 7–16)
AST SERPL-CCNC: 18 U/L (ref 12–45)
BASOPHILS # BLD AUTO: 0.9 % (ref 0–1.8)
BASOPHILS # BLD: 0.04 K/UL (ref 0–0.12)
BILIRUB SERPL-MCNC: 0.4 MG/DL (ref 0.1–1.5)
BUN SERPL-MCNC: 15 MG/DL (ref 8–22)
CALCIUM ALBUM COR SERPL-MCNC: 10 MG/DL (ref 8.5–10.5)
CALCIUM SERPL-MCNC: 10.1 MG/DL (ref 8.5–10.5)
CHLORIDE SERPL-SCNC: 103 MMOL/L (ref 96–112)
CO2 SERPL-SCNC: 25 MMOL/L (ref 20–33)
CREAT SERPL-MCNC: 0.78 MG/DL (ref 0.5–1.4)
EOSINOPHIL # BLD AUTO: 0.14 K/UL (ref 0–0.51)
EOSINOPHIL NFR BLD: 3.1 % (ref 0–6.9)
ERYTHROCYTE [DISTWIDTH] IN BLOOD BY AUTOMATED COUNT: 54.6 FL (ref 35.9–50)
GFR SERPLBLD CREATININE-BSD FMLA CKD-EPI: 83 ML/MIN/1.73 M 2
GLOBULIN SER CALC-MCNC: 3.2 G/DL (ref 1.9–3.5)
GLUCOSE SERPL-MCNC: 152 MG/DL (ref 65–99)
HCT VFR BLD AUTO: 36.1 % (ref 37–47)
HGB BLD-MCNC: 12.4 G/DL (ref 12–16)
IMM GRANULOCYTES # BLD AUTO: 0.01 K/UL (ref 0–0.11)
IMM GRANULOCYTES NFR BLD AUTO: 0.2 % (ref 0–0.9)
LDH SERPL L TO P-CCNC: 172 U/L (ref 107–266)
LYMPHOCYTES # BLD AUTO: 1.8 K/UL (ref 1–4.8)
LYMPHOCYTES NFR BLD: 40.4 % (ref 22–41)
MCH RBC QN AUTO: 33.2 PG (ref 27–33)
MCHC RBC AUTO-ENTMCNC: 34.3 G/DL (ref 32.2–35.5)
MCV RBC AUTO: 96.8 FL (ref 81.4–97.8)
MONOCYTES # BLD AUTO: 0.32 K/UL (ref 0–0.85)
MONOCYTES NFR BLD AUTO: 7.2 % (ref 0–13.4)
NEUTROPHILS # BLD AUTO: 2.14 K/UL (ref 1.82–7.42)
NEUTROPHILS NFR BLD: 48.2 % (ref 44–72)
NRBC # BLD AUTO: 0 K/UL
NRBC BLD-RTO: 0 /100 WBC (ref 0–0.2)
PLATELET # BLD AUTO: 238 K/UL (ref 164–446)
PMV BLD AUTO: 8.9 FL (ref 9–12.9)
POTASSIUM SERPL-SCNC: 4.6 MMOL/L (ref 3.6–5.5)
PROT SERPL-MCNC: 7.3 G/DL (ref 6–8.2)
RBC # BLD AUTO: 3.73 M/UL (ref 4.2–5.4)
SODIUM SERPL-SCNC: 139 MMOL/L (ref 135–145)
WBC # BLD AUTO: 4.5 K/UL (ref 4.8–10.8)

## 2024-11-11 PROCEDURE — 85025 COMPLETE CBC W/AUTO DIFF WBC: CPT

## 2024-11-11 PROCEDURE — 80053 COMPREHEN METABOLIC PANEL: CPT

## 2024-11-11 PROCEDURE — 83615 LACTATE (LD) (LDH) ENZYME: CPT

## 2024-11-11 PROCEDURE — 36415 COLL VENOUS BLD VENIPUNCTURE: CPT

## 2024-11-14 ENCOUNTER — HOSPITAL ENCOUNTER (OUTPATIENT)
Dept: HEMATOLOGY ONCOLOGY | Facility: MEDICAL CENTER | Age: 69
End: 2024-11-14
Attending: INTERNAL MEDICINE
Payer: MEDICARE

## 2024-11-14 VITALS
HEART RATE: 68 BPM | SYSTOLIC BLOOD PRESSURE: 112 MMHG | TEMPERATURE: 97.7 F | DIASTOLIC BLOOD PRESSURE: 68 MMHG | BODY MASS INDEX: 33.8 KG/M2 | HEIGHT: 60 IN | WEIGHT: 172.18 LBS | OXYGEN SATURATION: 96 %

## 2024-11-14 DIAGNOSIS — D75.839 THROMBOCYTOSIS: ICD-10-CM

## 2024-11-14 PROCEDURE — 99212 OFFICE O/P EST SF 10 MIN: CPT | Performed by: INTERNAL MEDICINE

## 2024-11-14 PROCEDURE — 99214 OFFICE O/P EST MOD 30 MIN: CPT | Performed by: INTERNAL MEDICINE

## 2024-11-14 ASSESSMENT — FIBROSIS 4 INDEX: FIB4 SCORE: 1.19

## 2024-11-14 NOTE — PROGRESS NOTES
PROGRESS NOTE: HEMATOLOGY/ONCOLOGY     Primary Care:  Iva Christian M.D.    Diagnosis: Essential thrombocytosis    History of Presenting Illness:  Marii Delgado is a 69 y.o. female who has a past medical history of diabetes, hypercholesterolemia, osteoarthritis, thrombocytosis, and a history of a colon cancer in 2016.  The patient told me that she had a surgery followed by the chemotherapy at that time.  Apparently, she was found to have a high platelet count since February 18, 2021 when she had a platelet count of 492,000. Her platelet count swathi to 615,000 on March 29, 2024.  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  She was a started on hydroxyurea 1000 mg p.o. daily since July 19, 2024. She comes for routine follow-up today    Interval History:       7/19/24 : HU 1000mg po daily.  11/14/24 : HU 1000mg  PO daily Mon- Saturday, nothing sunday    Past Medical History:   Diagnosis Date    Arthritis     left knee    Back pain     Bowel habit changes     constipation/ occasional diarrhea    Breath shortness     with exertion     Cancer (HCC) 2016    colon (chemo)    Cancer of sigmoid colon (HCC) 12/07/2016    Cataract     no sx    Colon cancer (HCC)     Dental disorder     missing teeth    Depression     Diabetes (HCC)     oral meds    H/O seasonal allergies     Heart burn     occasional    Hemorrhagic disorder (HCC)     post tooth extraction    High cholesterol     Hypertension     3/19/2024 pt reports she does not have hypertension    Kidney stone     Mixed hyperlipidemia 10/29/2019    Obesity 10/29/2019    Pain 12/01/2016    left wrist and knee, 3-4/10    Peripheral vascular disease (HCC)     Pre-diabetes 10/29/2019    Psychiatric problem     anxiety    Renal disorder     hx of kidney stones     Sleep apnea     CPAP    Snoring     first step of sleep study completed, pt awaiting overnight sleep study     Urinary incontinence        Past Surgical History:   Procedure  Laterality Date    ARTHROPLASTY, KNEE, ROBOT-ASSISTED Left 04/15/2024    Procedure: ROBOTIC LEFT TOTAL KNEE ARTHROPLASTY;  Surgeon: Samy Abrams M.D.;  Location: SURGERY [facility];  Service: Ortho Robotic    ANGIOGRAM      pt reports no cardiac issues    OTHER      port removal     LOW ANTERIOR RESECTION LAPAROSCOPIC  2016    Procedure: LOW ANTERIOR RESECTION LAPAROSCOPIC;  Surgeon: Enoch Shaw M.D.;  Location: SURGERY [facility];  Service:     ORIF, WRIST Left 2016    HYSTERECTOMY, VAGINAL      hysterectomy    VEIN STRIPPING      varicose vein stripping    PRIMARY C SECTION      repeat        Family History   Problem Relation Age of Onset    Breast Cancer Paternal Aunt     Cancer Paternal Aunt     Cancer Paternal Aunt         brain    Cancer Paternal Uncle         brain tumor    Colorectal Cancer Paternal Grandmother     Cancer Paternal Grandmother     Sleep Apnea Neg Hx     Ovarian Cancer Neg Hx     Tubal Cancer Neg Hx     Peritoneal Cancer Neg Hx        OB History   No obstetric history on file.       Allergies as of 2024 - Reviewed 2024   Allergen Reaction Noted    Other environmental Runny Nose and Itching 2024         Current Outpatient Medications:     Omega-3 Fatty Acids (FISH OIL PO), Take  by mouth., Disp: , Rfl:     citalopram (CELEXA) 40 MG Tab, Take 1 Tablet by mouth at bedtime., Disp: 90 Tablet, Rfl: 2    montelukast (SINGULAIR) 10 MG Tab, Take 1 Tablet by mouth at bedtime. Indications: Hayfever, Disp: 90 Tablet, Rfl: 2    estradiol (ESTRACE) 0.1 MG/GM vaginal cream, 500 mg to 1 g one to three times per week, Disp: 42.5 g, Rfl: 6    metformin (GLUCOPHAGE) 1000 MG tablet, Take 1 Tablet by mouth 2 times a day with meals., Disp: 180 Tablet, Rfl: 2    cetirizine (ZYRTEC) 10 MG Tab, TAKE 1 TABLET BY MOUTH EVERY MORNING, Disp: 90 Tablet, Rfl: 3    hydroxyurea (HYDREA) 500 MG Cap, Take 2 Capsules by mouth every day., Disp: 60 Capsule, Rfl: 11     valACYclovir (VALTREX) 500 MG Tab, Take 1 Tablet by mouth 2 times a day. 3 day course, Disp: 14 Tablet, Rfl: 0    docusate sodium (COLACE) 100 MG Cap, Take 1 Capsule by mouth 2 times a day as needed for Constipation (2-3 times daily PRN)., Disp: 30 Capsule, Rfl: 0    Glucosamine-Chondroitin (MOVE FREE PO), Take  by mouth every day., Disp: , Rfl:     melatonin 1 mg Tab, Take 5 mg by mouth at bedtime as needed., Disp: , Rfl:     Acetaminophen (TYLENOL ARTHRITIS PAIN PO), Take  by mouth as needed., Disp: , Rfl:     Cinnamon 500 MG Cap, Take  by mouth every day., Disp: , Rfl:     Multiple Vitamins-Minerals (CENTRUM SILVER 50+WOMEN) Tab, Take  by mouth., Disp: , Rfl:     atorvastatin (LIPITOR) 10 MG Tab, Take 0.5 Tablets by mouth every evening., Disp: 90 Tablet, Rfl: 2    Review of Systems:  Patient denies fever, chills, nausea, vomiting, chest pain, sob, blood in stools, black stool, blood in urine. All systems are reviewed See HPI.  She is feeling well and she is relatively asymptomatic.       Physical Exam:  /68 (BP Location: Left arm, Patient Position: Sitting, BP Cuff Size: Adult)   Pulse 68   Temp 36.5 °C (97.7 °F) (Temporal)   Ht 1.524 m (5')   Wt 78.1 kg (172 lb 2.9 oz)   SpO2 96%  Body mass index is 33.63 kg/m².    Constitutional:  NAD, well appearing.  HEENT:   CLARENCE EOMI  Cardiovascular: RRR.   No m/r/g. No carotid bruits.       Lungs:   Clear, no wheezing, no rales, no respiratory distress.  Abdomen: Soft, non-tender. + BS, no masses, no suprapubic tenderness, no hepatomegaly.  Extremities:  No pedal edema. Bilateral and radial pulses present.  Skin:  Warm and dry.    Neurologic: Alert & oriented x 3, CN II-XII grossly intact, strength and sensation grossly intact.  No focal deficits noted.  Psychiatric:  Affect normal, mood normal, judgment normal.    Labs:  No visits with results within 7 Day(s) from this visit.   Latest known visit with results is:   Admission on 06/16/2024, Discharged on  06/16/2024   Component Date Value Ref Range Status    WBC 06/16/2024 9.2  4.8 - 10.8 K/uL Final    RBC 06/16/2024 5.07  4.20 - 5.40 M/uL Final    Hemoglobin 06/16/2024 13.8  12.0 - 16.0 g/dL Final    Hematocrit 06/16/2024 42.0  37.0 - 47.0 % Final    MCV 06/16/2024 82.8  81.4 - 97.8 fL Final    MCH 06/16/2024 27.2  27.0 - 33.0 pg Final    MCHC 06/16/2024 32.9  32.2 - 35.5 g/dL Final    RDW 06/16/2024 45.2  35.9 - 50.0 fL Final    Platelet Count 06/16/2024 597 (H)  164 - 446 K/uL Final    MPV 06/16/2024 9.3  9.0 - 12.9 fL Final    Neutrophils-Polys 06/16/2024 65.00  44.00 - 72.00 % Final    Lymphocytes 06/16/2024 22.30  22.00 - 41.00 % Final    Monocytes 06/16/2024 7.60  0.00 - 13.40 % Final    Eosinophils 06/16/2024 3.40  0.00 - 6.90 % Final    Basophils 06/16/2024 1.00  0.00 - 1.80 % Final    Immature Granulocytes 06/16/2024 0.70  0.00 - 0.90 % Final    Nucleated RBC 06/16/2024 0.00  0.00 - 0.20 /100 WBC Final    Neutrophils (Absolute) 06/16/2024 6.00  1.82 - 7.42 K/uL Final    Includes immature neutrophils, if present.    Lymphs (Absolute) 06/16/2024 2.06  1.00 - 4.80 K/uL Final    Monos (Absolute) 06/16/2024 0.70  0.00 - 0.85 K/uL Final    Eos (Absolute) 06/16/2024 0.31  0.00 - 0.51 K/uL Final    Baso (Absolute) 06/16/2024 0.09  0.00 - 0.12 K/uL Final    Immature Granulocytes (abs) 06/16/2024 0.06  0.00 - 0.11 K/uL Final    NRBC (Absolute) 06/16/2024 0.00  K/uL Final    Sodium 06/16/2024 139  135 - 145 mmol/L Final    Potassium 06/16/2024 4.7  3.6 - 5.5 mmol/L Final    Chloride 06/16/2024 104  96 - 112 mmol/L Final    Co2 06/16/2024 23  20 - 33 mmol/L Final    Anion Gap 06/16/2024 12.0  7.0 - 16.0 Final    Glucose 06/16/2024 137 (H)  65 - 99 mg/dL Final    Bun 06/16/2024 17  8 - 22 mg/dL Final    Creatinine 06/16/2024 0.64  0.50 - 1.40 mg/dL Final    Calcium 06/16/2024 9.7  8.5 - 10.5 mg/dL Final    Correct Calcium 06/16/2024 9.6  8.5 - 10.5 mg/dL Final    AST(SGOT) 06/16/2024 20  12 - 45 U/L Final    ALT(SGPT)  06/16/2024 17  2 - 50 U/L Final    Alkaline Phosphatase 06/16/2024 118 (H)  30 - 99 U/L Final    Total Bilirubin 06/16/2024 0.4  0.1 - 1.5 mg/dL Final    Albumin 06/16/2024 4.1  3.2 - 4.9 g/dL Final    Total Protein 06/16/2024 7.1  6.0 - 8.2 g/dL Final    Globulin 06/16/2024 3.0  1.9 - 3.5 g/dL Final    A-G Ratio 06/16/2024 1.4  g/dL Final    PT 06/16/2024 14.3  12.0 - 14.6 sec Final    INR 06/16/2024 1.09  0.87 - 1.13 Final    Comment: INR - Non-therapeutic Reference Range: 0.87-1.13  INR - Therapeutic Reference Range: 2.0-4.0      APTT 06/16/2024 31.3  24.7 - 36.0 sec Final    Stat C-Reactive Protein 06/16/2024 <0.30  0.00 - 0.75 mg/dL Final    Sed Rate Westergren 06/16/2024 5  0 - 25 mm/hour Final    GFR (CKD-EPI) 06/16/2024 97  >60 mL/min/1.73 m 2 Final    Comment: Estimated Glomerular Filtration Rate is calculated using  race neutral CKD-EPI 2021 equation per NKF-ASN recommendations.         Assessment & Plan:    Essential thrombocytosis on hydroxyurea at 1000 mg p.o. daily.  Thank you  She is on baby aspirin 81 mg p.o. daily.  Since she has a JENNA 2 mutation, she is a high risk and  she was placed on hydroxyurea 1000 mg p.o. daily since July 19, 2024.  I did a follow-up CBC on November 11, 2024 which showed a white blood cell 4.5 hemoglobin 13.5 and a platelet count of 238,000.    She told me that she missed about a week of the hydroxyurea.  I believe her platelet count is a little bit too low for her safety.  I will recommend hydroxyurea 1000 mg from Monday through Saturday and nothing on Sunday.    I will follow her CBC in 3 months for continuous care    She understood and agreed with the current plan. I answered all the questions and concerns she has at this time and advised to call me if any questions or concerns in regards to her care.  I also called the patient's son and explained to him in detail.    Thank you for allowing me to participate in his care, I will continue to follow.

## 2024-11-21 ENCOUNTER — APPOINTMENT (OUTPATIENT)
Dept: RADIOLOGY | Facility: MEDICAL CENTER | Age: 69
End: 2024-11-21
Attending: STUDENT IN AN ORGANIZED HEALTH CARE EDUCATION/TRAINING PROGRAM
Payer: MEDICARE

## 2024-12-19 ENCOUNTER — HOSPITAL ENCOUNTER (OUTPATIENT)
Dept: RADIOLOGY | Facility: MEDICAL CENTER | Age: 69
End: 2024-12-19
Attending: STUDENT IN AN ORGANIZED HEALTH CARE EDUCATION/TRAINING PROGRAM
Payer: MEDICARE

## 2024-12-19 DIAGNOSIS — Z78.0 POST-MENOPAUSAL: ICD-10-CM

## 2024-12-19 PROCEDURE — 77080 DXA BONE DENSITY AXIAL: CPT

## 2025-02-03 ENCOUNTER — HOSPITAL ENCOUNTER (OUTPATIENT)
Dept: LAB | Facility: MEDICAL CENTER | Age: 70
End: 2025-02-03
Attending: STUDENT IN AN ORGANIZED HEALTH CARE EDUCATION/TRAINING PROGRAM
Payer: MEDICARE

## 2025-02-03 DIAGNOSIS — Z78.0 POST-MENOPAUSAL: ICD-10-CM

## 2025-02-03 DIAGNOSIS — Z11.59 ENCOUNTER FOR HEPATITIS C SCREENING TEST FOR LOW RISK PATIENT: ICD-10-CM

## 2025-02-03 DIAGNOSIS — E11.9 TYPE 2 DIABETES MELLITUS WITHOUT COMPLICATION, WITHOUT LONG-TERM CURRENT USE OF INSULIN (HCC): ICD-10-CM

## 2025-02-03 DIAGNOSIS — G47.33 OSA (OBSTRUCTIVE SLEEP APNEA): ICD-10-CM

## 2025-02-03 DIAGNOSIS — E78.2 MIXED HYPERLIPIDEMIA: ICD-10-CM

## 2025-02-03 LAB
25(OH)D3 SERPL-MCNC: 31 NG/ML (ref 30–100)
ALBUMIN SERPL BCP-MCNC: 4 G/DL (ref 3.2–4.9)
ALBUMIN/GLOB SERPL: 1.3 G/DL
ALP SERPL-CCNC: 119 U/L (ref 30–99)
ALT SERPL-CCNC: 24 U/L (ref 2–50)
ANION GAP SERPL CALC-SCNC: 9 MMOL/L (ref 7–16)
AST SERPL-CCNC: 27 U/L (ref 12–45)
BILIRUB SERPL-MCNC: 0.5 MG/DL (ref 0.1–1.5)
BUN SERPL-MCNC: 15 MG/DL (ref 8–22)
CALCIUM ALBUM COR SERPL-MCNC: 9.7 MG/DL (ref 8.5–10.5)
CALCIUM SERPL-MCNC: 9.7 MG/DL (ref 8.5–10.5)
CHLORIDE SERPL-SCNC: 103 MMOL/L (ref 96–112)
CHOLEST SERPL-MCNC: 151 MG/DL (ref 100–199)
CO2 SERPL-SCNC: 27 MMOL/L (ref 20–33)
CREAT SERPL-MCNC: 0.87 MG/DL (ref 0.5–1.4)
GFR SERPLBLD CREATININE-BSD FMLA CKD-EPI: 73 ML/MIN/1.73 M 2
GLOBULIN SER CALC-MCNC: 3 G/DL (ref 1.9–3.5)
GLUCOSE SERPL-MCNC: 172 MG/DL (ref 65–99)
HCV AB SER QL: NORMAL
HDLC SERPL-MCNC: 47 MG/DL
LDLC SERPL CALC-MCNC: 79 MG/DL
POTASSIUM SERPL-SCNC: 4.5 MMOL/L (ref 3.6–5.5)
PROT SERPL-MCNC: 7 G/DL (ref 6–8.2)
SODIUM SERPL-SCNC: 139 MMOL/L (ref 135–145)
TRIGL SERPL-MCNC: 124 MG/DL (ref 0–149)
TSH SERPL DL<=0.005 MIU/L-ACNC: 1.71 UIU/ML (ref 0.38–5.33)

## 2025-02-03 PROCEDURE — 80053 COMPREHEN METABOLIC PANEL: CPT

## 2025-02-03 PROCEDURE — 84443 ASSAY THYROID STIM HORMONE: CPT

## 2025-02-03 PROCEDURE — 82306 VITAMIN D 25 HYDROXY: CPT

## 2025-02-03 PROCEDURE — 80061 LIPID PANEL: CPT

## 2025-02-03 PROCEDURE — 36415 COLL VENOUS BLD VENIPUNCTURE: CPT

## 2025-02-03 PROCEDURE — 86803 HEPATITIS C AB TEST: CPT

## 2025-02-06 ENCOUNTER — OFFICE VISIT (OUTPATIENT)
Dept: MEDICAL GROUP | Facility: MEDICAL CENTER | Age: 70
End: 2025-02-06
Payer: MEDICARE

## 2025-02-06 VITALS
DIASTOLIC BLOOD PRESSURE: 70 MMHG | TEMPERATURE: 97.1 F | RESPIRATION RATE: 16 BRPM | OXYGEN SATURATION: 96 % | SYSTOLIC BLOOD PRESSURE: 106 MMHG | BODY MASS INDEX: 35.34 KG/M2 | HEIGHT: 60 IN | HEART RATE: 66 BPM | WEIGHT: 180 LBS

## 2025-02-06 DIAGNOSIS — A60.00 GENITAL HERPES SIMPLEX, UNSPECIFIED SITE: ICD-10-CM

## 2025-02-06 DIAGNOSIS — E11.69 TYPE 2 DIABETES MELLITUS WITH OTHER SPECIFIED COMPLICATION, WITHOUT LONG-TERM CURRENT USE OF INSULIN (HCC): ICD-10-CM

## 2025-02-06 DIAGNOSIS — E78.2 MIXED HYPERLIPIDEMIA: ICD-10-CM

## 2025-02-06 DIAGNOSIS — J30.9 ALLERGIC RHINITIS, UNSPECIFIED SEASONALITY, UNSPECIFIED TRIGGER: ICD-10-CM

## 2025-02-06 DIAGNOSIS — E66.01 SEVERE OBESITY (HCC): ICD-10-CM

## 2025-02-06 DIAGNOSIS — M85.88 OSTEOPENIA OF LUMBAR SPINE: ICD-10-CM

## 2025-02-06 LAB
HBA1C MFR BLD: 7 % (ref ?–5.8)
POCT INT CON NEG: NEGATIVE
POCT INT CON POS: POSITIVE

## 2025-02-06 PROCEDURE — 83036 HEMOGLOBIN GLYCOSYLATED A1C: CPT | Performed by: STUDENT IN AN ORGANIZED HEALTH CARE EDUCATION/TRAINING PROGRAM

## 2025-02-06 PROCEDURE — 3078F DIAST BP <80 MM HG: CPT | Performed by: STUDENT IN AN ORGANIZED HEALTH CARE EDUCATION/TRAINING PROGRAM

## 2025-02-06 PROCEDURE — 3074F SYST BP LT 130 MM HG: CPT | Performed by: STUDENT IN AN ORGANIZED HEALTH CARE EDUCATION/TRAINING PROGRAM

## 2025-02-06 PROCEDURE — 99214 OFFICE O/P EST MOD 30 MIN: CPT | Performed by: STUDENT IN AN ORGANIZED HEALTH CARE EDUCATION/TRAINING PROGRAM

## 2025-02-06 RX ORDER — ATORVASTATIN CALCIUM 10 MG/1
5 TABLET, FILM COATED ORAL NIGHTLY
Qty: 90 TABLET | Refills: 3 | Status: SHIPPED | OUTPATIENT
Start: 2025-02-06

## 2025-02-06 RX ORDER — VALACYCLOVIR HYDROCHLORIDE 500 MG/1
500 TABLET, FILM COATED ORAL 2 TIMES DAILY PRN
Qty: 30 TABLET | Refills: 1 | Status: SHIPPED | OUTPATIENT
Start: 2025-02-06

## 2025-02-06 ASSESSMENT — ENCOUNTER SYMPTOMS
CHILLS: 0
WHEEZING: 0
VOMITING: 0
PALPITATIONS: 0
HEADACHES: 0
FEVER: 0
NAUSEA: 0
DIZZINESS: 0
SHORTNESS OF BREATH: 0
WEIGHT LOSS: 0

## 2025-02-06 ASSESSMENT — PATIENT HEALTH QUESTIONNAIRE - PHQ9: CLINICAL INTERPRETATION OF PHQ2 SCORE: 0

## 2025-02-06 ASSESSMENT — FIBROSIS 4 INDEX: FIB4 SCORE: 1.57

## 2025-02-06 NOTE — PROGRESS NOTES
Subjective:     CC:     HPI:   Marii Baker presents today with    PMH of HLD, NIDDM 2, atherosclerosis, ISABELA on cpap, thrombocytosis on hydroxyurea, history of colon cancer s/p partial colectomy 2016 ( last colonoscopy report 2024), AnxietyMDD, allergy, s/p hysterectomy oophrectomy 2006 ( at risk for osteoporosis/ vag prolapse)  Specialist  Hematologist - thrombocytosis   Gastroenterology - [facility] - last colonoscopy 2024- requested     Lab 2/3/2025 CMP was fine alkaline phosphatase mildly elevated A1c of 6.4 from 8.2 LDL less than 100 thyroid is fine vitamin D adequate DEXA scan showed osteopenia in spine and osteopenia, FRAX score 21.9    Verbal consent was acquired by the patient to use [employer] ambient listening note generation during this visit Yes   History of Present Illness  The patient presents for a follow-up visit with a history of type 2 diabetes, sleep apnea, colon cancer status post partial colectomy, and anxiety.    She has been experiencing increased anxiety over the past 2 months, which she attributes to her current unemployment status. She reports a sensation of pressure in her epigastric region, which is not associated with food intake or physical activity. Additionally, she mentions occasional mild heartburn, for which she takes an acid blocker. She has been using Kelly-Turner as needed for symptom relief. She has not been engaging in regular exercise due to a persistent cough and lack of energy. She also reports weight gain during this period.    She believes her blood glucose levels may be elevated due to increased food intake during family gatherings this month.    She has a known allergy to dust and is currently taking cetirizine and montelukast for management. She experiences itching if she misses a dose of cetirizine for 2 days. She has been on these medications for an extended period. She works as a , which exposes her to dust and chemicals. She occasionally forgets to take her  medications in the morning.    She reports one of her tonsils is red and swollen, causing difficulty in swallowing food. She does not experience any associated pain. She still has her tonsils intact.            Health Maintenance:     ROS: Denies chest pain shortness of breath dyspnea exertion.  Symptoms of epigastric pain seem to resolve with use of Tums  Review of Systems   Constitutional:  Negative for chills, fever and weight loss.   HENT:  Negative for hearing loss.    Respiratory:  Negative for shortness of breath and wheezing.    Cardiovascular:  Negative for chest pain and palpitations.   Gastrointestinal:  Negative for nausea and vomiting.   Genitourinary:  Negative for frequency and urgency.   Skin:  Negative for rash.   Neurological:  Negative for dizziness and headaches.       Objective:     Exam:  /70 (BP Location: Left arm, Patient Position: Sitting)   Pulse 66   Temp 36.2 °C (97.1 °F) (Temporal)   Resp 16   Ht 1.524 m (5')   Wt 81.6 kg (180 lb)   SpO2 96%   BMI 35.15 kg/m²  Body mass index is 35.15 kg/m².    Physical Exam  Constitutional:       Appearance: Normal appearance.   Cardiovascular:      Rate and Rhythm: Normal rate and regular rhythm.      Heart sounds: No murmur heard.  Pulmonary:      Effort: Pulmonary effort is normal.      Breath sounds: Normal breath sounds. No wheezing.   Musculoskeletal:      Cervical back: Normal range of motion and neck supple.   Lymphadenopathy:      Cervical: No cervical adenopathy.   Neurological:      Mental Status: She is alert.           Labs:     Assessment & Plan:     70 y.o. female with the following -     1. Severe obesity (HCC)  3. Body mass index (BMI) of 35.0-35.9 in adult  Chronic, stable slight weight gain since last visit report multiple birthday parties.  She also had difficulty finding work.    -Will work on lifestyle changes      4. Type 2 diabetes mellitus with other specified complication, without long-term current use of insulin  (HCC)  Chronic, stable A1c slightly worsened, continue metformin 1 g twice daily and work on lifestyle   - POCT Hemoglobin A1C    5. Mixed hyperlipidemia  Cholesterol well-controlled on atorvastatin 10 mg daily  - atorvastatin (LIPITOR) 10 MG Tab; Take 0.5 Tablets by mouth every evening.  Dispense: 90 Tablet; Refill: 3    6. Genital herpes simplex, unspecified site    - valACYclovir (VALTREX) 500 MG Tab; Take 1 Tablet by mouth 2 times a day as needed (take 500 mg twice daily for 3 days for herpes flares). 3 day course  Dispense: 30 Tablet; Refill: 1    7. Allergic rhinitis, unspecified seasonality, unspecified trigger  Chronic, stable recommend continue cetirizine and montelukast as prescribed    8. Osteopenia of lumbar spine  Osteopenia, elevated FRAX score.  Discussed with patient to continue vitamin D and calcium, may consider bisphosphonate.          HCC Gap Form    Diagnosis: E66.01 - Severe obesity (HCC)  Z68.35 - Body mass index (BMI) of 35.0-35.9 in adult  Comorbidity Diagnosis: Arthritis of knee, right  The current BMI is 35.15 kg/m² as of 02/06/25.    Past BMI Values:  02/06/25 : 35.15 kg/m². 11/14/24 : 33.63 kg/m². 11/06/24 : 33.97 kg/m².   Assessment and plan: Chronic, stable. Encouraged healthy diet and physical activity changes with a goal of weight loss. Follow up at least annually. The comorbid condition is asymptomatic based on today's assessment. Continue current treatment plan. Follow up at least yearly.  Weight gain of 5-8 lb reports for past 2 month due to recent illness has not been exercising.   Diagnosis: E11.69 - Type 2 diabetes mellitus with other specified complication (HCC)  Assessment and plan: Chronic, stable. Continue with current defined treatment plan: on metformin 1g BID. Follow-up at least annually.  Last edited 02/06/25 09:18 PST by Everett Jovel M.D.               Return in about 3 months (around 5/6/2025) for Lab review, Med check, Diabetes+A1c.    Please note that this  dictation was created using voice recognition software. I have made every reasonable attempt to correct obvious errors, but I expect that there are errors of grammar and possibly content that I did not discover before finalizing the note.

## 2025-02-18 ENCOUNTER — HOSPITAL ENCOUNTER (OUTPATIENT)
Dept: LAB | Facility: MEDICAL CENTER | Age: 70
End: 2025-02-18
Attending: INTERNAL MEDICINE
Payer: MEDICARE

## 2025-02-18 DIAGNOSIS — D75.839 THROMBOCYTOSIS: ICD-10-CM

## 2025-02-18 LAB
BASOPHILS # BLD AUTO: 1 % (ref 0–1.8)
BASOPHILS # BLD: 0.05 K/UL (ref 0–0.12)
EOSINOPHIL # BLD AUTO: 0.17 K/UL (ref 0–0.51)
EOSINOPHIL NFR BLD: 3.5 % (ref 0–6.9)
ERYTHROCYTE [DISTWIDTH] IN BLOOD BY AUTOMATED COUNT: 49.8 FL (ref 35.9–50)
HCT VFR BLD AUTO: 38.3 % (ref 37–47)
HGB BLD-MCNC: 12.9 G/DL (ref 12–16)
IMM GRANULOCYTES # BLD AUTO: 0.02 K/UL (ref 0–0.11)
IMM GRANULOCYTES NFR BLD AUTO: 0.4 % (ref 0–0.9)
LYMPHOCYTES # BLD AUTO: 1.83 K/UL (ref 1–4.8)
LYMPHOCYTES NFR BLD: 37.8 % (ref 22–41)
MCH RBC QN AUTO: 33.1 PG (ref 27–33)
MCHC RBC AUTO-ENTMCNC: 33.7 G/DL (ref 32.2–35.5)
MCV RBC AUTO: 98.2 FL (ref 81.4–97.8)
MONOCYTES # BLD AUTO: 0.39 K/UL (ref 0–0.85)
MONOCYTES NFR BLD AUTO: 8.1 % (ref 0–13.4)
NEUTROPHILS # BLD AUTO: 2.38 K/UL (ref 1.82–7.42)
NEUTROPHILS NFR BLD: 49.2 % (ref 44–72)
NRBC # BLD AUTO: 0 K/UL
NRBC BLD-RTO: 0 /100 WBC (ref 0–0.2)
PLATELET # BLD AUTO: 174 K/UL (ref 164–446)
PMV BLD AUTO: 9.5 FL (ref 9–12.9)
RBC # BLD AUTO: 3.9 M/UL (ref 4.2–5.4)
WBC # BLD AUTO: 4.8 K/UL (ref 4.8–10.8)

## 2025-02-18 PROCEDURE — 85025 COMPLETE CBC W/AUTO DIFF WBC: CPT

## 2025-02-18 PROCEDURE — 36415 COLL VENOUS BLD VENIPUNCTURE: CPT

## 2025-02-18 PROCEDURE — 83615 LACTATE (LD) (LDH) ENZYME: CPT

## 2025-02-19 ENCOUNTER — HOSPITAL ENCOUNTER (OUTPATIENT)
Dept: LAB | Facility: MEDICAL CENTER | Age: 70
End: 2025-02-19
Attending: INTERNAL MEDICINE
Payer: MEDICARE

## 2025-02-19 LAB
ALBUMIN SERPL BCP-MCNC: 3.9 G/DL (ref 3.2–4.9)
ALBUMIN/GLOB SERPL: 1.3 G/DL
ALP SERPL-CCNC: 119 U/L (ref 30–99)
ALT SERPL-CCNC: 24 U/L (ref 2–50)
ANION GAP SERPL CALC-SCNC: 10 MMOL/L (ref 7–16)
AST SERPL-CCNC: 22 U/L (ref 12–45)
BILIRUB SERPL-MCNC: 0.6 MG/DL (ref 0.1–1.5)
BUN SERPL-MCNC: 18 MG/DL (ref 8–22)
CALCIUM ALBUM COR SERPL-MCNC: 9.9 MG/DL (ref 8.5–10.5)
CALCIUM SERPL-MCNC: 9.8 MG/DL (ref 8.5–10.5)
CHLORIDE SERPL-SCNC: 102 MMOL/L (ref 96–112)
CO2 SERPL-SCNC: 26 MMOL/L (ref 20–33)
CREAT SERPL-MCNC: 0.9 MG/DL (ref 0.5–1.4)
GFR SERPLBLD CREATININE-BSD FMLA CKD-EPI: 70 ML/MIN/1.73 M 2
GLOBULIN SER CALC-MCNC: 3 G/DL (ref 1.9–3.5)
GLUCOSE SERPL-MCNC: 169 MG/DL (ref 65–99)
LDH SERPL L TO P-CCNC: 160 U/L (ref 107–266)
POTASSIUM SERPL-SCNC: 4.8 MMOL/L (ref 3.6–5.5)
PROT SERPL-MCNC: 6.9 G/DL (ref 6–8.2)
SODIUM SERPL-SCNC: 138 MMOL/L (ref 135–145)

## 2025-02-19 PROCEDURE — 36415 COLL VENOUS BLD VENIPUNCTURE: CPT

## 2025-02-19 PROCEDURE — 80053 COMPREHEN METABOLIC PANEL: CPT

## 2025-02-20 ENCOUNTER — HOSPITAL ENCOUNTER (OUTPATIENT)
Dept: HEMATOLOGY ONCOLOGY | Facility: MEDICAL CENTER | Age: 70
End: 2025-02-20
Attending: INTERNAL MEDICINE
Payer: MEDICARE

## 2025-02-20 VITALS
DIASTOLIC BLOOD PRESSURE: 60 MMHG | HEIGHT: 60 IN | WEIGHT: 176.37 LBS | RESPIRATION RATE: 16 BRPM | SYSTOLIC BLOOD PRESSURE: 108 MMHG | OXYGEN SATURATION: 96 % | TEMPERATURE: 98.1 F | BODY MASS INDEX: 34.63 KG/M2 | HEART RATE: 88 BPM

## 2025-02-20 DIAGNOSIS — D47.3 ESSENTIAL THROMBOCYTHEMIA (HCC): ICD-10-CM

## 2025-02-20 PROCEDURE — 99212 OFFICE O/P EST SF 10 MIN: CPT | Performed by: INTERNAL MEDICINE

## 2025-02-20 PROCEDURE — 99214 OFFICE O/P EST MOD 30 MIN: CPT | Performed by: INTERNAL MEDICINE

## 2025-02-20 PROCEDURE — 99212 OFFICE O/P EST SF 10 MIN: CPT

## 2025-02-20 ASSESSMENT — FIBROSIS 4 INDEX: FIB4 SCORE: 1.75

## 2025-02-20 ASSESSMENT — PAIN SCALES - GENERAL: PAINLEVEL_OUTOF10: NO PAIN

## 2025-02-20 NOTE — PROGRESS NOTES
PROGRESS NOTE: HEMATOLOGY/ONCOLOGY     Primary Care:  Iva Christian M.D.    Diagnosis: Essential thrombocytosis, on HU    History of Presenting Illness:  Marii Delgado is a 69 y.o. female who has a past medical history of diabetes, hypercholesterolemia, osteoarthritis, thrombocytosis, and a history of a colon cancer in 2016.  The patient told me that she had a surgery followed by the chemotherapy at that time.  Apparently, she was found to have a high platelet count since February 18, 2021 when she had a platelet count of 492,000. Her platelet count swathi to 615,000 on March 29, 2024.  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  She was a started on hydroxyurea 1000 mg p.o. daily since July 19, 2024. She comes for routine follow-up today      Interval History:     7/19/24 : HU 1000mg po daily.  11/14/24 : HU 1000mg  PO daily Mon- Saturday, nothing Sunday 2/20/25 : HU 1000 mg Mon- Friday, nothing on Saturday and Sunday    Past Medical History:   Diagnosis Date    Arthritis     left knee    Back pain     Bowel habit changes     constipation/ occasional diarrhea    Breath shortness     with exertion     Cancer (HCC) 2016    colon (chemo)    Cancer of sigmoid colon (HCC) 12/07/2016    Cataract     no sx    Colon cancer (HCC)     Dental disorder     missing teeth    Depression     Diabetes (HCC)     oral meds    H/O seasonal allergies     Heart burn     occasional    Hemorrhagic disorder (HCC)     post tooth extraction    High cholesterol     Hypertension     3/19/2024 pt reports she does not have hypertension    Kidney stone     Mixed hyperlipidemia 10/29/2019    Obesity 10/29/2019    Pain 12/01/2016    left wrist and knee, 3-4/10    Peripheral vascular disease (HCC)     Pre-diabetes 10/29/2019    Psychiatric problem     anxiety    Renal disorder     hx of kidney stones     Sleep apnea     CPAP    Snoring     first step of sleep study completed, pt awaiting overnight sleep  study     Urinary incontinence        Past Surgical History:   Procedure Laterality Date    ARTHROPLASTY, KNEE, ROBOT-ASSISTED Left 04/15/2024    Procedure: ROBOTIC LEFT TOTAL KNEE ARTHROPLASTY;  Surgeon: Samy Abrams M.D.;  Location: SURGERY [facility];  Service: Ortho Robotic    ANGIOGRAM      pt reports no cardiac issues    OTHER  2019    port removal     LOW ANTERIOR RESECTION LAPAROSCOPIC  2016    Procedure: LOW ANTERIOR RESECTION LAPAROSCOPIC;  Surgeon: Enoch Shaw M.D.;  Location: SURGERY [facility];  Service:     ORIF, WRIST Left 2016    HYSTERECTOMY, VAGINAL      hysterectomy    VEIN STRIPPING      varicose vein stripping    PRIMARY C SECTION      repeat        Family History   Problem Relation Age of Onset    Breast Cancer Paternal Aunt     Cancer Paternal Aunt     Cancer Paternal Aunt         brain    Cancer Paternal Uncle         brain tumor    Colorectal Cancer Paternal Grandmother     Cancer Paternal Grandmother     Sleep Apnea Neg Hx     Ovarian Cancer Neg Hx     Tubal Cancer Neg Hx     Peritoneal Cancer Neg Hx        OB History   No obstetric history on file.       Allergies as of 2025 - Reviewed 2025   Allergen Reaction Noted    Other environmental Runny Nose and Itching 2024         Current Outpatient Medications:     atorvastatin (LIPITOR) 10 MG Tab, Take 0.5 Tablets by mouth every evening., Disp: 90 Tablet, Rfl: 3    valACYclovir (VALTREX) 500 MG Tab, Take 1 Tablet by mouth 2 times a day as needed (take 500 mg twice daily for 3 days for herpes flares). 3 day course, Disp: 30 Tablet, Rfl: 1    Omega-3 Fatty Acids (FISH OIL PO), Take  by mouth., Disp: , Rfl:     citalopram (CELEXA) 40 MG Tab, Take 1 Tablet by mouth at bedtime., Disp: 90 Tablet, Rfl: 2    estradiol (ESTRACE) 0.1 MG/GM vaginal cream, 500 mg to 1 g one to three times per week, Disp: 42.5 g, Rfl: 6    metformin (GLUCOPHAGE) 1000 MG tablet, Take 1 Tablet by mouth 2 times a day  with meals., Disp: 180 Tablet, Rfl: 2    hydroxyurea (HYDREA) 500 MG Cap, Take 2 Capsules by mouth every day., Disp: 60 Capsule, Rfl: 11    docusate sodium (COLACE) 100 MG Cap, Take 1 Capsule by mouth 2 times a day as needed for Constipation (2-3 times daily PRN)., Disp: 30 Capsule, Rfl: 0    Glucosamine-Chondroitin (MOVE FREE PO), Take  by mouth every day., Disp: , Rfl:     melatonin 1 mg Tab, Take 5 mg by mouth at bedtime as needed., Disp: , Rfl:     Acetaminophen (TYLENOL ARTHRITIS PAIN PO), Take  by mouth as needed., Disp: , Rfl:     Cinnamon 500 MG Cap, Take  by mouth every day., Disp: , Rfl:     Multiple Vitamins-Minerals (CENTRUM SILVER 50+WOMEN) Tab, Take  by mouth., Disp: , Rfl:     Review of Systems:  Patient denies fever, chills, nausea, vomiting, chest pain, sob, blood in stools, black stool, blood in urine. All systems are reviewed See HPI.  She is feeling well and she is relatively asymptomatic.       Physical Exam:  /60 (BP Location: Left arm, Patient Position: Sitting, BP Cuff Size: Adult)   Pulse 88   Temp 36.7 °C (98.1 °F) (Temporal)   Resp 16   Ht 1.524 m (5')   Wt 80 kg (176 lb 5.9 oz)   SpO2 96%  Body mass index is 34.44 kg/m².    Constitutional:  NAD, well appearing.  HEENT:   CLARENCE EOMI  Cardiovascular: RRR.   No m/r/g. No carotid bruits.       Lungs:   Clear, no wheezing, no rales, no respiratory distress.  Abdomen: Soft, non-tender. + BS, no masses, no suprapubic tenderness, no hepatomegaly.  Extremities:  No pedal edema. Bilateral and radial pulses present.  Skin:  Warm and dry.    Neurologic: Alert & oriented x 3, CN II-XII grossly intact, strength and sensation grossly intact.  No focal deficits noted.  Psychiatric:  Affect normal, mood normal, judgment normal.    Labs:  No visits with results within 7 Day(s) from this visit.   Latest known visit with results is:   Admission on 06/16/2024, Discharged on 06/16/2024   Component Date Value Ref Range Status    WBC 06/16/2024 9.2   4.8 - 10.8 K/uL Final    RBC 06/16/2024 5.07  4.20 - 5.40 M/uL Final    Hemoglobin 06/16/2024 13.8  12.0 - 16.0 g/dL Final    Hematocrit 06/16/2024 42.0  37.0 - 47.0 % Final    MCV 06/16/2024 82.8  81.4 - 97.8 fL Final    MCH 06/16/2024 27.2  27.0 - 33.0 pg Final    MCHC 06/16/2024 32.9  32.2 - 35.5 g/dL Final    RDW 06/16/2024 45.2  35.9 - 50.0 fL Final    Platelet Count 06/16/2024 597 (H)  164 - 446 K/uL Final    MPV 06/16/2024 9.3  9.0 - 12.9 fL Final    Neutrophils-Polys 06/16/2024 65.00  44.00 - 72.00 % Final    Lymphocytes 06/16/2024 22.30  22.00 - 41.00 % Final    Monocytes 06/16/2024 7.60  0.00 - 13.40 % Final    Eosinophils 06/16/2024 3.40  0.00 - 6.90 % Final    Basophils 06/16/2024 1.00  0.00 - 1.80 % Final    Immature Granulocytes 06/16/2024 0.70  0.00 - 0.90 % Final    Nucleated RBC 06/16/2024 0.00  0.00 - 0.20 /100 WBC Final    Neutrophils (Absolute) 06/16/2024 6.00  1.82 - 7.42 K/uL Final    Includes immature neutrophils, if present.    Lymphs (Absolute) 06/16/2024 2.06  1.00 - 4.80 K/uL Final    Monos (Absolute) 06/16/2024 0.70  0.00 - 0.85 K/uL Final    Eos (Absolute) 06/16/2024 0.31  0.00 - 0.51 K/uL Final    Baso (Absolute) 06/16/2024 0.09  0.00 - 0.12 K/uL Final    Immature Granulocytes (abs) 06/16/2024 0.06  0.00 - 0.11 K/uL Final    NRBC (Absolute) 06/16/2024 0.00  K/uL Final    Sodium 06/16/2024 139  135 - 145 mmol/L Final    Potassium 06/16/2024 4.7  3.6 - 5.5 mmol/L Final    Chloride 06/16/2024 104  96 - 112 mmol/L Final    Co2 06/16/2024 23  20 - 33 mmol/L Final    Anion Gap 06/16/2024 12.0  7.0 - 16.0 Final    Glucose 06/16/2024 137 (H)  65 - 99 mg/dL Final    Bun 06/16/2024 17  8 - 22 mg/dL Final    Creatinine 06/16/2024 0.64  0.50 - 1.40 mg/dL Final    Calcium 06/16/2024 9.7  8.5 - 10.5 mg/dL Final    Correct Calcium 06/16/2024 9.6  8.5 - 10.5 mg/dL Final    AST(SGOT) 06/16/2024 20  12 - 45 U/L Final    ALT(SGPT) 06/16/2024 17  2 - 50 U/L Final    Alkaline Phosphatase 06/16/2024 118 (H)   30 - 99 U/L Final    Total Bilirubin 06/16/2024 0.4  0.1 - 1.5 mg/dL Final    Albumin 06/16/2024 4.1  3.2 - 4.9 g/dL Final    Total Protein 06/16/2024 7.1  6.0 - 8.2 g/dL Final    Globulin 06/16/2024 3.0  1.9 - 3.5 g/dL Final    A-G Ratio 06/16/2024 1.4  g/dL Final    PT 06/16/2024 14.3  12.0 - 14.6 sec Final    INR 06/16/2024 1.09  0.87 - 1.13 Final    Comment: INR - Non-therapeutic Reference Range: 0.87-1.13  INR - Therapeutic Reference Range: 2.0-4.0      APTT 06/16/2024 31.3  24.7 - 36.0 sec Final    Stat C-Reactive Protein 06/16/2024 <0.30  0.00 - 0.75 mg/dL Final    Sed Rate Westergren 06/16/2024 5  0 - 25 mm/hour Final    GFR (CKD-EPI) 06/16/2024 97  >60 mL/min/1.73 m 2 Final    Comment: Estimated Glomerular Filtration Rate is calculated using  race neutral CKD-EPI 2021 equation per NKF-ASN recommendations.         Assessment & Plan:    #1.  Essential thrombocytosis on hydroxyurea.  She is on baby aspirin 81 mg p.o. daily.  Since she has a JENNA 2 mutation, she is a high risk and she was placed on hydroxyurea 1000 mg p.o. daily since July 19, 2024.  I did a follow-up CBC every 18 2025 which showed a white blood cell 4.8, hemoglobin 12.9, and a platelet count of 174,000.  I told her that I will try to maintain her platelet count between 200-400,000.  I will reduce her hydroxyurea dose to 1000 mg Monday through Friday, nothing on Saturday and Sunday (10 tablets/week).  I will follow this patient in 4 months with a follow-up CBC for continuous care    #2.  She is on Singulair and she asked me to continue are not.  She told me that she does not have any allergic reaction or short of breath.  I recommend for the patient to discontinue Singulair.    #3.  Hypercholesterolemia.  She is on Lipitor 10 mg every other day which is a very small dose.  Her cholesterol profile is normal.  She wants to discontinue Lipitor and I agreed with her.      She understood and agreed with the current plan. I answered all the questions  and concerns she has at this time and advised to call me if any questions or concerns in regards to her care.  I also called the patient's son and explained to him in detail.    Thank you for allowing me to participate in his care, I will continue to follow.

## 2025-03-05 DIAGNOSIS — G47.33 OSA (OBSTRUCTIVE SLEEP APNEA): ICD-10-CM

## 2025-04-17 ENCOUNTER — TELEPHONE (OUTPATIENT)
Dept: HEALTH INFORMATION MANAGEMENT | Facility: OTHER | Age: 70
End: 2025-04-17
Payer: MEDICARE

## 2025-04-18 ENCOUNTER — TELEPHONE (OUTPATIENT)
Dept: SLEEP MEDICINE | Facility: MEDICAL CENTER | Age: 70
End: 2025-04-18
Payer: MEDICARE

## 2025-04-21 ENCOUNTER — OFFICE VISIT (OUTPATIENT)
Dept: SLEEP MEDICINE | Facility: MEDICAL CENTER | Age: 70
End: 2025-04-21
Attending: PHYSICIAN ASSISTANT
Payer: MEDICARE

## 2025-04-21 VITALS
WEIGHT: 180 LBS | OXYGEN SATURATION: 96 % | BODY MASS INDEX: 35.34 KG/M2 | HEART RATE: 74 BPM | RESPIRATION RATE: 14 BRPM | HEIGHT: 60 IN | SYSTOLIC BLOOD PRESSURE: 114 MMHG | DIASTOLIC BLOOD PRESSURE: 72 MMHG

## 2025-04-21 DIAGNOSIS — G47.33 OSA (OBSTRUCTIVE SLEEP APNEA): ICD-10-CM

## 2025-04-21 PROCEDURE — 99213 OFFICE O/P EST LOW 20 MIN: CPT | Performed by: PHYSICIAN ASSISTANT

## 2025-04-21 PROCEDURE — 3074F SYST BP LT 130 MM HG: CPT | Performed by: PHYSICIAN ASSISTANT

## 2025-04-21 PROCEDURE — 99214 OFFICE O/P EST MOD 30 MIN: CPT | Performed by: PHYSICIAN ASSISTANT

## 2025-04-21 PROCEDURE — 3078F DIAST BP <80 MM HG: CPT | Performed by: PHYSICIAN ASSISTANT

## 2025-04-21 ASSESSMENT — ENCOUNTER SYMPTOMS
HEARTBURN: 1
SPUTUM PRODUCTION: 0
DIZZINESS: 0
FEVER: 0
ORTHOPNEA: 0
INSOMNIA: 1
WHEEZING: 0
COUGH: 0
SINUS PAIN: 0
CHILLS: 0
WEIGHT LOSS: 0
TREMORS: 0
SORE THROAT: 0
PALPITATIONS: 0
SHORTNESS OF BREATH: 0
HEADACHES: 1

## 2025-04-21 ASSESSMENT — FIBROSIS 4 INDEX: FIB4 SCORE: 1.75

## 2025-04-21 NOTE — PATIENT INSTRUCTIONS
4-[id number] with clinic, last seen 2.5 years ago  2-annual follow up recommended  3-reviewed compliance demonstrating use and benefit  4-updated order to new vendor which is [employer]  5-contact info provided  6-mask leak noted but needs to replace headgear and mask  7-As a reminder use distilled water only in humidifier chamber.  Fresh fill water  8-Today we reviewed equipment cleaning  once weekly minimum  mask, tubing and water chamber  use dedicated container  use mild soap and water  SoClean or other ozone  are not recommended  white vinegar and water solution is no longer recommended  hang tubing to dry  mask sanitizing wipes are an option for use   9-Equipment replacement schedule : Mask cushion every month, Mask every 6 months, Head gear every 6 months, Tubing every 3 months, Ultra-fine filters 2 times per month, Humidifier chamber every 6 months

## 2025-04-21 NOTE — PROGRESS NOTES
Chief Complaint   Patient presents with    Apnea     Last Office Visit 10/07/2022 with JAY Bone    DME: DME order ([employer]) for mask (MOC) and supplies     PAP/O2/OAT: Auto CPAP cmH20 5-15 cm       HPI:  Marii Delgado is a 70 y.o. year old female here today for follow-up on obstructive sleep apnea.  Patient last seen in clinic 10/7/2022 by JULIET Villa.  Previously evaluated by Dr. Joel Art 6/30/2020.    Past Medical History: ISABELA, obesity, colon cancer, type 2 diabetes, major depressive disorder, memory loss, allergic rhinitis, hypertension, peripheral vascular disease.    Vitals:  /72 (BP Location: Left arm, Patient Position: Sitting, BP Cuff Size: Adult)   Pulse 74   Resp 14   Ht 1.524 m (5')   Wt 81.6 kg (180 lb)   SpO2 96% BMI of 35.15 kg/m².    Recent Imaging: None    Echocardiogram obtained 11/13/2019 demonstrated normal left ventricular chamber size, wall thickness, systolic and diastolic function.  LVEF estimated 55%.  Normal right ventricular size and function.  Trace mitral regurgitation, mild tricuspid regurgitation with estimated RVSP of 30 mmHg.    Currently using  [employer] auto CPAP @5-15 cm H20 pressure; compliance reviewed for 3/23/2025 through 4/21/2025, days used 26/30 or 87%, average daily usage 7 hours 59 minutes, 87% of days greater than or equal to 4 hours, mask leak at 32.7 LPM at 95th percentile, AHI 4.1 per hour.  Mask leak noted, see media for full report.    Device obtained July 2020  DME provider switch to [employer] at patient request  Mask interface F20 small fullface mask    Overnight home sleep test obtained 6/12/2020 demonstrated findings consistent with severe obstructive sleep apnea with FRANCOIS of 87.9 events per hour increasing to 109.7 with supine sleep.  Low O2 sat of 79% with sats less than 89% for 155 minutes of flow evaluated time.  Recommendation was made for in-lab titration due to severity of sleep apnea and persistent nocturnal  hypoxia.  Patient proceeded with auto CPAP trial.    Sleep schedule goes to bed 10-11 p.m., wakens 9 AM , and gets up during the night bathroom x 1   Symptoms denies day time somnolence and denies morning headache    [place] Sleepiness Scale 21/24 on 6/1/2020      Review of Systems   Constitutional:  Positive for malaise/fatigue (moderate). Negative for chills, fever and weight loss.   HENT:  Positive for congestion (stuffy in am). Negative for hearing loss, nosebleeds, sinus pain, sore throat and tinnitus.    Respiratory:  Negative for cough, sputum production, shortness of breath and wheezing.    Cardiovascular:  Negative for chest pain, palpitations, orthopnea and leg swelling.   Gastrointestinal:  Positive for heartburn (occasional takes agata seltzer).        No dentures, missing two teeth, some swallowing issues    Neurological:  Positive for headaches (sometimes mild). Negative for dizziness and tremors.   Psychiatric/Behavioral:  The patient has insomnia (sometimes).        Past Medical History:   Diagnosis Date    Arthritis     left knee    Back pain     Bowel habit changes     constipation/ occasional diarrhea    Breath shortness     with exertion     Cancer (HCC) 2016    colon (chemo)    Cancer of sigmoid colon (HCC) 12/07/2016    Cataract     no sx    Colon cancer (HCC)     Dental disorder     missing teeth    Depression     Diabetes (HCC)     oral meds    H/O seasonal allergies     Heart burn     occasional    Hemorrhagic disorder (HCC)     post tooth extraction    High cholesterol     Hypertension     3/19/2024 pt reports she does not have hypertension    Kidney stone     Mixed hyperlipidemia 10/29/2019    Obesity 10/29/2019    Pain 12/01/2016    left wrist and knee, 3-4/10    Peripheral vascular disease (HCC)     Pre-diabetes 10/29/2019    Psychiatric problem     anxiety    Renal disorder     hx of kidney stones     Sleep apnea     CPAP    Snoring     first step of sleep study completed, pt awaiting  overnight sleep study     Urinary incontinence        Past Surgical History:   Procedure Laterality Date    ARTHROPLASTY, KNEE, ROBOT-ASSISTED Left 04/15/2024    Procedure: ROBOTIC LEFT TOTAL KNEE ARTHROPLASTY;  Surgeon: Samy Abrams M.D.;  Location: SURGERY [facility];  Service: Ortho Robotic    ANGIOGRAM      pt reports no cardiac issues    OTHER  2019    port removal     LOW ANTERIOR RESECTION LAPAROSCOPIC  2016    Procedure: LOW ANTERIOR RESECTION LAPAROSCOPIC;  Surgeon: Enoch Shaw M.D.;  Location: SURGERY [facility];  Service:     ORIF, WRIST Left 2016    HYSTERECTOMY, VAGINAL      hysterectomy    VEIN STRIPPING      varicose vein stripping    PRIMARY C SECTION      repeat        Family History   Problem Relation Age of Onset    Breast Cancer Paternal Aunt     Cancer Paternal Aunt     Cancer Paternal Aunt         brain    Cancer Paternal Uncle         brain tumor    Colorectal Cancer Paternal Grandmother     Cancer Paternal Grandmother     Sleep Apnea Neg Hx     Ovarian Cancer Neg Hx     Tubal Cancer Neg Hx     Peritoneal Cancer Neg Hx        Social History     Socioeconomic History    Marital status:      Spouse name: Not on file    Number of children: Not on file    Years of education: Not on file    Highest education level: Bachelor's degree (e.g., BA, AB, BS)   Occupational History    Not on file   Tobacco Use    Smoking status: Never     Passive exposure: Never    Smokeless tobacco: Never   Vaping Use    Vaping status: Never Used   Substance and Sexual Activity    Alcohol use: No     Alcohol/week: 0.0 oz    Drug use: No    Sexual activity: Not on file   Other Topics Concern    Not on file   Social History Narrative    Not on file     Social Drivers of Health     Financial Resource Strain: High Risk (3/17/2024)    Overall Financial Resource Strain (CARDIA)     Difficulty of Paying Living Expenses: Hard   Food Insecurity: No Food Insecurity (3/17/2024)     Hunger Vital Sign     Worried About Running Out of Food in the Last Year: Never true     Ran Out of Food in the Last Year: Never true   Transportation Needs: No Transportation Needs (3/17/2024)    PRAPARE - Transportation     Lack of Transportation (Medical): No     Lack of Transportation (Non-Medical): No   Physical Activity: Patient Declined (3/17/2024)    Exercise Vital Sign     Days of Exercise per Week: Patient declined     Minutes of Exercise per Session: Patient declined   Stress: Stress Concern Present (3/17/2024)    [place] [employer] - Occupational Stress Questionnaire     Feeling of Stress : To some extent   Social Connections: Socially Integrated (3/17/2024)    Social Connection and Isolation Panel [NHANES]     Frequency of Communication with Friends and Family: Three times a week     Frequency of Social Gatherings with Friends and Family: Three times a week     Attends Yazidism Services: More than 4 times per year     Active Member of Clubs or Organizations: Yes     Attends Club or Organization Meetings: More than 4 times per year     Marital Status:    Intimate Partner Violence: Not on file   Housing Stability: Low Risk  (3/17/2024)    Housing Stability Vital Sign     Unable to Pay for Housing in the Last Year: No     Number of Places Lived in the Last Year: 1     Unstable Housing in the Last Year: No       Allergies as of 04/21/2025 - Reviewed 04/21/2025   Allergen Reaction Noted    Other environmental Runny Nose and Itching 03/19/2024          Current medications as of today   Current Outpatient Medications   Medication Sig Dispense Refill    atorvastatin (LIPITOR) 10 MG Tab Take 0.5 Tablets by mouth every evening. 90 Tablet 3    valACYclovir (VALTREX) 500 MG Tab Take 1 Tablet by mouth 2 times a day as needed (take 500 mg twice daily for 3 days for herpes flares). 3 day course 30 Tablet 1    Omega-3 Fatty Acids (FISH OIL PO) Take  by mouth.      citalopram (CELEXA) 40 MG  Tab Take 1 Tablet by mouth at bedtime. 90 Tablet 2    estradiol (ESTRACE) 0.1 MG/GM vaginal cream 500 mg to 1 g one to three times per week 42.5 g 6    metformin (GLUCOPHAGE) 1000 MG tablet Take 1 Tablet by mouth 2 times a day with meals. 180 Tablet 2    hydroxyurea (HYDREA) 500 MG Cap Take 2 Capsules by mouth every day. 60 Capsule 11    docusate sodium (COLACE) 100 MG Cap Take 1 Capsule by mouth 2 times a day as needed for Constipation (2-3 times daily PRN). 30 Capsule 0    Glucosamine-Chondroitin (MOVE FREE PO) Take  by mouth every day.      melatonin 1 mg Tab Take 5 mg by mouth at bedtime as needed.      Acetaminophen (TYLENOL ARTHRITIS PAIN PO) Take  by mouth as needed.      Cinnamon 500 MG Cap Take  by mouth every day.      Multiple Vitamins-Minerals (CENTRUM SILVER 50+WOMEN) Tab Take  by mouth.       No current facility-administered medications for this visit.         Physical Exam:   Gen:           Alert and oriented, No apparent distress. Mood and affect appropriate, normal interaction with examiner.   Hearing:     Grossly intact.  Nose:          Normal, no lesions or deformities.  Dentition:    Fair dentition.   Oropharynx:   Tongue normal, posterior pharynx without erythema or exudate.  Mallampati Classification: II  Neck:        Supple, trachea midline, no masses.  Respiratory Effort: No intercostal retractions or use of accessory muscles.   Gait and Station: Normal.  Digits and Nails: No clubbing, cyanosis, petechiae, or nodes.   Skin:        No rashes, lesions or ulcers noted.               Ext:           No cyanosis or edema.      Immunizations:  Flu: 10/27/2024  PCV 20: 10/7/2022  Moderna SARS CoV2 Vaccine: 5/19/2022, 11/2/2021, 3/25/2021, 2/27/2021  COVID-19 mRNA vaccine: 10/27/2024    Assessment / Plan:  1. ISABELA (obstructive sleep apnea)  - DME Mask and Supplies    Patient reestablishing with clinic, last seen 2.5 years ago.  Annual follow-up is recommended for management of her sleep apnea on PAP  therapy.  Reviewed compliance demonstrating use and benefit.  Requesting updated order for mask and supplies to new vendor which is [employer].  Contact info provided.  Mask leak noted but patient needs to replace headgear and mask.  Reminded to use distilled water only in humidifier chamber, reviewed equipment cleaning and equipment replacement schedule.  Short-term follow-up to reassess for resolution of severe mask leak and tolerance of therapy.      Follow-up:   No follow-ups on file.    Please note that this dictation was created using voice recognition software. I have made every reasonable attempt to correct obvious errors, but it is possible there are errors of grammar and possibly content that I did not discover before finalizing the note.

## 2025-04-23 ENCOUNTER — DOCUMENTATION (OUTPATIENT)
Dept: MEDICAL GROUP | Facility: MEDICAL CENTER | Age: 70
End: 2025-04-23
Payer: MEDICARE

## 2025-04-23 DIAGNOSIS — E11.69 TYPE 2 DIABETES MELLITUS WITH OTHER SPECIFIED COMPLICATION, WITHOUT LONG-TERM CURRENT USE OF INSULIN (HCC): ICD-10-CM

## 2025-04-23 NOTE — PROGRESS NOTES
[facility]    Patient with unmet Star goals:  A1c is due in the calendar year - Completed 2/6/25  eGFR and uACR due - GFR completed 2/19/25, UACR due    [employer] message sent.    1st attempt    Meredith DiazD

## 2025-05-05 ENCOUNTER — HOSPITAL ENCOUNTER (OUTPATIENT)
Dept: LAB | Facility: MEDICAL CENTER | Age: 70
End: 2025-05-05
Attending: STUDENT IN AN ORGANIZED HEALTH CARE EDUCATION/TRAINING PROGRAM
Payer: MEDICARE

## 2025-05-05 DIAGNOSIS — E11.69 TYPE 2 DIABETES MELLITUS WITH OTHER SPECIFIED COMPLICATION, WITHOUT LONG-TERM CURRENT USE OF INSULIN (HCC): ICD-10-CM

## 2025-05-05 LAB
CREAT UR-MCNC: 137 MG/DL
MICROALBUMIN UR-MCNC: <1.2 MG/DL
MICROALBUMIN/CREAT UR: NORMAL MG/G (ref 0–30)

## 2025-05-05 PROCEDURE — 82570 ASSAY OF URINE CREATININE: CPT

## 2025-05-05 PROCEDURE — 82043 UR ALBUMIN QUANTITATIVE: CPT

## 2025-05-08 ENCOUNTER — OFFICE VISIT (OUTPATIENT)
Dept: MEDICAL GROUP | Facility: MEDICAL CENTER | Age: 70
End: 2025-05-08
Payer: MEDICARE

## 2025-05-08 ENCOUNTER — RESULTS FOLLOW-UP (OUTPATIENT)
Dept: MEDICAL GROUP | Facility: MEDICAL CENTER | Age: 70
End: 2025-05-08

## 2025-05-08 VITALS
DIASTOLIC BLOOD PRESSURE: 56 MMHG | WEIGHT: 180.12 LBS | RESPIRATION RATE: 18 BRPM | HEART RATE: 73 BPM | OXYGEN SATURATION: 97 % | TEMPERATURE: 97.1 F | HEIGHT: 61 IN | SYSTOLIC BLOOD PRESSURE: 112 MMHG | BODY MASS INDEX: 34.01 KG/M2

## 2025-05-08 DIAGNOSIS — E11.69 TYPE 2 DIABETES MELLITUS WITH OTHER SPECIFIED COMPLICATION, WITHOUT LONG-TERM CURRENT USE OF INSULIN (HCC): ICD-10-CM

## 2025-05-08 DIAGNOSIS — E66.9 OBESITY (BMI 30-39.9): ICD-10-CM

## 2025-05-08 DIAGNOSIS — Z12.31 ENCOUNTER FOR SCREENING MAMMOGRAM FOR BREAST CANCER: ICD-10-CM

## 2025-05-08 DIAGNOSIS — E78.2 MIXED HYPERLIPIDEMIA: ICD-10-CM

## 2025-05-08 DIAGNOSIS — D47.3 ESSENTIAL THROMBOCYTHEMIA (HCC): ICD-10-CM

## 2025-05-08 LAB
HBA1C MFR BLD: 7.2 % (ref ?–5.8)
POCT INT CON NEG: NEGATIVE
POCT INT CON POS: POSITIVE

## 2025-05-08 PROCEDURE — 83036 HEMOGLOBIN GLYCOSYLATED A1C: CPT | Performed by: STUDENT IN AN ORGANIZED HEALTH CARE EDUCATION/TRAINING PROGRAM

## 2025-05-08 PROCEDURE — 99214 OFFICE O/P EST MOD 30 MIN: CPT | Performed by: STUDENT IN AN ORGANIZED HEALTH CARE EDUCATION/TRAINING PROGRAM

## 2025-05-08 PROCEDURE — 3078F DIAST BP <80 MM HG: CPT | Performed by: STUDENT IN AN ORGANIZED HEALTH CARE EDUCATION/TRAINING PROGRAM

## 2025-05-08 PROCEDURE — 3074F SYST BP LT 130 MM HG: CPT | Performed by: STUDENT IN AN ORGANIZED HEALTH CARE EDUCATION/TRAINING PROGRAM

## 2025-05-08 ASSESSMENT — ENCOUNTER SYMPTOMS
HEADACHES: 0
NAUSEA: 0
VOMITING: 0
PALPITATIONS: 0
DIZZINESS: 0
CHILLS: 0
SHORTNESS OF BREATH: 0
WHEEZING: 0
FEVER: 0
WEIGHT LOSS: 0

## 2025-05-08 ASSESSMENT — FIBROSIS 4 INDEX: FIB4 SCORE: 1.75

## 2025-05-08 NOTE — PROGRESS NOTES
"Subjective:     CC:     HPI:   Marii Baker presents today with    PMH of HLD, NIDDM 2, atherosclerosis, ISABELA on cpap, thrombocytosis on hydroxyurea/ asa, history of colon cancer s/p partial colectomy 2016 ( last colonoscopy report 2024), AnxietyMDD, allergy, s/p hysterectomy oophrectomy 2006 ( at risk for osteoporosis/ vag prolapse)  Specialist  Hematologist - thrombocytosis   Gastroenterology - [facility] - last colonoscopy 2024- requested      Since last visit follows w hematology, hydroxyurea dose reduced to 1g M-F, recommend singulair to be discontinued     Report she stopped atorvastatin          Problem   Obesity (Bmi 30-39.9)       Health Maintenance:     ROS:  Review of Systems   Constitutional:  Negative for chills, fever and weight loss.   HENT:  Negative for hearing loss.    Respiratory:  Negative for shortness of breath and wheezing.    Cardiovascular:  Negative for chest pain and palpitations.   Gastrointestinal:  Negative for nausea and vomiting.   Genitourinary:  Negative for frequency and urgency.   Skin:  Negative for rash.   Neurological:  Negative for dizziness and headaches.       Objective:     Exam:  /56 (BP Location: Left arm, Patient Position: Sitting, BP Cuff Size: Adult)   Pulse 73   Temp 36.2 °C (97.1 °F) (Temporal)   Resp 18   Ht 1.549 m (5' 1\") Comment: pt reported  Wt 81.7 kg (180 lb 1.9 oz)   SpO2 97%   BMI 34.03 kg/m²  Body mass index is 34.03 kg/m².    Physical Exam  Constitutional:       Appearance: Normal appearance.   Cardiovascular:      Rate and Rhythm: Normal rate and regular rhythm.      Heart sounds: No murmur heard.  Pulmonary:      Effort: Pulmonary effort is normal.      Breath sounds: Normal breath sounds. No wheezing.   Musculoskeletal:      Cervical back: Normal range of motion and neck supple.   Lymphadenopathy:      Cervical: No cervical adenopathy.   Neurological:      Mental Status: She is alert.         Labs:     Assessment & Plan:     70 y.o. female with the " following -     1. Type 2 diabetes mellitus with other specified complication, without long-term current use of insulin (HCC)  Chronic stable  On metformin 1g bid taking without issue  We discussed GLP1Ra in setting of elevated bmi, arthralgia and she would like to think about it   - Diabetic Monofilament Lower Extremity Exam    2. Encounter for screening mammogram for breast cancer    - MA-SCREENING MAMMO BILAT W/TOMOSYNTHESIS W/CAD; Future    3. Obesity (BMI 30-39.9)    - Patient identified as having weight management issue.  Appropriate orders and counseling given.    4. Mixed hyperlipidemia  Report was recommended to stop atorvastatin 10mg daily. She has since stopped atorvastatin   Would like to recheck lipid next visit     5. Essential thrombocythemia (HCC)  Follows with hematology note reviewed  On hydroxyurea    Assessment & Plan            Return in about 3 months (around 8/8/2025) for Lab review, Med check.    Please note that this dictation was created using voice recognition software. I have made every reasonable attempt to correct obvious errors, but I expect that there are errors of grammar and possibly content that I did not discover before finalizing the note.

## 2025-06-03 ENCOUNTER — HOSPITAL ENCOUNTER (OUTPATIENT)
Dept: LAB | Facility: MEDICAL CENTER | Age: 70
End: 2025-06-03
Attending: INTERNAL MEDICINE
Payer: MEDICARE

## 2025-06-03 DIAGNOSIS — D47.3 ESSENTIAL THROMBOCYTHEMIA (HCC): ICD-10-CM

## 2025-06-03 LAB
ANISOCYTOSIS BLD QL SMEAR: ABNORMAL
BASOPHILS # BLD AUTO: 1.7 % (ref 0–1.8)
BASOPHILS # BLD: 0.07 K/UL (ref 0–0.12)
EOSINOPHIL # BLD AUTO: 0.11 K/UL (ref 0–0.51)
EOSINOPHIL NFR BLD: 2.6 % (ref 0–6.9)
ERYTHROCYTE [DISTWIDTH] IN BLOOD BY AUTOMATED COUNT: 48.5 FL (ref 35.9–50)
HCT VFR BLD AUTO: 37.3 % (ref 37–47)
HGB BLD-MCNC: 12.7 G/DL (ref 12–16)
LYMPHOCYTES # BLD AUTO: 1.58 K/UL (ref 1–4.8)
LYMPHOCYTES NFR BLD: 35.9 % (ref 22–41)
MANUAL DIFF BLD: NORMAL
MCH RBC QN AUTO: 33 PG (ref 27–33)
MCHC RBC AUTO-ENTMCNC: 34 G/DL (ref 32.2–35.5)
MCV RBC AUTO: 96.9 FL (ref 81.4–97.8)
MICROCYTES BLD QL SMEAR: ABNORMAL
MONOCYTES # BLD AUTO: 0.3 K/UL (ref 0–0.85)
MONOCYTES NFR BLD AUTO: 6.8 % (ref 0–13.4)
MORPHOLOGY BLD-IMP: NORMAL
NEUTROPHILS # BLD AUTO: 2.33 K/UL (ref 1.82–7.42)
NEUTROPHILS NFR BLD: 53 % (ref 44–72)
NRBC # BLD AUTO: 0 K/UL
NRBC BLD-RTO: 0 /100 WBC (ref 0–0.2)
OVALOCYTES BLD QL SMEAR: NORMAL
PLATELET # BLD AUTO: 202 K/UL (ref 164–446)
PLATELET BLD QL SMEAR: NORMAL
PMV BLD AUTO: 9.4 FL (ref 9–12.9)
POIKILOCYTOSIS BLD QL SMEAR: NORMAL
RBC # BLD AUTO: 3.85 M/UL (ref 4.2–5.4)
RBC BLD AUTO: PRESENT
WBC # BLD AUTO: 4.4 K/UL (ref 4.8–10.8)

## 2025-06-03 PROCEDURE — 36415 COLL VENOUS BLD VENIPUNCTURE: CPT

## 2025-06-03 PROCEDURE — 85027 COMPLETE CBC AUTOMATED: CPT

## 2025-06-03 PROCEDURE — 85007 BL SMEAR W/DIFF WBC COUNT: CPT

## 2025-06-19 ENCOUNTER — HOSPITAL ENCOUNTER (OUTPATIENT)
Dept: HEMATOLOGY ONCOLOGY | Facility: MEDICAL CENTER | Age: 70
End: 2025-06-19
Attending: NURSE PRACTITIONER
Payer: MEDICARE

## 2025-06-19 VITALS
BODY MASS INDEX: 33.4 KG/M2 | RESPIRATION RATE: 18 BRPM | DIASTOLIC BLOOD PRESSURE: 70 MMHG | TEMPERATURE: 98 F | HEART RATE: 66 BPM | OXYGEN SATURATION: 97 % | WEIGHT: 176.92 LBS | HEIGHT: 61 IN | SYSTOLIC BLOOD PRESSURE: 110 MMHG

## 2025-06-19 DIAGNOSIS — Z79.899 ENCOUNTER FOR LONG-TERM (CURRENT) USE OF HIGH-RISK MEDICATION: ICD-10-CM

## 2025-06-19 DIAGNOSIS — D47.3 ESSENTIAL THROMBOCYTHEMIA (HCC): Primary | ICD-10-CM

## 2025-06-19 PROCEDURE — 99214 OFFICE O/P EST MOD 30 MIN: CPT | Performed by: NURSE PRACTITIONER

## 2025-06-19 PROCEDURE — 99212 OFFICE O/P EST SF 10 MIN: CPT | Performed by: NURSE PRACTITIONER

## 2025-06-19 RX ORDER — HYDROXYUREA 500 MG/1
1000 CAPSULE ORAL DAILY
Qty: 180 CAPSULE | Refills: 0 | Status: SHIPPED | OUTPATIENT
Start: 2025-06-19

## 2025-06-19 ASSESSMENT — PAIN SCALES - GENERAL: PAINLEVEL_OUTOF10: NO PAIN

## 2025-06-19 ASSESSMENT — FIBROSIS 4 INDEX: FIB4 SCORE: 1.51

## 2025-06-19 NOTE — PROGRESS NOTES
Subjective     Marii Delgado is a 70 y.o. female who presents with Follow-Up (Christina / Essential (hemorrhagic) thrombocythemia/ 4 mo fv)          HPI    Patient seen today in follow-up for essential thrombocytosis.  She presents accompanied by her son for today's visit.    Chief Complaint: Patient seen today for evaluation of her essential thrombocytosis, for continued monitoring of symptoms and side effects of cancer treatments, employing Hydrea 1000 mg Monday-Friday, off on Saturday and Sunday.    Colon Cancer History:  Patient was initially diagnosed in May 2016 with sigmoid colon cancer secondary to a positive occult testing with changes in bowel habits.  She underwent a colonoscopy which showed a polyp in the sigmoid colon which was positive for poorly differentiated adenocarcinoma with indeterminate margins.  She did proceed with laparoscopic sigmoid resection with low anterior pelvic anastomosis on December 2016.  Pathology did show evidence of local malignancy but was found to have 1/13 nodes positive.  According to Dr. Gomes pathology showed MLH1, MSH2, MSH6 and PMS2 intact.  She did undergo staging work-up and was found to be negative for metastatic disease.  On diagnosis of her sigmoid cancer patient CEA was low at 1, highest it has been was 2.4.  Patient was started on adjuvant chemotherapy with modified FOLFOX on February 2, 2017.  Patient did have oxaliplatin held intermittently throughout the treatment due to severe cold sensitivity and peripheral neuropathy.  She did finish 12 cycles of modified FOLFOX on July 20, 2017, with the last few cycles with single agent 5-FU only.     Since completion of treatment patient has been on surveillance and observation.  She completed her surveillance in August 2022.  She is to continue screening with colonoscopy per GI.    Essential Thrombocytosis History:  Patient referred back in June 2024 with a history of thrombocytosis.  Apparently patient has had  "elevated platelet count since February 2021 in which she had a platelet count of 492k.  Her platelets had increased to 615k in March 2024.  She was found to have a positive JENNA 2 V617F mutation by PCR on 6/16/2024.  Patient was initiated on hydroxyurea as well as aspirin on 7/18/2024.    Treatment History:   12/16: Laparoscopic sigmoid resection with low anterior pelvic anastomosis  02/02/17 - 07/20/17: FOLFOX x 12 (oxaliplatin dropped for last few cycles)    07/18/24: Initiation of hydroxyurea and aspirin     Interval History:  Patient overall is doing well and tolerating the treatment well.  She denies any significant symptoms or side effects associated with Hydrea.  She has been taking the 1000 mg Monday-Friday, stating she takes it in the morning.  However patient did admit that she sometimes misses the dose in the morning.  This was recently changed as her platelet count was less than 200k 3 months ago.  Plan is for patient to maintain her platelet count between 200-400k.  Her most recent platelet count at 202k.  She did confirm that she is on aspirin and notes that she does have easy bruising at times.  She denies any bleeding in her stool or urine.    Allergies[1]  Medications Ordered Prior to Encounter[2]      Review of Systems   Constitutional:  Negative for chills, fever, malaise/fatigue and weight loss.   Respiratory:  Negative for cough and shortness of breath.    Cardiovascular:  Negative for chest pain and palpitations.   Gastrointestinal:  Negative for constipation, diarrhea, nausea and vomiting.   Genitourinary:  Negative for dysuria.   Musculoskeletal:  Negative for myalgias.   Endo/Heme/Allergies:  Bruises/bleeds easily.              Objective     /70   Pulse 66   Temp 36.7 °C (98 °F) (Temporal)   Resp 18   Ht 1.549 m (5' 0.98\")   Wt 80.3 kg (176 lb 14.7 oz)   SpO2 97%   BMI 33.45 kg/m²      Physical Exam  Vitals reviewed.   Constitutional:       General: She is not in acute " distress.     Appearance: Normal appearance. She is not diaphoretic.   HENT:      Head: Normocephalic and atraumatic.   Cardiovascular:      Rate and Rhythm: Normal rate and regular rhythm.      Heart sounds: Normal heart sounds. No murmur heard.     No friction rub. No gallop.   Pulmonary:      Effort: No respiratory distress.      Breath sounds: Normal breath sounds. No wheezing.   Abdominal:      General: Bowel sounds are normal. There is no distension.      Palpations: Abdomen is soft.      Tenderness: There is no abdominal tenderness.   Musculoskeletal:         General: No swelling or tenderness. Normal range of motion.   Skin:     General: Skin is warm and dry.   Neurological:      Mental Status: She is alert and oriented to person, place, and time.   Psychiatric:         Mood and Affect: Mood normal.         Behavior: Behavior normal.                  Latest Reference Range & Units 11/11/24 11:11 02/18/25 09:41 06/03/25 10:37   WBC 4.8 - 10.8 K/uL 4.5 (L) 4.8 4.4 (L)   RBC 4.20 - 5.40 M/uL 3.73 (L) 3.90 (L) 3.85 (L)   Hemoglobin 12.0 - 16.0 g/dL 12.4 12.9 12.7   Hematocrit 37.0 - 47.0 % 36.1 (L) 38.3 37.3   MCV 81.4 - 97.8 fL 96.8 98.2 (H) 96.9   MCH 27.0 - 33.0 pg 33.2 (H) 33.1 (H) 33.0   MCHC 32.2 - 35.5 g/dL 34.3 33.7 34.0   RDW 35.9 - 50.0 fL 54.6 (H) 49.8 48.5   Platelet Count 164 - 446 K/uL 238 174 202   MPV 9.0 - 12.9 fL 8.9 (L) 9.5 9.4   Neutrophils-Polys 44.00 - 72.00 % 48.20 49.20 53.00   Neutrophils (Absolute) 1.82 - 7.42 K/uL 2.14 2.38 2.33   Lymphocytes 22.00 - 41.00 % 40.40 37.80 35.90   Lymphs (Absolute) 1.00 - 4.80 K/uL 1.80 1.83 1.58   Monocytes 0.00 - 13.40 % 7.20 8.10 6.80   Monos (Absolute) 0.00 - 0.85 K/uL 0.32 0.39 0.30   Eosinophils 0.00 - 6.90 % 3.10 3.50 2.60   Eos (Absolute) 0.00 - 0.51 K/uL 0.14 0.17 0.11   Basophils 0.00 - 1.80 % 0.90 1.00 1.70   Baso (Absolute) 0.00 - 0.12 K/uL 0.04 0.05 0.07   Immature Granulocytes 0.00 - 0.90 % 0.20 0.40    Immature Granulocytes (abs) 0.00 -  0.11 K/uL 0.01 0.02    Nucleated RBC 0.00 - 0.20 /100 WBC 0.00 0.00 0.00   NRBC (Absolute) K/uL 0.00 0.00 0.00   Plt Estimation    Normal            Assessment & Plan     1. Essential thrombocythemia (HCC)  hydroxyurea (HYDREA) 500 MG Cap    CBC WITH DIFFERENTIAL      2. Encounter for long-term (current) use of high-risk medication                Patient with essential thrombocytosis currently on Hydrea 1000 mg p.o. daily Monday-Friday, off on Saturday and Sunday.  She is also on aspirin daily as well.  She is tolerating Hydrea well.  Her most recent platelet count is at 202k.  She mentioned that she sometimes misses doses because she is taking it in the morning.  Discussed with her the importance of taking medication as prescribed as we need to adjust her dose based on her lab results.  She will start to take the medication in the evening as it seems to be easier for her.  At this time we will not make any changes as I am not certain if we have adequate results.  Her last platelet count was above 200k and therefore we will continue with the dose with anticipation of possibly slightly altering the Hydrea dose.  I will have her follow-up in the clinic in 3 months with repeat CBC at that time.    Patient did verbalize understanding's and agreed with the plan.      Please note that this dictation was created using voice recognition software. I have made every reasonable attempt to correct obvious errors, but I expect that there are errors of grammar and possibly content that I did not discover before finalizing the note.               [1]   Allergies  Allergen Reactions    Other Environmental Runny Nose and Itching     Dust mites, pollen....   [2]   Current Outpatient Medications on File Prior to Encounter   Medication Sig Dispense Refill    Turmeric (QC TUMERIC COMPLEX PO) Take  by mouth.      Ashwagandha (ASHWAGANDHA 35) 120 MG Cap Ashwagandha      aspirin 81 MG EC tablet 1 (one) time each day at the same time.       cetirizine (ZYRTEC) 10 MG Tab Oral      meloxicam (MOBIC) 7.5 MG Tab Take 1 Tablet by mouth every day. 30 Tablet 3    valACYclovir (VALTREX) 500 MG Tab Take 1 Tablet by mouth 2 times a day as needed (take 500 mg twice daily for 3 days for herpes flares). 3 day course 30 Tablet 1    Omega-3 Fatty Acids (FISH OIL PO) Take  by mouth.      citalopram (CELEXA) 40 MG Tab Take 1 Tablet by mouth at bedtime. 90 Tablet 2    estradiol (ESTRACE) 0.1 MG/GM vaginal cream 500 mg to 1 g one to three times per week 42.5 g 6    metformin (GLUCOPHAGE) 1000 MG tablet Take 1 Tablet by mouth 2 times a day with meals. 180 Tablet 2    docusate sodium (COLACE) 100 MG Cap Take 1 Capsule by mouth 2 times a day as needed for Constipation (2-3 times daily PRN). 30 Capsule 0    Glucosamine-Chondroitin (MOVE FREE PO) Take  by mouth every day.      melatonin 1 mg Tab Take 5 mg by mouth at bedtime as needed.      Acetaminophen (TYLENOL ARTHRITIS PAIN PO) Take  by mouth as needed.      Cinnamon 500 MG Cap Take  by mouth every day.      Multiple Vitamins-Minerals (CENTRUM SILVER 50+WOMEN) Tab Take  by mouth.      atorvastatin (LIPITOR) 40 MG Tab Oral (Patient not taking: Reported on 6/19/2025)      montelukast (SINGULAIR) 10 MG Tab take 1 tablet (10 mg) by oral route once daily in the evening Oral 1 (Patient not taking: Reported on 6/19/2025)       No current facility-administered medications on file prior to encounter.

## 2025-06-23 ASSESSMENT — ENCOUNTER SYMPTOMS
WEIGHT LOSS: 0
PALPITATIONS: 0
VOMITING: 0
CONSTIPATION: 0
COUGH: 0
FEVER: 0
DIARRHEA: 0
CHILLS: 0
NAUSEA: 0
MYALGIAS: 0
BRUISES/BLEEDS EASILY: 1
SHORTNESS OF BREATH: 0

## 2025-07-21 ENCOUNTER — APPOINTMENT (OUTPATIENT)
Dept: SLEEP MEDICINE | Facility: MEDICAL CENTER | Age: 70
End: 2025-07-21
Attending: PHYSICIAN ASSISTANT
Payer: MEDICARE

## 2025-07-24 ENCOUNTER — TELEPHONE (OUTPATIENT)
Dept: SLEEP MEDICINE | Facility: MEDICAL CENTER | Age: 70
End: 2025-07-24
Payer: MEDICARE

## 2025-07-25 ENCOUNTER — OFFICE VISIT (OUTPATIENT)
Dept: SLEEP MEDICINE | Facility: MEDICAL CENTER | Age: 70
End: 2025-07-25
Attending: PHYSICIAN ASSISTANT
Payer: MEDICARE

## 2025-07-25 VITALS
SYSTOLIC BLOOD PRESSURE: 110 MMHG | DIASTOLIC BLOOD PRESSURE: 62 MMHG | BODY MASS INDEX: 32.66 KG/M2 | HEIGHT: 61 IN | WEIGHT: 173 LBS | OXYGEN SATURATION: 98 % | RESPIRATION RATE: 16 BRPM | HEART RATE: 78 BPM

## 2025-07-25 DIAGNOSIS — G47.33 OSA (OBSTRUCTIVE SLEEP APNEA): Primary | ICD-10-CM

## 2025-07-25 PROCEDURE — 99213 OFFICE O/P EST LOW 20 MIN: CPT | Performed by: PHYSICIAN ASSISTANT

## 2025-07-25 PROCEDURE — 3074F SYST BP LT 130 MM HG: CPT | Performed by: PHYSICIAN ASSISTANT

## 2025-07-25 PROCEDURE — 3078F DIAST BP <80 MM HG: CPT | Performed by: PHYSICIAN ASSISTANT

## 2025-07-25 ASSESSMENT — ENCOUNTER SYMPTOMS
FEVER: 0
INSOMNIA: 0
SORE THROAT: 0
WEIGHT LOSS: 0
ORTHOPNEA: 0
CHILLS: 0
HEADACHES: 0
WHEEZING: 0
DIZZINESS: 1
SPUTUM PRODUCTION: 0
TREMORS: 0
HEARTBURN: 1
SHORTNESS OF BREATH: 0
PALPITATIONS: 0
COUGH: 1
SINUS PAIN: 0

## 2025-07-25 ASSESSMENT — FIBROSIS 4 INDEX: FIB4 SCORE: 1.51

## 2025-07-25 NOTE — PROGRESS NOTES
"Chief Complaint   Patient presents with    Apnea     Last Office Visit 4/21/25 with Marielos De P.A.-C.    PAP/O2/OAT: Auto CPAP cmH20 5-15 cm       HPI:  Marii Delgado is a 70 y.o. year old female here today for follow-up on obstructive sleep apnea.  Last seen in clinic 4/21/2025 by OLMAN Mejia.      Past Medical History: ISABELA,obesity, colon cancer, type 2 diabetes, major depressive disorder, memory loss, allergic rhinitis, hypertension, peripheral vascular disease.     Vitals:  /62 (BP Location: Left arm, Patient Position: Sitting, BP Cuff Size: Adult)   Pulse 78   Resp 16   Ht 1.549 m (5' 1\")   Wt 78.5 kg (173 lb)   SpO2 98% BMI of 32.69 kg/m².    Recent Imaging: None    Echocardiogram obtained 11/13/2019 demonstrated normal left ventricular chamber size, wall thickness, systolic and diastolic function. LVEF estimated 55%. Normal right ventricular size and function. Trace mitral regurgitation, mild tricuspid regurgitation with estimated RVSP of 30 mmHg.     Currently using  Resmed auto CPAP @5-15 cm H20 pressure; compliance reviewed for 6/25/2025 through 7/24/2025, days used 30/30, average daily usage 6 hours 20 minutes, 90% of days greater than or equal to 4 hours, mask leak at 55.6 LPM at 95th percentile, AHI 5.7 per hour.  Severe mask leak noted.  See media for full report.    Device obtained July 2020  DME provider [employer]   Mask interface F20 small fullface mask    Overnight home sleep test obtained 6/12/2020 demonstrated findings consistent with severe obstructive sleep apnea with FRANCOIS of 87.9 events per hour increasing to 109.7 with supine sleep. Low O2 sat of 79% with sats less than 89% for 155 minutes of flow evaluated time. Recommendation was made for in-lab titration due to severity of sleep apnea and persistent nocturnal hypoxia. Patient proceeded with auto CPAP trial.     Sleep schedule goes to bed 1-1:30 AM, wakens 11 AM , and gets up during the night bathroom x 1 "   Symptoms denies day time somnolence and denies morning headache, states benefit with treatment    Maryneal Sleepiness Scale 21/24 on 6/1/2020      Review of Systems   Constitutional:  Positive for malaise/fatigue (mild to moderate). Negative for chills, fever and weight loss.   HENT:  Negative for congestion, hearing loss, nosebleeds, sinus pain, sore throat and tinnitus.    Respiratory:  Positive for cough (occasional). Negative for sputum production, shortness of breath and wheezing.    Cardiovascular:  Negative for chest pain, palpitations, orthopnea and leg swelling.   Gastrointestinal:  Positive for heartburn (occasional).        No dentures, missing 2-3 molars, some swallowing issues    Neurological:  Positive for dizziness (occasional vertigo). Negative for tremors and headaches.   Psychiatric/Behavioral:  The patient does not have insomnia.        Past Medical History[1]    Past Surgical History[2]    Family History   Problem Relation Age of Onset    Breast Cancer Paternal Aunt     Cancer Paternal Aunt     Cancer Paternal Aunt         brain    Cancer Paternal Uncle         brain tumor    Colorectal Cancer Paternal Grandmother     Cancer Paternal Grandmother     Sleep Apnea Neg Hx     Ovarian Cancer Neg Hx     Tubal Cancer Neg Hx     Peritoneal Cancer Neg Hx        Social History     Socioeconomic History    Marital status:      Spouse name: Not on file    Number of children: Not on file    Years of education: Not on file    Highest education level: Bachelor's degree (e.g., BA, AB, BS)   Occupational History    Not on file   Tobacco Use    Smoking status: Never     Passive exposure: Never    Smokeless tobacco: Never   Vaping Use    Vaping status: Never Used   Substance and Sexual Activity    Alcohol use: No     Alcohol/week: 0.0 oz    Drug use: No    Sexual activity: Not on file   Other Topics Concern    Not on file   Social History Narrative    Not on file     Social Drivers of Health     Financial  Resource Strain: High Risk (3/17/2024)    Overall Financial Resource Strain (CARDIA)     Difficulty of Paying Living Expenses: Hard   Food Insecurity: No Food Insecurity (3/17/2024)    Hunger Vital Sign     Worried About Running Out of Food in the Last Year: Never true     Ran Out of Food in the Last Year: Never true   Transportation Needs: No Transportation Needs (3/17/2024)    PRAPARE - Transportation     Lack of Transportation (Medical): No     Lack of Transportation (Non-Medical): No   Physical Activity: Patient Declined (3/17/2024)    Exercise Vital Sign     Days of Exercise per Week: Patient declined     Minutes of Exercise per Session: Patient declined   Stress: Stress Concern Present (3/17/2024)    [place] [employer] - Occupational Stress Questionnaire     Feeling of Stress : To some extent   Social Connections: Socially Integrated (3/17/2024)    Social Connection and Isolation Panel [NHANES]     Frequency of Communication with Friends and Family: Three times a week     Frequency of Social Gatherings with Friends and Family: Three times a week     Attends [employer] Services: More than 4 times per year     Active Member of Clubs or Organizations: Yes     Attends Club or Organization Meetings: More than 4 times per year     Marital Status:    Intimate Partner Violence: Not on file   Housing Stability: Low Risk  (3/17/2024)    Housing Stability Vital Sign     Unable to Pay for Housing in the Last Year: No     Number of Places Lived in the Last Year: 1     Unstable Housing in the Last Year: No       Allergies as of 07/25/2025 - Reviewed 07/25/2025   Allergen Reaction Noted    Other environmental Runny Nose and Itching 03/19/2024          Current medications as of today Current Medications[3]      Physical Exam:   Gen:           Alert and oriented, No apparent distress. Mood and affect appropriate, normal interaction with examiner.   Hearing:     Grossly intact.  Nose:           Normal, no lesions or deformities.  Dentition:    fair dentition.   Oropharynx:   Tongue normal, posterior pharynx without erythema or exudate.  Mallampati Classification: II  Neck:        Supple, trachea midline, no masses.  Respiratory Effort: No intercostal retractions or use of accessory muscles.   Gait and Station: Grossly normal.  Digits and Nails: No clubbing, cyanosis, petechiae, or nodes.   Skin:        No rashes, lesions or ulcers noted.               Ext:           No cyanosis or edema.      Immunizations:  Flu: 10/27/2024  PCV 20: 10/7/2022  SARS CoV2 Vaccine: 10/27/2024, 5/19/2022, 11/2/2021, 3/25/2021, 2/27/2021    Assessment / Plan:  1. ISABELA (obstructive sleep apnea)    Reviewed compliance now that patient has been able to get supplies.  Consider hybrid mask as mask leak noted in addition to area of minor irritation.  May need alternate mask versus gel pad use.  Mask should be fingertip tight and not too tight.  Patient was reminded to use distilled water only, reviewed equipment cleaning and equipment replacement schedule.  Patient to follow-up in 3 months to reassess.      Follow-up:   Return in about 3 months (around 10/25/2025) for Return with Marielos De PA-C.    Please note that this dictation was created using voice recognition software. I have made every reasonable attempt to correct obvious errors, but it is possible there are errors of grammar and possibly content that I did not discover before finalizing the note.           [1]   Past Medical History:  Diagnosis Date    Arthritis     left knee    Back pain     Bowel habit changes     constipation/ occasional diarrhea    Breath shortness     with exertion     Cancer (HCC) 2016    colon (chemo)    Cancer of sigmoid colon (HCC) 12/07/2016    Cataract     no sx    Colon cancer (HCC)     Dental disorder     missing teeth    Depression     Diabetes (HCC)     oral meds    H/O seasonal allergies     Heart burn     occasional    Hemorrhagic  disorder (HCC)     post tooth extraction    High cholesterol     Hypertension     3/19/2024 pt reports she does not have hypertension    Kidney stone     Mixed hyperlipidemia 10/29/2019    Obesity 10/29/2019    Pain 2016    left wrist and knee, 3-4/10    Peripheral vascular disease (HCC)     Pre-diabetes 10/29/2019    Psychiatric problem     anxiety    Renal disorder     hx of kidney stones     Sleep apnea     CPAP    Snoring     first step of sleep study completed, pt awaiting overnight sleep study     Urinary incontinence    [2]   Past Surgical History:  Procedure Laterality Date    ARTHROPLASTY, KNEE, ROBOT-ASSISTED Left 04/15/2024    Procedure: ROBOTIC LEFT TOTAL KNEE ARTHROPLASTY;  Surgeon: Samy Abrams M.D.;  Location: SURGERY [facility];  Service: Ortho Robotic    ANGIOGRAM      pt reports no cardiac issues    OTHER      port removal     LOW ANTERIOR RESECTION LAPAROSCOPIC  2016    Procedure: LOW ANTERIOR RESECTION LAPAROSCOPIC;  Surgeon: Enoch Shaw M.D.;  Location: SURGERY [facility];  Service:     ORIF, WRIST Left 2016    HYSTERECTOMY, VAGINAL      hysterectomy    VEIN STRIPPING      varicose vein stripping    PRIMARY C SECTION      repeat    [3]   Current Outpatient Medications   Medication Sig Dispense Refill    Turmeric (QC TUMERIC COMPLEX PO) Take  by mouth.      hydroxyurea (HYDREA) 500 MG Cap Take 2 Capsules by mouth every day. 180 Capsule 0    Ashwagandha (ASHWAGANDHA 35) 120 MG Cap Ashwagandha      aspirin 81 MG EC tablet 1 (one) time each day at the same time.      cetirizine (ZYRTEC) 10 MG Tab Oral      meloxicam (MOBIC) 7.5 MG Tab Take 1 Tablet by mouth every day. 30 Tablet 3    valACYclovir (VALTREX) 500 MG Tab Take 1 Tablet by mouth 2 times a day as needed (take 500 mg twice daily for 3 days for herpes flares). 3 day course 30 Tablet 1    Omega-3 Fatty Acids (FISH OIL PO) Take  by mouth.      citalopram (CELEXA) 40 MG Tab Take 1 Tablet by  mouth at bedtime. 90 Tablet 2    estradiol (ESTRACE) 0.1 MG/GM vaginal cream 500 mg to 1 g one to three times per week 42.5 g 6    metformin (GLUCOPHAGE) 1000 MG tablet Take 1 Tablet by mouth 2 times a day with meals. 180 Tablet 2    Glucosamine-Chondroitin (MOVE FREE PO) Take  by mouth every day.      melatonin 1 mg Tab Take 5 mg by mouth at bedtime as needed.      Acetaminophen (TYLENOL ARTHRITIS PAIN PO) Take  by mouth as needed.      Cinnamon 500 MG Cap Take  by mouth every day.      Multiple Vitamins-Minerals (CENTRUM SILVER 50+WOMEN) Tab Take  by mouth.      atorvastatin (LIPITOR) 40 MG Tab Oral (Patient not taking: Reported on 6/19/2025)      montelukast (SINGULAIR) 10 MG Tab take 1 tablet (10 mg) by oral route once daily in the evening Oral 1 (Patient not taking: Reported on 6/19/2025)      docusate sodium (COLACE) 100 MG Cap Take 1 Capsule by mouth 2 times a day as needed for Constipation (2-3 times daily PRN). 30 Capsule 0     No current facility-administered medications for this visit.

## 2025-07-25 NOTE — PATIENT INSTRUCTIONS
1-has been able to get supplies  2-consider hybrid mask  3-mask leak noted and area of minor irritation  4-may need alternate mask vs gel pad use   5-mask should be finger tip tight but not too tight   6-As a reminder use distilled water only in humidifier chamber.  Fresh fill daily  7-Today we reviewed equipment cleaning  once weekly minimum  mask, tubing and water chamber  use dedicated container  use mild soap and water  SoClean or other ozone  are not recommended  white vinegar and water solution is no longer recommended  hang tubing to dry  mask sanitizing wipes are an option for use   8-Equipment replacement schedule : Mask cushion every month, Head gear every 6 months, Tubing every 3 months, Ultra-fine filters 2 times per month, Humidifier chamber every 6 months   -follow up; in 3 months

## 2025-07-29 DIAGNOSIS — E11.9 TYPE 2 DIABETES MELLITUS WITHOUT COMPLICATION, WITHOUT LONG-TERM CURRENT USE OF INSULIN (HCC): ICD-10-CM

## 2025-07-29 DIAGNOSIS — F32.5 MAJOR DEPRESSIVE DISORDER IN FULL REMISSION, UNSPECIFIED WHETHER RECURRENT (HCC): ICD-10-CM

## 2025-07-30 RX ORDER — CITALOPRAM HYDROBROMIDE 40 MG/1
40 TABLET ORAL
Qty: 90 TABLET | Refills: 2 | Status: SHIPPED | OUTPATIENT
Start: 2025-07-30

## 2025-08-07 ENCOUNTER — HOSPITAL ENCOUNTER (OUTPATIENT)
Dept: RADIOLOGY | Facility: MEDICAL CENTER | Age: 70
End: 2025-08-07
Attending: STUDENT IN AN ORGANIZED HEALTH CARE EDUCATION/TRAINING PROGRAM
Payer: MEDICARE

## 2025-08-07 DIAGNOSIS — Z12.31 ENCOUNTER FOR SCREENING MAMMOGRAM FOR BREAST CANCER: ICD-10-CM

## 2025-08-21 ENCOUNTER — APPOINTMENT (OUTPATIENT)
Dept: MEDICAL GROUP | Facility: MEDICAL CENTER | Age: 70
End: 2025-08-21
Payer: MEDICARE

## 2025-08-30 ENCOUNTER — HOSPITAL ENCOUNTER (OUTPATIENT)
Dept: LAB | Facility: MEDICAL CENTER | Age: 70
End: 2025-08-30
Attending: STUDENT IN AN ORGANIZED HEALTH CARE EDUCATION/TRAINING PROGRAM
Payer: MEDICARE

## 2025-08-30 DIAGNOSIS — E11.69 TYPE 2 DIABETES MELLITUS WITH OTHER SPECIFIED COMPLICATION, WITHOUT LONG-TERM CURRENT USE OF INSULIN (HCC): ICD-10-CM

## 2025-08-30 DIAGNOSIS — E66.9 OBESITY (BMI 30-39.9): ICD-10-CM

## 2025-08-30 DIAGNOSIS — E78.2 MIXED HYPERLIPIDEMIA: ICD-10-CM

## 2025-08-30 LAB
ALBUMIN SERPL BCP-MCNC: 4.1 G/DL (ref 3.2–4.9)
ALBUMIN/GLOB SERPL: 1.5 G/DL
ALP SERPL-CCNC: 97 U/L (ref 30–99)
ALT SERPL-CCNC: 15 U/L (ref 2–50)
ANION GAP SERPL CALC-SCNC: 12 MMOL/L (ref 7–16)
AST SERPL-CCNC: 19 U/L (ref 12–45)
BILIRUB SERPL-MCNC: 0.5 MG/DL (ref 0.1–1.5)
BUN SERPL-MCNC: 18 MG/DL (ref 8–22)
CALCIUM ALBUM COR SERPL-MCNC: 9.5 MG/DL (ref 8.5–10.5)
CALCIUM SERPL-MCNC: 9.6 MG/DL (ref 8.5–10.5)
CHLORIDE SERPL-SCNC: 103 MMOL/L (ref 96–112)
CHOLEST SERPL-MCNC: 162 MG/DL (ref 100–199)
CO2 SERPL-SCNC: 23 MMOL/L (ref 20–33)
CREAT SERPL-MCNC: 0.83 MG/DL (ref 0.5–1.4)
EST. AVERAGE GLUCOSE BLD GHB EST-MCNC: 137 MG/DL
GFR SERPLBLD CREATININE-BSD FMLA CKD-EPI: 76 ML/MIN/1.73 M 2
GLOBULIN SER CALC-MCNC: 2.7 G/DL (ref 1.9–3.5)
GLUCOSE SERPL-MCNC: 141 MG/DL (ref 65–99)
HBA1C MFR BLD: 6.4 % (ref 4–5.6)
HDLC SERPL-MCNC: 47 MG/DL
LDLC SERPL CALC-MCNC: 93 MG/DL
POTASSIUM SERPL-SCNC: 4.3 MMOL/L (ref 3.6–5.5)
PROT SERPL-MCNC: 6.8 G/DL (ref 6–8.2)
SODIUM SERPL-SCNC: 138 MMOL/L (ref 135–145)
TRIGL SERPL-MCNC: 111 MG/DL (ref 0–149)
TSH SERPL DL<=0.005 MIU/L-ACNC: 1.54 UIU/ML (ref 0.38–5.33)

## 2025-08-30 PROCEDURE — 36415 COLL VENOUS BLD VENIPUNCTURE: CPT

## 2025-08-30 PROCEDURE — 84443 ASSAY THYROID STIM HORMONE: CPT

## 2025-08-30 PROCEDURE — 80061 LIPID PANEL: CPT

## 2025-08-30 PROCEDURE — 83036 HEMOGLOBIN GLYCOSYLATED A1C: CPT

## 2025-08-30 PROCEDURE — 80053 COMPREHEN METABOLIC PANEL: CPT

## 2025-09-04 ENCOUNTER — APPOINTMENT (OUTPATIENT)
Dept: MEDICAL GROUP | Facility: MEDICAL CENTER | Age: 70
End: 2025-09-04
Payer: MEDICARE

## (undated) DEVICE — MIXER BONE CEMENT REVOLUTION - W/FEMORAL PRESSURIZER (6/CA)

## (undated) DEVICE — SET EXTENSION WITH 2 PORTS (48EA/CA) ***PART #2C8610 IS A SUBSTITUTE*****

## (undated) DEVICE — LACTATED RINGERS INJ 1000 ML - (14EA/CA 60CA/PF)

## (undated) DEVICE — Device

## (undated) DEVICE — HUMID-VENT HEAT AND MOISTURE EXCHANGE- (50/BX)

## (undated) DEVICE — SUCTION INSTRUMENT YANKAUER BULBOUS TIP W/O VENT (50EA/CA)

## (undated) DEVICE — GLOVE BIOGEL SZ 8 SURGICAL PF LTX - (50PR/BX 4BX/CA)

## (undated) DEVICE — SODIUM CHL. IRRIGATION 0.9% 3000ML (4EA/CA 65CA/PF)

## (undated) DEVICE — SODIUM CHL IRRIGATION 0.9% 1000ML (12EA/CA)

## (undated) DEVICE — GLOVE BIOGEL SZ 7.5 SURGICAL PF LTX - (50PR/BX 4BX/CA)

## (undated) DEVICE — TIP INTPLS HFLO ML ORFC BTRY - (12/CS)  FOR SURGILAV

## (undated) DEVICE — PACK TOTAL KNEE  (1/CA)

## (undated) DEVICE — TUBING CLEARLINK DUO-VENT - C-FLO (48EA/CA)

## (undated) DEVICE — GOWN WARMING STANDARD FLEX - (30/CA)

## (undated) DEVICE — SUTURE 3-0 MONOCRYL PLUS PS-1 - 27 INCH (36/BX)

## (undated) DEVICE — KIT DRAPE RIO ONE PIECE WITH POCKETS (1EA)

## (undated) DEVICE — DISPOSABLE WOUND VAC PICO 10 X 30 CM - WOUND CARE (3/CA)

## (undated) DEVICE — PIN FIXATION BONE STERILE 3.2MM X 110MM (2EA/PK)

## (undated) DEVICE — DRESSING STERILE BURN ACTICOAT FLEX 7 (50EA/CA)

## (undated) DEVICE — SENSOR OXIMETER ADULT SPO2 RD SET (20EA/BX)

## (undated) DEVICE — PAD PREP 24 X 48 CUFFED - (100/CA)

## (undated) DEVICE — PIN FIXATION BONE STERILE 3.2MM X 140MM (2EA/PK)

## (undated) DEVICE — BLADE SAGITTAL 34MM

## (undated) DEVICE — SYSTEM NAVIGATION VIZADISC KNEE PROCEDURE TRACKING KIT (1EA)

## (undated) DEVICE — CANISTER SUCTION RIGID RED 1500CC (40EA/CA)

## (undated) DEVICE — STAPLER SKIN DISP - (6/BX 10BX/CA) VISISTAT

## (undated) DEVICE — SUTURE GENERAL

## (undated) DEVICE — TOWEL STOP TIMEOUT SAFETY FLAG (40EA/CA)

## (undated) DEVICE — SUTURE 2-0 VICRYL PLUS CT-1 - 8 X 18 INCH(12/BX)

## (undated) DEVICE — HANDPIECE 10FT INTPLS SCT PLS IRRIGATION HAND CONTROL SET (6/PK)

## (undated) DEVICE — GLOVE BIOGEL PI INDICATOR SZ 7.5 SURGICAL PF LF -(50/BX 4BX/CA)